# Patient Record
Sex: MALE | Race: WHITE | NOT HISPANIC OR LATINO | Employment: OTHER | URBAN - METROPOLITAN AREA
[De-identification: names, ages, dates, MRNs, and addresses within clinical notes are randomized per-mention and may not be internally consistent; named-entity substitution may affect disease eponyms.]

---

## 2017-01-01 ENCOUNTER — HOSPITAL ENCOUNTER (EMERGENCY)
Facility: HOSPITAL | Age: 77
Discharge: HOME | End: 2017-01-01
Payer: MEDICARE

## 2017-01-01 ENCOUNTER — NURSE TRIAGE (OUTPATIENT)
Dept: ADMINISTRATIVE | Facility: CLINIC | Age: 77
End: 2017-01-01

## 2017-01-01 VITALS
WEIGHT: 180 LBS | OXYGEN SATURATION: 95 % | TEMPERATURE: 99 F | BODY MASS INDEX: 29.99 KG/M2 | HEIGHT: 65 IN | RESPIRATION RATE: 17 BRPM | DIASTOLIC BLOOD PRESSURE: 57 MMHG | SYSTOLIC BLOOD PRESSURE: 124 MMHG | HEART RATE: 78 BPM

## 2017-01-01 DIAGNOSIS — J18.9 PNEUMONIA OF LEFT LOWER LOBE DUE TO INFECTIOUS ORGANISM: Primary | ICD-10-CM

## 2017-01-01 DIAGNOSIS — R05.9 COUGH: ICD-10-CM

## 2017-01-01 DIAGNOSIS — R51.9 HEADACHE: ICD-10-CM

## 2017-01-01 LAB
ALBUMIN SERPL BCP-MCNC: 3.5 G/DL
ALLENS TEST: ABNORMAL
ALP SERPL-CCNC: 78 U/L
ALT SERPL W/O P-5'-P-CCNC: 28 U/L
ANION GAP SERPL CALC-SCNC: 12 MMOL/L
AST SERPL-CCNC: 43 U/L
BASOPHILS # BLD AUTO: 0.03 K/UL
BASOPHILS NFR BLD: 0.2 %
BILIRUB SERPL-MCNC: 1.2 MG/DL
BILIRUB UR QL STRIP: NEGATIVE
BNP SERPL-MCNC: 91 PG/ML
BUN SERPL-MCNC: 11 MG/DL
CALCIUM SERPL-MCNC: 8.9 MG/DL
CHLORIDE SERPL-SCNC: 103 MMOL/L
CLARITY UR: CLEAR
CO2 SERPL-SCNC: 19 MMOL/L
COLOR UR: YELLOW
CREAT SERPL-MCNC: 1.1 MG/DL
DELSYS: ABNORMAL
DIFFERENTIAL METHOD: ABNORMAL
EOSINOPHIL # BLD AUTO: 0.1 K/UL
EOSINOPHIL NFR BLD: 0.4 %
ERYTHROCYTE [DISTWIDTH] IN BLOOD BY AUTOMATED COUNT: 14 %
EST. GFR  (AFRICAN AMERICAN): >60 ML/MIN/1.73 M^2
EST. GFR  (NON AFRICAN AMERICAN): >60 ML/MIN/1.73 M^2
FLOW: 2
FLUAV AG SPEC QL IA: NEGATIVE
FLUBV AG SPEC QL IA: NEGATIVE
GLUCOSE SERPL-MCNC: 110 MG/DL
GLUCOSE UR QL STRIP: NEGATIVE
HCO3 UR-SCNC: 22.8 MMOL/L (ref 24–28)
HCT VFR BLD AUTO: 43.3 %
HGB BLD-MCNC: 14.9 G/DL
HGB UR QL STRIP: ABNORMAL
INR PPP: 1.1
KETONES UR QL STRIP: NEGATIVE
LACTATE SERPL-SCNC: 1.6 MMOL/L
LEUKOCYTE ESTERASE UR QL STRIP: NEGATIVE
LYMPHOCYTES # BLD AUTO: 0.6 K/UL
LYMPHOCYTES NFR BLD: 4.6 %
MCH RBC QN AUTO: 32.3 PG
MCHC RBC AUTO-ENTMCNC: 34.4 %
MCV RBC AUTO: 94 FL
MICROSCOPIC COMMENT: NORMAL
MODE: ABNORMAL
MONOCYTES # BLD AUTO: 1.1 K/UL
MONOCYTES NFR BLD: 8.5 %
NEUTROPHILS # BLD AUTO: 11.2 K/UL
NEUTROPHILS NFR BLD: 86.3 %
NITRITE UR QL STRIP: NEGATIVE
PCO2 BLDA: 35.8 MMHG (ref 35–45)
PH SMN: 7.41 [PH] (ref 7.35–7.45)
PH UR STRIP: 6 [PH] (ref 5–8)
PLATELET # BLD AUTO: 191 K/UL
PMV BLD AUTO: 9.2 FL
PO2 BLDA: 63 MMHG (ref 80–100)
POC BE: -2 MMOL/L
POC SATURATED O2: 92 % (ref 95–100)
POTASSIUM SERPL-SCNC: 4.9 MMOL/L
PROT SERPL-MCNC: 7.5 G/DL
PROT UR QL STRIP: NEGATIVE
PROTHROMBIN TIME: 11.1 SEC
RBC # BLD AUTO: 4.62 M/UL
RBC #/AREA URNS HPF: 1 /HPF (ref 0–4)
SAMPLE: ABNORMAL
SITE: ABNORMAL
SODIUM SERPL-SCNC: 134 MMOL/L
SP GR UR STRIP: <=1.005 (ref 1–1.03)
SPECIMEN SOURCE: NORMAL
SQUAMOUS #/AREA URNS HPF: 1 /HPF
TROPONIN I SERPL DL<=0.01 NG/ML-MCNC: <0.006 NG/ML
URN SPEC COLLECT METH UR: ABNORMAL
UROBILINOGEN UR STRIP-ACNC: NEGATIVE EU/DL
WBC # BLD AUTO: 12.97 K/UL

## 2017-01-01 PROCEDURE — 63600175 PHARM REV CODE 636 W HCPCS: Performed by: PHYSICIAN ASSISTANT

## 2017-01-01 PROCEDURE — 94640 AIRWAY INHALATION TREATMENT: CPT

## 2017-01-01 PROCEDURE — 81000 URINALYSIS NONAUTO W/SCOPE: CPT

## 2017-01-01 PROCEDURE — 83880 ASSAY OF NATRIURETIC PEPTIDE: CPT

## 2017-01-01 PROCEDURE — 87400 INFLUENZA A/B EACH AG IA: CPT

## 2017-01-01 PROCEDURE — 25000003 PHARM REV CODE 250: Performed by: PHYSICIAN ASSISTANT

## 2017-01-01 PROCEDURE — 80053 COMPREHEN METABOLIC PANEL: CPT

## 2017-01-01 PROCEDURE — 85025 COMPLETE CBC W/AUTO DIFF WBC: CPT

## 2017-01-01 PROCEDURE — 99900035 HC TECH TIME PER 15 MIN (STAT)

## 2017-01-01 PROCEDURE — 84484 ASSAY OF TROPONIN QUANT: CPT

## 2017-01-01 PROCEDURE — 87040 BLOOD CULTURE FOR BACTERIA: CPT

## 2017-01-01 PROCEDURE — 96361 HYDRATE IV INFUSION ADD-ON: CPT

## 2017-01-01 PROCEDURE — 93005 ELECTROCARDIOGRAM TRACING: CPT

## 2017-01-01 PROCEDURE — 96365 THER/PROPH/DIAG IV INF INIT: CPT

## 2017-01-01 PROCEDURE — 85610 PROTHROMBIN TIME: CPT

## 2017-01-01 PROCEDURE — 25000242 PHARM REV CODE 250 ALT 637 W/ HCPCS: Performed by: PHYSICIAN ASSISTANT

## 2017-01-01 PROCEDURE — 99284 EMERGENCY DEPT VISIT MOD MDM: CPT | Mod: 25

## 2017-01-01 PROCEDURE — 83605 ASSAY OF LACTIC ACID: CPT

## 2017-01-01 PROCEDURE — 93010 ELECTROCARDIOGRAM REPORT: CPT | Mod: ,,, | Performed by: INTERNAL MEDICINE

## 2017-01-01 RX ORDER — MOXIFLOXACIN HYDROCHLORIDE 400 MG/1
400 TABLET ORAL DAILY
Qty: 10 TABLET | Refills: 0 | Status: SHIPPED | OUTPATIENT
Start: 2017-01-01 | End: 2017-01-03 | Stop reason: ALTCHOICE

## 2017-01-01 RX ORDER — IBUPROFEN 800 MG/1
800 TABLET ORAL EVERY 6 HOURS PRN
Qty: 20 TABLET | Refills: 0 | Status: SHIPPED | OUTPATIENT
Start: 2017-01-01 | End: 2017-02-02

## 2017-01-01 RX ORDER — MOXIFLOXACIN HYDROCHLORIDE 400 MG/250ML
400 INJECTION, SOLUTION INTRAVENOUS
Status: COMPLETED | OUTPATIENT
Start: 2017-01-01 | End: 2017-01-01

## 2017-01-01 RX ORDER — IPRATROPIUM BROMIDE AND ALBUTEROL SULFATE 2.5; .5 MG/3ML; MG/3ML
3 SOLUTION RESPIRATORY (INHALATION)
Status: COMPLETED | OUTPATIENT
Start: 2017-01-01 | End: 2017-01-01

## 2017-01-01 RX ADMIN — SODIUM CHLORIDE 1000 ML: 0.9 INJECTION, SOLUTION INTRAVENOUS at 03:01

## 2017-01-01 RX ADMIN — MOXIFLOXACIN HYDROCHLORIDE 400 MG: 400 INJECTION, SOLUTION INTRAVENOUS at 05:01

## 2017-01-01 RX ADMIN — IPRATROPIUM BROMIDE AND ALBUTEROL SULFATE 3 ML: .5; 3 SOLUTION RESPIRATORY (INHALATION) at 03:01

## 2017-01-01 NOTE — ED NOTES
Bed rails are up and call light is within patient reach. Spouse at bedside, will continue to monitor

## 2017-01-01 NOTE — ED NOTES
Bed rails are up and call light is within patient reach. Spouse at bedside, will continue to monitor and assist.

## 2017-01-01 NOTE — ED AVS SNAPSHOT
[facility] -   [address] [address]               Doron Domínguez   2017  2:32 PM   ED    Description:  Male : 1940   Department:  [facility]           Your Care was Coordinated By:     Provider Role From To    SCARLET Boyer Physician Assistant 17 1980 --      Reason for Visit     Headache           Diagnoses this Visit        Comments    Pneumonia of left lower lobe due to infectious organism    -  Primary     Cough         Headache           ED Disposition     None           To Do List           Follow-up Information     Follow up with Dante Massey MD. Go in 2 days.    Specialty:  Pulmonary Disease    Contact information:    [address]  [facility] [address] [phone]        [facility]     [facility] -  Assistance  Registered nurses in the [facility] provide clinical advisement, health education, appointment booking, and other advisory services.  Call for this free service at [phone].             Medications           Message regarding Medications     Verify the changes and/or additions to your medication regime listed below are the same as discussed with your clinician today.  If any of these changes or additions are incorrect, please notify your healthcare provider.        These medications were administered today        Dose Freq    sodium chloride 0.9% bolus 1,000 mL 1,000 mL ED 1 Time    Sig: Inject 1,000 mLs into the vein ED 1 Time.    Class: Normal    Route: Intravenous    Cosign for Ordering: Accepted by David Padilla MD on 2017  2:49 PM    albuterol-ipratropium 2.5mg-0.5mg/3mL nebulizer solution 3 mL 3 mL ED 1 Time    Sig: Take 3 mLs by nebulization ED 1 Time.    Class: Normal    Route: Nebulization    Cosign for Ordering: Accepted by David Padilla MD on 2017  2:49 PM    moxifloxacin 400 mg/250 mL IVPB 400 mg 400 mg ED 1 Time  "   Sig: Inject 250 mLs (400 mg total) into the vein ED 1 Time.    Class: Normal    Route: Intravenous    Cosign for Ordering: Accepted by David Padilla MD on 1/1/2017  4:28 PM           Verify that the below list of medications is an accurate representation of the medications you are currently taking.  If none reported, the list may be blank. If incorrect, please contact your healthcare provider. Carry this list with you in case of emergency.           Current Medications     albuterol 90 mcg/actuation inhaler Inhale 2 puffs into the lungs every 4 (four) hours as needed for Wheezing.    albuterol-ipratropium 2.5mg-0.5mg/3mL (DUO-NEB) 0.5 mg-3 mg(2.5 mg base)/3 mL nebulizer solution Take 3 mLs by nebulization every 6 (six) hours.    atorvastatin (LIPITOR) 20 MG tablet Take 20 mg by mouth every evening.     budesonide (PULMICORT) 0.5 mg/2 mL nebulizer solution Take 2 mLs (0.5 mg total) by nebulization 2 (two) times daily. Wash out mouth after using.    glucosamine-chondroitin 500-400 mg tablet Take 1 tablet by mouth 2 (two) times daily.     inhalation device (AEROCHAMBER PLUS FLOW-VU) Use as directed for inhalation.    multivitamin with minerals tablet Take 1 tablet by mouth once daily.    potassium gluconate 595 mg (99 mg) Tab Take by mouth.    tamsulosin (FLOMAX) 0.4 mg Cp24 Take 0.4 mg by mouth once daily.     albuterol-ipratropium 2.5mg-0.5mg/3mL nebulizer solution 3 mL Take 3 mLs by nebulization ED 1 Time.    metoprolol succinate (TOPROL-XL) 25 MG 24 hr tablet Take 25 mg by mouth once daily.     moxifloxacin 400 mg/250 mL IVPB 400 mg Inject 250 mLs (400 mg total) into the vein ED 1 Time.           Clinical Reference Information           Your Vitals Were     BP Pulse Temp Resp Height Weight    122/59 77 98.7 °F (37.1 °C) (Oral) 19 5' 5" (1.651 m) 81.6 kg (180 lb)    SpO2 BMI             94% 29.95 kg/m2         Allergies as of 1/1/2017        Reactions    Bactrim [Sulfamethoxazole-trimethoprim] Other (See " Comments)    Severe leg cramps, dizziness      Immunizations Administered on Date of Encounter - 1/1/2017     None      ED Micro, Lab, POCT     Start Ordered       Status Ordering Provider    01/01/17 1711 01/01/17 1711  ISTAT PROCEDURE  Once      Final result     01/01/17 1628 01/01/17 1627  Blood Culture #1 **CANNOT BE ORDERED STAT**  Once      In process     01/01/17 1628 01/01/17 1627  Blood Culture #2 **CANNOT BE ORDERED STAT**  Once      Acknowledged     01/01/17 1532 01/01/17 1532  Influenza antigen Nasopharyngeal Swab  Once      Final result     01/01/17 1448 01/01/17 1448  Troponin I  STAT      Final result     01/01/17 1448 01/01/17 1448  Lactic acid, plasma  STAT      Final result     01/01/17 1448 01/01/17 1448  CBC auto differential  STAT      Final result     01/01/17 1448 01/01/17 1448  Comprehensive metabolic panel  STAT      Final result     01/01/17 1448 01/01/17 1448  Brain natriuretic peptide  STAT      Final result     01/01/17 1448 01/01/17 1448  Urinalysis  STAT      Final result     01/01/17 1448 01/01/17 1448  Protime-INR  STAT      Final result     01/01/17 1448 01/01/17 1448  Urinalysis Microscopic  Once      Final result       ED Imaging Orders     Start Ordered       Status Ordering Provider    01/01/17 1448 01/01/17 1448  X-Ray Chest PA And Lateral  1 time imaging      Final result     01/01/17 1448 01/01/17 1448    1 time imaging,   Status:  Canceled      Canceled         Discharge Instructions         Pneumonia (Adult)  Pneumonia is an infection deep within the lungs. It is in the small air sacs (alveoli). Pneumonia may be caused by a virus or bacteria. Pneumonia caused by bacteria is usually treated with an antibiotic. Severe cases may need to be treated in the hospital. Milder cases can be treated at home. Symptoms usually start to get better during the first 2 days of treatment.    Home care  Follow these guidelines when caring for yourself at home:  · Rest at home for the first 2  to 3 days, or until you feel stronger. Dont let yourself get overly tired when you go back to your activities.  · Stay away from cigarette smoke - yours or other peoples.  · You may use acetaminophen or ibuprofen to control fever or pain, unless another medicine was prescribed. If you have chronic liver or kidney disease, talk with your health care provider before using these medicines. Also talk with your provider if youve had a stomach ulcer or GI bleeding. Dont give aspirin to anyone younger than 18 years of age who is ill with a fever. It may cause severe liver damage.  · Your appetite may be poor, so a light diet is fine.  · Drink 6 to 8 glasses of fluids every day to make sure you are getting enough fluids. Beverages can include water, sport drinks, sodas without caffeine, juices, tea, or soup. Fluids will help loosen secretions in the lung. This will make it easier for you to cough up the phlegm (sputum). If you also have heart or kidney disease, check with your health care provider before you drink extra fluids.  · Take antibiotic medicine prescribed until it is all gone, even if you are feeling better after a few days.  Follow-up care  Follow up with your health care provider in the next 2 to 3 days, or as advised. This is to be sure the medicine is helping you get better.  If you are 65 or older, you should get a pneumococcal vaccine and a yearly flu (influenza) shot. You should also get these vaccines if you have chronic lung disease like asthma, emphysema, or COPD. Ask your provider about this.  When to seek medical advice  Call your health care provider right away if any of these occur:  · You dont get better within the first 48 hours of treatment  · Shortness of breath gets worse  · Rapid breathing (more than 25 breaths per minute)  · Coughing up blood  · Chest pain gets worse with breathing  · Fever of 102°F (38°C) or higher that doesnt get better with fever medicine  · Weakness, dizziness, or  fainting that gets worse  · Thirst or dry mouth that gets worse  · Sinus pain, headache, or a stiff neck  · Chest pain not caused by coughing  © 5950-9670 [employer] [address] [place] [address], [place] [address]. All rights reserved. This information is not intended as a substitute for professional medical care. Always follow your healthcare professional's instructions.          Your Scheduled Appointments     Jan 03, 2017  8:00 AM CST   Established Patient Visit with MD [facility] - [facility] ([facility])    [address] [place] [id number]   [phone]            Jan 04, 2017  1:00 PM CST   Consult with Negrito Hernandez MD   [facility] - General Surgery ([facility])    [address] [place] [id number]   [phone]            Jan 04, 2017  3:20 PM CST   Established Patient Visit with MD JUSTA [facility] - Hematology Oncology ([facility])    [address] [place] [id number] [phone]            Mar 29, 2017  3:40 PM CDT   Established Patient Visit with Jaren Shelton MD   [facility] Cardiology ([facility])    [address] [place] [id number] [phone]               [facility] complies with applicable Federal civil rights laws and does not discriminate on the basis of race, color, national origin, age, disability, or sex.        Language Assistance Services     ATTENTION: Language assistance services are available, free of charge. Please call [phone].      ATENCIÓN: Si habla español, tiene a moreno disposición servicios gratuitos de asistencia lingüística. Llame al [phone].     CHÚ Ý: N?u b?n nói Ti?ng Vi?t, có các d?ch v? h? tr? ngôn ng? mi?n phí dành cho b?n. G?i s? [phone].

## 2017-01-01 NOTE — DISCHARGE INSTRUCTIONS
Pneumonia (Adult)  Pneumonia is an infection deep within the lungs. It is in the small air sacs (alveoli). Pneumonia may be caused by a virus or bacteria. Pneumonia caused by bacteria is usually treated with an antibiotic. Severe cases may need to be treated in the hospital. Milder cases can be treated at home. Symptoms usually start to get better during the first 2 days of treatment.    Home care  Follow these guidelines when caring for yourself at home:  · Rest at home for the first 2 to 3 days, or until you feel stronger. Dont let yourself get overly tired when you go back to your activities.  · Stay away from cigarette smoke - yours or other peoples.  · You may use acetaminophen or ibuprofen to control fever or pain, unless another medicine was prescribed. If you have chronic liver or kidney disease, talk with your health care provider before using these medicines. Also talk with your provider if youve had a stomach ulcer or GI bleeding. Dont give aspirin to anyone younger than 18 years of age who is ill with a fever. It may cause severe liver damage.  · Your appetite may be poor, so a light diet is fine.  · Drink 6 to 8 glasses of fluids every day to make sure you are getting enough fluids. Beverages can include water, sport drinks, sodas without caffeine, juices, tea, or soup. Fluids will help loosen secretions in the lung. This will make it easier for you to cough up the phlegm (sputum). If you also have heart or kidney disease, check with your health care provider before you drink extra fluids.  · Take antibiotic medicine prescribed until it is all gone, even if you are feeling better after a few days.  Follow-up care  Follow up with your health care provider in the next 2 to 3 days, or as advised. This is to be sure the medicine is helping you get better.  If you are 65 or older, you should get a pneumococcal vaccine and a yearly flu (influenza) shot. You should also get these vaccines if you have  chronic lung disease like asthma, emphysema, or COPD. Ask your provider about this.  When to seek medical advice  Call your health care provider right away if any of these occur:  · You dont get better within the first 48 hours of treatment  · Shortness of breath gets worse  · Rapid breathing (more than 25 breaths per minute)  · Coughing up blood  · Chest pain gets worse with breathing  · Fever of 102°F (38°C) or higher that doesnt get better with fever medicine  · Weakness, dizziness, or fainting that gets worse  · Thirst or dry mouth that gets worse  · Sinus pain, headache, or a stiff neck  · Chest pain not caused by coughing  © 9243-5476 [employer]. [address] [place] [address], [place] [address]. All rights reserved. This information is not intended as a substitute for professional medical care. Always follow your healthcare professional's instructions.

## 2017-01-01 NOTE — ED PROVIDER NOTES
"Encounter Date: 1/1/2017       History     Chief Complaint   Patient presents with    Headache     Pt states, "I have a bad headache and no appetite, had fever at home and took some tylenol". PT was just diagnosed with lung cancer on 12/23.     Review of patient's allergies indicates:   Allergen Reactions    Bactrim [sulfamethoxazole-trimethoprim] Other (See Comments)     Severe leg cramps, dizziness     Patient is a 76 y.o. male presenting with the following complaint: headaches. The history is provided by the patient.   Headache    This is a new problem. The current episode started today. The problem occurs constantly. The problem has been unchanged. The pain is located in the bilateral region. The pain does not radiate. The pain quality is not similar to prior headaches. The quality of the pain is described as aching. The pain is at a severity of 4/10. Pertinent negatives include no nausea, neck pain, phonophobia, photophobia, scalp tenderness, seizures, sinus pressure, sore throat, swollen glands, tingling, tinnitus or visual change. The symptoms are aggravated by coughing. He has tried acetaminophen for the symptoms. The treatment provided mild relief. His past medical history is significant for cancer.     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  Past Surgical History   Procedure Laterality Date    Coronary artery bypass graft  2012     C[facility]G x 3 at [facility]     History reviewed. No pertinent family history.  Social History   Substance Use Topics    Smoking status: Never Smoker    Smokeless tobacco: Never Used    Alcohol use No     Review of Systems   HENT: Negative for sinus pressure, sore throat and tinnitus.    Eyes: Negative for photophobia. " "  Gastrointestinal: Negative for nausea.   Endocrine: Negative for polydipsia and polyphagia.   Musculoskeletal: Negative for neck pain.   Neurological: Positive for headaches. Negative for tingling and seizures.   Psychiatric/Behavioral: The patient is not nervous/anxious.    All other systems reviewed and are negative.      Physical Exam   Initial Vitals   BP Pulse Resp Temp SpO2   01/01/17 1425 01/01/17 1425 01/01/17 1425 01/01/17 1425 01/01/17 1425   128/68 98 20 98.7 °F (37.1 °C) 92 %     Physical Exam    Nursing note and vitals reviewed.  Constitutional: He appears well-developed and well-nourished.   HENT:   Head: Normocephalic and atraumatic.   Right Ear: External ear normal.   Left Ear: External ear normal.   Nose: Nose normal.   Mouth/Throat: Oropharynx is clear and moist.   Eyes: Conjunctivae and EOM are normal. Pupils are equal, round, and reactive to light.   Neck: Normal range of motion. Neck supple.   Cardiovascular: Normal rate, regular rhythm, normal heart sounds and intact distal pulses.   Pulmonary/Chest: Breath sounds normal. No respiratory distress. He has no wheezes. He has no rhonchi. He has no rales.   Abdominal: Soft. Bowel sounds are normal. He exhibits no distension. There is no tenderness. There is no rebound and no guarding.   Musculoskeletal: Normal range of motion.   Neurological: He is alert and oriented to person, place, and time. He has normal strength.   Skin: Skin is warm and dry.   Psychiatric: He has a normal mood and affect. His behavior is normal. Judgment and thought content normal.         ED Course   Procedures  ED Vital Signs:  Vitals:    01/01/17 1425 01/01/17 1502 01/01/17 1512 01/01/17 1516   BP: 128/68  121/69 120/62   Pulse: 98 88 88 88   Resp: 20 20 20 (!) 24   Temp: 98.7 °F (37.1 °C)      TempSrc: Oral      SpO2: (!) 92% (!) 93% (!) 93% (!) 92%   Weight: 81.6 kg (180 lb)      Height: 5' 5" (1.651 m)       01/01/17 1531 01/01/17 1537 01/01/17 1601 01/01/17 1607   BP: " 106/62  (!) 111/54    Pulse: 87 86 79 81   Resp: 19 (!) 21 14 19   Temp:       TempSrc:       SpO2: (!) 94% 96% 95% 97%   Weight:       Height:        01/01/17 1616 01/01/17 1631 01/01/17 1646   BP: 132/60 (!) 119/58 (!) 122/59   Pulse: 80 79 77   Resp: (!) 21 10 19   Temp:      TempSrc:      SpO2: 95% 96% (!) 94%   Weight:      Height:            Abnormal Lab Results:  Labs Reviewed   CBC W/ AUTO DIFFERENTIAL - Abnormal; Notable for the following:        Result Value    WBC 12.97 (*)     MCH 32.3 (*)     Gran # 11.2 (*)     Lymph # 0.6 (*)     Mono # 1.1 (*)     Gran% 86.3 (*)     Lymph% 4.6 (*)     All other components within normal limits   COMPREHENSIVE METABOLIC PANEL - Abnormal; Notable for the following:     Sodium 134 (*)     CO2 19 (*)     Total Bilirubin 1.2 (*)     AST 43 (*)     All other components within normal limits   URINALYSIS - Abnormal; Notable for the following:     Specific Gravity, UA <=1.005 (*)     Occult Blood UA 1+ (*)     All other components within normal limits   ISTAT PROCEDURE - Abnormal; Notable for the following:     POC PO2 63 (*)     POC HCO3 22.8 (*)     POC SATURATED O2 92 (*)     All other components within normal limits   CULTURE, BLOOD   CULTURE, BLOOD   TROPONIN I   LACTIC ACID, PLASMA   B-TYPE NATRIURETIC PEPTIDE   PROTIME-INR   INFLUENZA A AND B ANTIGEN   URINALYSIS MICROSCOPIC          All Lab Results:  Results for orders placed or performed during the hospital encounter of 01/01/17   Troponin I   Result Value Ref Range    Troponin I <0.006 0.000 - 0.026 ng/mL   Lactic acid, plasma   Result Value Ref Range    Lactate (Lactic Acid) 1.6 0.5 - 2.2 mmol/L   CBC auto differential   Result Value Ref Range    WBC 12.97 (H) 3.90 - 12.70 K/uL    RBC 4.62 4.60 - 6.20 M/uL    Hemoglobin 14.9 14.0 - 18.0 g/dL    Hematocrit 43.3 40.0 - 54.0 %    MCV 94 82 - 98 fL    MCH 32.3 (H) 27.0 - 31.0 pg    MCHC 34.4 32.0 - 36.0 %    RDW 14.0 11.5 - 14.5 %    Platelets 191 150 - 350 K/uL    MPV  9.2 9.2 - 12.9 fL    Gran # 11.2 (H) 1.8 - 7.7 K/uL    Lymph # 0.6 (L) 1.0 - 4.8 K/uL    Mono # 1.1 (H) 0.3 - 1.0 K/uL    Eos # 0.1 0.0 - 0.5 K/uL    Baso # 0.03 0.00 - 0.20 K/uL    Gran% 86.3 (H) 38.0 - 73.0 %    Lymph% 4.6 (L) 18.0 - 48.0 %    Mono% 8.5 4.0 - 15.0 %    Eosinophil% 0.4 0.0 - 8.0 %    Basophil% 0.2 0.0 - 1.9 %    Differential Method Automated    Comprehensive metabolic panel   Result Value Ref Range    Sodium 134 (L) 136 - 145 mmol/L    Potassium 4.9 3.5 - 5.1 mmol/L    Chloride 103 95 - 110 mmol/L    CO2 19 (L) 23 - 29 mmol/L    Glucose 110 70 - 110 mg/dL    BUN, Bld 11 8 - 23 mg/dL    Creatinine 1.1 0.5 - 1.4 mg/dL    Calcium 8.9 8.7 - 10.5 mg/dL    Total Protein 7.5 6.0 - 8.4 g/dL    Albumin 3.5 3.5 - 5.2 g/dL    Total Bilirubin 1.2 (H) 0.1 - 1.0 mg/dL    Alkaline Phosphatase 78 55 - 135 U/L    AST 43 (H) 10 - 40 U/L    ALT 28 10 - 44 U/L    Anion Gap 12 8 - 16 mmol/L    eGFR if African American >60 >60 mL/min/1.73 m^2    eGFR if non African American >60 >60 mL/min/1.73 m^2   Brain natriuretic peptide   Result Value Ref Range    BNP 91 0 - 99 pg/mL   Urinalysis   Result Value Ref Range    Specimen UA Urine, Clean Catch     Color, UA Yellow Yellow, Straw, Lucita    Appearance, UA Clear Clear    pH, UA 6.0 5.0 - 8.0    Specific Gravity, UA <=1.005 (A) 1.005 - 1.030    Protein, UA Negative Negative    Glucose, UA Negative Negative    Ketones, UA Negative Negative    Bilirubin (UA) Negative Negative    Occult Blood UA 1+ (A) Negative    Nitrite, UA Negative Negative    Urobilinogen, UA Negative <2.0 EU/dL    Leukocytes, UA Negative Negative   Protime-INR   Result Value Ref Range    Prothrombin Time 11.1 9.0 - 12.5 sec    INR 1.1 0.8 - 1.2   Influenza antigen Nasopharyngeal Swab   Result Value Ref Range    Influenza A Ag, EIA Negative Negative    Influenza B Ag, EIA Negative Negative    Flu A & B Source Nasopharyngeal Swab    Urinalysis Microscopic   Result Value Ref Range    RBC, UA 1 0 - 4 /hpf     Squam Epithel, UA 1 /hpf    Microscopic Comment SEE COMMENT    ISTAT PROCEDURE   Result Value Ref Range    POC PH 7.412 7.35 - 7.45    POC PCO2 35.8 35 - 45 mmHg    POC PO2 63 (L) 80 - 100 mmHg    POC HCO3 22.8 (L) 24 - 28 mmol/L    POC BE -2 -2 to 2 mmol/L    POC SATURATED O2 92 (L) 95 - 100 %    Sample ARTERIAL     Site RR     Allens Test Pass     DelSys Nasal Can     Mode SPONT     Flow 2            Imaging Results:  Imaging Results         X-Ray Chest PA And Lateral (Final result) Result time:  01/01/17 15:59:13    Final result by Shyam Regan MD (01/01/17 15:59:13)    Impression:         Left lower lobe infiltrate.  Findings appear worse since 12/16/2016.      Electronically signed by: SHYAM REGAN MD  Date:     01/01/17  Time:    15:59     Narrative:    Exam: XR CHEST PA AND LATERAL,    Date:  01/01/17 15:29:59    History: Cough.    Comparison:  12/16/2016        Findings:     Mild hazy increased density left lung base compatible with a left lower lobe infiltrate.  Finding is worse since 12/16/2016.  Right lung clear.  Heart mildly enlarged with postoperative changes.                 The Emergency Provider reviewed the vital signs and test results, which are outlined above.    ED Discussions:    4:25 PM: Pt is refusing head CT at this time. Pt states that he is concerned about the amount of radiation exposure and notes that he recently had PET scan.     5:06 PM: SCARLET Hogan discussed the pt's case with Sultana Guy NP ([facility]) and requested admission for pt. Malena reviewed pt's records, hx, and x-ray at length. Malena discussed pt's case with the Hospital Medicine physician. Malena states that hospital medicine does not feel the need to admit pt and states that pt can be discharged home.                         Clinical Impression:       ICD-10-CM ICD-9-CM   1. Pneumonia of left lower lobe due to infectious organism J18.9 483.8   2. Cough R05 786.2   3. Headache R51 784.0          Disposition:   Disposition: Discharged  Condition: Stable       SCARLET Boyer  01/01/17 1037

## 2017-01-02 ENCOUNTER — TELEPHONE (OUTPATIENT)
Dept: EMERGENCY MEDICINE | Facility: HOSPITAL | Age: 77
End: 2017-01-02

## 2017-01-03 ENCOUNTER — OFFICE VISIT (OUTPATIENT)
Dept: PULMONOLOGY | Facility: CLINIC | Age: 77
End: 2017-01-03
Payer: MEDICARE

## 2017-01-03 VITALS
WEIGHT: 178.38 LBS | OXYGEN SATURATION: 92 % | HEIGHT: 65 IN | BODY MASS INDEX: 29.72 KG/M2 | SYSTOLIC BLOOD PRESSURE: 118 MMHG | RESPIRATION RATE: 18 BRPM | HEART RATE: 88 BPM | DIASTOLIC BLOOD PRESSURE: 70 MMHG

## 2017-01-03 DIAGNOSIS — R05.9 COUGH: ICD-10-CM

## 2017-01-03 DIAGNOSIS — C34.32 MALIGNANT NEOPLASM OF LOWER LOBE OF LEFT LUNG: ICD-10-CM

## 2017-01-03 DIAGNOSIS — J20.8 ACUTE BRONCHITIS DUE TO OTHER SPECIFIED ORGANISMS: Primary | ICD-10-CM

## 2017-01-03 PROCEDURE — 99214 OFFICE O/P EST MOD 30 MIN: CPT | Mod: S$PBB,,, | Performed by: INTERNAL MEDICINE

## 2017-01-03 PROCEDURE — 99213 OFFICE O/P EST LOW 20 MIN: CPT | Mod: PBBFAC | Performed by: INTERNAL MEDICINE

## 2017-01-03 PROCEDURE — 99999 PR PBB SHADOW E&M-EST. PATIENT-LVL III: CPT | Mod: PBBFAC,,, | Performed by: INTERNAL MEDICINE

## 2017-01-03 RX ORDER — LEVOFLOXACIN 500 MG/1
500 TABLET, FILM COATED ORAL DAILY
Qty: 10 TABLET | Refills: 1 | Status: ON HOLD | OUTPATIENT
Start: 2017-01-03 | End: 2017-01-19 | Stop reason: HOSPADM

## 2017-01-03 RX ORDER — PROMETHAZINE HYDROCHLORIDE AND CODEINE PHOSPHATE 6.25; 1 MG/5ML; MG/5ML
5 SOLUTION ORAL EVERY 6 HOURS PRN
Qty: 473 ML | Refills: 1 | Status: ON HOLD | OUTPATIENT
Start: 2017-01-03 | End: 2017-01-19 | Stop reason: HOSPADM

## 2017-01-03 NOTE — PROGRESS NOTES
Subjective:       Patient ID: Doron Domínguez is a 76 y.o. male.    Chief Complaint:   He   presents for evaluation and treatment of lung cancer after being discharged from the hospital  3  days ago. Since discharge symptoms have gradually improving course since that time. Patient denies fever or chills. Symptoms are aggravated by activity. Symptoms improve with rest.  Respiratory: positive for cough and dyspnea on exertion; Cardiovascular: no chest pain or palpitations.  Patient currently is not on home oxygen therapy..    Malignant Neoplasm Of The lower left lobe    HPI  Past Medical History   Diagnosis Date    Aortic stenosis 12/29/2016    BPH (benign prostatic hyperplasia)     CAD (coronary artery disease)     CAD, multiple vessel 12/29/2016    Cancer     High cholesterol     Hx of CABG 12/29/2016    Hypertension      Past Surgical History   Procedure Laterality Date    Coronary artery bypass graft  2012     [facility] at [facility]     Social History     Social History    Marital status:      Spouse name: N/A    Number of children: N/A    Years of education: N/A     Occupational History    Not on file.     Social History Main Topics    Smoking status: Never Smoker    Smokeless tobacco: Never Used    Alcohol use No    Drug use: Not on file    Sexual activity: Not on file     Other Topics Concern    Not on file     Social History Narrative     Review of Systems   Constitutional: Positive for fatigue. Negative for fever.   HENT: Negative for postnasal drip, rhinorrhea and congestion.    Respiratory: Positive for cough, sputum production, shortness of breath, dyspnea on extertion and Paroxysmal Nocturnal Dyspnea. Negative for use of rescue inhaler.    Cardiovascular: Negative for chest pain, palpitations and leg swelling.   Skin: Negative for rash.   Gastrointestinal: Negative for nausea and abdominal pain.   Neurological: Negative for dizziness, syncope, weakness and light-headedness.    Hematological: Negative for adenopathy. Does not bruise/bleed easily.   Psychiatric/Behavioral: Negative for sleep disturbance. The patient is not nervous/anxious.        Objective:      Physical Exam   Constitutional: He is oriented to person, place, and time. He appears well-developed and well-nourished.   HENT:   Head: Normocephalic and atraumatic.   Mouth/Throat: No oropharyngeal exudate.   Eyes: Conjunctivae are normal. Pupils are equal, round, and reactive to light.   Neck: Neck supple. No JVD present. No tracheal deviation present. No thyromegaly present.   Cardiovascular: Normal rate, regular rhythm and normal heart sounds.    No murmur heard.  Pulmonary/Chest: Effort normal. He has decreased breath sounds in the left lower field. He has no wheezes. He has no rhonchi. He has rales in the left lower field.   Abdominal: Soft. Bowel sounds are normal.   Musculoskeletal: Normal range of motion. He exhibits no edema or tenderness.   Lymphadenopathy:     He has no cervical adenopathy.   Neurological: He is alert and oriented to person, place, and time.   Skin: Skin is warm and dry.   Nursing note and vitals reviewed.    Personal Diagnostic Review  Chest x-ray: left lower lobe  PET: left lower lobe uptake    .GMGPFTNEW  No flowsheet data found.      Assessment:       1. Acute bronchitis due to other specified organisms    2. Cough    3. Malignant neoplasm of lower lobe of left lung        Outpatient Encounter Prescriptions as of 1/3/2017   Medication Sig Dispense Refill    albuterol 90 mcg/actuation inhaler Inhale 2 puffs into the lungs every 4 (four) hours as needed for Wheezing. 8.5 g 1    albuterol-ipratropium 2.5mg-0.5mg/3mL (DUO-NEB) 0.5 mg-3 mg(2.5 mg base)/3 mL nebulizer solution Take 3 mLs by nebulization every 6 (six) hours. 120 vial 12    atorvastatin (LIPITOR) 20 MG tablet Take 20 mg by mouth every evening.       budesonide (PULMICORT) 0.5 mg/2 mL nebulizer solution Take 2 mLs (0.5 mg total) by  nebulization 2 (two) times daily. Wash out mouth after using. 120 mL 12    glucosamine-chondroitin 500-400 mg tablet Take 1 tablet by mouth 2 (two) times daily.       ibuprofen (ADVIL,MOTRIN) 800 MG tablet Take 1 tablet (800 mg total) by mouth every 6 (six) hours as needed for Pain. 20 tablet 0    inhalation device (AEROCHAMBER PLUS FLOW-VU) Use as directed for inhalation. 1 Device 0    metoprolol succinate (TOPROL-XL) 25 MG 24 hr tablet Take 25 mg by mouth once daily.       multivitamin with minerals tablet Take 1 tablet by mouth once daily.      potassium gluconate 595 mg (99 mg) Tab Take by mouth.      tamsulosin (FLOMAX) 0.4 mg Cp24 Take 0.4 mg by mouth once daily.       [DISCONTINUED] moxifloxacin (AVELOX) 400 mg tablet Take 1 tablet (400 mg total) by mouth once daily. 10 tablet 0    levoFLOXacin (LEVAQUIN) 500 MG tablet Take 1 tablet (500 mg total) by mouth once daily. 10 tablet 1    promethazine-codeine 6.25-10 mg/5 ml (PHENERGAN WITH CODEINE) 6.25-10 mg/5 mL syrup Take 5 mLs by mouth every 6 (six) hours as needed for Cough. 473 mL 1     No facility-administered encounter medications on file as of 1/3/2017.      No orders of the defined types were placed in this encounter.    Plan:       Requested Prescriptions     Signed Prescriptions Disp Refills    levoFLOXacin (LEVAQUIN) 500 MG tablet 10 tablet 1     Sig: Take 1 tablet (500 mg total) by mouth once daily.    promethazine-codeine 6.25-10 mg/5 ml (PHENERGAN WITH CODEINE) 6.25-10 mg/5 mL syrup 473 mL 1     Sig: Take 5 mLs by mouth every 6 (six) hours as needed for Cough.     Acute bronchitis due to other specified organisms  -     levoFLOXacin (LEVAQUIN) 500 MG tablet; Take 1 tablet (500 mg total) by mouth once daily.  Dispense: 10 tablet; Refill: 1    Cough  -     promethazine-codeine 6.25-10 mg/5 ml (PHENERGAN WITH CODEINE) 6.25-10 mg/5 mL syrup; Take 5 mLs by mouth every 6 (six) hours as needed for Cough.  Dispense: 473 mL; Refill:  1    Malignant neoplasm of lower lobe of left lung           Return if symptoms worsen or fail to improve.

## 2017-01-03 NOTE — MR AVS SNAPSHOT
[facility]  [address]  [place] [address]  Phone: [phone]  Fax: [phone]                  Doron Domínguez   1/3/2017 8:00 AM   Office Visit    Description:  Male : 1940   Provider:  Dante Massey MD   [facility]  [facility]           Reason for Visit     Malignant Neoplasm Of The lower left lobe           Diagnoses this Visit        Comments    Acute bronchitis due to other specified organisms    -  Primary     Cough         Malignant neoplasm of lower lobe of left lung                To Do List           Future Appointments        Provider Department Dept Phone    2017 1:00 PM Negrito Hernandez MD [facility] General Surgery [phone]    2017 3:20 PM Shant Leon MD [facility] - Hematology Oncology [phone]    3/29/2017 3:40 PM Jaren Shelton MD [facility] - Cardiology [phone]      Goals (5 Years of Data)     None      Follow-Up and Disposition     Return if symptoms worsen or fail to improve.    Follow-up and Disposition History       These Medications        Disp Refills Start End    levoFLOXacin (LEVAQUIN) 500 MG tablet 10 tablet 1 1/3/2017 2017    Take 1 tablet (500 mg total) by mouth once daily. - Oral    Pharmacy: [address] [address] [facility] Ph #: [phone]       promethazine-codeine 6.25-10 mg/5 ml (PHENERGAN WITH CODEINE) 6.25-10 mg/5 mL syrup 473 mL 1 1/3/2017 2017    Take 5 mLs by mouth every 6 (six) hours as needed for Cough. - Oral    Pharmacy: [address] - [address] [facility] Ph #: [phone]         [facility]     [facility] On [facility] Nurse Care Line -  Assistance  Registered nurses in the [facility] On Call Center provide clinical advisement, health education, appointment booking, and other advisory services.  Call for this free service at [phone].             Medications           Message  regarding Medications     Verify the changes and/or additions to your medication regime listed below are the same as discussed with your clinician today.  If any of these changes or additions are incorrect, please notify your healthcare provider.        START taking these NEW medications        Refills    levoFLOXacin (LEVAQUIN) 500 MG tablet 1    Sig: Take 1 tablet (500 mg total) by mouth once daily.    Class: Normal    Route: Oral    promethazine-codeine 6.25-10 mg/5 ml (PHENERGAN WITH CODEINE) 6.25-10 mg/5 mL syrup 1    Sig: Take 5 mLs by mouth every 6 (six) hours as needed for Cough.    Class: Normal    Route: Oral      STOP taking these medications     moxifloxacin (AVELOX) 400 mg tablet Take 1 tablet (400 mg total) by mouth once daily.           Verify that the below list of medications is an accurate representation of the medications you are currently taking.  If none reported, the list may be blank. If incorrect, please contact your healthcare provider. Carry this list with you in case of emergency.           Current Medications     albuterol 90 mcg/actuation inhaler Inhale 2 puffs into the lungs every 4 (four) hours as needed for Wheezing.    albuterol-ipratropium 2.5mg-0.5mg/3mL (DUO-NEB) 0.5 mg-3 mg(2.5 mg base)/3 mL nebulizer solution Take 3 mLs by nebulization every 6 (six) hours.    atorvastatin (LIPITOR) 20 MG tablet Take 20 mg by mouth every evening.     budesonide (PULMICORT) 0.5 mg/2 mL nebulizer solution Take 2 mLs (0.5 mg total) by nebulization 2 (two) times daily. Wash out mouth after using.    glucosamine-chondroitin 500-400 mg tablet Take 1 tablet by mouth 2 (two) times daily.     ibuprofen (ADVIL,MOTRIN) 800 MG tablet Take 1 tablet (800 mg total) by mouth every 6 (six) hours as needed for Pain.    inhalation device (AEROCHAMBER PLUS FLOW-VU) Use as directed for inhalation.    metoprolol succinate (TOPROL-XL) 25 MG 24 hr tablet Take 25 mg by mouth once daily.     multivitamin with minerals  "tablet Take 1 tablet by mouth once daily.    potassium gluconate 595 mg (99 mg) Tab Take by mouth.    tamsulosin (FLOMAX) 0.4 mg Cp24 Take 0.4 mg by mouth once daily.     levoFLOXacin (LEVAQUIN) 500 MG tablet Take 1 tablet (500 mg total) by mouth once daily.    promethazine-codeine 6.25-10 mg/5 ml (PHENERGAN WITH CODEINE) 6.25-10 mg/5 mL syrup Take 5 mLs by mouth every 6 (six) hours as needed for Cough.           Clinical Reference Information           Vital Signs - Last Recorded  Most recent update: 1/3/2017  8:16 AM by Gabriela Bang MA    BP Pulse Resp Ht Wt SpO2    118/70 88 18 5' 5" (1.651 m) 80.9 kg (178 lb 5.6 oz) (!) 92%    BMI                29.68 kg/m2          Blood Pressure          Most Recent Value    BP  118/70      Allergies as of 1/3/2017     Bactrim [Sulfamethoxazole-trimethoprim]      Immunizations Administered on Date of Encounter - 1/3/2017     None      "

## 2017-01-04 ENCOUNTER — OFFICE VISIT (OUTPATIENT)
Dept: HEMATOLOGY/ONCOLOGY | Facility: CLINIC | Age: 77
End: 2017-01-04
Payer: MEDICARE

## 2017-01-04 ENCOUNTER — OFFICE VISIT (OUTPATIENT)
Dept: SURGERY | Facility: CLINIC | Age: 77
End: 2017-01-04
Payer: MEDICARE

## 2017-01-04 VITALS
HEART RATE: 92 BPM | WEIGHT: 177 LBS | BODY MASS INDEX: 29.46 KG/M2 | SYSTOLIC BLOOD PRESSURE: 136 MMHG | TEMPERATURE: 97 F | DIASTOLIC BLOOD PRESSURE: 68 MMHG

## 2017-01-04 VITALS
TEMPERATURE: 99 F | OXYGEN SATURATION: 94 % | DIASTOLIC BLOOD PRESSURE: 68 MMHG | HEART RATE: 89 BPM | BODY MASS INDEX: 29.38 KG/M2 | RESPIRATION RATE: 20 BRPM | HEIGHT: 65 IN | WEIGHT: 176.38 LBS | SYSTOLIC BLOOD PRESSURE: 122 MMHG

## 2017-01-04 DIAGNOSIS — C34.32 MALIGNANT NEOPLASM OF LOWER LOBE OF LEFT LUNG: Primary | ICD-10-CM

## 2017-01-04 DIAGNOSIS — C34.92 PRIMARY LUNG CANCER, LEFT: ICD-10-CM

## 2017-01-04 PROCEDURE — 99214 OFFICE O/P EST MOD 30 MIN: CPT | Mod: S$PBB,,, | Performed by: INTERNAL MEDICINE

## 2017-01-04 PROCEDURE — 99999 PR PBB SHADOW E&M-EST. PATIENT-LVL III: CPT | Mod: PBBFAC,,, | Performed by: INTERNAL MEDICINE

## 2017-01-04 PROCEDURE — 99204 OFFICE O/P NEW MOD 45 MIN: CPT | Mod: S$PBB,,, | Performed by: SURGERY

## 2017-01-04 PROCEDURE — 99999 PR PBB SHADOW E&M-EST. PATIENT-LVL III: CPT | Mod: PBBFAC,,, | Performed by: SURGERY

## 2017-01-04 PROCEDURE — 99213 OFFICE O/P EST LOW 20 MIN: CPT | Mod: PBBFAC | Performed by: INTERNAL MEDICINE

## 2017-01-04 RX ORDER — SODIUM CHLORIDE 9 MG/ML
INJECTION, SOLUTION INTRAVENOUS CONTINUOUS
Status: CANCELLED | OUTPATIENT
Start: 2017-01-04

## 2017-01-04 NOTE — PROGRESS NOTES
Distress Screening Results: Psychosocial Distress screening score of Distress Score: 5 {AMB ONC DISTRESS SCORE:41187}

## 2017-01-04 NOTE — LETTER
January 6, 2017      Shant Leon MD  [address] [place] [address]           [facility] General Surgery  [address]  [place] [address]  Phone: [phone]  Fax: [phone]          Patient: Doron Domínguez   MR Number: [id number]   YOB: 1940   Date of Visit: 1/4/2017       Dear Dr. Shant Leon:    Thank you for referring Doron Domínguez to me for evaluation. Attached you will find relevant portions of my assessment and plan of care.    If you have questions, please do not hesitate to call me. I look forward to following Doron Domínguez along with you.    Sincerely,    Negrito Hernandez MD    Enclosure  CC:  No Recipients    If you would like to receive this communication electronically, please contact [email or web address] or [phone] to request more information on [employer] Link access.    For providers and/or their staff who would like to refer a patient to [facility], please contact us through our one-stop-shop provider referral line, [facility], at [phone].    If you feel you have received this communication in error or would no longer like to receive these types of communications, please e-mail [email or web address]

## 2017-01-04 NOTE — PROGRESS NOTES
Subjective:       Patient ID: Doron Domínguez is a 76 y.o. male.    Chief Complaint: Results and Lung Cancer    HPI 76-year-old male returns after being seen by thoracic surgery for consideration of potential surgical resection for left lower lobe non-small cell lung carcinoma ECOG status 1    Past Medical History   Diagnosis Date    Aortic stenosis 12/29/2016    BPH (benign prostatic hyperplasia)     CAD (coronary artery disease)     CAD, multiple vessel 12/29/2016    Cancer     High cholesterol     Hx of CABG 12/29/2016    Hypertension      History reviewed. No pertinent family history.  Social History     Social History    Marital status:      Spouse name: N/A    Number of children: N/A    Years of education: N/A     Occupational History    Not on file.     Social History Main Topics    Smoking status: Never Smoker    Smokeless tobacco: Never Used    Alcohol use No    Drug use: Not on file    Sexual activity: Not on file     Other Topics Concern    Not on file     Social History Narrative     Past Surgical History   Procedure Laterality Date    Coronary artery bypass graft  2012     CABG x 3 at [facility]       Labs:  Lab Results   Component Value Date    WBC 12.97 (H) 01/01/2017    HGB 14.9 01/01/2017    HCT 43.3 01/01/2017    MCV 94 01/01/2017     01/01/2017     BMP  Lab Results   Component Value Date     (L) 01/01/2017    K 4.9 01/01/2017     01/01/2017    CO2 19 (L) 01/01/2017    BUN 11 01/01/2017    CREATININE 1.1 01/01/2017    CALCIUM 8.9 01/01/2017    ANIONGAP 12 01/01/2017    ESTGFRAFRICA >60 01/01/2017    EGFRNONAA >60 01/01/2017     Lab Results   Component Value Date    ALT 28 01/01/2017    AST 43 (H) 01/01/2017    ALKPHOS 78 01/01/2017    BILITOT 1.2 (H) 01/01/2017       No results found for: IRON, TIBC, FERRITIN, SATURATEDIRO  No results found for: [id number]  No results found for: FOLATE  No results found for: TSH      Review of Systems   Constitutional: Negative  for activity change, appetite change, chills, diaphoresis, fatigue, fever and unexpected weight change.   HENT: Negative for congestion, dental problem, drooling, ear discharge, ear pain, facial swelling, hearing loss, mouth sores, nosebleeds, postnasal drip, rhinorrhea, sinus pressure, sneezing, sore throat, tinnitus, trouble swallowing and voice change.    Eyes: Negative for photophobia, pain, discharge, redness, itching and visual disturbance.   Respiratory: Negative for apnea, cough, choking, chest tightness, shortness of breath, wheezing and stridor.    Cardiovascular: Negative for chest pain, palpitations and leg swelling.   Gastrointestinal: Negative for abdominal distention, abdominal pain, anal bleeding, blood in stool, constipation, diarrhea, nausea, rectal pain and vomiting.   Endocrine: Negative for cold intolerance, heat intolerance, polydipsia, polyphagia and polyuria.   Genitourinary: Negative for decreased urine volume, difficulty urinating, discharge, dysuria, enuresis, flank pain, frequency, genital sores, hematuria, penile pain, penile swelling, scrotal swelling, testicular pain and urgency.   Musculoskeletal: Negative for arthralgias, back pain, gait problem, joint swelling, myalgias, neck pain and neck stiffness.   Skin: Negative for color change, pallor, rash and wound.   Allergic/Immunologic: Negative for environmental allergies, food allergies and immunocompromised state.   Neurological: Negative for dizziness, tremors, seizures, syncope, facial asymmetry, speech difficulty, weakness, light-headedness, numbness and headaches.   Hematological: Negative for adenopathy. Does not bruise/bleed easily.   Psychiatric/Behavioral: Positive for dysphoric mood. Negative for agitation, behavioral problems, confusion, decreased concentration, hallucinations, self-injury, sleep disturbance and suicidal ideas. The patient is nervous/anxious. The patient is not hyperactive.        Objective:      Physical  Exam   Constitutional: He is oriented to person, place, and time. He appears well-developed and well-nourished. No distress.   HENT:   Head: Normocephalic.   Right Ear: External ear normal.   Left Ear: External ear normal.   Nose: Nose normal. Right sinus exhibits no maxillary sinus tenderness and no frontal sinus tenderness. Left sinus exhibits no maxillary sinus tenderness and no frontal sinus tenderness.   Mouth/Throat: Oropharynx is clear and moist. No oropharyngeal exudate.   Eyes: EOM and lids are normal. Pupils are equal, round, and reactive to light. Right eye exhibits no discharge. Left eye exhibits no discharge. Right conjunctiva is not injected. Right conjunctiva has no hemorrhage. Left conjunctiva is not injected. Left conjunctiva has no hemorrhage. No scleral icterus. Right eye exhibits normal extraocular motion. Left eye exhibits normal extraocular motion.   Neck: Normal range of motion. Neck supple. No JVD present. No tracheal deviation present. No thyromegaly present.   Cardiovascular: Normal rate and regular rhythm.    Pulmonary/Chest: Effort normal. No stridor. No respiratory distress.   Abdominal: Soft. He exhibits no mass. There is no hepatosplenomegaly, splenomegaly or hepatomegaly. There is no tenderness.   Musculoskeletal: Normal range of motion. He exhibits no edema or tenderness.   Lymphadenopathy:        Head (right side): No posterior auricular and no occipital adenopathy present.        Head (left side): No posterior auricular and no occipital adenopathy present.     He has no cervical adenopathy.        Right cervical: No superficial cervical, no deep cervical and no posterior cervical adenopathy present.       Left cervical: No superficial cervical, no deep cervical and no posterior cervical adenopathy present.     He has no axillary adenopathy.        Right: No supraclavicular adenopathy present.        Left: No supraclavicular adenopathy present.   Neurological: He is alert and  oriented to person, place, and time. He has normal strength. No cranial nerve deficit. Coordination normal.   Skin: Skin is dry. No rash noted. He is not diaphoretic. No cyanosis or erythema. Nails show no clubbing.   Psychiatric: He has a normal mood and affect. His behavior is normal. Judgment and thought content normal. Cognition and memory are normal.   Vitals reviewed.          Assessment:       1. Malignant neoplasm of lower lobe of left lung            Plan:     patient has been seen and evaluated by thoracic surgery a concur that sensation is potential surgical candidate the abnormal findings in the pleura may be reactive in nature.  I've discussed with him his wife daughter as well as granddaughter who is a fourth year GYN resident at [employer] medical school in [place].  I have forwarded images from the most recent PET scan and CAT scan to her I concur that thorascopic examination prior to formal thoracotomy to determine whether or not pleural involvement with tumor is the best course of action.  I agree entirely that surgical resection is the best course of action in terms of potential cure.  We discussed potential options in the postoperative setting of adjuvant chemotherapy or adjuvant immunotherapy in research trials based off of molecular markers of tumor.  Answered questions 25 minutes face-to-face time coordination of care [id number]. Psychosocial Distress screening score of Distress Score: 5 noted and reviewed.  Ambulatory referral made to

## 2017-01-04 NOTE — LETTER
January 4, 2017      Dante Massey MD  [address] [address]           [facility]  [address] [place] [address]  Phone: [phone]  Fax: [phone]          Patient: Doron Domínguez   MR Number: [id number]   YOB: 1940   Date of Visit: 1/4/2017       Dear Dr. Dante Massey:    Thank you for referring Doron Domínguez to me for evaluation. Attached you will find relevant portions of my assessment and plan of care.    If you have questions, please do not hesitate to call me. I look forward to following Doron Domínguez along with you.    Sincerely,    Shant Leon MD    Enclosure  CC:  No Recipients    If you would like to receive this communication electronically, please contact [email or web address] or [phone] to request more information on [employer] Link access.    For providers and/or their staff who would like to refer a patient to [facility], please contact us through our one-stop-shop provider referral line, [facility], at [phone].    If you feel you have received this communication in error or would no longer like to receive these types of communications, please e-mail [email or web address]

## 2017-01-05 NOTE — PROGRESS NOTES
CONSULTATION    Consultation seen for Dr. Leon.     REASON FOR CONSULTATION:  Left lower lobe lung cancer.    HISTORY OF PRESENT ILLNESS:  The patient is a 76-year-old white male who noticed   around August, he developed a dry type of cough.  He was seen by his primary   care doctor and he apparently was placed on a course of antibiotics and then   required a second course with Levaquin.  This seemed to get a little better, but   then he still felt congestion in his chest when he lies down.  He had a   worsening of the chest congestion with some wheezing.  The sputum itself is   clear.  He has had a persistent left lower lobe infiltrate.  The patient   underwent a CT scan of the chest, which showed this lower lobe infiltrate and   thought there was possibly solid consolidated area within that infiltrate.  He   underwent a PET scan, which showed the area also with infiltrate, but some area   of consolidation, which seemed to be a little bit shifted from the CT scan.  It   had SUV of 11.  There was a question of a very small amount of fluid in this   area of the chest, which had a SUV around 4.  There were no obvious pleural   nodules, no enlarged lymph nodes.  There was area of consolidation or mass   effect in the lower lobe adjacent to the pleura posteriorly and inferiorly.  The   patient underwent a bronchoscopy.  Bronchoscopy washings showed malignant   cells, nonsmall cell, difficult to tell whether it is adenoma or squamous.  A   bronchoscopic lung biopsy showed malignancy and probable adenocarcinoma.  The   patient was seen in the Emergency Room again last week, again with a cough,   which makes it hard for him to sleep at night and a mild headache, but that has   resolved at this point.  He was placed on Levaquin again.  It has seemed to   decrease his cough some along with a cough syrup.  He is able to sleep a little   better and again he is also having fever somewhere, the highest one was 102 and   has  been up to 99 or 100.  He has taken Tylenol and Advil for that.  The patient   denies any chest pain.  He has lost about 10 pounds, but attributes that to his   decreased eating because of the cough.  He has never smoked cigarettes nor did   cigars.  Denies any asbestos exposure.  He has had no prior pneumonias.  He had   pulmonary functions performed, which revealed FEV1 of 2.34, which is 95% of   predicted.  His diffusion capacity is 90% of predicted.  His total lung capacity   is normal.  He was seen by Cardiology because of a prior history of coronary   artery bypass.  He has never had an MI.  His echocardiogram showed good LV   function, mild AS, and it was felt by Cardiology to be good to proceed with   surgery.  In addition to Levaquin that he is taking, he is also using Pulmicort   nebulizers and DuoNebs.    PAST MEDICAL HISTORY:  Again, he has the left lobe lung cancer, mild aortic   stenosis.  He has had a history of coronary artery bypass graft.  Lumbar disk   disease with some chronic mild back pain.  Hyperlipidemia.      SOCIAL HISTORY:  He has never smoked.  Does not drink alcohol.    FAMILY HISTORY:  Noncontributory.    MEDICATIONS:  Include DuoNeb, Pulmicort inhaler, Lipitor.  The other medicines   are Advil, Tylenol, Levaquin, metoprolol XL 25 mg every day in the a.m.,   potassium intermittently for leg cramps, Flomax.    ALLERGIES:  HE IS ALLERGIC TO BACTRIM.    PAST SURGICAL HISTORY:  Includes coronary artery bypass graft.    REVIEW OF SYSTEMS:  CARDIAC:  At this point, cardiac wise, again he just recently had the echo   showing some mild AS and normal ejection fraction.  Denies any anginal-type   symptoms or any palpitations.  RESPIRATORY:  Again, see present illness.  Occasional shortness of breath with   clear sputum.  CONSTITUTIONAL:  He had the fever recently, which seems to be coming down with   the antibiotics.  He has had about a 10-pound weight loss.  He has forced   himself to eat  well.  GASTROINTESTINAL:  Occasional heartburn.  No nausea, vomiting or diarrhea.  ENDOCRINE:  He has some hyperlipidemia, but no other endocrine complaints.  GENITOURINARY:  He does have some BPH, slow urine stream, for which he takes   Flomax.  MUSCULOSKELETAL:  Again, some arthralgias in his legs, but chronic lower back   pain he says from disk disease.  HEMATOLOGIC:  Denies any history of blood clots or excess bleeding or anemia.  NEUROLOGIC:  The patient denies any neurologic problems or anxiety.  Denies any   history of seizure or any strokes.  Balance of review of systems is negative.    PHYSICAL EXAMINATION:  VITAL SIGNS:  Blood pressure 136/68, pulse 90, temperature 97.4.  He is 80 kg.    He is alert and oriented x3.  HEENT:  Extraocular movements were intact.  No gross visual or auditory defects.  NECK:  Soft without any adenopathy or any bruits.  There is no axillary   adenopathy.  CHEST:  Clear on the right side with some mildly diminished breath sounds in the   right base.  Examination of the left side is clear except in posteriorly in the   lower one-third one-fourth of the chest are some rales.  I do not hear any   wheezes in the lung fields.  He has good respiratory excursion.  There are no   chest wall deformities.  HEART:  Reveals normal rate and rhythm.  I do not hear any bruits.  There is no   murmur.  ABDOMEN:  Reveals good bowel sounds.  Abdomen is soft and nontender throughout.    He has good pulses in his groins.  EXTREMITIES:  There are no gross deformities on the back.  He has adequate   strength and range of motion in his extremities.  NEUROLOGIC:  Gross neuro is intact.    LABORATORY DATA:  Reviewed his CT scan done, his PET scan, his pulmonary   functions, and his blood work.    IMPRESSION:  1.  Left lower lobe lung cancer.  It is difficult to really delineate the tumor.    Most of the CT and PET scan show a left lower lobe with a lot of inflammation   or infiltrate in the left lower  lobe.  The rest of the lungs looks normal.    There is a hint of a mass consolidation posteriorly inferiorly in the left lower   lobe.  The consolidation seems to be a little different and on followup CT   scan from the PET, which seems to be a little smaller little more medial.  There   is no obvious gross pleural effusion.bronchoalveolar.He may have bronchoalveolar cancer in left lower lobe  2.  Symptoms are consistent with pneumonia, probably related to the carcinoma of   left lower lobe causing the postobstructive pneumonia.  3.  Atherosclerotic heart disease, status post coronary artery bypass,   asymptomatic at this point.  4.  Mild aortic stenosis.  5.  Some mild arthritis the lower back.    RECOMMENDATIONS:  I told the patient as far as I can tell he is a good candidate   for surgery for a possible surgical cure.  This will entail a left lower   lobectomy.  The patient has adequate pulmonary function tests.  The PET scan did   not show any definite metastatic spread of disease.  There is question of some   effusion there, which is probably reactive.  At this point, I talked to the   family.  I recommend that we start with a left thoracoscopy to look at the left   lower lobe and look for any obvious pleural metastases.  If there are no obvious   pleural metastases, I will proceed with thoracotomy and left lower lobectomy.    If the visceral pleura is adherent to the parietal pleura, then that could be   removed with the left lower lobe if it appears to be resectable.  I told them   with a left thoracotomy, I would check the lymph nodes.  If they are positive   outside the left lower lobe, then I would stop.  Otherwise, I will go ahead and   proceed with the left lower lobectomy of the lung.  I counseled them as to the   procedure, the pre and postoperative course, risks including bleeding and   infection, postoperative cardiac event, postoperative SVT, postoperative   pneumonia or respiratory failure.  I  counseled him on the postoperative pain,   prolonged chest tube drainage, possible need to go home with the chest tube.  I   also counseled him to the fact the placement of an epidural catheter and the   fact he will need a Almaraz catheter for a while.  I told him he will probably be   in the hospital approximately one week.  The patient has surgery scheduled for   next week.  So I told him to stop the Advil, hold the aspirin.  He is to finish   taking his antibiotics and make sure he takes his metoprolol on the morning of   surgery.        JACOB/MOHAN  dd: 01/04/2017 17:46:47 (CST)  td: 01/05/2017 03:39:40 (CST)  Doc ID   #[id number]  Job ID #[id number]    CC: Dante Massey M.D.  [employer] [facility]

## 2017-01-06 NOTE — PRE ADMISSION SCREENING
Pre op instructions reviewed with patient per phone:    To confirm, Your surgeon has instructed you:  Surgery is scheduled 01/10/17 at 0923.      Please report to [facility] 1st floor main lobby by 0745  Pre admit office to call afternoon prior to arrival time.      INSTRUCTIONS IMPORTANT!!!  ¨ Do not eat, drink, or smoke after 12 midnight-including water. OK to brush teeth, no gum, candy or mints!    ¨ Take only these medicines with a small swallow of water-morning of surgery.  Duo-Neb, Metoprolol        ____  Do not wear makeup, including mascara.  ____  No powder, lotions or creams to surgical area.  ____  Please remove all jewelry, including piercings and leave at home.  ____  No money or valuables needed. Please leave at home.  ____  Please bring identification and insurance information to hospital.  ____  If going home the same day, arrange for a ride home. You will not be able to   drive if Anesthesia was used.  ____  Children, under 12 years old, must remain in the waiting room with an adult.  They are not allowed in patient areas.  ____  Wear loose fitting clothing. Allow for dressings, bandages.  ____  Stop Aspirin, Ibuprofen, Motrin and Aleve at least 5-7 days before surgery, unless otherwise instructed by your doctor, or the nurse.   You MAY use Tylenol/acetaminophen until day of surgery.  ____  If you take diabetic medication, do not take am of surgery unless instructed by   Doctor.  ____ Stop taking any Fish Oil supplement or any Vitamins that contain Vitamin E at least 5 days prior to surgery.          Bathing Instructions-- The night before surgery and the morning prior to coming to the hospital:   -Do not shave the surgical area.   -Shower and wash your hair and body as usual with your regular soap and shampoo.   -Rinse your hair and body completely.   -Use one packet of hibiclens to wash the surgical site (using your hand) gently for 5 minutes.  Do not scrub you skin too  hard.   -Do not use hibiclens on your head, face, or genitals.   -Do not wash with regular soap after you use the hibiclens.   -Rinse your body thoroughly.   -Dry with clean, soft towel.  Do not use lotion, cream, deodorant, or powders on   the surgical site.    Use antibacterial soap in place of hibiclens if your surgery is on the head, face or genitals.         Surgical Site Infection    Prevention of surgical site infections:     -Keep incisions clean and dry.   -Do not soak/submerge incisions in water until completely healed.   -Do not apply lotions, powders, creams, or deodorants to site.   -Always make sure hands are cleaned with antibacterial soap/ alcohol-based   prior to touching the surgical site.  (This includes doctors, nurses, staff, and yourself.)    Signs and symptoms:   -Redness and pain around the area where you had surgery   -Drainage of cloudy fluid from your surgical wound   -Fever over 100.4  I have read or had read and explained to me, and understand the above information.

## 2017-01-07 LAB
BACTERIA BLD CULT: NORMAL
BACTERIA BLD CULT: NORMAL

## 2017-01-09 ENCOUNTER — ANESTHESIA EVENT (OUTPATIENT)
Dept: SURGERY | Facility: HOSPITAL | Age: 77
DRG: 163 | End: 2017-01-09
Payer: MEDICARE

## 2017-01-09 NOTE — H&P (VIEW-ONLY)
CONSULTATION    Consultation seen for Dr. Leon.     REASON FOR CONSULTATION:  Left lower lobe lung cancer.    HISTORY OF PRESENT ILLNESS:  The patient is a 76-year-old white male who noticed   around August, he developed a dry type of cough.  He was seen by his primary   care doctor and he apparently was placed on a course of antibiotics and then   required a second course with Levaquin.  This seemed to get a little better, but   then he still felt congestion in his chest when he lies down.  He had a   worsening of the chest congestion with some wheezing.  The sputum itself is   clear.  He has had a persistent left lower lobe infiltrate.  The patient   underwent a CT scan of the chest, which showed this lower lobe infiltrate and   thought there was possibly solid consolidated area within that infiltrate.  He   underwent a PET scan, which showed the area also with infiltrate, but some area   of consolidation, which seemed to be a little bit shifted from the CT scan.  It   had SUV of 11.  There was a question of a very small amount of fluid in this   area of the chest, which had a SUV around 4.  There were no obvious pleural   nodules, no enlarged lymph nodes.  There was area of consolidation or mass   effect in the lower lobe adjacent to the pleura posteriorly and inferiorly.  The   patient underwent a bronchoscopy.  Bronchoscopy washings showed malignant   cells, nonsmall cell, difficult to tell whether it is adenoma or squamous.  A   bronchoscopic lung biopsy showed malignancy and probable adenocarcinoma.  The   patient was seen in the Emergency Room again last week, again with a cough,   which makes it hard for him to sleep at night and a mild headache, but that has   resolved at this point.  He was placed on Levaquin again.  It has seemed to   decrease his cough some along with a cough syrup.  He is able to sleep a little   better and again he is also having fever somewhere, the highest one was 102 and   has  been up to 99 or 100.  He has taken Tylenol and Advil for that.  The patient   denies any chest pain.  He has lost about 10 pounds, but attributes that to his   decreased eating because of the cough.  He has never smoked cigarettes nor did   cigars.  Denies any asbestos exposure.  He has had no prior pneumonias.  He had   pulmonary functions performed, which revealed FEV1 of 2.34, which is 95% of   predicted.  His diffusion capacity is 90% of predicted.  His total lung capacity   is normal.  He was seen by Cardiology because of a prior history of coronary   artery bypass.  He has never had an MI.  His echocardiogram showed good LV   function, mild AS, and it was felt by Cardiology to be good to proceed with   surgery.  In addition to Levaquin that he is taking, he is also using Pulmicort   nebulizers and DuoNebs.    PAST MEDICAL HISTORY:  Again, he has the left lobe lung cancer, mild aortic   stenosis.  He has had a history of coronary artery bypass graft.  Lumbar disk   disease with some chronic mild back pain.  Hyperlipidemia.      SOCIAL HISTORY:  He has never smoked.  Does not drink alcohol.    FAMILY HISTORY:  Noncontributory.    MEDICATIONS:  Include DuoNeb, Pulmicort inhaler, Lipitor.  The other medicines   are Advil, Tylenol, Levaquin, metoprolol XL 25 mg every day in the a.m.,   potassium intermittently for leg cramps, Flomax.    ALLERGIES:  HE IS ALLERGIC TO BACTRIM.    PAST SURGICAL HISTORY:  Includes coronary artery bypass graft.    REVIEW OF SYSTEMS:  CARDIAC:  At this point, cardiac wise, again he just recently had the echo   showing some mild AS and normal ejection fraction.  Denies any anginal-type   symptoms or any palpitations.  RESPIRATORY:  Again, see present illness.  Occasional shortness of breath with   clear sputum.  CONSTITUTIONAL:  He had the fever recently, which seems to be coming down with   the antibiotics.  He has had about a 10-pound weight loss.  He has forced   himself to eat  well.  GASTROINTESTINAL:  Occasional heartburn.  No nausea, vomiting or diarrhea.  ENDOCRINE:  He has some hyperlipidemia, but no other endocrine complaints.  GENITOURINARY:  He does have some BPH, slow urine stream, for which he takes   Flomax.  MUSCULOSKELETAL:  Again, some arthralgias in his legs, but chronic lower back   pain he says from disk disease.  HEMATOLOGIC:  Denies any history of blood clots or excess bleeding or anemia.  NEUROLOGIC:  The patient denies any neurologic problems or anxiety.  Denies any   history of seizure or any strokes.  Balance of review of systems is negative.    PHYSICAL EXAMINATION:  VITAL SIGNS:  Blood pressure 136/68, pulse 90, temperature 97.4.  He is 80 kg.    He is alert and oriented x3.  HEENT:  Extraocular movements were intact.  No gross visual or auditory defects.  NECK:  Soft without any adenopathy or any bruits.  There is no axillary   adenopathy.  CHEST:  Clear on the right side with some mildly diminished breath sounds in the   right base.  Examination of the left side is clear except in posteriorly in the   lower one-third one-fourth of the chest are some rales.  I do not hear any   wheezes in the lung fields.  He has good respiratory excursion.  There are no   chest wall deformities.  HEART:  Reveals normal rate and rhythm.  I do not hear any bruits.  There is no   murmur.  ABDOMEN:  Reveals good bowel sounds.  Abdomen is soft and nontender throughout.    He has good pulses in his groins.  EXTREMITIES:  There are no gross deformities on the back.  He has adequate   strength and range of motion in his extremities.  NEUROLOGIC:  Gross neuro is intact.    LABORATORY DATA:  Reviewed his CT scan done, his PET scan, his pulmonary   functions, and his blood work.    IMPRESSION:  1.  Left lower lobe lung cancer.  It is difficult to really delineate the tumor.    Most of the CT and PET scan show a left lower lobe with a lot of inflammation   or infiltrate in the left lower  lobe.  The rest of the lungs looks normal.    There is a hint of a mass consolidation posteriorly inferiorly in the left lower   lobe.  The consolidation seems to be a little different and on followup CT   scan from the PET, which seems to be a little smaller little more medial.  There   is no obvious gross pleural effusion.bronchoalveolar.He may have bronchoalveolar cancer in left lower lobe  2.  Symptoms are consistent with pneumonia, probably related to the carcinoma of   left lower lobe causing the postobstructive pneumonia.  3.  Atherosclerotic heart disease, status post coronary artery bypass,   asymptomatic at this point.  4.  Mild aortic stenosis.  5.  Some mild arthritis the lower back.    RECOMMENDATIONS:  I told the patient as far as I can tell he is a good candidate   for surgery for a possible surgical cure.  This will entail a left lower   lobectomy.  The patient has adequate pulmonary function tests.  The PET scan did   not show any definite metastatic spread of disease.  There is question of some   effusion there, which is probably reactive.  At this point, I talked to the   family.  I recommend that we start with a left thoracoscopy to look at the left   lower lobe and look for any obvious pleural metastases.  If there are no obvious   pleural metastases, I will proceed with thoracotomy and left lower lobectomy.    If the visceral pleura is adherent to the parietal pleura, then that could be   removed with the left lower lobe if it appears to be resectable.  I told them   with a left thoracotomy, I would check the lymph nodes.  If they are positive   outside the left lower lobe, then I would stop.  Otherwise, I will go ahead and   proceed with the left lower lobectomy of the lung.  I counseled them as to the   procedure, the pre and postoperative course, risks including bleeding and   infection, postoperative cardiac event, postoperative SVT, postoperative   pneumonia or respiratory failure.  I  counseled him on the postoperative pain,   prolonged chest tube drainage, possible need to go home with the chest tube.  I   also counseled him to the fact the placement of an epidural catheter and the   fact he will need a Almaraz catheter for a while.  I told him he will probably be   in the hospital approximately one week.  The patient has surgery scheduled for   next week.  So I told him to stop the Advil, hold the aspirin.  He is to finish   taking his antibiotics and make sure he takes his metoprolol on the morning of   surgery.        JACOB/MOHAN  dd: 01/04/2017 17:46:47 (CST)  td: 01/05/2017 03:39:40 (CST)  Doc ID   #[id number]  Job ID #[id number]    CC: Dante Massey M.D.  [employer] [facility]

## 2017-01-10 ENCOUNTER — ANESTHESIA (OUTPATIENT)
Dept: SURGERY | Facility: HOSPITAL | Age: 77
DRG: 163 | End: 2017-01-10
Payer: MEDICARE

## 2017-01-10 ENCOUNTER — HOSPITAL ENCOUNTER (INPATIENT)
Facility: HOSPITAL | Age: 77
LOS: 9 days | Discharge: HOME HEALTH | DRG: 163 | End: 2017-01-19
Attending: SURGERY | Admitting: SURGERY
Payer: MEDICARE

## 2017-01-10 VITALS — RESPIRATION RATE: 17 BRPM

## 2017-01-10 DIAGNOSIS — C34.92 BRONCHIOLO-ALVEOLAR ADENOCARCINOMA OF LEFT LUNG: Primary | ICD-10-CM

## 2017-01-10 DIAGNOSIS — C34.92 PRIMARY LUNG CANCER, LEFT: ICD-10-CM

## 2017-01-10 DIAGNOSIS — C34.32 MALIGNANT NEOPLASM OF LOWER LOBE OF LEFT LUNG: ICD-10-CM

## 2017-01-10 PROBLEM — C34.90 PRIMARY LUNG CANCER: Status: ACTIVE | Noted: 2017-01-10

## 2017-01-10 LAB
ABO + RH BLD: NORMAL
ALLENS TEST: ABNORMAL
ANION GAP SERPL CALC-SCNC: 8 MMOL/L
BASOPHILS # BLD AUTO: 0.02 K/UL
BASOPHILS NFR BLD: 0.1 %
BLD GP AB SCN CELLS X3 SERPL QL: NORMAL
BUN SERPL-MCNC: 14 MG/DL
CALCIUM SERPL-MCNC: 8.6 MG/DL
CHLORIDE SERPL-SCNC: 107 MMOL/L
CO2 SERPL-SCNC: 23 MMOL/L
CREAT SERPL-MCNC: 0.9 MG/DL
DELSYS: ABNORMAL
DIFFERENTIAL METHOD: ABNORMAL
EOSINOPHIL # BLD AUTO: 0.1 K/UL
EOSINOPHIL NFR BLD: 0.4 %
ERYTHROCYTE [DISTWIDTH] IN BLOOD BY AUTOMATED COUNT: 13.9 %
EST. GFR  (AFRICAN AMERICAN): >60 ML/MIN/1.73 M^2
EST. GFR  (NON AFRICAN AMERICAN): >60 ML/MIN/1.73 M^2
FIO2: 32
FLOW: 3
GLUCOSE SERPL-MCNC: 122 MG/DL
GLUCOSE SERPL-MCNC: 168 MG/DL (ref 70–110)
HCO3 UR-SCNC: 24.4 MMOL/L (ref 24–28)
HCT VFR BLD AUTO: 39.5 %
HCT VFR BLD CALC: 37 %PCV (ref 36–54)
HGB BLD-MCNC: 13.3 G/DL
LYMPHOCYTES # BLD AUTO: 0.7 K/UL
LYMPHOCYTES NFR BLD: 5.1 %
MCH RBC QN AUTO: 31.3 PG
MCHC RBC AUTO-ENTMCNC: 33.7 %
MCV RBC AUTO: 93 FL
MODE: ABNORMAL
MONOCYTES # BLD AUTO: 1.2 K/UL
MONOCYTES NFR BLD: 8.2 %
NEUTROPHILS # BLD AUTO: 12.4 K/UL
NEUTROPHILS NFR BLD: 86.2 %
PCO2 BLDA: 38 MMHG (ref 35–45)
PH SMN: 7.42 [PH] (ref 7.35–7.45)
PLATELET # BLD AUTO: 210 K/UL
PMV BLD AUTO: 8.6 FL
PO2 BLDA: 115 MMHG (ref 80–100)
POC BE: 0 MMOL/L
POC IONIZED CALCIUM: 1.18 MMOL/L (ref 1.06–1.42)
POC SATURATED O2: 99 % (ref 95–100)
POTASSIUM BLD-SCNC: 4.1 MMOL/L (ref 3.5–5.1)
POTASSIUM SERPL-SCNC: 4.1 MMOL/L
RBC # BLD AUTO: 4.25 M/UL
SAMPLE: ABNORMAL
SITE: ABNORMAL
SODIUM BLD-SCNC: 138 MMOL/L (ref 136–145)
SODIUM SERPL-SCNC: 138 MMOL/L
SP02: 98
WBC # BLD AUTO: 14.34 K/UL

## 2017-01-10 PROCEDURE — 63600175 PHARM REV CODE 636 W HCPCS: Performed by: NURSE ANESTHETIST, CERTIFIED REGISTERED

## 2017-01-10 PROCEDURE — 25000003 PHARM REV CODE 250: Performed by: SURGERY

## 2017-01-10 PROCEDURE — 25000003 PHARM REV CODE 250: Performed by: NURSE ANESTHETIST, CERTIFIED REGISTERED

## 2017-01-10 PROCEDURE — 37799 UNLISTED PX VASCULAR SURGERY: CPT

## 2017-01-10 PROCEDURE — 94770 HC EXHALED C02 TEST: CPT

## 2017-01-10 PROCEDURE — 88331 PATH CONSLTJ SURG 1 BLK 1SPC: CPT | Mod: 26,,, | Performed by: PATHOLOGY

## 2017-01-10 PROCEDURE — 38746 REMOVE THORACIC LYMPH NODES: CPT | Mod: ,,, | Performed by: SURGERY

## 2017-01-10 PROCEDURE — 88309 TISSUE EXAM BY PATHOLOGIST: CPT | Mod: 26,,, | Performed by: PATHOLOGY

## 2017-01-10 PROCEDURE — 99291 CRITICAL CARE FIRST HOUR: CPT | Mod: ,,, | Performed by: INTERNAL MEDICINE

## 2017-01-10 PROCEDURE — 85025 COMPLETE CBC W/AUTO DIFF WBC: CPT

## 2017-01-10 PROCEDURE — 25000003 PHARM REV CODE 250: Performed by: INTERNAL MEDICINE

## 2017-01-10 PROCEDURE — 82800 BLOOD PH: CPT

## 2017-01-10 PROCEDURE — 82803 BLOOD GASES ANY COMBINATION: CPT

## 2017-01-10 PROCEDURE — 84132 ASSAY OF SERUM POTASSIUM: CPT

## 2017-01-10 PROCEDURE — 37000009 HC ANESTHESIA EA ADD 15 MINS: Performed by: SURGERY

## 2017-01-10 PROCEDURE — 71000033 HC RECOVERY, INTIAL HOUR: Performed by: SURGERY

## 2017-01-10 PROCEDURE — 82330 ASSAY OF CALCIUM: CPT

## 2017-01-10 PROCEDURE — 27000221 HC OXYGEN, UP TO 24 HOURS

## 2017-01-10 PROCEDURE — 63600175 PHARM REV CODE 636 W HCPCS: Performed by: ANESTHESIOLOGY

## 2017-01-10 PROCEDURE — 32480 PARTIAL REMOVAL OF LUNG: CPT | Mod: LT,,, | Performed by: SURGERY

## 2017-01-10 PROCEDURE — 80048 BASIC METABOLIC PNL TOTAL CA: CPT

## 2017-01-10 PROCEDURE — 0BTJ0ZZ RESECTION OF LEFT LOWER LUNG LOBE, OPEN APPROACH: ICD-10-PCS | Performed by: SURGERY

## 2017-01-10 PROCEDURE — 20000000 HC ICU ROOM

## 2017-01-10 PROCEDURE — 86900 BLOOD TYPING SEROLOGIC ABO: CPT

## 2017-01-10 PROCEDURE — 36000710: Performed by: SURGERY

## 2017-01-10 PROCEDURE — 3E0234Z INTRODUCTION OF SERUM, TOXOID AND VACCINE INTO MUSCLE, PERCUTANEOUS APPROACH: ICD-10-PCS | Performed by: SURGERY

## 2017-01-10 PROCEDURE — 38746 REMOVE THORACIC LYMPH NODES: CPT | Mod: 80,,, | Performed by: SURGERY

## 2017-01-10 PROCEDURE — 36000711: Performed by: SURGERY

## 2017-01-10 PROCEDURE — 88307 TISSUE EXAM BY PATHOLOGIST: CPT | Mod: 26,,, | Performed by: PATHOLOGY

## 2017-01-10 PROCEDURE — 27201423 OPTIME MED/SURG SUP & DEVICES STERILE SUPPLY: Performed by: SURGERY

## 2017-01-10 PROCEDURE — 71000039 HC RECOVERY, EACH ADD'L HOUR: Performed by: SURGERY

## 2017-01-10 PROCEDURE — 85014 HEMATOCRIT: CPT

## 2017-01-10 PROCEDURE — 37000008 HC ANESTHESIA 1ST 15 MINUTES: Performed by: SURGERY

## 2017-01-10 PROCEDURE — 32480 PARTIAL REMOVAL OF LUNG: CPT | Mod: 80,LT,, | Performed by: SURGERY

## 2017-01-10 PROCEDURE — C1729 CATH, DRAINAGE: HCPCS | Performed by: SURGERY

## 2017-01-10 PROCEDURE — 25000003 PHARM REV CODE 250: Performed by: ANESTHESIOLOGY

## 2017-01-10 PROCEDURE — 86850 RBC ANTIBODY SCREEN: CPT

## 2017-01-10 PROCEDURE — 0BJL4ZZ INSPECTION OF LEFT LUNG, PERCUTANEOUS ENDOSCOPIC APPROACH: ICD-10-PCS | Performed by: SURGERY

## 2017-01-10 PROCEDURE — 84295 ASSAY OF SERUM SODIUM: CPT

## 2017-01-10 PROCEDURE — 88331 PATH CONSLTJ SURG 1 BLK 1SPC: CPT | Performed by: PATHOLOGY

## 2017-01-10 PROCEDURE — 99900035 HC TECH TIME PER 15 MIN (STAT)

## 2017-01-10 RX ORDER — BUPIVACAINE HYDROCHLORIDE 2.5 MG/ML
INJECTION, SOLUTION EPIDURAL; INFILTRATION; INTRACAUDAL
Status: DISCONTINUED | OUTPATIENT
Start: 2017-01-10 | End: 2017-01-10 | Stop reason: HOSPADM

## 2017-01-10 RX ORDER — SODIUM CHLORIDE 0.9 % (FLUSH) 0.9 %
3 SYRINGE (ML) INJECTION
Status: DISCONTINUED | OUTPATIENT
Start: 2017-01-10 | End: 2017-01-19 | Stop reason: HOSPADM

## 2017-01-10 RX ORDER — SODIUM CHLORIDE, SODIUM LACTATE, POTASSIUM CHLORIDE, CALCIUM CHLORIDE 600; 310; 30; 20 MG/100ML; MG/100ML; MG/100ML; MG/100ML
INJECTION, SOLUTION INTRAVENOUS CONTINUOUS PRN
Status: DISCONTINUED | OUTPATIENT
Start: 2017-01-10 | End: 2017-01-10

## 2017-01-10 RX ORDER — SODIUM CHLORIDE, SODIUM LACTATE, POTASSIUM CHLORIDE, CALCIUM CHLORIDE 600; 310; 30; 20 MG/100ML; MG/100ML; MG/100ML; MG/100ML
INJECTION, SOLUTION INTRAVENOUS CONTINUOUS
Status: DISCONTINUED | OUTPATIENT
Start: 2017-01-10 | End: 2017-01-10

## 2017-01-10 RX ORDER — NALOXONE HCL 0.4 MG/ML
0.02 VIAL (ML) INJECTION
Status: DISCONTINUED | OUTPATIENT
Start: 2017-01-10 | End: 2017-01-10 | Stop reason: SDUPTHER

## 2017-01-10 RX ORDER — MEPERIDINE HYDROCHLORIDE 50 MG/ML
12.5 INJECTION INTRAMUSCULAR; INTRAVENOUS; SUBCUTANEOUS ONCE AS NEEDED
Status: DISCONTINUED | OUTPATIENT
Start: 2017-01-10 | End: 2017-01-10 | Stop reason: HOSPADM

## 2017-01-10 RX ORDER — HYDROMORPHONE HYDROCHLORIDE 1 MG/ML
1 INJECTION, SOLUTION INTRAMUSCULAR; INTRAVENOUS; SUBCUTANEOUS
Status: DISCONTINUED | OUTPATIENT
Start: 2017-01-10 | End: 2017-01-10 | Stop reason: DRUGHIGH

## 2017-01-10 RX ORDER — GLYCOPYRROLATE 0.2 MG/ML
INJECTION INTRAMUSCULAR; INTRAVENOUS
Status: DISCONTINUED | OUTPATIENT
Start: 2017-01-10 | End: 2017-01-10

## 2017-01-10 RX ORDER — LIDOCAINE HCL/PF 100 MG/5ML
SYRINGE (ML) INTRAVENOUS
Status: DISCONTINUED | OUTPATIENT
Start: 2017-01-10 | End: 2017-01-10

## 2017-01-10 RX ORDER — ROPIVACAINE HYDROCHLORIDE 5 MG/ML
8 INJECTION, SOLUTION EPIDURAL; INFILTRATION; PERINEURAL CONTINUOUS
Status: DISCONTINUED | OUTPATIENT
Start: 2017-01-10 | End: 2017-01-10 | Stop reason: DRUGHIGH

## 2017-01-10 RX ORDER — SODIUM CHLORIDE 9 MG/ML
INJECTION, SOLUTION INTRAVENOUS CONTINUOUS
Status: DISCONTINUED | OUTPATIENT
Start: 2017-01-10 | End: 2017-01-10

## 2017-01-10 RX ORDER — ATORVASTATIN CALCIUM 10 MG/1
20 TABLET, FILM COATED ORAL NIGHTLY
Status: DISCONTINUED | OUTPATIENT
Start: 2017-01-10 | End: 2017-01-19 | Stop reason: HOSPADM

## 2017-01-10 RX ORDER — ENOXAPARIN SODIUM 100 MG/ML
40 INJECTION SUBCUTANEOUS
Status: DISPENSED | OUTPATIENT
Start: 2017-01-11 | End: 2017-01-15

## 2017-01-10 RX ORDER — NALOXONE HCL 0.4 MG/ML
0.02 VIAL (ML) INJECTION
Status: DISCONTINUED | OUTPATIENT
Start: 2017-01-10 | End: 2017-01-12

## 2017-01-10 RX ORDER — ROCURONIUM BROMIDE 10 MG/ML
INJECTION, SOLUTION INTRAVENOUS
Status: DISCONTINUED | OUTPATIENT
Start: 2017-01-10 | End: 2017-01-10

## 2017-01-10 RX ORDER — PHENYLEPHRINE HYDROCHLORIDE 10 MG/ML
INJECTION INTRAVENOUS
Status: DISCONTINUED | OUTPATIENT
Start: 2017-01-10 | End: 2017-01-10

## 2017-01-10 RX ORDER — FAMOTIDINE 10 MG/ML
20 INJECTION INTRAVENOUS EVERY 12 HOURS
Status: DISCONTINUED | OUTPATIENT
Start: 2017-01-10 | End: 2017-01-12

## 2017-01-10 RX ORDER — OXYCODONE HYDROCHLORIDE 5 MG/1
5 TABLET ORAL
Status: DISCONTINUED | OUTPATIENT
Start: 2017-01-10 | End: 2017-01-10 | Stop reason: HOSPADM

## 2017-01-10 RX ORDER — DEXTROSE, SODIUM CHLORIDE, SODIUM LACTATE, POTASSIUM CHLORIDE, AND CALCIUM CHLORIDE 5; .6; .31; .03; .02 G/100ML; G/100ML; G/100ML; G/100ML; G/100ML
INJECTION, SOLUTION INTRAVENOUS CONTINUOUS
Status: DISCONTINUED | OUTPATIENT
Start: 2017-01-10 | End: 2017-01-12

## 2017-01-10 RX ORDER — ACETAMINOPHEN 10 MG/ML
INJECTION, SOLUTION INTRAVENOUS
Status: DISCONTINUED | OUTPATIENT
Start: 2017-01-10 | End: 2017-01-10

## 2017-01-10 RX ORDER — MIDAZOLAM HYDROCHLORIDE 1 MG/ML
INJECTION INTRAMUSCULAR; INTRAVENOUS
Status: DISCONTINUED | OUTPATIENT
Start: 2017-01-10 | End: 2017-01-10

## 2017-01-10 RX ORDER — CIPROFLOXACIN 500 MG/1
500 TABLET ORAL EVERY 12 HOURS
Status: DISCONTINUED | OUTPATIENT
Start: 2017-01-10 | End: 2017-01-16

## 2017-01-10 RX ORDER — TAMSULOSIN HYDROCHLORIDE 0.4 MG/1
0.4 CAPSULE ORAL NIGHTLY
Status: DISCONTINUED | OUTPATIENT
Start: 2017-01-10 | End: 2017-01-12

## 2017-01-10 RX ORDER — ONDANSETRON 2 MG/ML
4 INJECTION INTRAMUSCULAR; INTRAVENOUS EVERY 6 HOURS PRN
Status: DISCONTINUED | OUTPATIENT
Start: 2017-01-10 | End: 2017-01-19 | Stop reason: HOSPADM

## 2017-01-10 RX ORDER — MORPHINE SULFATE 10 MG/ML
2 INJECTION INTRAMUSCULAR; INTRAVENOUS; SUBCUTANEOUS EVERY 5 MIN PRN
Status: DISCONTINUED | OUTPATIENT
Start: 2017-01-10 | End: 2017-01-10 | Stop reason: HOSPADM

## 2017-01-10 RX ORDER — METOPROLOL SUCCINATE 25 MG/1
25 TABLET, EXTENDED RELEASE ORAL DAILY
Status: DISCONTINUED | OUTPATIENT
Start: 2017-01-11 | End: 2017-01-15

## 2017-01-10 RX ORDER — IPRATROPIUM BROMIDE AND ALBUTEROL SULFATE 2.5; .5 MG/3ML; MG/3ML
3 SOLUTION RESPIRATORY (INHALATION) EVERY 6 HOURS
Status: DISCONTINUED | OUTPATIENT
Start: 2017-01-10 | End: 2017-01-12

## 2017-01-10 RX ORDER — FENTANYL CITRATE 50 UG/ML
INJECTION, SOLUTION INTRAMUSCULAR; INTRAVENOUS
Status: DISCONTINUED | OUTPATIENT
Start: 2017-01-10 | End: 2017-01-10

## 2017-01-10 RX ORDER — PROPOFOL 10 MG/ML
VIAL (ML) INTRAVENOUS
Status: DISCONTINUED | OUTPATIENT
Start: 2017-01-10 | End: 2017-01-10

## 2017-01-10 RX ORDER — HYDROMORPHONE HCL IN 0.9% NACL 6 MG/30 ML
PATIENT CONTROLLED ANALGESIA SYRINGE INTRAVENOUS CONTINUOUS
Status: DISCONTINUED | OUTPATIENT
Start: 2017-01-10 | End: 2017-01-12

## 2017-01-10 RX ORDER — BUPIVACAINE HYDROCHLORIDE 5 MG/ML
INJECTION, SOLUTION EPIDURAL; INTRACAUDAL
Status: DISCONTINUED | OUTPATIENT
Start: 2017-01-10 | End: 2017-01-10

## 2017-01-10 RX ORDER — SUCCINYLCHOLINE CHLORIDE 20 MG/ML
INJECTION INTRAMUSCULAR; INTRAVENOUS
Status: DISCONTINUED | OUTPATIENT
Start: 2017-01-10 | End: 2017-01-10

## 2017-01-10 RX ADMIN — ATORVASTATIN CALCIUM 20 MG: 10 TABLET, FILM COATED ORAL at 08:01

## 2017-01-10 RX ADMIN — FENTANYL CITRATE 50 MCG: 50 INJECTION, SOLUTION INTRAMUSCULAR; INTRAVENOUS at 01:01

## 2017-01-10 RX ADMIN — BUPIVACAINE 266 MG: 13.3 INJECTION, SUSPENSION, LIPOSOMAL INFILTRATION at 01:01

## 2017-01-10 RX ADMIN — SODIUM CHLORIDE, SODIUM LACTATE, POTASSIUM CHLORIDE, AND CALCIUM CHLORIDE 500 ML: .6; .31; .03; .02 INJECTION, SOLUTION INTRAVENOUS at 10:01

## 2017-01-10 RX ADMIN — FENTANYL CITRATE 100 MCG: 50 INJECTION, SOLUTION INTRAMUSCULAR; INTRAVENOUS at 02:01

## 2017-01-10 RX ADMIN — GLYCOPYRROLATE 0.2 MG: 0.2 INJECTION, SOLUTION INTRAMUSCULAR; INTRAVENOUS at 10:01

## 2017-01-10 RX ADMIN — PHENYLEPHRINE HYDROCHLORIDE 200 MCG: 10 INJECTION INTRAVENOUS at 12:01

## 2017-01-10 RX ADMIN — EPHEDRINE SULFATE 10 MG: 50 INJECTION, SOLUTION INTRAMUSCULAR; INTRAVENOUS; SUBCUTANEOUS at 12:01

## 2017-01-10 RX ADMIN — SODIUM CHLORIDE, SODIUM LACTATE, POTASSIUM CHLORIDE, AND CALCIUM CHLORIDE: 600; 310; 30; 20 INJECTION, SOLUTION INTRAVENOUS at 10:01

## 2017-01-10 RX ADMIN — ACETAMINOPHEN 1000 MG: 10 INJECTION, SOLUTION INTRAVENOUS at 01:01

## 2017-01-10 RX ADMIN — MIDAZOLAM HYDROCHLORIDE 2 MG: 1 INJECTION, SOLUTION INTRAMUSCULAR; INTRAVENOUS at 10:01

## 2017-01-10 RX ADMIN — FAMOTIDINE 20 MG: 10 INJECTION, SOLUTION INTRAVENOUS at 08:01

## 2017-01-10 RX ADMIN — TAMSULOSIN HYDROCHLORIDE 0.4 MG: 0.4 CAPSULE ORAL at 08:01

## 2017-01-10 RX ADMIN — ROPIVACAINE HYDROCHLORIDE 16 MCG/HR: 5 INJECTION, SOLUTION EPIDURAL; INFILTRATION; PERINEURAL at 03:01

## 2017-01-10 RX ADMIN — BUPIVACAINE HYDROCHLORIDE 2.5 ML: 5 INJECTION, SOLUTION EPIDURAL; INTRACAUDAL; PERINEURAL at 01:01

## 2017-01-10 RX ADMIN — Medication: at 08:01

## 2017-01-10 RX ADMIN — DEXTROSE, SODIUM CHLORIDE, SODIUM LACTATE, POTASSIUM CHLORIDE, AND CALCIUM CHLORIDE: 5; .6; .31; .03; .02 INJECTION, SOLUTION INTRAVENOUS at 05:01

## 2017-01-10 RX ADMIN — ROCURONIUM BROMIDE 45 MG: 10 INJECTION, SOLUTION INTRAVENOUS at 11:01

## 2017-01-10 RX ADMIN — FENTANYL CITRATE 50 MCG: 50 INJECTION, SOLUTION INTRAMUSCULAR; INTRAVENOUS at 10:01

## 2017-01-10 RX ADMIN — CIPROFLOXACIN HYDROCHLORIDE 500 MG: 500 TABLET, FILM COATED ORAL at 08:01

## 2017-01-10 RX ADMIN — SUCCINYLCHOLINE CHLORIDE 120 MG: 20 INJECTION, SOLUTION INTRAMUSCULAR; INTRAVENOUS at 11:01

## 2017-01-10 RX ADMIN — LIDOCAINE HYDROCHLORIDE 100 MG: 20 INJECTION, SOLUTION INTRAVENOUS at 11:01

## 2017-01-10 RX ADMIN — ROCURONIUM BROMIDE 5 MG: 10 INJECTION, SOLUTION INTRAVENOUS at 11:01

## 2017-01-10 RX ADMIN — PROPOFOL 100 MG: 10 INJECTION, EMULSION INTRAVENOUS at 11:01

## 2017-01-10 RX ADMIN — ROCURONIUM BROMIDE 20 MG: 10 INJECTION, SOLUTION INTRAVENOUS at 12:01

## 2017-01-10 RX ADMIN — GLYCOPYRROLATE 0.8 MG: 0.2 INJECTION, SOLUTION INTRAMUSCULAR; INTRAVENOUS at 02:01

## 2017-01-10 RX ADMIN — ROPIVACAINE HYDROCHLORIDE 8 ML/HR: 5 INJECTION, SOLUTION EPIDURAL; INFILTRATION; PERINEURAL at 08:01

## 2017-01-10 RX ADMIN — EPHEDRINE SULFATE 10 MG: 50 INJECTION, SOLUTION INTRAMUSCULAR; INTRAVENOUS; SUBCUTANEOUS at 01:01

## 2017-01-10 RX ADMIN — PHENYLEPHRINE HYDROCHLORIDE 100 MCG: 10 INJECTION INTRAVENOUS at 12:01

## 2017-01-10 RX ADMIN — FENTANYL CITRATE 50 MCG: 50 INJECTION, SOLUTION INTRAMUSCULAR; INTRAVENOUS at 11:01

## 2017-01-10 NOTE — TRANSFER OF CARE
"Anesthesia Transfer of Care Note    Patient: Doron Domínguez    Procedure(s) Performed: Procedure(s) (LRB):  LOBECTOMY-LUNG left lower lobe, need to do thoracoscopy first (Left)  THORACOSCOPY (Left)    Patient location: PACU    Anesthesia Type: general    Transport from OR: Transported from OR on room air with adequate spontaneous ventilation    Post pain: adequate analgesia    Post assessment: no apparent anesthetic complications and tolerated procedure well    Post vital signs: stable    Level of consciousness: awake and responds to stimulation    Nausea/Vomiting: no nausea/vomiting    Complications: none          Last vitals:   Visit Vitals    /71 (BP Location: Left arm, Patient Position: Lying, BP Method: Automatic)    Pulse 70    Temp 36.5 °C (97.7 °F) (Oral)    Resp (!) 21    Ht 5' 5.5" (1.664 m)    Wt 78 kg (171 lb 15.3 oz)    SpO2 (!) 93%    BMI 28.18 kg/m2     "

## 2017-01-10 NOTE — ANESTHESIA POSTPROCEDURE EVALUATION
"Anesthesia Post Evaluation    Patient: Doron Domínguez    Procedure(s) Performed: Procedure(s) (LRB):  LOBECTOMY-LUNG left lower lobe, need to do thoracoscopy first (Left)  THORACOSCOPY (Left)    Final Anesthesia Type: general  Patient location during evaluation: PACU  Patient participation: Yes- Able to Participate  Level of consciousness: awake  Post-procedure vital signs: reviewed and stable  Pain management: adequate  Airway patency: patent  PONV status at discharge: No PONV  Anesthetic complications: no      Cardiovascular status: blood pressure returned to baseline  Respiratory status: unassisted, spontaneous ventilation and room air  Hydration status: euvolemic  Follow-up needed (thoracic epidural in place)         Visit Vitals    /62 (BP Location: Left arm, Patient Position: Lying, BP Method: Automatic)    Pulse 83    Temp 36.4 °C (97.5 °F) (Temporal)    Resp 19    Ht 5' 5.5" (1.664 m)    Wt 78 kg (171 lb 15.3 oz)    SpO2 97%    BMI 28.18 kg/m2       Pain/Feliz Score: Pain Assessment Performed: Yes (1/10/2017  3:30 PM)  Presence of Pain: non-verbal indicators absent (1/10/2017  3:30 PM)  Pain Rating Prior to Med Admin: 4 (1/10/2017  3:15 PM)  Feliz Score: 8 (1/10/2017  3:30 PM)      "

## 2017-01-10 NOTE — ANESTHESIA PREPROCEDURE EVALUATION
01/10/2017  Doron Domínguez is a 76 y.o., male.    [facility] Anesthesia Evaluation    I have reviewed the Patient Summary Reports.    I have reviewed the Nursing Notes.   I have reviewed the Medications.     Review of Systems  Anesthesia Hx:  History of prior surgery of interest to airway management or planning: Denies Family Hx of Anesthesia complications.   Denies Personal Hx of Anesthesia complications.   Cardiovascular:   Hypertension CAD   CONCLUSIONS     1 - Mild left atrial enlargement.     2 - Concentric remodeling.     3 - Normal left ventricular systolic function (EF 60-65%).     4 - Normal left ventricular diastolic function.     5 - Right ventricular enlargement with mildly depressed systolic function.     6 - The estimated PA systolic pressure is 20 mmHg.     7 - Mild aortic stenosis, LIZZETTE = 1.55 cm2, peak velocity = 2.0 m/s, mean gradient = 9.0 mmHg.      Pulmonary:   Asthma Shortness of breath Lung cancer       Physical Exam  General:  Well nourished    Airway/Jaw/Neck:  Airway Findings: Mallampati: I     Dental:  Dental Findings: Edentulous   Chest/Lungs:  Chest/Lungs Findings: Normal Respiratory Rate     Heart/Vascular:  Heart Findings: Normal            Anesthesia Plan  Type of Anesthesia, risks & benefits discussed:  Anesthesia Type:  general  Patient's Preference:   Intra-op Monitoring Plan: arterial line  Intra-op Monitoring Plan Comments:   Post Op Pain Control Plan: epidural  Post Op Pain Control Plan Comments:   Induction:   IV  Beta Blocker:  Patient is on a Beta-Blocker and has received one dose within the past 24 hours (No further documentation required).       Informed Consent: Patient understands risks and agrees with Anesthesia plan.  Questions answered. Anesthesia consent signed with patient.  ASA Score: 3     Day of Surgery Review of History & Physical: I have interviewed and examined  the patient. I have reviewed the patient's H&P dated:  There are no significant changes.          Ready For Surgery From Anesthesia Perspective.

## 2017-01-10 NOTE — IP AVS SNAPSHOT
[facility]  [id number] [place] [address] [address] [place] [address]           Patient Discharge Instructions     Our goal is to set you up for success. This packet includes information on your condition, medications, and your home care. It will help you to care for yourself so you don't get sicker and need to go back to the hospital.     Please ask your nurse if you have any questions.        There are many details to remember when preparing to leave the hospital. Here is what you will need to do:    1. Take your medicine. If you are prescribed medications, review your Medication List in the following pages. You may have new medications to  at the pharmacy and others that you'll need to stop taking. Review the instructions for how and when to take your medications. Talk with your doctor or nurses if you are unsure of what to do.     2. Go to your follow-up appointments. Specific follow-up information is listed in the following pages. Your may be contacted by a transition nurse or clinical provider about future appointments. Be sure we have all of the phone numbers to reach you, if needed. Please contact your provider's office if you are unable to make an appointment.     3. Watch for warning signs. Your doctor or nurse will give you detailed warning signs to watch for and when to call for assistance. These instructions may also include educational information about your condition. If you experience any of warning signs to your health, call your doctor.               ** Verify the list of medication(s) below is accurate and up to date. Carry this with you in case of emergency. If your medications have changed, please notify your healthcare provider.             Medication List      START taking these medications        Additional Info                      hydrocodone-acetaminophen 7.5-325mg 7.5-325 mg per tablet   Commonly known as:  NORCO   Quantity:  20 tablet   Refills:  0   Dose:  1 tablet     Last time this was given:  1 tablet on 1/19/2017  1:22 PM   Instructions:  Take 1 tablet by mouth every 4 (four) hours as needed.     Begin Date    AM    Noon    PM    Bedtime         CONTINUE taking these medications        Additional Info                      albuterol 90 mcg/actuation inhaler   Quantity:  8.5 g   Refills:  1   Dose:  2 puff    Instructions:  Inhale 2 puffs into the lungs every 4 (four) hours as needed for Wheezing.     Begin Date    AM    Noon    PM    Bedtime       albuterol-ipratropium 2.5mg-0.5mg/3mL 0.5 mg-3 mg(2.5 mg base)/3 mL nebulizer solution   Commonly known as:  DUO-NEB   Quantity:  120 vial   Refills:  12   Dose:  3 mL    Last time this was given:  3 mLs on 1/19/2017  9:20 AM   Instructions:  Take 3 mLs by nebulization every 6 (six) hours.     Begin Date    AM    Noon    PM    Bedtime       atorvastatin 20 MG tablet   Commonly known as:  LIPITOR   Refills:  0   Dose:  20 mg    Last time this was given:  20 mg on 1/18/2017  8:54 PM   Instructions:  Take 20 mg by mouth every evening.     Begin Date    AM    Noon    PM    Bedtime       budesonide 0.5 mg/2 mL nebulizer solution   Commonly known as:  PULMICORT   Quantity:  120 mL   Refills:  12   Dose:  0.5 mg   Comments:  Dispense # 60 vials of 0.5mg/2ml    Instructions:  Take 2 mLs (0.5 mg total) by nebulization 2 (two) times daily. Wash out mouth after using.     Begin Date    AM    Noon    PM    Bedtime       glucosamine-chondroitin 500-400 mg tablet   Refills:  0   Dose:  1 tablet    Instructions:  Take 1 tablet by mouth 2 (two) times daily.     Begin Date    AM    Noon    PM    Bedtime       ibuprofen 800 MG tablet   Commonly known as:  ADVIL,MOTRIN   Quantity:  20 tablet   Refills:  0   Dose:  800 mg    Last time this was given:  400 mg on 1/12/2017 12:25 PM   Instructions:  Take 1 tablet (800 mg total) by mouth every 6 (six) hours as needed for Pain.     Begin Date    AM    Noon    PM    Bedtime       inhalation device   Commonly  known as:  AEROCHAMBER PLUS FLOW-VU   Quantity:  1 Device   Refills:  0   Comments:  Gave in clinic with instruction for use with albuterol inhaler.    Instructions:  Use as directed for inhalation.     Begin Date    AM    Noon    PM    Bedtime       metoprolol succinate 25 MG 24 hr tablet   Commonly known as:  TOPROL-XL   Refills:  0   Dose:  25 mg    Last time this was given:  50 mg on 1/19/2017  9:17 AM   Instructions:  Take 25 mg by mouth once daily.     Begin Date    AM    Noon    PM    Bedtime       multivitamin with minerals tablet   Refills:  0   Dose:  1 tablet    Instructions:  Take 1 tablet by mouth once daily.     Begin Date    AM    Noon    PM    Bedtime       potassium gluconate 595 mg (99 mg) Tab   Refills:  0    Instructions:  Take by mouth.     Begin Date    AM    Noon    PM    Bedtime       tamsulosin 0.4 mg Cp24   Commonly known as:  FLOMAX   Refills:  0   Dose:  0.4 mg    Last time this was given:  0.4 mg on 1/18/2017  8:54 PM   Instructions:  Take 0.4 mg by mouth every evening.     Begin Date    AM    Noon    PM    Bedtime         STOP taking these medications     levoFLOXacin 500 MG tablet   Commonly known as:  LEVAQUIN       promethazine-codeine 6.25-10 mg/5 ml 6.25-10 mg/5 mL syrup   Commonly known as:  PHENERGAN with CODEINE            Where to Get Your Medications      These medications were sent to [place] [address]  [place], [place] - [address] [facility]  [address] [facility] [address]     Phone:  [phone]     hydrocodone-acetaminophen 7.5-325mg 7.5-325 mg per tablet                  Please bring to all follow up appointments:    1. A copy of your discharge instructions.  2. All medicines you are currently taking in their original bottles.  3. Identification and insurance card.    Please arrive 15 minutes ahead of scheduled appointment time.    Please call 24 hours in advance if you must reschedule your appointment and/or time.        Your Scheduled  "Appointments     Jan 24, 2017  1:40 PM CST   New Patient with Altagracia Blackwood DO   [facility]    [address] [place] [address] [phone]            Jan 26, 2017  2:00 PM CST   Established Patient Visit with Shant Leon MD   [facility] ([facility])    [address] [facility] [address] [phone]            Jan 27, 2017  8:00 AM CST   Established Patient Visit with Negrito Hernandez MD   [facility]    [address] [place] [address] [phone]            Jan 31, 2017  3:20 PM CST   Established Patient Visit with Dante Massey MD   [facility]    [address] [place] [address] [phone]            Mar 29, 2017  3:40 PM CDT   Established Patient Visit with Jaren Shelton MD   [facility]    [address] [place] [address] [phone]              Follow-up Information     Follow up with Altagracia Blackwood DO In 2 weeks.    Specialty:  Internal Medicine    Contact information:    [address] [place] [address] [address] [place] [address]  [phone]          Follow up with Shant Leon MD In 2 weeks.    Specialty:  Hematology and Oncology    Contact information:    [address] [facility] [id number] [phone]          Follow up with [facility] [place].    Specialties:  Wound Care, Home Health Services    Why:  Home Health, SN and PT        Follow up with Negrito Hernandez MD On 1/27/2017.    Specialty:  General Surgery    Contact information:    [address]  [place] [id number] [phone]          Discharge Instructions     Future Orders    BATH/SHOWER CHAIR FOR HOME USE     Questions:    Height:  5' 5.5" (1.664 m)    Weight:  78 kg (172 lb)    Does patient have medical equipment at home?:  none    Length of need (1-99 months):  99    Type:  With back    Call MD for:  " "difficulty breathing or increased cough     Call MD for:  increased confusion or weakness     Call MD for:  persistent nausea and vomiting or diarrhea     Call MD for:  redness, tenderness, or signs of infection (pain, swelling, redness, odor or green/yellow discharge around incision site)     Call MD for:  severe uncontrolled pain     Call MD for:  temperature >100.4     Diet general     Questions:    Total calories:      Fat restriction, if any:      Protein restriction, if any:      Na restriction, if any:      Fluid restriction:      Additional restrictions:      Lifting restrictions     Comments:    Avoid lifting over 20lbs        Primary Diagnosis     Your primary diagnosis was:  Malignant Neoplasm Of Lower Lobe Of Left Lung      Admission Information     Date & Time Department CSN    1/10/2017  7:33 AM [facility] -  [id number]      Care Providers     Provider Role Specialty Primary office phone    Negrito Hernandez MD Surgeon  General Surgery [phone]      Important Medicare Message          Most Recent Value    Important Message from Medicare Regarding Discharge Appeal Rights  Given to patient/caregiver, Explained to patient/caregiver, Signed/date by patient/caregiver yes 01/18/2017 1439      Your Vitals Were     BP Pulse Temp Resp Height Weight    106/59 (BP Location: Left arm, Patient Position: Sitting, BP Method: Automatic) 87 98.4 °F (36.9 °C) (Oral) 18 5' 5.5" (1.664 m) 78 kg (172 lb)    SpO2 BMI             95% 28.19 kg/m2         Recent Lab Values     No lab values to display.      Allergies as of 1/19/2017        Reactions    Bactrim [Sulfamethoxazole-trimethoprim] Other (See Comments)    Severe leg cramps, dizziness    Dilaudid [Hydromorphone] Other (See Comments)    Confusion, paranoia      Advance Directives     An advance directive is a document which, in the event you are no longer able to make decisions for yourself, tells your healthcare team what kind of treatment you do or do " not want to receive, or who you would like to make those decisions for you.  If you do not currently have an advance directive, [facility] encourages you to create one.  For more information call:  [phone] ([phone]), [phone] ([phone]),  or log on to [email or web address].        Language Assistance Services     ATTENTION: Language assistance services are available, free of charge. Please call [phone].      ATENCIÓN: Si habla español, tiene a moreno disposición servicios gratuitos de asistencia lingüística. Llame al [phone].     CHÚ Ý: N?u b?n nói Ti?ng Vi?t, có các d?ch v? h? tr? ngôn ng? mi?n phí dành cho b?n. G?i s? [phone].        Pneumonmia Discharge Instructions                 [facility] complies with applicable Federal civil rights laws and does not discriminate on the basis of race, color, national origin, age, disability, or sex.

## 2017-01-10 NOTE — CONSULTS
[facility] -   Critical Care Medicine  Consult Note    Patient Name: Doron Domínguez  MRN: [id number]  Admission Date: 1/10/2017  Hospital Length of Stay: 0 days  Code Status: Full Code  Attending Physician: Negrito Hernandez MD   Primary Care Provider: Omar Ramirez MD   Principal Problem: Malignant neoplasm of lower lobe of left lung    Consults  Subjective: left sided shoulder pain     HPI: s/p op left lower lobe lobectomy for lung cancer, alert, orientated     Hospital/ICU Course: Post op in ICU - c/o left shoulder pain, no SOB    Past Medical History   Diagnosis Date    Aortic stenosis 12/29/2016    Arthritis     Asthma     BPH (benign prostatic hyperplasia)     CAD (coronary artery disease)     CAD, multiple vessel 12/29/2016    Cancer      lung    High cholesterol     Hx of CABG 12/29/2016    Hypertension        Past Surgical History   Procedure Laterality Date    Coronary artery bypass graft  2012     [facility]    Cardiac surgery      Appendectomy         Review of patient's allergies indicates:   Allergen Reactions    Bactrim [sulfamethoxazole-trimethoprim] Other (See Comments)     Severe leg cramps, dizziness       Family History     None          Social History Main Topics    Smoking status: Never Smoker    Smokeless tobacco: Never Used    Alcohol use No    Drug use: No    Sexual activity: Not on file        Review of Systems   Constitutional: Negative.  Negative for fatigue, fever and unexpected weight change.   HENT: Negative for congestion, postnasal drip, rhinorrhea, sinus pressure and sneezing.    Respiratory: Positive for chest tightness.    Cardiovascular: Positive for chest pain. Negative for palpitations and leg swelling.   Gastrointestinal: Negative for abdominal pain and nausea.   Musculoskeletal: Negative for arthralgias and back pain.   Skin: Negative for rash.   Neurological: Negative for dizziness, syncope, weakness and light-headedness.   Hematological:  Negative for adenopathy. Does not bruise/bleed easily.   Psychiatric/Behavioral: Negative for dysphoric mood and sleep disturbance. The patient is not nervous/anxious.      Objective:     Vital Signs (Most Recent):  Temp: 97.5 °F (36.4 °C) (01/10/17 1630)  Pulse: 83 (01/10/17 1630)  Resp: 19 (01/10/17 1630)  BP: 117/62 (01/10/17 1630)  SpO2: 97 % (01/10/17 1630) Vital Signs (24h Range):  Temp:  [97.5 °F (36.4 °C)-98.2 °F (36.8 °C)] 97.5 °F (36.4 °C)  Pulse:  [70-83] 83  Resp:  [11-24] 19  BP: (112-158)/(62-83) 117/62  Arterial Line BP: (118-145)/(50-61) 119/51     Weight: 78 kg (171 lb 15.3 oz)  Body mass index is 28.18 kg/(m^2).      Intake/Output Summary (Last 24 hours) at 01/10/17 1713  Last data filed at 01/10/17 1436   Gross per 24 hour   Intake              800 ml   Output              600 ml   Net              200 ml       Physical Exam   Constitutional: He is oriented to person, place, and time. He appears well-developed and well-nourished.   HENT:   Head: Normocephalic and atraumatic.   Eyes: Conjunctivae are normal. Pupils are equal, round, and reactive to light.   Neck: Neck supple. No JVD present. No tracheal deviation present. No thyromegaly present.   Cardiovascular: Normal rate, regular rhythm and normal heart sounds.    Pulmonary/Chest: Effort normal. He has decreased breath sounds in the left lower field. He has rales in the left lower field.       Abdominal: Soft.   Musculoskeletal: Normal range of motion.   Lymphadenopathy:     He has no cervical adenopathy.   Neurological: He is alert and oriented to person, place, and time.   Skin: Skin is warm and dry.   Nursing note and vitals reviewed.      Vents:  Oxygen Concentration (%): 98 (01/10/17 1500)    Lines/Drains/Airways     Drain                 Chest Tube 01/10/17 1320 1 Left Pleural 36 Fr. less than 1 day         Chest Tube 01/10/17 1321 2 Left Pleural 32 Fr. less than 1 day         Urethral Catheter 01/10/17 1115 Latex;Straight-tip 16 Fr. less  than 1 day          Epidural Line                 Neuraxial Analgesia/Anesthesia Assessment (using dermatomes) Epidural 01/10/17 0850 less than 1 day          Arterial Line                 Arterial Line 01/10/17 1436 Left Radial less than 1 day          Peripheral Intravenous Line                 Peripheral IV - Single Lumen 01/10/17 0755 Right Forearm less than 1 day         Peripheral IV - Single Lumen 01/10/17 1435 Left Hand less than 1 day                Significant Labs:    CBC/Anemia Profile:    Recent Labs  Lab 01/10/17  1435   WBC 14.34*   HGB 13.3*   HCT 39.5*      MCV 93   RDW 13.9        Chemistries:    Recent Labs  Lab 01/10/17  1435      K 4.1      CO2 23   BUN 14   CREATININE 0.9   CALCIUM 8.6*       BMP:     Recent Labs  Lab 01/10/17  1435   *      K 4.1      CO2 23   BUN 14   CREATININE 0.9   CALCIUM 8.6*     CMP:     Recent Labs  Lab 01/10/17  1435      K 4.1      CO2 23   *   BUN 14   CREATININE 0.9   CALCIUM 8.6*   ANIONGAP 8   EGFRNONAA >60     Coagulation: No results for input(s): INR, APTT in the last 48 hours.    Invalid input(s): PT    Significant Imaging: CXR: I have reviewed all pertinent results/findings within the past 24 hours and my personal findings are:  post op changes , chest tube    Assessment/Plan:     Active Diagnoses:    Diagnosis Date Noted POA    PRINCIPAL PROBLEM:  Malignant neoplasm of lower lobe of left lung [C34.32] 12/23/2016 Yes    Primary lung cancer [C34.90] 01/10/2017 Yes      Problems Resolved During this Admission:    Diagnosis Date Noted Date Resolved POA          Critical Care Medicine Daily Checklist:    A: Awake: RASS Goal/Actual Goal:    Actual:     B: Spontaneous Breathing Trial Performed?     C: SAT & SBT Coordinated?  Y                      D: Delirium: CAM-ICU     E: Early Mobility Performed? No   F: Feeding Goal:    Status:     Current Diet Order   Procedures    Diet NPO      AS:  Analgesia/Sedation Adequate  Discuss left shoulder pain with anesthesia   T: Thromboembolic Prophylaxis y   H: HOB > 300 Yes   U: Stress Ulcer Prophylaxis (if needed) Y   G: Glucose Control good   B: Bowel Function     I: Indwelling Catheter (Lines & Almaraz) Necessity needed   D: De-escalation of Antimicrobials/Pharmacotherapies na    Plan for the day/ETD Pain control    Code Status:  Family/Goals of Care: Full Code  discussed     Critical Care Time: 35 min  Critical secondary to Patient has a condition that poses threat to life and bodily function: post op thoracotomy  Patient is currently on drug therapy requiring intensive monitoring for toxicity: epidural      Critical care was time spent personally by me on the following activities: development of treatment plan with patient or surrogate and bedside caregivers, discussions with consultants, evaluation of patient's response to treatment, examination of patient, ordering and performing treatments and interventions, ordering and review of laboratory studies, ordering and review of radiographic studies, pulse oximetry, re-evaluation of patient's condition.  This critical care time did not overlap with that of any other provider or involve time for any procedures.    Thank you for your consult. I will follow-up with patient. Please contact us if you have any additional questions.     Dante Massey MD  Critical Care Medicine  [facility]

## 2017-01-10 NOTE — INTERVAL H&P NOTE
The patient has been examined and the H&P has been reviewed:    I concur with the findings and no changes have occurred since H&P was written.    Anesthesia/Surgery risks, benefits and alternative options discussed and understood by patient/family.          Active Hospital Problems    Diagnosis  POA    Primary lung cancer [C34.90]  Yes      Resolved Hospital Problems    Diagnosis Date Resolved POA   No resolved problems to display.

## 2017-01-10 NOTE — ANESTHESIA PROCEDURE NOTES
Epidural    Patient location during procedure: pre-op  Block not for primary anesthetic.  End time: 1/10/2017 9:10 AM  Staffing  Anesthesiologist: MADDIE SUN  Performed by: anesthesiologist   Preanesthetic Checklist  Completed: patient identified, surgical consent, pre-op evaluation, timeout performed, IV checked, risks and benefits discussed, monitors and equipment checked, anesthesia consent given, hand hygiene performed and patient being monitored  Preparation  Patient position: sitting  Prep: Betadine  Patient monitoring: ECG, Pulse Ox and Blood Pressure  Epidural  Skin Anesthetic: lidocaine 1%  Approach: midline  Interspace: T4-5  Injection technique: JD air  Needle and Epidural Catheter  Needle type: Tuohy   Needle gauge: 18  Needle length: 3.5 inches  Catheter type: multi-orifice  Test dose: 2 mL of lidocaine 1.5% with Epi 1-to-200,000  Additional Documentation: negative aspiration for heme and CSF, no paresthesia on injection, no signs/symptoms of IV or SA injection and no significant pain on injection  Needle localization: anatomical landmarks  Assessment  Ease of block: easy  Patient's tolerance of the procedure: comfortable throughout block and no complaints  Additional Notes  Attempted one space lower without success.  Catheter will be bolused at end of procedure.

## 2017-01-10 NOTE — PROGRESS NOTES
Epidural inserted by dr parnell. Pt assisted in sitting position with head down to chest and shoulders rounded.Sterile technique maintained throughout procedure. Time out done 0848. Procedure started at 0850 and ended at 0910am. Benzoin spray followed by tegaderm and foam tape applied by dr parnell and pt assisted back to supine position. jose procedure well. No loss of function or numbness noted. Will continue to monitor.

## 2017-01-10 NOTE — BRIEF OP NOTE
[facility] -   Brief Operative Note    SUMMARY     Surgery Date: 1/10/2017     Surgeon(s) and Role:     * Negrito Hernandez MD - Primary     * Nick Stinson MD - Assisting        Pre-op Diagnosis:  Malignant neoplasm of lower lobe of left lung [C34.32]    Post-op Diagnosis:  Post-Op Diagnosis Codes:     * Malignant neoplasm of lower lobe of left lung [C34.32]    Procedure(s) (LRB):  LOBECTOMY-LUNG left lower lobe, need to do thoracoscopy first (Left)  THORACOSCOPY (Left)    Anesthesia: General and 20ml of exparel with 30ml of .25% marcaine with epi via intercostal block and locally    Description of Procedure:      Description of the findings of the procedure: no pleural lesions, all lymph nodes were benign, infiltrative process of lower lobe    Estimated Blood Loss: 100 mL         Specimens:   Specimen (12h ago through future)    Start     Ordered    01/10/17 1338  Specimen to Pathology - Surgery  Once     Comments:  1 - left inferior pulmonary ligament nodes (frozen)  2 - left inferior pulmonary vein node (frozen)  3 - left AP window node (frozen)  4 - Left lobar node (permanent)  5 - Left lower lobe (fresh)    DX - lung cancer, suspicious lung mass    01/10/17 1340

## 2017-01-10 NOTE — NURSING
Pt c/o pain to left shoulder- 5 on PRS- spoke to Dr Ponce with anesthesia- new orders received.  He is coming to see pt shortly.

## 2017-01-11 LAB
ANION GAP SERPL CALC-SCNC: 7 MMOL/L
BASOPHILS # BLD AUTO: 0.01 K/UL
BASOPHILS NFR BLD: 0.1 %
BUN SERPL-MCNC: 14 MG/DL
CALCIUM SERPL-MCNC: 8.5 MG/DL
CHLORIDE SERPL-SCNC: 105 MMOL/L
CO2 SERPL-SCNC: 24 MMOL/L
CREAT SERPL-MCNC: 1 MG/DL
DIFFERENTIAL METHOD: ABNORMAL
EOSINOPHIL # BLD AUTO: 0 K/UL
EOSINOPHIL NFR BLD: 0.1 %
ERYTHROCYTE [DISTWIDTH] IN BLOOD BY AUTOMATED COUNT: 14 %
EST. GFR  (AFRICAN AMERICAN): >60 ML/MIN/1.73 M^2
EST. GFR  (NON AFRICAN AMERICAN): >60 ML/MIN/1.73 M^2
GLUCOSE SERPL-MCNC: 155 MG/DL
HCT VFR BLD AUTO: 35.9 %
HGB BLD-MCNC: 11.8 G/DL
LYMPHOCYTES # BLD AUTO: 0.8 K/UL
LYMPHOCYTES NFR BLD: 6.9 %
MCH RBC QN AUTO: 31 PG
MCHC RBC AUTO-ENTMCNC: 32.9 %
MCV RBC AUTO: 94 FL
MONOCYTES # BLD AUTO: 1.3 K/UL
MONOCYTES NFR BLD: 11.1 %
NEUTROPHILS # BLD AUTO: 9.5 K/UL
NEUTROPHILS NFR BLD: 81.8 %
PLATELET # BLD AUTO: 202 K/UL
PMV BLD AUTO: 9 FL
POTASSIUM SERPL-SCNC: 4.1 MMOL/L
RBC # BLD AUTO: 3.81 M/UL
SODIUM SERPL-SCNC: 136 MMOL/L
WBC # BLD AUTO: 11.6 K/UL

## 2017-01-11 PROCEDURE — 25000003 PHARM REV CODE 250: Performed by: INTERNAL MEDICINE

## 2017-01-11 PROCEDURE — P9045 ALBUMIN (HUMAN), 5%, 250 ML: HCPCS

## 2017-01-11 PROCEDURE — 25000242 PHARM REV CODE 250 ALT 637 W/ HCPCS: Performed by: SURGERY

## 2017-01-11 PROCEDURE — 94799 UNLISTED PULMONARY SVC/PX: CPT

## 2017-01-11 PROCEDURE — 63600175 PHARM REV CODE 636 W HCPCS

## 2017-01-11 PROCEDURE — 85025 COMPLETE CBC W/AUTO DIFF WBC: CPT

## 2017-01-11 PROCEDURE — 80048 BASIC METABOLIC PNL TOTAL CA: CPT

## 2017-01-11 PROCEDURE — 25000003 PHARM REV CODE 250: Performed by: SURGERY

## 2017-01-11 PROCEDURE — 94640 AIRWAY INHALATION TREATMENT: CPT

## 2017-01-11 PROCEDURE — 63600175 PHARM REV CODE 636 W HCPCS: Performed by: SURGERY

## 2017-01-11 PROCEDURE — 99233 SBSQ HOSP IP/OBS HIGH 50: CPT | Mod: ,,, | Performed by: INTERNAL MEDICINE

## 2017-01-11 PROCEDURE — 20000000 HC ICU ROOM

## 2017-01-11 PROCEDURE — 99900035 HC TECH TIME PER 15 MIN (STAT)

## 2017-01-11 PROCEDURE — 94770 HC EXHALED C02 TEST: CPT

## 2017-01-11 PROCEDURE — 63600175 PHARM REV CODE 636 W HCPCS: Performed by: ANESTHESIOLOGY

## 2017-01-11 RX ORDER — IBUPROFEN 400 MG/1
400 TABLET ORAL EVERY 6 HOURS PRN
Status: DISCONTINUED | OUTPATIENT
Start: 2017-01-11 | End: 2017-01-19 | Stop reason: HOSPADM

## 2017-01-11 RX ORDER — ALBUMIN HUMAN 50 G/1000ML
SOLUTION INTRAVENOUS
Status: COMPLETED
Start: 2017-01-11 | End: 2017-01-11

## 2017-01-11 RX ORDER — ALBUMIN HUMAN 250 G/1000ML
12.5 SOLUTION INTRAVENOUS ONCE
Status: DISCONTINUED | OUTPATIENT
Start: 2017-01-11 | End: 2017-01-19 | Stop reason: HOSPADM

## 2017-01-11 RX ADMIN — SODIUM CHLORIDE, SODIUM LACTATE, POTASSIUM CHLORIDE, AND CALCIUM CHLORIDE 500 ML: .6; .31; .03; .02 INJECTION, SOLUTION INTRAVENOUS at 04:01

## 2017-01-11 RX ADMIN — IPRATROPIUM BROMIDE AND ALBUTEROL SULFATE 3 ML: .5; 3 SOLUTION RESPIRATORY (INHALATION) at 07:01

## 2017-01-11 RX ADMIN — CIPROFLOXACIN HYDROCHLORIDE 500 MG: 500 TABLET, FILM COATED ORAL at 08:01

## 2017-01-11 RX ADMIN — IPRATROPIUM BROMIDE AND ALBUTEROL SULFATE 3 ML: .5; 3 SOLUTION RESPIRATORY (INHALATION) at 01:01

## 2017-01-11 RX ADMIN — ONDANSETRON 4 MG: 2 INJECTION INTRAMUSCULAR; INTRAVENOUS at 12:01

## 2017-01-11 RX ADMIN — CIPROFLOXACIN HYDROCHLORIDE 500 MG: 500 TABLET, FILM COATED ORAL at 09:01

## 2017-01-11 RX ADMIN — ALBUMIN (HUMAN) 12.5 G: 12.5 SOLUTION INTRAVENOUS at 03:01

## 2017-01-11 RX ADMIN — IPRATROPIUM BROMIDE AND ALBUTEROL SULFATE 3 ML: .5; 3 SOLUTION RESPIRATORY (INHALATION) at 12:01

## 2017-01-11 RX ADMIN — TAMSULOSIN HYDROCHLORIDE 0.4 MG: 0.4 CAPSULE ORAL at 09:01

## 2017-01-11 RX ADMIN — FAMOTIDINE 20 MG: 10 INJECTION, SOLUTION INTRAVENOUS at 09:01

## 2017-01-11 RX ADMIN — FAMOTIDINE 20 MG: 10 INJECTION, SOLUTION INTRAVENOUS at 08:01

## 2017-01-11 RX ADMIN — METOPROLOL SUCCINATE 25 MG: 25 TABLET, EXTENDED RELEASE ORAL at 08:01

## 2017-01-11 RX ADMIN — ENOXAPARIN SODIUM 40 MG: 100 INJECTION SUBCUTANEOUS at 12:01

## 2017-01-11 RX ADMIN — ATORVASTATIN CALCIUM 20 MG: 10 TABLET, FILM COATED ORAL at 09:01

## 2017-01-11 NOTE — PLAN OF CARE
Discharge plan home with family and possible Home Health if medical status warrants discussed with patinet, spouse and daughter at bedside. Senior Resource book given to spouse for HH options. CM awaiting choice. Spouse states independent in all ADL's prior to this hospital stay. Denies post hospital service needs at this time. CM anticipates possible HH, SN, PT,OT.       01/11/17 6473   Discharge Assessment   Assessment Type Discharge Planning Assessment   Confirmed/corrected address and phone number on facesheet? Yes   Assessment information obtained from? Patient;Caregiver  (patient's spouse and daughter)   Expected Length of Stay (days) 7   Communicated expected length of stay with patient/caregiver yes   Type of Healthcare Directive Received Advance Directive   If Healthcare Directive is received, is it scanned into Epic? yes   Prior to hospitilization cognitive status: Alert/Oriented   Prior to hospitalization functional status: Independent   Current cognitive status: Alert/Oriented   Current Functional Status: Needs Assistance   Arrived From admitted as an inpatient;home or self-care   Lives With spouse   Able to Return to Prior Arrangements yes   Is patient able to care for self after discharge? Unable to determine at this time (comments)   How many people do you have in your home that can help with your care after discharge? 1   Who are your caregiver(s) and their phone number(s)? (Anna Domínguez(spouse)-[phone])   Patient's perception of discharge disposition admitted as an inpatient;home or selfcare   Readmission Within The Last 30 Days no previous admission in last 30 days   Patient currently being followed by outpatient case management? No   Patient currently receives home health services? No   Does the patient currently use HME? No   Patient currently receives private duty nursing? N/A   Patient currently receives any other outside agency services? No   Equipment Currently Used at Home none   Do  you have any problems affording any of your prescribed medications? No   Is the patient taking medications as prescribed? yes   Do you have any financial concerns preventing you from receiving the healthcare you need? No   Does the patient have transportation to healthcare appointments? Yes   Transportation Available car;family or friend will provide   On Dialysis? No   Does the patient receive services at the [facility]mad[facility] Clinic? No   Are there any open cases? No   Discharge Plan A Home Health;Home with family   Discharge Plan B Home with family   Patient/Family In Agreement With Plan yes

## 2017-01-11 NOTE — PROGRESS NOTES
Pt seen this morning.  VSS and normal motor strength of all extremities.  Epidural infusuion turned off at midnight, and pt was hurting at the surgical site.  Pt was unable to move legs, so epidural infusion was turned off at midnight.  Restarted infusion at a lower rate of 6cc/hr of ropivicaine 2mg/ml.

## 2017-01-11 NOTE — PLAN OF CARE
Problem: Patient Care Overview  Goal: Plan of Care Review  Outcome: Ongoing (interventions implemented as appropriate)  Pt rec'd 1 liter LR bolus and albumin this shift for low b/p.  Epidural site intact/dry/in place, pump stopped as per anesthesia.  Pt c/o not able to move bi lat lower extremites which resolved within 2-3 hours of pump being off.  Pt has full ROM to bi lat LE at this time.  PCA pump in progress.  C/o shoulder pain which does not seem to be related to the incision site.  Positioned pt with wedge on right side for comfort.  Chest Tube site dry with scant amount of old drainage noted to the dressing, draining 180cc bloody drainage this shift.

## 2017-01-11 NOTE — PROGRESS NOTES
[facility] -   Critical Care Medicine  Progress Note    Patient Name: Doron Domínguez  MRN: [id number]  Admission Date: 1/10/2017  Hospital Length of Stay: 1 days  Code Status: Full Code  Attending Provider: Negrito Hernandez MD  Primary Care Provider: Omar Ramirez MD   Principal Problem: Malignant neoplasm of lower lobe of left lung    Subjective:     HPI: Post op left lower lobe lobectomy    Hospital/ICU Course: stable overnight    Interval History/Significant Events: pain control improved. Numbness in legs resolved with changing dose of epidural    Review of Systems  Objective:     Vital Signs (Most Recent):  Temp: 98.2 °F (36.8 °C) (01/11/17 1105)  Pulse: 80 (01/11/17 1105)  Resp: 14 (01/11/17 1105)  BP: (!) 92/43 (01/11/17 1105)  SpO2: (!) 94 % (01/11/17 1105) Vital Signs (24h Range):  Temp:  [97.5 °F (36.4 °C)-99 °F (37.2 °C)] 98.2 °F (36.8 °C)  Pulse:  [] 80  Resp:  [0-24] 14  BP: ()/(30-81) 92/43  Arterial Line BP: (118-145)/(50-61) 119/51     Weight: 78 kg (171 lb 15.3 oz)  Body mass index is 28.18 kg/(m^2).      Intake/Output Summary (Last 24 hours) at 01/11/17 1143  Last data filed at 01/11/17 1105   Gross per 24 hour   Intake             3013 ml   Output             2490 ml   Net              523 ml       Physical Exam    Vents:  Oxygen Concentration (%): 28 (01/11/17 0121)    Lines/Drains/Airways     Drain                 Urethral Catheter 01/10/17 1115 Latex;Straight-tip 16 Fr. 1 day         Chest Tube 01/10/17 1320 1 Left Pleural 36 Fr. less than 1 day         Chest Tube 01/10/17 1321 2 Left Pleural 32 Fr. less than 1 day          Epidural Line                 Neuraxial Analgesia/Anesthesia Assessment (using dermatomes) Epidural 01/10/17 0850 1 day          Arterial Line                 Arterial Line 01/10/17 1436 Left Radial less than 1 day          Peripheral Intravenous Line                 Peripheral IV - Single Lumen 01/10/17 0755 Right Forearm 1 day         Peripheral IV  - Single Lumen 01/10/17 1435 Left Hand less than 1 day                Significant Labs:    CBC/Anemia Profile:    Recent Labs  Lab 01/10/17  1435 01/10/17  2104 01/11/17  0326   WBC 14.34*  --  11.60   HGB 13.3*  --  11.8*   HCT 39.5* 37 35.9*     --  202   MCV 93  --  94   RDW 13.9  --  14.0        Chemistries:    Recent Labs  Lab 01/10/17  1435 01/11/17  0326    136   K 4.1 4.1    105   CO2 23 24   BUN 14 14   CREATININE 0.9 1.0   CALCIUM 8.6* 8.5*       BMP:   Recent Labs  Lab 01/11/17  0326   *      K 4.1      CO2 24   BUN 14   CREATININE 1.0   CALCIUM 8.5*     CMP:   Recent Labs  Lab 01/10/17  1435 01/11/17  0326    136   K 4.1 4.1    105   CO2 23 24   * 155*   BUN 14 14   CREATININE 0.9 1.0   CALCIUM 8.6* 8.5*   ANIONGAP 8 7*   EGFRNONAA >60 >60       Significant Imaging:  CXR: I have reviewed all pertinent results/findings within the past 24 hours and my personal findings are:  stable    Assessment/Plan:     Active Diagnoses:    Diagnosis Date Noted POA    PRINCIPAL PROBLEM:  Malignant neoplasm of lower lobe of left lung [C34.32] 12/23/2016 Yes    Primary lung cancer [C34.90] 01/10/2017 Yes      Problems Resolved During this Admission:    Diagnosis Date Noted Date Resolved POA        Critical Care Medicine Daily Checklist:    A: Awake: RASS Goal/Actual Goal:    Actual:     B: Spontaneous Breathing Trial Performed?     C: SAT & SBT Coordinated?  na                      D: Delirium: CAM-ICU     E: Early Mobility Performed? Yes   F: Feeding Goal:    Status:     Current Diet Order   Procedures    Diet NPO      AS: Analgesia/Sedation adequate   T: Thromboembolic Prophylaxis Y   H: HOB > 300 Yes   U: Stress Ulcer Prophylaxis (if needed) Y   G: Glucose Control Y   B: Bowel Function     I: Indwelling Catheter (Lines & Almaraz) Necessity needed   D: De-escalation of Antimicrobials/Pharmacotherapies na    Plan for the day/ETD Out of bed - if ok then transfer to  floor - TELE    Code Status:  Family/Goals of Care: Full Code  discusssed        Time: 40 min  Critical secondary to Patient is currently on drug therapy requiring intensive monitoring for toxicity: epidural , pain control     Critical care was time spent personally by me on the following activities: development of treatment plan with patient or surrogate and bedside caregivers, discussions with consultants, evaluation of patient's response to treatment, examination of patient, ordering and performing treatments and interventions, ordering and review of laboratory studies, ordering and review of radiographic studies, pulse oximetry, re-evaluation of patient's condition.  This critical care time did not overlap with that of any other provider or involve time for any procedures.     Dante Massey MD  Critical Care Medicine  [facility] -

## 2017-01-11 NOTE — PROGRESS NOTES
Patient now has 5/5 motor strength in b/l lower extremities; vital signs otherwise stable; will continue to hold epidural for now as pain adequately controlled on pca.  Nurses instructed to remove wedge.  Will continue to follow

## 2017-01-11 NOTE — PLAN OF CARE
Problem: Patient Care Overview  Goal: Plan of Care Review  Outcome: Ongoing (interventions implemented as appropriate)  Patient IS at bedside, experienced nausea during the night. Tolerated neb tx well, will continue to monitor.

## 2017-01-11 NOTE — PROGRESS NOTES
Patient unable to use IS at this time, will follow in the morning. Patient experienced nausea at 2340, but okay at this time to give neb tx. Will continue to monitor.

## 2017-01-11 NOTE — PROGRESS NOTES
At patient bedside after being notified of lower extremity motor weakness.  Epidural discontinued per my verbal orders after patient had 0/5 movement in bilateral lower extremities roughly 2 hours ago.  Current evaluation shows 2/5 motor strength dorsal, plantar, and knee flexor on the right, 1/5 on the left.  Sensory level intact in sacral dermatomes on right but not left.  Thus decision made to pull catheter back a few centimeters.  Upon inspection, it appears as though catheter was secured at 14cm, pulled back to 12cm; catheter site appeared c/d/i other than trace amount of shadowing at insertion site. dressing reapplied and patient placed in right abdominal displacement position.  Will continue qhour neurochecks, and if patient doesn't continue to improve within next hour, he will get stat CT spine to r/o epidural hematoma

## 2017-01-11 NOTE — OP NOTE
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  Left thoracoscopy.  2.  Left thoracotomy, left lower lobectomy of lung.    SURGEON:  Negrito Hernandez M.D.    FIRST ASSISTANT:  Nick Stinson M.D.    ESTIMATED BLOOD LOSS:  100 mL.    FLUIDS RECEIVED:  Approximately 800 mL of crystalloid.    ANESTHESIA:  General endobronchial also solution containing 20 mL of Exparel and   30 mL of 0.25% Marcaine was injected.  A 20 mL of this were used to intercostal   block and injecting the area of the intercostal nerve, 2 ribs above and 2 ribs   below the thoracotomy site.  In addition, 30 mL was used to inject the muscle   fascia and the thoracotomy incision.    OPERATIVE FINDINGS:  Thoracoscopy was performed first and there was no evidence   of any pleural metastasis either from the lung or the parietal pleura.  The lung   itself appeared to be congested, but no pleural lesions or gross invasion   through the visceral pleura could be seen.  There was about 10 mL of clear fluid   in and around the area of the left lower lobe was felt to be due to more   inflammatory  Volume not adequate, easily removed.  The   patient had lymph nodes, lymph nodes were sampled including inferior pulmonary   vein nodes, AP window nodes, inferior pulmonary ligament nodes and these were   sent for frozen and they are all benign.  There were no nodes seen in the area   of the hilum.  Also, some lobar nodes were noted, but they were in the field of   resection, so they were not sent for frozen section.  The patient had again had edema and swelling    left lower lobe, except for the superior segment was thickened.  The bronchial   margins were clear.    DESCRIPTION OF PROCEDURE:  After adequate general endobronchial anesthesia   obtained in supine position, the patient had a Almaraz  catheter placed and   arterial line placed.  The tube was checked and appeared to be in good position   in the left mainstem.  This is a double lumen tube.  The patient was then placed   in lateral decubitus position, left side up.  His left chest was prepped and   draped in the usual sterile fashion.  Preoperatively, he had SCD hose and IV   antibiotics.  Incision was then made approximately seventh intercostal space mid   axillary line.  It was carried through the skin using knife, through   subcutaneous tissue using cautery.  Dissection carried over the eighth rib and   the chest cavity was entered after deflating the left lung.  Thoracoscope was   placed.  The findings were stated as above.  The visceral and parietal pleura   appeared to be normal.  The lower portion of the left lower lobe was thickened.    There was no obvious visceral penetration.  There was minimal amount of pleural   fluid at the base, 10 mL.  It was felt that there was no obvious evidence of   metastatic disease, so thoracotomy was then performed.  This left posterolateral   thoracotomy was performed through a transverse incision was carried through the   skin using knife, through subcutaneous tissue using cautery.  The latissimus   dorsi muscle was then transected using the cautery.  The posterior 1/2 to 2/3rds   of the serratus anterior muscle was transected also using the cautery.  The   rests of the serratus anterior muscles were able to be retracted anteriorly.    The fifth intercostal space was then entered and intercostal muscles were   incised and a rib retractor was placed.  The findings were stated as above.    Next, the inferior pulmonary ligament was taken down, incising the pleura using   the cautery.  Dissection was carried superiorly on the inferior pulmonary   ligament.  There were some inferior pulmonary ligament nodes, which were hard   and felt like they were more granulomatous or anthracotic.  These nodes were     from the pleura and inferior pulmonary ligament and from the inferior   pulmonary vein and sent for frozen, which returned benign.  Inferior pulmonary   vein was then dissected out, actually had 2 branches of the vein.  Dissection   carried around these and noted on the vein was also dissected away and sent.    After this was done, incision was then made posteriorly on the left lower lobe   pleura.  AP window node was identified and was sent.  Pleura were then incised   anteriorly up to the left upper lobe.  The area of the hilum was examined and no   distinct nodes could be identified.  After this was done, incision was then   made on the pleura in the major fissure.  The pleura were incised using cautery.    The main pulmonary artery could be seen at the AP window area and the pleura   anterior to this was transected using cautery and the lingular vessels could be   identified going to the upper lobe.  The lower lobe is at the terminal end of   this artery.  The basilar branches and superior segmental branch could be   identified.  These branches were dissected out and vessel loops placed.  The   fissure anterior to the basilar branches of pulmonary artery was identified.    Using blunt dissection, dissection was carried between the lower lobe bronchus   and the basilar branch of the pulmonary artery and an opening was created there.    Then, the fissure anterior to this pulmonary artery was then transected using   ROBBIE-75 stapler.  After this was done, the basilar branch of the pulmonary   artery, superior segmental branch of the pulmonary artery and the 2 branches of   the inferior pulmonary vein were all stapled and transected using the vascular   Endostapler, power stapler.  After this was done, the only attachment of the   lower lobe was the bronchus.  There were couple of anthracotic nodes on the   bronchus inferior to the upper lobe bronchi.  These were taken out without   devascularize in the  lower lobe bronchus.  After this was done, the lower lobe   bronchus was clamped using the TA 60 stapler with green staples.  The left upper   lobe was then inflated and all segments on the left upper lobe easily inflated   and deflated.  Stapler was then fired and the left lower lobe bronchus was   amputated distal to the staple line.  The lung was removed and sent for margins.    Margins appeared to be clear.  Pleura were examined carefully and no pleural   invasion was noted.  After this was done, the chest cavity was copiously   irrigated.  The lung was cleared in the left mainstem bronchus and the inferior   aspect of the left lower lobe was immersed under saline and the lung was   inflated up to a pressure of 30 cm.  There was some minimal air leak at the lung   ROBBIE staple line.  The bronchus appeared to be good and intact.  The lung was   then deflated.  There was small area of air leak, which was a pleural tear was   closed using a 3-0 Vicryl suture.  After this was done, the intercostal block   was then done with Exparel Marcaine solution.  The Evicel fibrin glue was then   applied over the bronchus stump and the pulmonary artery and pulmonary vein   stumps and along the stapled fissure.  It was also applied at the most posterior   aspect of the thoracotomy.  Surgicel was also applied to those areas.  After   this was done, a 32-Thai chest tube was placed in the original trocar site and   brought up to the apex of the lung and secured to the skin with 0 silk after   closing the snug and the fascia around the chest tube with 0 Vicryl.  Just   posterior to this, another chest tube site was created by making a small   incision.  A 36 angled chest tube was placed along the diaphragm and brought out   through this incision and secured to skin with 0 silk suture.  The ribs were   then reapproximated using interrupted figure-of-eight #2 Vicryl suture.  Before   tying the ribs down, the left lung was then  inflated and the left upper lobe   inflated well and the chest tubes appeared to be in good position.  Ribs were   then sutures around and ribs were then tied down snugly.  The wound was then   irrigated and then injected by injecting the muscle fascia with local   anesthetic.  The latissimus dorsi muscle and serratus anterior muscles were   closed in several layers using running 0 Vicryl sutures.  Subcutaneous tissue   closed using running 3-0 Vicryl suture.  Skin was closed using running 4-0   Vicryl subcuticular.  Steri-Strips were applied, Vaseline gauze around the chest   tubes, 4 x 4s and tape.  The patient was awakened in the Operating Room and   taken to Recovery Room in stable condition.  It should be noted that   preoperatively the patient had an epidural catheter placed and that was dosed at   the time he was awakened up.      JACOB/  dd: 01/10/2017 16:49:00 (CST)  td: 01/10/2017 22:05:14 (CST)  Doc ID   #[id number]  Job ID #[id number]    CC: [employer] [facility]

## 2017-01-11 NOTE — PROGRESS NOTES
Doron Domínguez is a 76 y.o. male patient.   1. Malignant neoplasm of lower lobe of left lung    2. Primary lung cancer, left      Past Medical History   Diagnosis Date    Aortic stenosis 12/29/2016    Arthritis     Asthma     BPH (benign prostatic hyperplasia)     CAD (coronary artery disease)     CAD, multiple vessel 12/29/2016    Cancer      lung    High cholesterol     Hx of CABG 12/29/2016    Hypertension      No past surgical history pertinent negatives on file.  Scheduled Meds:   albumin human 25%  12.5 g Intravenous Once    albuterol-ipratropium 2.5mg-0.5mg/3mL  3 mL Nebulization Q6H    atorvastatin  20 mg Oral QHS    ciprofloxacin HCl  500 mg Oral Q12H    enoxparin  40 mg Subcutaneous Q24H    famotidine (PF)  20 mg Intravenous Q12H    metoprolol succinate  25 mg Oral Daily    tamsulosin  0.4 mg Oral QHS     Continuous Infusions:   dextrose 5% lactated ringers 90 mL/hr at 01/11/17 0600    hydromorphone in 0.9 % NaCl 6 mg/30 ml      ropivacaine (PF)       PRN Meds:naloxone, ondansetron, sodium chloride 0.9%    Review of patient's allergies indicates:   Allergen Reactions    Bactrim [sulfamethoxazole-trimethoprim] Other (See Comments)     Severe leg cramps, dizziness     Active Hospital Problems    Diagnosis  POA    *Malignant neoplasm of lower lobe of left lung [C34.32]  Yes     FINAL PATHOLOGIC DIAGNOSIS  Lung, left lower lobe, biopsy:  Positive for at least adenocarcinoma in situ, see note.  Note: The specimen consists of atypical cells in a lepidic growth pattern. An area of definitive invasion is not  identified, however, a more severe lesion may be present in an unsampled area. Follow up recommended as  clinically indicated. Mutation analyses for EGFR, ALK, ROS-1 and PD-L1 will be sent and the results will be issued  in a supplemental report. Reviewed by Dr. Soraya Garnica, Dr. Mellisa Prasad and Dr. Savannah Campos who concur.  Case discussed with Dr. Dante Massey on 12/21/2016 at 9:00  "a      Primary lung cancer [C34.90]  Yes      Resolved Hospital Problems    Diagnosis Date Resolved POA   No resolved problems to display.     Blood pressure (!) 108/50, pulse 102, temperature 98.8 °F (37.1 °C), temperature source Oral, resp. rate 11, height 5' 5.5" (1.664 m), weight 78 kg (171 lb 15.3 oz), SpO2 97 %.    Subjective:   Diet: NPO (no nausea, ).    Activity level: Activity impairment: to get oob later.    Pain control: Subjective patient pain control: well controled with epidural but could not move legs. moving legs now ok with it off but more pain.       Objective:  Vital signs (most recent): Blood pressure (!) 108/50, pulse 102, temperature 98.8 °F (37.1 °C), temperature source Oral, resp. rate 11, height 5' 5.5" (1.664 m), weight 78 kg (171 lb 15.3 oz), SpO2 97 %.  General appearance: Comfortable and in no acute distress.    Lungs:  Normal respiratory rate and normal effort.  (Mild decrease in bases, chest tube shows no air leak, 180ml of serosanguinous drainage. cxr looks stable except now large gastric bubble. ).    Heart: (Rate 90 to 100 regular. ).    Abdomen: Abdomen is soft.  (There is some gastric distention, there are some bs. ).    Wound:  Clean.  There is no drainage.    Extremities: There is normal range of motion.    Neurological: The patient is alert and oriented to person, place and time.        Recent Labs  Lab 01/10/17  1435 01/10/17  2104 01/11/17  0326   WBC 14.34*  --  11.60   HGB 13.3*  --  11.8*   HCT 39.5* 37 35.9*     --  202   MONO 8.2  1.2*  --  11.1  1.3*       Recent Labs  Lab 01/10/17  1435 01/11/17  0326    136   K 4.1 4.1    105   CO2 23 24   BUN 14 14   CREATININE 0.9 1.0   CALCIUM 8.6* 8.5*      Assessment:   Post-op: 1 day.    Condition: In stable condition (bp running a little low at times but good urine with one liter out over last 12hrs. ).     Plan:  Out of bed and up to chair.  Continue wound care as written.  Start/continue incentive " spirometry.  Chest tube to suction.  Sips/ice chips (hold on diet until gastric air decreases).  General Orders/Medications Plan: will talk to anesthesia about restarting epidural at possible slower rate as it was workin well.            Negrito Hernandez MD  1/11/2017

## 2017-01-11 NOTE — PROGRESS NOTES
EICU aware of low pressures. 500 cc bolus given with little improvement. Will continue to monitor closely.

## 2017-01-11 NOTE — PROGRESS NOTES
Pt able to raise both legs off bed. Neuro checks to lower extremities improving.  Will call anesthesia & inform.

## 2017-01-11 NOTE — PROGRESS NOTES
Called to patient bedside to evaluate for postoperative pain.  He is POD#0 s/p left thoracotomy/lobectomy.  He received thoracic epidural at t4-5 for procedure.  Sensory exam shows coverage to t6.  He denies adequate sensory level at t4 and and also c/o ipsilateral shoulder pain.  Describes pain in shoulder as 6/10 in severity, does not appear to be pleuritic in nature.   Shoulder pain worsens with abduction.  4/5 motor strength in left upper extremity. Due to location of incision, will give 2cc bolus of 1% lidocaine, continue infusion at 8ml/hr of 0.5% ropivicaine (remove fentanyl), and use dilaudid pca for multimodal approach.  Of note, vital signs otherwise stable, and no signs of ST segment changes on ekg. Will continue to follow

## 2017-01-11 NOTE — PROGRESS NOTES
Pt states he cannot move bi lat extremities at all. EICU & anesthesia (Dr. Ponce) consulted.  Dr. Ponce instructed to turn off eipdural pump & watch for 2 hours.  If no improvement, call anesthesia back.

## 2017-01-11 NOTE — PROGRESS NOTES
Anesthesia at bedside.  Pt improving with some movement to bi lat LE.  Reevaluate in 1 hour.  If same or worse, get stat CT of spine.  If improving, continue to watch closely.

## 2017-01-12 LAB
ALBUMIN SERPL BCP-MCNC: 2.7 G/DL
ALP SERPL-CCNC: 64 U/L
ALT SERPL W/O P-5'-P-CCNC: 27 U/L
ANION GAP SERPL CALC-SCNC: 6 MMOL/L
AST SERPL-CCNC: 48 U/L
BASOPHILS # BLD AUTO: 0.02 K/UL
BASOPHILS NFR BLD: 0.1 %
BILIRUB SERPL-MCNC: 1 MG/DL
BUN SERPL-MCNC: 9 MG/DL
CALCIUM SERPL-MCNC: 8.8 MG/DL
CHLORIDE SERPL-SCNC: 101 MMOL/L
CO2 SERPL-SCNC: 27 MMOL/L
CREAT SERPL-MCNC: 0.9 MG/DL
DIFFERENTIAL METHOD: ABNORMAL
EOSINOPHIL # BLD AUTO: 0.1 K/UL
EOSINOPHIL NFR BLD: 0.5 %
ERYTHROCYTE [DISTWIDTH] IN BLOOD BY AUTOMATED COUNT: 13.9 %
EST. GFR  (AFRICAN AMERICAN): >60 ML/MIN/1.73 M^2
EST. GFR  (NON AFRICAN AMERICAN): >60 ML/MIN/1.73 M^2
GLUCOSE SERPL-MCNC: 138 MG/DL
HCT VFR BLD AUTO: 39.3 %
HGB BLD-MCNC: 13 G/DL
LYMPHOCYTES # BLD AUTO: 1.2 K/UL
LYMPHOCYTES NFR BLD: 7.9 %
MCH RBC QN AUTO: 31.3 PG
MCHC RBC AUTO-ENTMCNC: 33.1 %
MCV RBC AUTO: 95 FL
MONOCYTES # BLD AUTO: 1.8 K/UL
MONOCYTES NFR BLD: 11.7 %
NEUTROPHILS # BLD AUTO: 12.4 K/UL
NEUTROPHILS NFR BLD: 79.8 %
PLATELET # BLD AUTO: 197 K/UL
PMV BLD AUTO: 8.7 FL
POTASSIUM SERPL-SCNC: 4 MMOL/L
PROT SERPL-MCNC: 6 G/DL
RBC # BLD AUTO: 4.16 M/UL
SODIUM SERPL-SCNC: 134 MMOL/L
WBC # BLD AUTO: 15.49 K/UL

## 2017-01-12 PROCEDURE — 94799 UNLISTED PULMONARY SVC/PX: CPT

## 2017-01-12 PROCEDURE — 80053 COMPREHEN METABOLIC PANEL: CPT

## 2017-01-12 PROCEDURE — 25000003 PHARM REV CODE 250: Performed by: INTERNAL MEDICINE

## 2017-01-12 PROCEDURE — 36415 COLL VENOUS BLD VENIPUNCTURE: CPT

## 2017-01-12 PROCEDURE — 27100171 HC OXYGEN HIGH FLOW UP TO 24 HOURS

## 2017-01-12 PROCEDURE — 25000003 PHARM REV CODE 250: Performed by: NURSE PRACTITIONER

## 2017-01-12 PROCEDURE — 99024 POSTOP FOLLOW-UP VISIT: CPT | Mod: ,,, | Performed by: PHYSICIAN ASSISTANT

## 2017-01-12 PROCEDURE — 25000003 PHARM REV CODE 250: Performed by: SURGERY

## 2017-01-12 PROCEDURE — 25000242 PHARM REV CODE 250 ALT 637 W/ HCPCS: Performed by: SURGERY

## 2017-01-12 PROCEDURE — 94640 AIRWAY INHALATION TREATMENT: CPT

## 2017-01-12 PROCEDURE — 63600175 PHARM REV CODE 636 W HCPCS: Performed by: SURGERY

## 2017-01-12 PROCEDURE — 63600175 PHARM REV CODE 636 W HCPCS: Performed by: ANESTHESIOLOGY

## 2017-01-12 PROCEDURE — 25000003 PHARM REV CODE 250: Performed by: ANESTHESIOLOGY

## 2017-01-12 PROCEDURE — 21400001 HC TELEMETRY ROOM

## 2017-01-12 PROCEDURE — 99291 CRITICAL CARE FIRST HOUR: CPT | Mod: ,,, | Performed by: INTERNAL MEDICINE

## 2017-01-12 PROCEDURE — 99900035 HC TECH TIME PER 15 MIN (STAT)

## 2017-01-12 PROCEDURE — 85025 COMPLETE CBC W/AUTO DIFF WBC: CPT

## 2017-01-12 PROCEDURE — 25000242 PHARM REV CODE 250 ALT 637 W/ HCPCS: Performed by: NURSE PRACTITIONER

## 2017-01-12 RX ORDER — IPRATROPIUM BROMIDE AND ALBUTEROL SULFATE 2.5; .5 MG/3ML; MG/3ML
3 SOLUTION RESPIRATORY (INHALATION)
Status: DISCONTINUED | OUTPATIENT
Start: 2017-01-12 | End: 2017-01-12

## 2017-01-12 RX ORDER — HYDROCODONE BITARTRATE AND ACETAMINOPHEN 5; 325 MG/1; MG/1
1 TABLET ORAL EVERY 4 HOURS PRN
Status: DISCONTINUED | OUTPATIENT
Start: 2017-01-12 | End: 2017-01-13

## 2017-01-12 RX ORDER — DOCUSATE SODIUM 100 MG/1
100 CAPSULE, LIQUID FILLED ORAL DAILY
Status: DISCONTINUED | OUTPATIENT
Start: 2017-01-12 | End: 2017-01-19 | Stop reason: HOSPADM

## 2017-01-12 RX ORDER — ROPIVACAINE IN 0.9% SOD CHL/PF 0.2 %
PLASTIC BAG, INJECTION (ML) EPIDURAL CONTINUOUS
Status: DISCONTINUED | OUTPATIENT
Start: 2017-01-12 | End: 2017-01-16

## 2017-01-12 RX ORDER — FAMOTIDINE 20 MG/1
20 TABLET, FILM COATED ORAL 2 TIMES DAILY
Status: DISCONTINUED | OUTPATIENT
Start: 2017-01-12 | End: 2017-01-14

## 2017-01-12 RX ORDER — HYDROCODONE BITARTRATE AND ACETAMINOPHEN 10; 325 MG/1; MG/1
1 TABLET ORAL EVERY 4 HOURS PRN
Status: DISCONTINUED | OUTPATIENT
Start: 2017-01-12 | End: 2017-01-16

## 2017-01-12 RX ORDER — BISACODYL 10 MG
10 SUPPOSITORY, RECTAL RECTAL DAILY PRN
Status: DISCONTINUED | OUTPATIENT
Start: 2017-01-12 | End: 2017-01-19 | Stop reason: HOSPADM

## 2017-01-12 RX ORDER — IPRATROPIUM BROMIDE AND ALBUTEROL SULFATE 2.5; .5 MG/3ML; MG/3ML
3 SOLUTION RESPIRATORY (INHALATION) EVERY 8 HOURS
Status: DISCONTINUED | OUTPATIENT
Start: 2017-01-13 | End: 2017-01-19 | Stop reason: HOSPADM

## 2017-01-12 RX ADMIN — ONDANSETRON 4 MG: 2 INJECTION INTRAMUSCULAR; INTRAVENOUS at 06:01

## 2017-01-12 RX ADMIN — IPRATROPIUM BROMIDE AND ALBUTEROL SULFATE 3 ML: .5; 3 SOLUTION RESPIRATORY (INHALATION) at 12:01

## 2017-01-12 RX ADMIN — IPRATROPIUM BROMIDE AND ALBUTEROL SULFATE 3 ML: .5; 3 SOLUTION RESPIRATORY (INHALATION) at 01:01

## 2017-01-12 RX ADMIN — FAMOTIDINE 20 MG: 20 TABLET ORAL at 09:01

## 2017-01-12 RX ADMIN — Medication: at 10:01

## 2017-01-12 RX ADMIN — ENOXAPARIN SODIUM 40 MG: 100 INJECTION SUBCUTANEOUS at 12:01

## 2017-01-12 RX ADMIN — FAMOTIDINE 20 MG: 10 INJECTION, SOLUTION INTRAVENOUS at 09:01

## 2017-01-12 RX ADMIN — CIPROFLOXACIN HYDROCHLORIDE 500 MG: 500 TABLET, FILM COATED ORAL at 09:01

## 2017-01-12 RX ADMIN — ATORVASTATIN CALCIUM 20 MG: 10 TABLET, FILM COATED ORAL at 10:01

## 2017-01-12 RX ADMIN — METOPROLOL SUCCINATE 25 MG: 25 TABLET, EXTENDED RELEASE ORAL at 09:01

## 2017-01-12 RX ADMIN — DEXTROSE, SODIUM CHLORIDE, SODIUM LACTATE, POTASSIUM CHLORIDE, AND CALCIUM CHLORIDE: 5; .6; .31; .03; .02 INJECTION, SOLUTION INTRAVENOUS at 02:01

## 2017-01-12 RX ADMIN — DOCUSATE SODIUM 100 MG: 100 CAPSULE, LIQUID FILLED ORAL at 10:01

## 2017-01-12 RX ADMIN — IPRATROPIUM BROMIDE AND ALBUTEROL SULFATE 3 ML: .5; 3 SOLUTION RESPIRATORY (INHALATION) at 08:01

## 2017-01-12 RX ADMIN — HYDROCODONE BITARTRATE AND ACETAMINOPHEN 1 TABLET: 5; 325 TABLET ORAL at 06:01

## 2017-01-12 RX ADMIN — IBUPROFEN 400 MG: 400 TABLET, FILM COATED ORAL at 12:01

## 2017-01-12 NOTE — PROGRESS NOTES
ETco2 monitor off, patient combative, restraints in place per Rosio Lee rn.  Patient very confused. Will continue to monitor.

## 2017-01-12 NOTE — PLAN OF CARE
Problem: Patient Care Overview  Goal: Plan of Care Review  Outcome: Ongoing (interventions implemented as appropriate)  Patient remains on 2lnc with a spo2 of 96%, no distress noted. Patient seems more calm, alert now. Tolerated neb tx well, unable to use IS at this time. Will continue to monitor.

## 2017-01-12 NOTE — PROGRESS NOTES
"Pt awake & tearful.  States that, in his confused state earlier,  he thought we were trying to "harvest" his organs.  Still a bit confused but seems to be resolving.  Educated pt on coughing & deep breathing & IS.  Pt demonstrates correctly.  Pt is no longer pulling his lines, bi lat soft wrist restraints removed at this time.   "

## 2017-01-12 NOTE — PROGRESS NOTES
Received Pt from ICU s/p Left thoracotomy, LLL lobectomy, Left thoroscopy.  Pt in bed.  CT to 2-0cm suction intact.  Epidural intact and secured to back with tape.  Family at bedside.  POC reviewed with Pt and spouse.  Both verbalized understanding.  Bed low and locked.  Call light within reach.

## 2017-01-12 NOTE — PROGRESS NOTES
Spouse at bedside.  Restraints in place.  Pt much calmer. pca pump stopped at this time.  EICU notified via monitor.

## 2017-01-12 NOTE — CONSULTS
Patient doing well. Resting comfortably. Site clear. Dressing intact. Rate 6cc/hr. Vitals stable. Will continue current rate.

## 2017-01-12 NOTE — PROGRESS NOTES
Pt increasingly confused.  Pulling out IV & pulling at chest tube. Order rec'd for bilat soft wrist restraints. Spouse telephoned at pt request.

## 2017-01-12 NOTE — PROGRESS NOTES
Pt awake, still confused x 3.  Speech is clear & there are no signs of distress noted.  Soft wrist restraints in place.  Pt educated on restraints.  Mood is no longer angry & combative.  Pt exhibits signs of sadness & remorse for acting out at staff earlier.  Encouraged pt to rest & helped reposition with pillows used for support.  Pt states he is not in any pain at this time.

## 2017-01-12 NOTE — PROGRESS NOTES
Doron Domínguez is a 76 y.o. male patient.   1. Malignant neoplasm of lower lobe of left lung    2. Primary lung cancer, left      Past Medical History   Diagnosis Date    Aortic stenosis 12/29/2016    Arthritis     Asthma     BPH (benign prostatic hyperplasia)     CAD (coronary artery disease)     CAD, multiple vessel 12/29/2016    Cancer      lung    High cholesterol     Hx of CABG 12/29/2016    Hypertension      No past surgical history pertinent negatives on file.  Scheduled Meds:   albumin human 25%  12.5 g Intravenous Once    albuterol-ipratropium 2.5mg-0.5mg/3mL  3 mL Nebulization Q6H    atorvastatin  20 mg Oral QHS    ciprofloxacin HCl  500 mg Oral Q12H    enoxparin  40 mg Subcutaneous Q24H    famotidine (PF)  20 mg Intravenous Q12H    metoprolol succinate  25 mg Oral Daily    tamsulosin  0.4 mg Oral QHS     Continuous Infusions:   dextrose 5% lactated ringers 90 mL/hr at 01/12/17 0800    ropivacaine (PF)       PRN Meds:ibuprofen, ondansetron, pneumoc 13-donnie conj-dip cr(PF), sodium chloride 0.9%    Review of patient's allergies indicates:   Allergen Reactions    Bactrim [sulfamethoxazole-trimethoprim] Other (See Comments)     Severe leg cramps, dizziness     Active Hospital Problems    Diagnosis  POA    *Malignant neoplasm of lower lobe of left lung [C34.32]  Yes     FINAL PATHOLOGIC DIAGNOSIS  Lung, left lower lobe, biopsy:  Positive for at least adenocarcinoma in situ, see note.  Note: The specimen consists of atypical cells in a lepidic growth pattern. An area of definitive invasion is not  identified, however, a more severe lesion may be present in an unsampled area. Follow up recommended as  clinically indicated. Mutation analyses for EGFR, ALK, ROS-1 and PD-L1 will be sent and the results will be issued  in a supplemental report. Reviewed by Dr. Soraya Garnica, Dr. Mellisa Prasad and Dr. Savannah Campos who concur.  Case discussed with Dr. Dante Massey on 12/21/2016 at 9:00 a       "Primary lung cancer [C34.90]  Yes      Resolved Hospital Problems    Diagnosis Date Resolved POA   No resolved problems to display.     Blood pressure (!) 119/55, pulse 76, temperature 99.9 °F (37.7 °C), temperature source Oral, resp. rate (!) 23, height 5' 5.5" (1.664 m), weight 78 kg (171 lb 15.3 oz), SpO2 99 %.    Subjective:   Diet: NPO.  Patient reports no nausea or vomiting.    Activity level: Impaired due to weakness (became dizzy yesterday so was not able to get OOB. Will try again today with PT. ).    Pain control: Well controlled.    Wound: Painful.      Objective:  Vital signs (most recent): Blood pressure (!) 119/55, pulse 76, temperature 99.9 °F (37.7 °C), temperature source Oral, resp. rate (!) 23, height 5' 5.5" (1.664 m), weight 78 kg (171 lb 15.3 oz), SpO2 99 %.  General appearance: Comfortable and in no acute distress.    Lungs:  Normal respiratory rate and normal effort.  (Mild decrease in bases, chest tube shows no air leak, serosanguinous drainage. cxr looks stable and gastric bubble appears somewhat smaller.   ).    Heart: (Rate 80's and regular. ).    Abdomen: Abdomen is soft.  (Mild distention, + bowel sounds, no tenderness  ).    Bowel sounds:  Bowel sounds are normal.    Wound:  Clean.    Extremities: There is normal range of motion.    Neurological: The patient is alert and oriented to person, place and time.        Recent Labs  Lab 01/10/17  1435 01/10/17  2104 01/11/17  0326 01/12/17  0430   WBC 14.34*  --  11.60 15.49*   HGB 13.3*  --  11.8* 13.0*   HCT 39.5* 37 35.9* 39.3*     --  202 197   MONO 8.2  1.2*  --  11.1  1.3* 11.7  1.8*       Recent Labs  Lab 01/10/17  1435 01/11/17  0326 01/12/17  0430    136 134*   K 4.1 4.1 4.0    105 101   CO2 23 24 27   BUN 14 14 9   CREATININE 0.9 1.0 0.9   CALCIUM 8.6* 8.5* 8.8   PROT  --   --  6.0   BILITOT  --   --  1.0   ALKPHOS  --   --  64   ALT  --   --  27   AST  --   --  48*      Assessment:   Post-op: 2 days.  "   Condition: In stable condition.     Plan:  Out of bed and up to chair.  Continue wound care as written.  Start/continue incentive spirometry.  Chest tube to suction.  Sips/ice chips (start clears today).    leave mac until OOB more.    PATIENT SEEN AND EXAMINED. HE IS TOLERATING DIET , URINE OUTPUT IS GOOD WITH 2.7 LITERS OUT. SATS OK ON 2LNC. HE HAS BS TODAY. CXR AND PE IS STABLE . LOW GRADE FEVER PROBABLY PULMONARY.      P: ADVANCE DIET, MAC PROBABLY OUT IN AM, PT CONSULT TO HELP AMBULATE. CONTINUE RESP. RX.     Wanda Decker PA-C  1/12/2017

## 2017-01-12 NOTE — PLAN OF CARE
Problem: Patient Care Overview  Goal: Plan of Care Review  Outcome: Ongoing (interventions implemented as appropriate)  Pt confusion slowly subsiding. Pt remains calm. Sleeping in between care. Chest tube in place, drsg dry & intact.  Drainage is serosanguinous to serous in color, amount moderate.  Epidural site intact & infusing at 6ml/hr. Pt turns self with 1 person asst.  Skin is free from breakdown & pt is free from falls/injury.  Oral intake this shift includes H2O & ice.  Pt has no problems swallowing.

## 2017-01-13 PROCEDURE — G8978 MOBILITY CURRENT STATUS: HCPCS | Mod: CM

## 2017-01-13 PROCEDURE — 94761 N-INVAS EAR/PLS OXIMETRY MLT: CPT

## 2017-01-13 PROCEDURE — 21400001 HC TELEMETRY ROOM

## 2017-01-13 PROCEDURE — 25000242 PHARM REV CODE 250 ALT 637 W/ HCPCS: Performed by: SURGERY

## 2017-01-13 PROCEDURE — 99231 SBSQ HOSP IP/OBS SF/LOW 25: CPT | Mod: ,,, | Performed by: INTERNAL MEDICINE

## 2017-01-13 PROCEDURE — G8979 MOBILITY GOAL STATUS: HCPCS | Mod: CK

## 2017-01-13 PROCEDURE — 27000221 HC OXYGEN, UP TO 24 HOURS

## 2017-01-13 PROCEDURE — 97530 THERAPEUTIC ACTIVITIES: CPT

## 2017-01-13 PROCEDURE — 97163 PT EVAL HIGH COMPLEX 45 MIN: CPT

## 2017-01-13 PROCEDURE — 25000003 PHARM REV CODE 250: Performed by: SURGERY

## 2017-01-13 PROCEDURE — 25000003 PHARM REV CODE 250: Performed by: ANESTHESIOLOGY

## 2017-01-13 PROCEDURE — 25000003 PHARM REV CODE 250: Performed by: NURSE PRACTITIONER

## 2017-01-13 PROCEDURE — 94640 AIRWAY INHALATION TREATMENT: CPT

## 2017-01-13 PROCEDURE — 99900035 HC TECH TIME PER 15 MIN (STAT)

## 2017-01-13 RX ORDER — HYDROCODONE BITARTRATE AND ACETAMINOPHEN 5; 325 MG/1; MG/1
1 TABLET ORAL EVERY 4 HOURS PRN
Status: DISCONTINUED | OUTPATIENT
Start: 2017-01-13 | End: 2017-01-16

## 2017-01-13 RX ADMIN — HYDROCODONE BITARTRATE AND ACETAMINOPHEN 1 TABLET: 5; 325 TABLET ORAL at 06:01

## 2017-01-13 RX ADMIN — ATORVASTATIN CALCIUM 20 MG: 10 TABLET, FILM COATED ORAL at 09:01

## 2017-01-13 RX ADMIN — Medication: at 03:01

## 2017-01-13 RX ADMIN — HYDROCODONE BITARTRATE AND ACETAMINOPHEN 1 TABLET: 10; 325 TABLET ORAL at 10:01

## 2017-01-13 RX ADMIN — FAMOTIDINE 20 MG: 20 TABLET ORAL at 09:01

## 2017-01-13 RX ADMIN — IPRATROPIUM BROMIDE AND ALBUTEROL SULFATE 3 ML: .5; 3 SOLUTION RESPIRATORY (INHALATION) at 07:01

## 2017-01-13 RX ADMIN — IPRATROPIUM BROMIDE AND ALBUTEROL SULFATE 3 ML: .5; 3 SOLUTION RESPIRATORY (INHALATION) at 03:01

## 2017-01-13 RX ADMIN — CIPROFLOXACIN HYDROCHLORIDE 500 MG: 500 TABLET, FILM COATED ORAL at 09:01

## 2017-01-13 RX ADMIN — IPRATROPIUM BROMIDE AND ALBUTEROL SULFATE 3 ML: .5; 3 SOLUTION RESPIRATORY (INHALATION) at 12:01

## 2017-01-13 RX ADMIN — HYDROCODONE BITARTRATE AND ACETAMINOPHEN 1 TABLET: 5; 325 TABLET ORAL at 04:01

## 2017-01-13 RX ADMIN — METOPROLOL SUCCINATE 25 MG: 25 TABLET, EXTENDED RELEASE ORAL at 09:01

## 2017-01-13 RX ADMIN — HYDROCODONE BITARTRATE AND ACETAMINOPHEN 1 TABLET: 5; 325 TABLET ORAL at 02:01

## 2017-01-13 RX ADMIN — IPRATROPIUM BROMIDE AND ALBUTEROL SULFATE 3 ML: .5; 3 SOLUTION RESPIRATORY (INHALATION) at 11:01

## 2017-01-13 RX ADMIN — DOCUSATE SODIUM 100 MG: 100 CAPSULE, LIQUID FILLED ORAL at 09:01

## 2017-01-13 RX ADMIN — Medication: at 07:01

## 2017-01-13 RX ADMIN — HYDROCODONE BITARTRATE AND ACETAMINOPHEN 1 TABLET: 5; 325 TABLET ORAL at 09:01

## 2017-01-13 NOTE — PLAN OF CARE
CM met with pt to conduct initial d/c assessment.  Pt wife present during assessment.  Pt used HH in the past, five years ago after he had surgery, but has not used HH since.  Pt is unsure if he will need HH and/or DME after d/c.  Pt wants to meet with his doctor and physical therapist for evaluation, and then reassess his needs.  Pt will be leaving primary care physician on Feb 28 and seeking a new PCP within [facility]       01/13/17 1158   Discharge Assessment   Assessment Type Discharge Planning Assessment   Confirmed/corrected address and phone number on facesheet? Yes   Assessment information obtained from? Patient;Caregiver;Medical Record   Expected Length of Stay (days) (TBD)   Type of Healthcare Directive Received (Anna Domínguez (wife) has POA.  )   Prior to hospitilization cognitive status: Alert/Oriented   Prior to hospitalization functional status: Independent   Current cognitive status: Alert/Oriented   Current Functional Status: Independent   Arrived From home or self-care   Lives With spouse   Able to Return to Prior Arrangements yes   Is patient able to care for self after discharge? Unable to determine at this time (comments)   How many people do you have in your home that can help with your care after discharge? 1   Who are your caregiver(s) and their phone number(s)? Anna Domínguez (wife) - [phone]   Patient's perception of discharge disposition home or selfcare   Readmission Within The Last 30 Days no previous admission in last 30 days   Patient currently being followed by outpatient case management? No   Patient currently receives home health services? No   Does the patient currently use HME? No   Patient currently receives private duty nursing? No   Patient currently receives any other outside agency services? No   Equipment Currently Used at Home none   Do you have any problems affording any of your prescribed medications? No   Do you have any financial concerns preventing you from  receiving the healthcare you need? No   Does the patient have transportation to healthcare appointments? Yes   Transportation Available car;family or friend will provide   On Dialysis? No   Does the patient receive services at the Coumadin Clinic? No   Are there any open cases? No   Discharge Plan A Home   Discharge Plan B Home Health   Patient/Family In Agreement With Plan yes

## 2017-01-13 NOTE — PLAN OF CARE
Problem: Patient Care Overview  Goal: Plan of Care Review  Outcome: Ongoing (interventions implemented as appropriate)  POC reviewed with Pt and spouse.  Both verbalized understanding.  Bed low and locked.  Call light within reach.  CT to -20cm suction intact, no air leaks noted.  Dsg to left chest wall CDI.  Output to CT 80 ml this shift.  Pt complained of pain and was given Ibuprofen and new order given for Norco which was given as ordered.  Zofran IV given for c/o nausea with good results.  Wife at bedside.  Almaraz cdi draining clear yellow urine. Pt encouraged to move and shift weight.

## 2017-01-13 NOTE — PLAN OF CARE
Problem: Patient Care Overview  Goal: Plan of Care Review  PT REQUIRES MAX A FOR BED MOBILITY AND MOD A FOR T/F TO CHAIR.   Outcome: Ongoing (interventions implemented as appropriate)  Pt has been free from falls, injury or trauma this shift.  POC reviewed with Pt and spouse.  Both verbalized understanding.  CT x 2 to Left chest CDI.  No air leaks noted.  Pt has had good pain control with po pain medication.  AVSS.  Bed low and locked while Pt in bed.  Call light within reach.

## 2017-01-13 NOTE — PROGRESS NOTES
Almaraz cath removed without difficulty.  Cath tip intact.  Pt tolerated well.  Chest tube x 2 intact, changed to 2 atriums.  Pt tolerated well.  No air leaks noted.  Will continue to monitor.

## 2017-01-13 NOTE — PT/OT/SLP EVAL
Physical Therapy  Evaluation    Doron Domínguez   MRN: [id number]   Admitting Diagnosis: Malignant neoplasm of lower lobe of left lung    PT Received On: 17  PT Start Time: 1002     PT Stop Time: 1026    PT Total Time (min): 24 min       Billable Minutes:  Evaluation 14 and Therapeutic Activity 10    Diagnosis: Malignant neoplasm of lower lobe of left lung  PT DX: GEN WEAKNESS    Past Medical History   Diagnosis Date    Aortic stenosis 2016    Arthritis     Asthma     BPH (benign prostatic hyperplasia)     CAD (coronary artery disease)     CAD, multiple vessel 2016    Cancer      lung    High cholesterol     Hx of CABG 2016    Hypertension       Past Surgical History   Procedure Laterality Date    Coronary artery bypass graft       [facility]    Cardiac surgery      Appendectomy         Referring physician: DOUGLAS  Date referred to PT: 2017      General Precautions: Standard, fall  Orthopedic Precautions:     Braces:              Patient History:  Lives With: spouse  Living Arrangements: house  Home Layout: Able to live on 1st floor  Stair Railings at Home: none  Transportation Available: family or friend will provide  Living Environment Comment: PT LIVES AT HOME IND AND WAS DRIVING.  Equipment Currently Used at Home: none  DME owned (not currently used): none    Previous Level of Function:  Ambulation Skills: independent  Transfer Skills: independent  ADL Skills: independent  Work/Leisure Activity: independent    Subjective:  Communicated with NURSE STONER AND EPIC CHART REVIEW  prior to session.  PT AGREED TO EVAL AND TX   Chief Complaint: PAIN  Patient goals: WALK    Pain Ratin/10         Location: chest  Pain Addressed: Cessation of Activity  Pain Rating Post-Intervention: 4/10    Objective:   Patient found with: telemetry, oxygen, chest tube, mac catheter (EPIDURAL)     Cognitive Exam:  Oriented to: Person, Place, Time and Situation    Follows  Commands/attention: Follows multistep  commands  Communication: clear/fluent  Safety awareness/insight to disability: intact    Physical Exam:  Postural examination/scapula alignment: Rounded shoulder and Head forward    Sensation:   Intact    Lower Extremity Range of Motion:  Right Lower Extremity: WNL  Left Lower Extremity: WNL    Lower Extremity Strength:  Right Lower Extremity: WFL  Left Lower Extremity: WFL    Functional Mobility:  Bed Mobility:  Rolling/Turning to Left: Maximum assistance  Scooting/Bridging: Moderate Assistance  Supine to Sit: Maximum Assistance    Transfers:  Sit <> Stand Assistance: Moderate Assistance  Bed <> Chair Technique: Stand Pivot  Bed <> Chair Assistance: Moderate Assistance  Bed <> Chair Assistive Device:  (HHA )    Gait:   Gait Distance: PT TOOK 2 STEPS FORWARD WITH MOD A.   Assistance 1: Moderate assistance  Gait Assistive Device: No device    Balance:   Static Sit: GOOD-: Takes MODERATE challenges from all directions but inconsistently  Dynamic Sit: FAIR+: Maintains balance through MINIMAL excursions of active trunk motion  Static Stand: POOR: Needs MODERATE assist to maintain  Dynamic stand: POOR: N/A    Therapeutic Activities and Exercises:  PT STOOD WITH MOD A AND T/F TO CHAIR WITH HHA. PT SEATED AND EDUCATED ON ROLE OF P.T.     AM-PAC 6 CLICK MOBILITY  How much help from another person does this patient currently need?   1 = Unable, Total/Dependent Assistance  2 = A lot, Maximum/Moderate Assistance  3 = A little, Minimum/Contact Guard/Supervision  4 = None, Modified Cochran/Independent          AM-PAC Raw Score CMS G-Code Modifier Level of Impairment Assistance   6 % Total / Unable   7 - 9 CM 80 - 100% Maximal Assist   10 - 14 CL 60 - 80% Moderate Assist   15 - 19 CK 40 - 60% Moderate Assist   20 - 22 CJ 20 - 40% Minimal Assist   23 CI 1-20% SBA / CGA   24 CH 0% Independent/ Mod I     Patient left up in chair with call button in reach and NURSE HERBIE  notified.    Assessment:   Doron Domínguez is a 76 y.o. male with a medical diagnosis of Malignant neoplasm of lower lobe of left lung and presents with CHEST TUBE, EPIDURAL AND LOWERY. PT DEBILITY AND WILL BENFIT FROM P.T. TO ADDRESS GROSS FUNC IMPAIRMENTS.     Rehab identified problem list/impairments: Rehab identified problem list/impairments: weakness, impaired endurance, impaired self care skills, gait instability, decreased lower extremity function, pain, decreased upper extremity function, impaired balance, impaired functional mobilty, decreased ROM    Rehab potential is good.    Activity tolerance: Fair    Discharge recommendations: Discharge Facility/Level Of Care Needs: rehabilitation facility (TBD)     Barriers to discharge:      Equipment recommendations: Equipment Needed After Discharge: walker, rolling     GOALS:   Physical Therapy Goals        Problem: Physical Therapy Goal    Goal Priority Disciplines Outcome Goal Variances Interventions   Physical Therapy Goal     PT/OT, PT      Description:  PT WILL BE SEEN FOR P.T. FOR A MIN OF 5 OUT OF 7 DAYS A WEEK  LT17  1. PT WILL COMPLETE BED MOBILITY WITH MIN A.  2. PT WILL STAND WITH RW AND MIN A WITH RW  3. PT WILL GT TRAINED WITH RW AND MIN A X 150'   4. PT WILL COMPLETE B LE FOR ROM AND STRENGTHENING.               PLAN:    Patient to be seen   to address the above listed problems via gait training, therapeutic activities, therapeutic exercises  Plan of Care expires:    Plan of Care reviewed with: patient, spouse    Functional Assessment Tool Used: BOSTON AM PAC  Score: CM  Functional Limitation: Mobility: Walking and moving around  Mobility: Walking and Moving Around Current Status (): CM  Mobility: Walking and Moving Around Goal Status (): AVA Mercer, PT  2017

## 2017-01-13 NOTE — ANESTHESIA POST-OP PAIN MANAGEMENT
Acute Pain Service Progress Note    Doron Domínguez is a 76 y.o., male, [id number].    Surgery:  Left Thoracotomy/ Lobectomy    Post Op Day #: 3    Catheter type: epidural T4/T5    Infusion type: Ropivacaine 0.2%  6 basal    Problem List:    Active Hospital Problems    Diagnosis  POA    *Malignant neoplasm of lower lobe of left lung [C34.32]  Yes     FINAL PATHOLOGIC DIAGNOSIS  Lung, left lower lobe, biopsy:  Positive for at least adenocarcinoma in situ, see note.  Note: The specimen consists of atypical cells in a lepidic growth pattern. An area of definitive invasion is not  identified, however, a more severe lesion may be present in an unsampled area. Follow up recommended as  clinically indicated. Mutation analyses for EGFR, ALK, ROS-1 and PD-L1 will be sent and the results will be issued  in a supplemental report. Reviewed by Dr. Soraya Garnica, Dr. Mellisa Prasad and Dr. Savannah Campos who concur.  Case discussed with Dr. Dante Massey on 12/21/2016 at 9:00 a      Primary lung cancer [C34.90]  Yes      Resolved Hospital Problems    Diagnosis Date Resolved POA   No resolved problems to display.       Subjective:     General appearance of alert, oriented, no complaints   Pain with rest: 1    Numbers   Pain with movement: 3    Numbers   Side Effects    1. Pruritis No    2. Nausea No    3. Motor Blockade No, 0=Ability to raise lower extremities off bed    4. Sedation No, 1=awake and alert    Objective:     Catheter level T4-5   Catheter site clean, dry, intact, minimal dried blood at insertion site under tegaderm        Vitals   Vitals:    01/13/17 0755   BP:    Pulse: 73   Resp: 20   Temp:         Labs    Admission on 01/10/2017   Component Date Value Ref Range Status    Group & Rh 01/10/2017 A POS   Final    Indirect Shayan 01/10/2017 NEG   Final    Sodium 01/10/2017 138  136 - 145 mmol/L Final    Potassium 01/10/2017 4.1  3.5 - 5.1 mmol/L Final    Chloride 01/10/2017 107  95 - 110 mmol/L Final    CO2 01/10/2017  23  23 - 29 mmol/L Final    Glucose 01/10/2017 122* 70 - 110 mg/dL Final    BUN, Bld 01/10/2017 14  8 - 23 mg/dL Final    Creatinine 01/10/2017 0.9  0.5 - 1.4 mg/dL Final    Calcium 01/10/2017 8.6* 8.7 - 10.5 mg/dL Final    Anion Gap 01/10/2017 8  8 - 16 mmol/L Final    eGFR if African American 01/10/2017 >60  >60 mL/min/1.73 m^2 Final    eGFR if non African American 01/10/2017 >60  >60 mL/min/1.73 m^2 Final    WBC 01/10/2017 14.34* 3.90 - 12.70 K/uL Final    RBC 01/10/2017 4.25* 4.60 - 6.20 M/uL Final    Hemoglobin 01/10/2017 13.3* 14.0 - 18.0 g/dL Final    Hematocrit 01/10/2017 39.5* 40.0 - 54.0 % Final    MCV 01/10/2017 93  82 - 98 fL Final    MCH 01/10/2017 31.3* 27.0 - 31.0 pg Final    MCHC 01/10/2017 33.7  32.0 - 36.0 % Final    RDW 01/10/2017 13.9  11.5 - 14.5 % Final    Platelets 01/10/2017 210  150 - 350 K/uL Final    MPV 01/10/2017 8.6* 9.2 - 12.9 fL Final    Gran # 01/10/2017 12.4* 1.8 - 7.7 K/uL Final    Lymph # 01/10/2017 0.7* 1.0 - 4.8 K/uL Final    Mono # 01/10/2017 1.2* 0.3 - 1.0 K/uL Final    Eos # 01/10/2017 0.1  0.0 - 0.5 K/uL Final    Baso # 01/10/2017 0.02  0.00 - 0.20 K/uL Final    Gran% 01/10/2017 86.2* 38.0 - 73.0 % Final    Lymph% 01/10/2017 5.1* 18.0 - 48.0 % Final    Mono% 01/10/2017 8.2  4.0 - 15.0 % Final    Eosinophil% 01/10/2017 0.4  0.0 - 8.0 % Final    Basophil% 01/10/2017 0.1  0.0 - 1.9 % Final    Differential Method 01/10/2017 Automated   Final    POC PH 01/10/2017 7.416  7.35 - 7.45 Final    POC PCO2 01/10/2017 38.0  35 - 45 mmHg Final    POC PO2 01/10/2017 115* 80 - 100 mmHg Final    POC HCO3 01/10/2017 24.4  24 - 28 mmol/L Final    POC BE 01/10/2017 0  -2 to 2 mmol/L Final    POC SATURATED O2 01/10/2017 99  95 - 100 % Final    POC Glucose 01/10/2017 168* 70 - 110 mg/dL Final    POC Sodium 01/10/2017 138  136 - 145 mmol/L Final    POC Potassium 01/10/2017 4.1  3.5 - 5.1 mmol/L Final    POC Ionized Calcium 01/10/2017 1.18  1.06 - 1.42 mmol/L  Final    POC Hematocrit 01/10/2017 37  36 - 54 %PCV Final    Sample 01/10/2017 ARTERIAL   Final    Site 01/10/2017 Zunilda/UAC   Final    Allens Test 01/10/2017 Pass   Final    DelSys 01/10/2017 Nasal Can   Final    Mode 01/10/2017 SPONT   Final    Flow 01/10/2017 3   Final    FiO2 01/10/2017 32   Final    Sp02 01/10/2017 98   Final    Sodium 01/11/2017 136  136 - 145 mmol/L Final    Potassium 01/11/2017 4.1  3.5 - 5.1 mmol/L Final    Chloride 01/11/2017 105  95 - 110 mmol/L Final    CO2 01/11/2017 24  23 - 29 mmol/L Final    Glucose 01/11/2017 155* 70 - 110 mg/dL Final    BUN, Bld 01/11/2017 14  8 - 23 mg/dL Final    Creatinine 01/11/2017 1.0  0.5 - 1.4 mg/dL Final    Calcium 01/11/2017 8.5* 8.7 - 10.5 mg/dL Final    Anion Gap 01/11/2017 7* 8 - 16 mmol/L Final    eGFR if African American 01/11/2017 >60  >60 mL/min/1.73 m^2 Final    eGFR if non African American 01/11/2017 >60  >60 mL/min/1.73 m^2 Final    WBC 01/11/2017 11.60  3.90 - 12.70 K/uL Final    RBC 01/11/2017 3.81* 4.60 - 6.20 M/uL Final    Hemoglobin 01/11/2017 11.8* 14.0 - 18.0 g/dL Final    Hematocrit 01/11/2017 35.9* 40.0 - 54.0 % Final    MCV 01/11/2017 94  82 - 98 fL Final    MCH 01/11/2017 31.0  27.0 - 31.0 pg Final    MCHC 01/11/2017 32.9  32.0 - 36.0 % Final    RDW 01/11/2017 14.0  11.5 - 14.5 % Final    Platelets 01/11/2017 202  150 - 350 K/uL Final    MPV 01/11/2017 9.0* 9.2 - 12.9 fL Final    Gran # 01/11/2017 9.5* 1.8 - 7.7 K/uL Final    Lymph # 01/11/2017 0.8* 1.0 - 4.8 K/uL Final    Mono # 01/11/2017 1.3* 0.3 - 1.0 K/uL Final    Eos # 01/11/2017 0.0  0.0 - 0.5 K/uL Final    Baso # 01/11/2017 0.01  0.00 - 0.20 K/uL Final    Gran% 01/11/2017 81.8* 38.0 - 73.0 % Final    Lymph% 01/11/2017 6.9* 18.0 - 48.0 % Final    Mono% 01/11/2017 11.1  4.0 - 15.0 % Final    Eosinophil% 01/11/2017 0.1  0.0 - 8.0 % Final    Basophil% 01/11/2017 0.1  0.0 - 1.9 % Final    Differential Method 01/11/2017 Automated   Final     WBC 01/12/2017 15.49* 3.90 - 12.70 K/uL Final    RBC 01/12/2017 4.16* 4.60 - 6.20 M/uL Final    Hemoglobin 01/12/2017 13.0* 14.0 - 18.0 g/dL Final    Hematocrit 01/12/2017 39.3* 40.0 - 54.0 % Final    MCV 01/12/2017 95  82 - 98 fL Final    MCH 01/12/2017 31.3* 27.0 - 31.0 pg Final    MCHC 01/12/2017 33.1  32.0 - 36.0 % Final    RDW 01/12/2017 13.9  11.5 - 14.5 % Final    Platelets 01/12/2017 197  150 - 350 K/uL Final    MPV 01/12/2017 8.7* 9.2 - 12.9 fL Final    Gran # 01/12/2017 12.4* 1.8 - 7.7 K/uL Final    Lymph # 01/12/2017 1.2  1.0 - 4.8 K/uL Final    Mono # 01/12/2017 1.8* 0.3 - 1.0 K/uL Final    Eos # 01/12/2017 0.1  0.0 - 0.5 K/uL Final    Baso # 01/12/2017 0.02  0.00 - 0.20 K/uL Final    Gran% 01/12/2017 79.8* 38.0 - 73.0 % Final    Lymph% 01/12/2017 7.9* 18.0 - 48.0 % Final    Mono% 01/12/2017 11.7  4.0 - 15.0 % Final    Eosinophil% 01/12/2017 0.5  0.0 - 8.0 % Final    Basophil% 01/12/2017 0.1  0.0 - 1.9 % Final    Differential Method 01/12/2017 Automated   Final    Sodium 01/12/2017 134* 136 - 145 mmol/L Final    Potassium 01/12/2017 4.0  3.5 - 5.1 mmol/L Final    Chloride 01/12/2017 101  95 - 110 mmol/L Final    CO2 01/12/2017 27  23 - 29 mmol/L Final    Glucose 01/12/2017 138* 70 - 110 mg/dL Final    BUN, Bld 01/12/2017 9  8 - 23 mg/dL Final    Creatinine 01/12/2017 0.9  0.5 - 1.4 mg/dL Final    Calcium 01/12/2017 8.8  8.7 - 10.5 mg/dL Final    Total Protein 01/12/2017 6.0  6.0 - 8.4 g/dL Final    Albumin 01/12/2017 2.7* 3.5 - 5.2 g/dL Final    Total Bilirubin 01/12/2017 1.0  0.1 - 1.0 mg/dL Final    Alkaline Phosphatase 01/12/2017 64  55 - 135 U/L Final    AST 01/12/2017 48* 10 - 40 U/L Final    ALT 01/12/2017 27  10 - 44 U/L Final    Anion Gap 01/12/2017 6* 8 - 16 mmol/L Final    eGFR if African American 01/12/2017 >60  >60 mL/min/1.73 m^2 Final    eGFR if non African American 01/12/2017 >60  >60 mL/min/1.73 m^2 Final        Meds   Current Facility-Administered  Medications   Medication Dose Route Frequency Provider Last Rate Last Dose    albumin human 25% bottle 12.5 g  12.5 g Intravenous Once Kota Ponce MD   12.5 g at 01/11/17 0430    albuterol-ipratropium 2.5mg-0.5mg/3mL nebulizer solution 3 mL  3 mL Nebulization Q8H Negrito Hernandez MD   3 mL at 01/13/17 0755    atorvastatin tablet 20 mg  20 mg Oral QHS Negrito Hernandez MD   20 mg at 01/12/17 2200    bisacodyl suppository 10 mg  10 mg Rectal Daily PRN Oniel Loera NP        ciprofloxacin HCl tablet 500 mg  500 mg Oral Q12H Negrito Hernandez MD   500 mg at 01/13/17 0911    docusate sodium capsule 100 mg  100 mg Oral Daily Oniel Loera NP   100 mg at 01/13/17 0911    enoxaparin injection 40 mg  40 mg Subcutaneous Q24H Negrito Hernandez MD   40 mg at 01/12/17 1225    famotidine tablet 20 mg  20 mg Oral BID Oniel Loera NP   20 mg at 01/13/17 0911    hydrocodone-acetaminophen 10-325mg per tablet 1 tablet  1 tablet Oral Q4H PRN Negrito Hernandez MD        hydrocodone-acetaminophen 5-325mg per tablet 1 tablet  1 tablet Oral Q4H PRN Negrito Hernandez MD   1 tablet at 01/13/17 1404    ibuprofen tablet 400 mg  400 mg Oral Q6H PRN MARTHA Mitchell MD   400 mg at 01/12/17 1225    metoprolol succinate (TOPROL-XL) 24 hr tablet 25 mg  25 mg Oral Daily Negrito Hernandez MD   25 mg at 01/13/17 0911    ondansetron injection 4 mg  4 mg Intravenous Q6H PRN Fernanda Mata MD   4 mg at 01/12/17 1820    pneumoc 13-donnie conj-dip cr(PF) 0.5 mL 0.5 mL  0.5 mL Intramuscular vaccine x 1 dose Negrito Hernandez MD        ropivacaine (PF) 0.2% in 0.9 % NaCl epidural   Epidural Continuous Fernanda Mata MD 6 mL/hr at 01/13/17 0301      sodium chloride 0.9% flush 3 mL  3 mL Intravenous PRN Fernanda Mata MD            Anticoagulant dose Loven ox at 12:00 (1/12/17); not yet given today.    Assessment:     Pain control adequate    Plan:     Patient doing well, continue present  treatment.    Evaluator Samuel Hope MD

## 2017-01-13 NOTE — PROGRESS NOTES
Progress Note  Pulmonology    Admit Date: 1/10/2017   LOS: 3 days     SUBJECTIVE: improved, no hallucinations overnight     Follow-up For:  Post op care    Continuous Infusions:   ropivacaine (PF) 0.2% in 0.9 % NaCl 6 mL/hr at 01/13/17 0301     Scheduled Meds:   albumin human 25%  12.5 g Intravenous Once    albuterol-ipratropium 2.5mg-0.5mg/3mL  3 mL Nebulization Q8H    atorvastatin  20 mg Oral QHS    ciprofloxacin HCl  500 mg Oral Q12H    docusate sodium  100 mg Oral Daily    enoxparin  40 mg Subcutaneous Q24H    famotidine  20 mg Oral BID    metoprolol succinate  25 mg Oral Daily       Review of Systems:  Constitutional: no fever or chills  Respiratory: positive for cough and dyspnea on exertion  Cardiovascular: no chest pain or palpitations    OBJECTIVE:     Vital Signs Range (Last 24H):  Temp:  [98 °F (36.7 °C)-98.3 °F (36.8 °C)]   Pulse:  [73-83]   Resp:  [18-20]   BP: (136-148)/(73-77)   SpO2:  [97 %-98 %]     I & O (Last 24H):  Intake/Output Summary (Last 24 hours) at 01/13/17 1732  Last data filed at 01/13/17 0600   Gross per 24 hour   Intake               78 ml   Output             1140 ml   Net            -1062 ml     Physical Exam:  General: no distress  Neck: no jugular venous distention  Lungs:  rales LLL  Chest Wall: no tenderness  Heart: regular rate and rhythm and no murmur  Abdomen: soft, non-tender non-distended; bowel sounds normal  Extremities: no cyanosis or edema, or clubbing  Neurologic: alert, oriented, thought content appropriate    Laboratory:  CBC:   Recent Labs  Lab 01/12/17  0430   WBC 15.49*   RBC 4.16*   HGB 13.0*   HCT 39.3*      MCV 95   MCH 31.3*   MCHC 33.1     BMP:   Recent Labs  Lab 01/12/17  0430   *   *   K 4.0      CO2 27   BUN 9   CREATININE 0.9   CALCIUM 8.8     CMP:   Recent Labs  Lab 01/12/17  0430   *   CALCIUM 8.8   ALBUMIN 2.7*   PROT 6.0   *   K 4.0   CO2 27      BUN 9   CREATININE 0.9   ALKPHOS 64   ALT 27   AST  48*   BILITOT 1.0     LFTs:   Recent Labs  Lab 01/12/17  0430   ALT 27   AST 48*   ALKPHOS 64   BILITOT 1.0   PROT 6.0   ALBUMIN 2.7*       Chest X-Ray: na    Diagnostic Results:  na    ASSESSMENT/PLAN:     Patient Active Problem List   Diagnosis    Malignant neoplasm of lower lobe of left lung    Aortic stenosis    CAD, multiple vessel    Hx of CABG    Preop cardiovascular exam    Shortness of breath    Primary lung cancer         Plan: Continue Current, incentive spirometry, jet nebs      Dante Massey MD  Pulmonary / Critical Care Medicine  .

## 2017-01-14 PROCEDURE — 63600175 PHARM REV CODE 636 W HCPCS: Performed by: SURGERY

## 2017-01-14 PROCEDURE — 94761 N-INVAS EAR/PLS OXIMETRY MLT: CPT

## 2017-01-14 PROCEDURE — 97110 THERAPEUTIC EXERCISES: CPT

## 2017-01-14 PROCEDURE — 99231 SBSQ HOSP IP/OBS SF/LOW 25: CPT | Mod: ,,, | Performed by: INTERNAL MEDICINE

## 2017-01-14 PROCEDURE — 27000221 HC OXYGEN, UP TO 24 HOURS

## 2017-01-14 PROCEDURE — 99900035 HC TECH TIME PER 15 MIN (STAT)

## 2017-01-14 PROCEDURE — 25000003 PHARM REV CODE 250: Performed by: SURGERY

## 2017-01-14 PROCEDURE — 25000003 PHARM REV CODE 250: Performed by: ANESTHESIOLOGY

## 2017-01-14 PROCEDURE — 25000003 PHARM REV CODE 250: Performed by: NURSE PRACTITIONER

## 2017-01-14 PROCEDURE — 94799 UNLISTED PULMONARY SVC/PX: CPT

## 2017-01-14 PROCEDURE — 21400001 HC TELEMETRY ROOM

## 2017-01-14 PROCEDURE — 94640 AIRWAY INHALATION TREATMENT: CPT

## 2017-01-14 PROCEDURE — 25000242 PHARM REV CODE 250 ALT 637 W/ HCPCS: Performed by: SURGERY

## 2017-01-14 RX ORDER — TAMSULOSIN HYDROCHLORIDE 0.4 MG/1
0.4 CAPSULE ORAL NIGHTLY
Status: DISCONTINUED | OUTPATIENT
Start: 2017-01-14 | End: 2017-01-19 | Stop reason: HOSPADM

## 2017-01-14 RX ADMIN — ENOXAPARIN SODIUM 40 MG: 100 INJECTION SUBCUTANEOUS at 12:01

## 2017-01-14 RX ADMIN — ATORVASTATIN CALCIUM 20 MG: 10 TABLET, FILM COATED ORAL at 08:01

## 2017-01-14 RX ADMIN — HYDROCODONE BITARTRATE AND ACETAMINOPHEN 1 TABLET: 10; 325 TABLET ORAL at 03:01

## 2017-01-14 RX ADMIN — HYDROCODONE BITARTRATE AND ACETAMINOPHEN 1 TABLET: 10; 325 TABLET ORAL at 07:01

## 2017-01-14 RX ADMIN — IPRATROPIUM BROMIDE AND ALBUTEROL SULFATE 3 ML: .5; 3 SOLUTION RESPIRATORY (INHALATION) at 07:01

## 2017-01-14 RX ADMIN — IPRATROPIUM BROMIDE AND ALBUTEROL SULFATE 3 ML: .5; 3 SOLUTION RESPIRATORY (INHALATION) at 11:01

## 2017-01-14 RX ADMIN — HYDROCODONE BITARTRATE AND ACETAMINOPHEN 1 TABLET: 5; 325 TABLET ORAL at 02:01

## 2017-01-14 RX ADMIN — HYDROCODONE BITARTRATE AND ACETAMINOPHEN 1 TABLET: 10; 325 TABLET ORAL at 11:01

## 2017-01-14 RX ADMIN — METOPROLOL SUCCINATE 25 MG: 25 TABLET, EXTENDED RELEASE ORAL at 09:01

## 2017-01-14 RX ADMIN — Medication: at 02:01

## 2017-01-14 RX ADMIN — HYDROCODONE BITARTRATE AND ACETAMINOPHEN 1 TABLET: 10; 325 TABLET ORAL at 06:01

## 2017-01-14 RX ADMIN — FAMOTIDINE 20 MG: 20 TABLET ORAL at 09:01

## 2017-01-14 RX ADMIN — IPRATROPIUM BROMIDE AND ALBUTEROL SULFATE 3 ML: .5; 3 SOLUTION RESPIRATORY (INHALATION) at 04:01

## 2017-01-14 RX ADMIN — CIPROFLOXACIN HYDROCHLORIDE 500 MG: 500 TABLET, FILM COATED ORAL at 09:01

## 2017-01-14 RX ADMIN — CIPROFLOXACIN HYDROCHLORIDE 500 MG: 500 TABLET, FILM COATED ORAL at 08:01

## 2017-01-14 RX ADMIN — DOCUSATE SODIUM 100 MG: 100 CAPSULE, LIQUID FILLED ORAL at 09:01

## 2017-01-14 RX ADMIN — TAMSULOSIN HYDROCHLORIDE 0.4 MG: 0.4 CAPSULE ORAL at 08:01

## 2017-01-14 NOTE — PROGRESS NOTES
Progress Note  Pulmonology    Admit Date: 1/10/2017   LOS: 4 days     SUBJECTIVE: improved, no hallucinations, c/o frequency of urination     Follow-up For:  Post op respiratory care, occasional cough    Continuous Infusions:   ropivacaine (PF) 0.2% in 0.9 % NaCl 6 mL/hr at 01/13/17 1942     Scheduled Meds:   albumin human 25%  12.5 g Intravenous Once    albuterol-ipratropium 2.5mg-0.5mg/3mL  3 mL Nebulization Q8H    atorvastatin  20 mg Oral QHS    ciprofloxacin HCl  500 mg Oral Q12H    docusate sodium  100 mg Oral Daily    enoxparin  40 mg Subcutaneous Q24H    metoprolol succinate  25 mg Oral Daily    tamsulosin  0.4 mg Oral QHS       Review of Systems:  Constitutional: no fever or chills  Respiratory: positive for cough and dyspnea on exertion  Cardiovascular: no chest pain or palpitations    OBJECTIVE:     Vital Signs Range (Last 24H):  Temp:  [98.1 °F (36.7 °C)-98.6 °F (37 °C)]   Pulse:  [71-82]   Resp:  [17-20]   BP: (114-142)/(59-83)   SpO2:  [96 %-100 %]     I & O (Last 24H):  Intake/Output Summary (Last 24 hours) at 01/14/17 1330  Last data filed at 01/14/17 0658   Gross per 24 hour   Intake              800 ml   Output             1465 ml   Net             -665 ml     Physical Exam:  General: no distress  Neck: no jugular venous distention  Lungs:  diminished breath sounds bibasilar and bilaterally  Chest Wall: no tenderness  Heart: regular rate and rhythm and no murmur  Abdomen: soft, non-tender non-distended; bowel sounds normal  Extremities: no cyanosis or edema, or clubbing  Neurologic: alert, oriented, thought content appropriate    Laboratory:  CBC:   Recent Labs  Lab 01/12/17  0430   WBC 15.49*   RBC 4.16*   HGB 13.0*   HCT 39.3*      MCV 95   MCH 31.3*   MCHC 33.1     BMP:   Recent Labs  Lab 01/12/17  0430   *   *   K 4.0      CO2 27   BUN 9   CREATININE 0.9   CALCIUM 8.8     CMP:   Recent Labs  Lab 01/12/17  0430   *   CALCIUM 8.8   ALBUMIN 2.7*   PROT 6.0    *   K 4.0   CO2 27      BUN 9   CREATININE 0.9   ALKPHOS 64   ALT 27   AST 48*   BILITOT 1.0       Chest X-Ray: I personally reviewed the films and findings are:, tiny ? apical pneumothorax     Diagnostic Results:  na    ASSESSMENT/PLAN:     Patient Active Problem List   Diagnosis    Malignant neoplasm of lower lobe of left lung    Aortic stenosis    CAD, multiple vessel    Hx of CABG    Preop cardiovascular exam    Shortness of breath    Primary lung cancer         Plan: Medications: add tamulosin, monitor blood pressure      Dante Massey MD  Pulmonary / Critical Care Medicine  .

## 2017-01-14 NOTE — PT/OT/SLP PROGRESS
Physical Therapy  Treatment    Doron Domínguez   MRN: [id number]   Admitting Diagnosis: Malignant neoplasm of lower lobe of left lung    PT Received On: 17  PT Start Time: 1120     PT Stop Time: 1140    PT Total Time (min): 20 min       Billable Minutes:  Therapeutic Exercise  18 min    Treatment Type: Treatment  PT/PTA: PTA             General Precautions: Standard, fall  Orthopedic Precautions:     Braces:           Subjective:  Communicated with Apple and Epic prior to session. Pt reports just getting back into bed after sitting up for 2 hours this am.     Pain Ratin/10                   Objective:   Patient found with: peripheral IV, telemetry, chest tube, oxygen    Functional Mobility:  Bed Mobility:   Supine to Sit: Moderate Assistance    Transfers:  Sit <> Stand Assistance: Minimum Assistance  Sit <> Stand Assistive Device: No Assistive Device    Gait:        Stairs:      Balance:   Static Sit: GOOD: Takes MODERATE challenges from all directions  Dynamic Sit: GOOD: Maintains balance through MODERATE excursions of active trunk movement  Static Stand: GOOD: Takes MODERATE challenges from all directions  Dynamic stand: GOOD-: Needs SUPERVISION only during gait and able to self right with moderate      Therapeutic Activities and Exercises:  Standing BLe therex 2 x 20 reps: mini squats, hip abduction and hip flexion.     AM-PAC 6 CLICK MOBILITY  How much help from another person does this patient currently need?   1 = Unable, Total/Dependent Assistance  2 = A lot, Maximum/Moderate Assistance  3 = A little, Minimum/Contact Guard/Supervision  4 = None, Modified Williams/Independent         AM-PAC Raw Score CMS G-Code Modifier Level of Impairment Assistance   6 % Total / Unable   7 - 9 CM 80 - 100% Maximal Assist   10 - 14 CL 60 - 80% Moderate Assist   15 - 19 CK 40 - 60% Moderate Assist   20 - 22 CJ 20 - 40% Minimal Assist   23 CI 1-20% SBA / CGA   24 CH 0% Independent/ Mod I     Patient left  supine with all lines intact, call button in reach and wife present.    Assessment:  Doron Domínguez is a 76 y.o. male with a medical diagnosis of Malignant neoplasm of lower lobe of left lung and presents with weakness and low endurance level. Pt with good tolerance to therex. Limited with gait today due to IV and chest tubes. Pt performed all pain free.     Rehab identified problem list/impairments: Rehab identified problem list/impairments: weakness, impaired endurance, gait instability, pain, edema    Rehab potential is good.    Activity tolerance: Good    Discharge recommendations: Discharge Facility/Level Of Care Needs: home with home health     Barriers to discharge:      Equipment recommendations:       GOALS:   Physical Therapy Goals        Problem: Physical Therapy Goal    Goal Priority Disciplines Outcome Goal Variances Interventions   Physical Therapy Goal     PT/OT, PT      Description:  PT WILL BE SEEN FOR P.T. FOR A MIN OF 5 OUT OF 7 DAYS A WEEK  LT17  1. PT WILL COMPLETE BED MOBILITY WITH MIN A.  2. PT WILL STAND WITH RW AND MIN A WITH RW  3. PT WILL GT TRAINED WITH RW AND MIN A X 150'   4. PT WILL COMPLETE B LE FOR ROM AND STRENGTHENING.               PLAN:    Patient to be seen    to address the above listed problems via gait training, therapeutic activities, therapeutic exercises  Plan of Care expires:    Plan of Care reviewed with: patient, spouse         Ladi James, PTA  2017

## 2017-01-14 NOTE — PLAN OF CARE
Problem: Patient Care Overview  Goal: Plan of Care Review  PT REQUIRES MAX A FOR BED MOBILITY AND MOD A FOR T/F TO CHAIR.   Outcome: Ongoing (interventions implemented as appropriate)  Patient remains free from falls. Fall precautions remain in place. CT x2 to L chest wall to -20cm suction. Pain controlled with epidural pump and PRN oral pain medication. VS are stable. No other c/o at this time. Call bell within reach. Reminded to call for assistance.

## 2017-01-14 NOTE — PLAN OF CARE
Problem: Patient Care Overview  Goal: Plan of Care Review  PT REQUIRES MAX A FOR BED MOBILITY AND MOD A FOR T/F TO CHAIR.   Outcome: Ongoing (interventions implemented as appropriate)  Pt has been free of falls. Pts PIV intact clean dry and intact. Pt has 2 chest tubes to -20 suction. Pt also has a epidural of ropivacaine at 6ml/hr. Pt also c/o of pain and is given PRN meds. Pt is on 2L NC with no c/o of SOB. Pt moves from bed to chair. Urinating a little at a time, flomax started. Pt call light in reach family at bedside and hourly rounding made.

## 2017-01-14 NOTE — ANESTHESIA POST-OP PAIN MANAGEMENT
Acute Pain Service Progress Note    Doron Domíngeuz is a 76 y.o., male, [id number].    Surgery:  Left Thoracotomy    Post Op Day #: 4    Catheter type: epidural T4/T5    Infusion type: Ropivacaine 0.2%  6 basal    Problem List:    Active Hospital Problems    Diagnosis  POA    *Malignant neoplasm of lower lobe of left lung [C34.32]  Yes     FINAL PATHOLOGIC DIAGNOSIS  Lung, left lower lobe, biopsy:  Positive for at least adenocarcinoma in situ, see note.  Note: The specimen consists of atypical cells in a lepidic growth pattern. An area of definitive invasion is not  identified, however, a more severe lesion may be present in an unsampled area. Follow up recommended as  clinically indicated. Mutation analyses for EGFR, ALK, ROS-1 and PD-L1 will be sent and the results will be issued  in a supplemental report. Reviewed by Dr. Soraya Garnica, Dr. Mellisa Prasad and Dr. Savannah Campos who concur.  Case discussed with Dr. Dante Massey on 12/21/2016 at 9:00 a      Primary lung cancer [C34.90]  Yes      Resolved Hospital Problems    Diagnosis Date Resolved POA   No resolved problems to display.       Subjective:     General appearance: sitting in chair, comfortable   Pain with rest: 2    Numbers   Pain with movement: 3    Numbers   Side Effects    1. Pruritis No    2. Nausea No    3. Motor Blockade No, 0=Ability to raise lower extremities off bed    4. Sedation No, 1=awake and alert    Objective:     Catheter level T4-5   Catheter site clean, dry, intact   Catheter placement 10 cm @skin      Vitals   Vitals:    01/14/17 1150   BP: (!) 142/73   Pulse: 71   Resp: 17   Temp: 36.7 °C (98.1 °F)        Labs    Admission on 01/10/2017   Component Date Value Ref Range Status    Group & Rh 01/10/2017 A POS   Final    Indirect Shayan 01/10/2017 NEG   Final    Sodium 01/10/2017 138  136 - 145 mmol/L Final    Potassium 01/10/2017 4.1  3.5 - 5.1 mmol/L Final    Chloride 01/10/2017 107  95 - 110 mmol/L Final    CO2 01/10/2017 23  23 -  29 mmol/L Final    Glucose 01/10/2017 122* 70 - 110 mg/dL Final    BUN, Bld 01/10/2017 14  8 - 23 mg/dL Final    Creatinine 01/10/2017 0.9  0.5 - 1.4 mg/dL Final    Calcium 01/10/2017 8.6* 8.7 - 10.5 mg/dL Final    Anion Gap 01/10/2017 8  8 - 16 mmol/L Final    eGFR if African American 01/10/2017 >60  >60 mL/min/1.73 m^2 Final    eGFR if non African American 01/10/2017 >60  >60 mL/min/1.73 m^2 Final    WBC 01/10/2017 14.34* 3.90 - 12.70 K/uL Final    RBC 01/10/2017 4.25* 4.60 - 6.20 M/uL Final    Hemoglobin 01/10/2017 13.3* 14.0 - 18.0 g/dL Final    Hematocrit 01/10/2017 39.5* 40.0 - 54.0 % Final    MCV 01/10/2017 93  82 - 98 fL Final    MCH 01/10/2017 31.3* 27.0 - 31.0 pg Final    MCHC 01/10/2017 33.7  32.0 - 36.0 % Final    RDW 01/10/2017 13.9  11.5 - 14.5 % Final    Platelets 01/10/2017 210  150 - 350 K/uL Final    MPV 01/10/2017 8.6* 9.2 - 12.9 fL Final    Gran # 01/10/2017 12.4* 1.8 - 7.7 K/uL Final    Lymph # 01/10/2017 0.7* 1.0 - 4.8 K/uL Final    Mono # 01/10/2017 1.2* 0.3 - 1.0 K/uL Final    Eos # 01/10/2017 0.1  0.0 - 0.5 K/uL Final    Baso # 01/10/2017 0.02  0.00 - 0.20 K/uL Final    Gran% 01/10/2017 86.2* 38.0 - 73.0 % Final    Lymph% 01/10/2017 5.1* 18.0 - 48.0 % Final    Mono% 01/10/2017 8.2  4.0 - 15.0 % Final    Eosinophil% 01/10/2017 0.4  0.0 - 8.0 % Final    Basophil% 01/10/2017 0.1  0.0 - 1.9 % Final    Differential Method 01/10/2017 Automated   Final    POC PH 01/10/2017 7.416  7.35 - 7.45 Final    POC PCO2 01/10/2017 38.0  35 - 45 mmHg Final    POC PO2 01/10/2017 115* 80 - 100 mmHg Final    POC HCO3 01/10/2017 24.4  24 - 28 mmol/L Final    POC BE 01/10/2017 0  -2 to 2 mmol/L Final    POC SATURATED O2 01/10/2017 99  95 - 100 % Final    POC Glucose 01/10/2017 168* 70 - 110 mg/dL Final    POC Sodium 01/10/2017 138  136 - 145 mmol/L Final    POC Potassium 01/10/2017 4.1  3.5 - 5.1 mmol/L Final    POC Ionized Calcium 01/10/2017 1.18  1.06 - 1.42 mmol/L Final     POC Hematocrit 01/10/2017 37  36 - 54 %PCV Final    Sample 01/10/2017 ARTERIAL   Final    Site 01/10/2017 Zunilda/UAC   Final    Allens Test 01/10/2017 Pass   Final    DelSys 01/10/2017 Nasal Can   Final    Mode 01/10/2017 SPONT   Final    Flow 01/10/2017 3   Final    FiO2 01/10/2017 32   Final    Sp02 01/10/2017 98   Final    Sodium 01/11/2017 136  136 - 145 mmol/L Final    Potassium 01/11/2017 4.1  3.5 - 5.1 mmol/L Final    Chloride 01/11/2017 105  95 - 110 mmol/L Final    CO2 01/11/2017 24  23 - 29 mmol/L Final    Glucose 01/11/2017 155* 70 - 110 mg/dL Final    BUN, Bld 01/11/2017 14  8 - 23 mg/dL Final    Creatinine 01/11/2017 1.0  0.5 - 1.4 mg/dL Final    Calcium 01/11/2017 8.5* 8.7 - 10.5 mg/dL Final    Anion Gap 01/11/2017 7* 8 - 16 mmol/L Final    eGFR if African American 01/11/2017 >60  >60 mL/min/1.73 m^2 Final    eGFR if non African American 01/11/2017 >60  >60 mL/min/1.73 m^2 Final    WBC 01/11/2017 11.60  3.90 - 12.70 K/uL Final    RBC 01/11/2017 3.81* 4.60 - 6.20 M/uL Final    Hemoglobin 01/11/2017 11.8* 14.0 - 18.0 g/dL Final    Hematocrit 01/11/2017 35.9* 40.0 - 54.0 % Final    MCV 01/11/2017 94  82 - 98 fL Final    MCH 01/11/2017 31.0  27.0 - 31.0 pg Final    MCHC 01/11/2017 32.9  32.0 - 36.0 % Final    RDW 01/11/2017 14.0  11.5 - 14.5 % Final    Platelets 01/11/2017 202  150 - 350 K/uL Final    MPV 01/11/2017 9.0* 9.2 - 12.9 fL Final    Gran # 01/11/2017 9.5* 1.8 - 7.7 K/uL Final    Lymph # 01/11/2017 0.8* 1.0 - 4.8 K/uL Final    Mono # 01/11/2017 1.3* 0.3 - 1.0 K/uL Final    Eos # 01/11/2017 0.0  0.0 - 0.5 K/uL Final    Baso # 01/11/2017 0.01  0.00 - 0.20 K/uL Final    Gran% 01/11/2017 81.8* 38.0 - 73.0 % Final    Lymph% 01/11/2017 6.9* 18.0 - 48.0 % Final    Mono% 01/11/2017 11.1  4.0 - 15.0 % Final    Eosinophil% 01/11/2017 0.1  0.0 - 8.0 % Final    Basophil% 01/11/2017 0.1  0.0 - 1.9 % Final    Differential Method 01/11/2017 Automated   Final    WBC  01/12/2017 15.49* 3.90 - 12.70 K/uL Final    RBC 01/12/2017 4.16* 4.60 - 6.20 M/uL Final    Hemoglobin 01/12/2017 13.0* 14.0 - 18.0 g/dL Final    Hematocrit 01/12/2017 39.3* 40.0 - 54.0 % Final    MCV 01/12/2017 95  82 - 98 fL Final    MCH 01/12/2017 31.3* 27.0 - 31.0 pg Final    MCHC 01/12/2017 33.1  32.0 - 36.0 % Final    RDW 01/12/2017 13.9  11.5 - 14.5 % Final    Platelets 01/12/2017 197  150 - 350 K/uL Final    MPV 01/12/2017 8.7* 9.2 - 12.9 fL Final    Gran # 01/12/2017 12.4* 1.8 - 7.7 K/uL Final    Lymph # 01/12/2017 1.2  1.0 - 4.8 K/uL Final    Mono # 01/12/2017 1.8* 0.3 - 1.0 K/uL Final    Eos # 01/12/2017 0.1  0.0 - 0.5 K/uL Final    Baso # 01/12/2017 0.02  0.00 - 0.20 K/uL Final    Gran% 01/12/2017 79.8* 38.0 - 73.0 % Final    Lymph% 01/12/2017 7.9* 18.0 - 48.0 % Final    Mono% 01/12/2017 11.7  4.0 - 15.0 % Final    Eosinophil% 01/12/2017 0.5  0.0 - 8.0 % Final    Basophil% 01/12/2017 0.1  0.0 - 1.9 % Final    Differential Method 01/12/2017 Automated   Final    Sodium 01/12/2017 134* 136 - 145 mmol/L Final    Potassium 01/12/2017 4.0  3.5 - 5.1 mmol/L Final    Chloride 01/12/2017 101  95 - 110 mmol/L Final    CO2 01/12/2017 27  23 - 29 mmol/L Final    Glucose 01/12/2017 138* 70 - 110 mg/dL Final    BUN, Bld 01/12/2017 9  8 - 23 mg/dL Final    Creatinine 01/12/2017 0.9  0.5 - 1.4 mg/dL Final    Calcium 01/12/2017 8.8  8.7 - 10.5 mg/dL Final    Total Protein 01/12/2017 6.0  6.0 - 8.4 g/dL Final    Albumin 01/12/2017 2.7* 3.5 - 5.2 g/dL Final    Total Bilirubin 01/12/2017 1.0  0.1 - 1.0 mg/dL Final    Alkaline Phosphatase 01/12/2017 64  55 - 135 U/L Final    AST 01/12/2017 48* 10 - 40 U/L Final    ALT 01/12/2017 27  10 - 44 U/L Final    Anion Gap 01/12/2017 6* 8 - 16 mmol/L Final    eGFR if African American 01/12/2017 >60  >60 mL/min/1.73 m^2 Final    eGFR if non African American 01/12/2017 >60  >60 mL/min/1.73 m^2 Final        Meds   Current Facility-Administered  Medications   Medication Dose Route Frequency Provider Last Rate Last Dose    albumin human 25% bottle 12.5 g  12.5 g Intravenous Once Kota Ponce MD   12.5 g at 01/11/17 0430    albuterol-ipratropium 2.5mg-0.5mg/3mL nebulizer solution 3 mL  3 mL Nebulization Q8H Negrito Hernandez MD   3 mL at 01/14/17 0707    atorvastatin tablet 20 mg  20 mg Oral QHS Negrito Hernandez MD   20 mg at 01/13/17 2130    bisacodyl suppository 10 mg  10 mg Rectal Daily PRN Oniel Loera NP        ciprofloxacin HCl tablet 500 mg  500 mg Oral Q12H Negrito Hernandez MD   500 mg at 01/14/17 0923    docusate sodium capsule 100 mg  100 mg Oral Daily Oniel Loera NP   100 mg at 01/14/17 0923    enoxaparin injection 40 mg  40 mg Subcutaneous Q24H Negrito Hernandez MD   40 mg at 01/14/17 1254    hydrocodone-acetaminophen 10-325mg per tablet 1 tablet  1 tablet Oral Q4H PRN Negrito Hernandez MD   1 tablet at 01/14/17 1107    hydrocodone-acetaminophen 5-325mg per tablet 1 tablet  1 tablet Oral Q4H PRN Negrito Hernandez MD   1 tablet at 01/14/17 0256    ibuprofen tablet 400 mg  400 mg Oral Q6H PRN MARTHA Mitchell MD   400 mg at 01/12/17 1225    metoprolol succinate (TOPROL-XL) 24 hr tablet 25 mg  25 mg Oral Daily Negrito Hernandez MD   25 mg at 01/14/17 0923    ondansetron injection 4 mg  4 mg Intravenous Q6H PRN Fernanda Mata MD   4 mg at 01/12/17 1820    pneumoc 13-donnie conj-dip cr(PF) 0.5 mL 0.5 mL  0.5 mL Intramuscular vaccine x 1 dose Negrito Hernandez MD        ropivacaine (PF) 0.2% in 0.9 % NaCl epidural   Epidural Continuous Fernanda Mata MD 6 mL/hr at 01/13/17 1942      sodium chloride 0.9% flush 3 mL  3 mL Intravenous PRN Fernanda Mata MD        tamsulosin 24 hr capsule 0.4 mg  0.4 mg Oral QHS Nick Stinson MD            Anticoagulant dose Lovenox 40mg at 12:54 today 1/14/17.    Assessment:     Pain control adequate    Plan:     Patient doing well, continue present treatment.  Continue ropivacaine 0.2% infusion at 6mL/hr. PO Norco given prn for pain not covered by epidural.     Evaluator Samuel Hope MD

## 2017-01-14 NOTE — PROGRESS NOTES
Doron Domínguez is a 76 y.o. male patient.   1. Malignant neoplasm of lower lobe of left lung    2. Primary lung cancer, left      Past Medical History   Diagnosis Date    Aortic stenosis 12/29/2016    Arthritis     Asthma     BPH (benign prostatic hyperplasia)     CAD (coronary artery disease)     CAD, multiple vessel 12/29/2016    Cancer      lung    High cholesterol     Hx of CABG 12/29/2016    Hypertension      No past surgical history pertinent negatives on file.  Scheduled Meds:   albumin human 25%  12.5 g Intravenous Once    albuterol-ipratropium 2.5mg-0.5mg/3mL  3 mL Nebulization Q8H    atorvastatin  20 mg Oral QHS    ciprofloxacin HCl  500 mg Oral Q12H    docusate sodium  100 mg Oral Daily    enoxparin  40 mg Subcutaneous Q24H    famotidine  20 mg Oral BID    metoprolol succinate  25 mg Oral Daily    tamsulosin  0.4 mg Oral QHS     Continuous Infusions:   ropivacaine (PF) 0.2% in 0.9 % NaCl 6 mL/hr at 01/13/17 1942     PRN Meds:bisacodyl, hydrocodone-acetaminophen 10-325mg, hydrocodone-acetaminophen 5-325mg, ibuprofen, ondansetron, pneumoc 13-donnie conj-dip cr(PF), sodium chloride 0.9%    Review of patient's allergies indicates:   Allergen Reactions    Bactrim [sulfamethoxazole-trimethoprim] Other (See Comments)     Severe leg cramps, dizziness    Dilaudid [hydromorphone] Other (See Comments)     Confusion, paranoia     Active Hospital Problems    Diagnosis  POA    *Malignant neoplasm of lower lobe of left lung [C34.32]  Yes     FINAL PATHOLOGIC DIAGNOSIS  Lung, left lower lobe, biopsy:  Positive for at least adenocarcinoma in situ, see note.  Note: The specimen consists of atypical cells in a lepidic growth pattern. An area of definitive invasion is not  identified, however, a more severe lesion may be present in an unsampled area. Follow up recommended as  clinically indicated. Mutation analyses for EGFR, ALK, ROS-1 and PD-L1 will be sent and the results will be issued  in a  "supplemental report. Reviewed by Dr. Soraya Garnica, Dr. Mellisa Prasad and Dr. Savannah Campos who concur.  Case discussed with Dr. Dante Massey on 12/21/2016 at 9:00 a      Primary lung cancer [C34.90]  Yes      Resolved Hospital Problems    Diagnosis Date Resolved POA   No resolved problems to display.     Blood pressure 114/67, pulse 74, temperature 98.6 °F (37 °C), temperature source Oral, resp. rate 18, height 5' 5.5" (1.664 m), weight 80.6 kg (177 lb 11.2 oz), SpO2 98 %.    Subjective:   Diet: Adequate intake.  Patient reports no nausea or vomiting.    Activity level: Returning to normal.    Pain control: Well controlled.    Wound: Subjective wound complaints: l no pain with epidural.      Objective:  Vital signs (most recent): Blood pressure 114/67, pulse 74, temperature 98.6 °F (37 °C), temperature source Oral, resp. rate 18, height 5' 5.5" (1.664 m), weight 80.6 kg (177 lb 11.2 oz), SpO2 98 %.  General appearance: Comfortable.    Lungs:  Normal respiratory rate.  Breath sounds normal.    Heart: Normal rate.  Regular rhythm.    Chest: Symmetric chest wall expansion.    Abdomen: No distension.    Wound:  Clean (Incision is dry, chest tubes with no air leak).    Neurological: The patient is oriented to person, place and time.      EXAM:   XR CHEST 1 VIEW    CLINICAL HISTORY:  chest tubes evaluation    COMPARISON:  01/13/2017    FINDINGS:   Mostly stable findings.  2 left-sided chest tube remain in place.  Today's images suggest a tiny left apical pneumothorax.  The distance between the pleural reflection line and the left 2nd rib = 0.8 cm. Blunting of the left gastric angle suggesting a small effusion and/or pleural thickening/scarring.    Some linear area of scarring in the right midlung zone.  Heart size appears normal.       Impression            Tiny left apical pneumothorax      Electronically signed by: LOUISE WASHINGTON MD  Date: 01/14/17  Time: 09:23         Assessment:   Post-op time: Left lower lobectomy, " small pneumothorax.      Plan:  Start/continue incentive spirometry.  General Orders/Medications Plan: Flomax, chest tubes to waterseal and check chest x-ray in the morning.  If the pneumothorax is unchanged.  Will remove the inferior chest.           Nick Stinson MD  1/14/2017

## 2017-01-15 PROCEDURE — 93010 ELECTROCARDIOGRAM REPORT: CPT | Mod: ,,, | Performed by: INTERNAL MEDICINE

## 2017-01-15 PROCEDURE — 94799 UNLISTED PULMONARY SVC/PX: CPT

## 2017-01-15 PROCEDURE — 25000003 PHARM REV CODE 250: Performed by: ANESTHESIOLOGY

## 2017-01-15 PROCEDURE — 25000003 PHARM REV CODE 250: Performed by: SURGERY

## 2017-01-15 PROCEDURE — 97110 THERAPEUTIC EXERCISES: CPT

## 2017-01-15 PROCEDURE — 93005 ELECTROCARDIOGRAM TRACING: CPT

## 2017-01-15 PROCEDURE — 25000003 PHARM REV CODE 250: Performed by: NURSE PRACTITIONER

## 2017-01-15 PROCEDURE — 99900035 HC TECH TIME PER 15 MIN (STAT)

## 2017-01-15 PROCEDURE — 27000221 HC OXYGEN, UP TO 24 HOURS

## 2017-01-15 PROCEDURE — 36415 COLL VENOUS BLD VENIPUNCTURE: CPT

## 2017-01-15 PROCEDURE — 80048 BASIC METABOLIC PNL TOTAL CA: CPT

## 2017-01-15 PROCEDURE — 25000242 PHARM REV CODE 250 ALT 637 W/ HCPCS: Performed by: SURGERY

## 2017-01-15 PROCEDURE — 63600175 PHARM REV CODE 636 W HCPCS: Performed by: SURGERY

## 2017-01-15 PROCEDURE — 94640 AIRWAY INHALATION TREATMENT: CPT

## 2017-01-15 PROCEDURE — 85025 COMPLETE CBC W/AUTO DIFF WBC: CPT

## 2017-01-15 PROCEDURE — 83735 ASSAY OF MAGNESIUM: CPT

## 2017-01-15 PROCEDURE — 21400001 HC TELEMETRY ROOM

## 2017-01-15 RX ORDER — METOPROLOL SUCCINATE 50 MG/1
50 TABLET, EXTENDED RELEASE ORAL DAILY
Status: DISCONTINUED | OUTPATIENT
Start: 2017-01-16 | End: 2017-01-19 | Stop reason: HOSPADM

## 2017-01-15 RX ORDER — METOPROLOL TARTRATE 25 MG/1
25 TABLET, FILM COATED ORAL ONCE
Status: DISCONTINUED | OUTPATIENT
Start: 2017-01-15 | End: 2017-01-19 | Stop reason: HOSPADM

## 2017-01-15 RX ADMIN — ATORVASTATIN CALCIUM 20 MG: 10 TABLET, FILM COATED ORAL at 09:01

## 2017-01-15 RX ADMIN — IPRATROPIUM BROMIDE AND ALBUTEROL SULFATE 3 ML: .5; 3 SOLUTION RESPIRATORY (INHALATION) at 07:01

## 2017-01-15 RX ADMIN — HYDROCODONE BITARTRATE AND ACETAMINOPHEN 1 TABLET: 10; 325 TABLET ORAL at 03:01

## 2017-01-15 RX ADMIN — METOPROLOL SUCCINATE 25 MG: 25 TABLET, EXTENDED RELEASE ORAL at 08:01

## 2017-01-15 RX ADMIN — TAMSULOSIN HYDROCHLORIDE 0.4 MG: 0.4 CAPSULE ORAL at 09:01

## 2017-01-15 RX ADMIN — ENOXAPARIN SODIUM 40 MG: 100 INJECTION SUBCUTANEOUS at 11:01

## 2017-01-15 RX ADMIN — CIPROFLOXACIN HYDROCHLORIDE 500 MG: 500 TABLET, FILM COATED ORAL at 08:01

## 2017-01-15 RX ADMIN — DOCUSATE SODIUM 100 MG: 100 CAPSULE, LIQUID FILLED ORAL at 08:01

## 2017-01-15 RX ADMIN — HYDROCODONE BITARTRATE AND ACETAMINOPHEN 1 TABLET: 10; 325 TABLET ORAL at 11:01

## 2017-01-15 RX ADMIN — Medication: at 07:01

## 2017-01-15 RX ADMIN — HYDROCODONE BITARTRATE AND ACETAMINOPHEN 1 TABLET: 10; 325 TABLET ORAL at 07:01

## 2017-01-15 RX ADMIN — Medication: at 10:01

## 2017-01-15 RX ADMIN — IPRATROPIUM BROMIDE AND ALBUTEROL SULFATE 3 ML: .5; 3 SOLUTION RESPIRATORY (INHALATION) at 04:01

## 2017-01-15 RX ADMIN — CIPROFLOXACIN HYDROCHLORIDE 500 MG: 500 TABLET, FILM COATED ORAL at 09:01

## 2017-01-15 NOTE — PLAN OF CARE
Problem: Patient Care Overview  Goal: Plan of Care Review  PT REQUIRES MAX A FOR BED MOBILITY AND MOD A FOR T/F TO CHAIR.   Outcome: Ongoing (interventions implemented as appropriate)  Patient free of falls this shift. Afebrile this shift. Good urine output after flowmax was given. C/O pain throughout the shift and request of PO pain meds when due. Pain was rated from 4-6 throughout, but decrease in pain reported after each med admin. Epidural infusing at 6 ml/hr. Chest tubes to 20 cm with decreased drainage to each. Denies SOB this shift. Afebrile this shift. No new breakdown noted this shift.

## 2017-01-15 NOTE — PT/OT/SLP PROGRESS
Physical Therapy  Treatment    Doron Domínguez   MRN: [id number]   Admitting Diagnosis: Malignant neoplasm of lower lobe of left lung    PT Received On: 01/15/17  PT Start Time: 1005     PT Stop Time: 1016    PT Total Time (min): 11 min       Billable Minutes:  Therapeutic Exercise 11 min    Treatment Type: Treatment  PT/PTA: PTA             General Precautions: Standard, fall  Orthopedic Precautions:     Braces:           Subjective:  Communicated with Apple and Epic prior to session.  Pt is standing in room prior to tx and with some SOB. O2, 2L is donned.     Pain Ratin/10                   Objective:   Patient found with: telemetry, peripheral IV    Functional Mobility:  Bed Mobility:        Transfers:  Sit <> Stand Assistance: Contact Guard Assistance  Sit <> Stand Assistive Device: No Assistive Device    Gait:        Stairs:      Balance:   Static Sit: GOOD+: Takes MAXIMAL challenges from all directions.    Dynamic Sit: GOOD: Maintains balance through MODERATE excursions of active trunk movement  Static Stand: GOOD+: Takes MAXIMAL challenges from all directions  Dynamic stand: GOOD: Needs SUPERVISION only during gait and able to self right with moderate      Therapeutic Activities and Exercises:  Standing Ble therex x 15 reps: mini squats, and hip flexion. Increased SOB, so sat on bed and performed deep breathing with o2 on. Seated BLE therex x 15 reps: LAQ, Hip flexion, hip abduction and AP.      AM-PAC 6 CLICK MOBILITY  How much help from another person does this patient currently need?   1 = Unable, Total/Dependent Assistance  2 = A lot, Maximum/Moderate Assistance  3 = A little, Minimum/Contact Guard/Supervision  4 = None, Modified [place]/Independent         AM-PAC Raw Score CMS G-Code Modifier Level of Impairment Assistance   6 % Total / Unable   7 - 9 CM 80 - 100% Maximal Assist   10 - 14 CL 60 - 80% Moderate Assist   15 - 19 CK 40 - 60% Moderate Assist   20 - 22 CJ 20 - 40% Minimal Assist    23 CI 1-20% SBA / CGA   24 CH 0% Independent/ Mod I     Patient left up in chair with all lines intact, call button in reach, Apple notified and wife present.    Assessment:  Doron Domínguez is a 76 y.o. male with a medical diagnosis of Malignant neoplasm of lower lobe of left lung and presents with weakness and low endurance level. Pt with increased SOB with therex, so performed some in sitting. Pt encouraged proper breathing. Pt HR did decrease after tx.     Rehab identified problem list/impairments: Rehab identified problem list/impairments: impaired endurance, weakness, decreased ROM    Rehab potential is good.    Activity tolerance: Good    Discharge recommendations: Discharge Facility/Level Of Care Needs: home with home health     Barriers to discharge:      Equipment recommendations:       GOALS:   Physical Therapy Goals        Problem: Physical Therapy Goal    Goal Priority Disciplines Outcome Goal Variances Interventions   Physical Therapy Goal     PT/OT, PT      Description:  PT WILL BE SEEN FOR P.T. FOR A MIN OF 5 OUT OF 7 DAYS A WEEK  LT17  1. PT WILL COMPLETE BED MOBILITY WITH MIN A.  2. PT WILL STAND WITH RW AND MIN A WITH RW  3. PT WILL GT TRAINED WITH RW AND MIN A X 150'   4. PT WILL COMPLETE B LE FOR ROM AND STRENGTHENING.               PLAN:    Patient to be seen    to address the above listed problems via gait training, therapeutic activities, therapeutic exercises  Plan of Care expires:    Plan of Care reviewed with: patient, family         Ladi Erika, PTA  01/15/2017

## 2017-01-15 NOTE — PROGRESS NOTES
Doron Domníguez is a 76 y.o. male patient.     Chest tubes are to suction    1. Malignant neoplasm of lower lobe of left lung    2. Primary lung cancer, left      Past Medical History   Diagnosis Date    Aortic stenosis 12/29/2016    Arthritis     Asthma     BPH (benign prostatic hyperplasia)     CAD (coronary artery disease)     CAD, multiple vessel 12/29/2016    Cancer      lung    High cholesterol     Hx of CABG 12/29/2016    Hypertension      No past surgical history pertinent negatives on file.  Scheduled Meds:   albumin human 25%  12.5 g Intravenous Once    albuterol-ipratropium 2.5mg-0.5mg/3mL  3 mL Nebulization Q8H    atorvastatin  20 mg Oral QHS    ciprofloxacin HCl  500 mg Oral Q12H    docusate sodium  100 mg Oral Daily    enoxparin  40 mg Subcutaneous Q24H    metoprolol succinate  25 mg Oral Daily    tamsulosin  0.4 mg Oral QHS     Continuous Infusions:   ropivacaine (PF) 0.2% in 0.9 % NaCl 6 mL/hr at 01/15/17 0719     PRN Meds:bisacodyl, hydrocodone-acetaminophen 10-325mg, hydrocodone-acetaminophen 5-325mg, ibuprofen, ondansetron, pneumoc 13-donnie conj-dip cr(PF), sodium chloride 0.9%    Review of patient's allergies indicates:   Allergen Reactions    Bactrim [sulfamethoxazole-trimethoprim] Other (See Comments)     Severe leg cramps, dizziness    Dilaudid [hydromorphone] Other (See Comments)     Confusion, paranoia     Active Hospital Problems    Diagnosis  POA    *Malignant neoplasm of lower lobe of left lung [C34.32]  Yes     FINAL PATHOLOGIC DIAGNOSIS  Lung, left lower lobe, biopsy:  Positive for at least adenocarcinoma in situ, see note.  Note: The specimen consists of atypical cells in a lepidic growth pattern. An area of definitive invasion is not  identified, however, a more severe lesion may be present in an unsampled area. Follow up recommended as  clinically indicated. Mutation analyses for EGFR, ALK, ROS-1 and PD-L1 will be sent and the results will be issued  in a  "supplemental report. Reviewed by Dr. Soraya Garnica, Dr. Mellisa Prasad and Dr. Savannah Campos who concur.  Case discussed with Dr. Dante Massey on 12/21/2016 at 9:00 a      Primary lung cancer [C34.90]  Yes      Resolved Hospital Problems    Diagnosis Date Resolved POA   No resolved problems to display.     Blood pressure 98/65, pulse 77, temperature 98 °F (36.7 °C), temperature source Oral, resp. rate 18, height 5' 5.5" (1.664 m), weight 80.6 kg (177 lb 11.2 oz), SpO2 98 %.    Subjective:   Diet: Adequate intake.    Activity level: Returning to normal.    Pain control: Well controlled (4 to 6 at times with epidural).    Wound: Painful.      Objective:  Vital signs (most recent): Blood pressure 98/65, pulse 77, temperature 98 °F (36.7 °C), temperature source Oral, resp. rate 18, height 5' 5.5" (1.664 m), weight 80.6 kg (177 lb 11.2 oz), SpO2 98 %.  General appearance: Comfortable.    Lungs:  Normal respiratory rate and normal effort.  Breath sounds normal.    Heart: Normal rate.    Chest: Symmetric chest wall expansion.  (Chest tube to suction, 245 ml out combined, no leak).    Abdomen: Abdomen is soft.  No distension.    Wound:  Objective wound description: dressed and dry.    Neurological: The patient is alert.       Assessment:   Post-op time: left lobectomy.    Condition: In stable condition.     Plan:  X-rays as ordered.  General Orders/Medications Plan: chest tube to water seal, stop lovenox after today dosage as epidural may be removed tomorrow.           Nick Stinson MD  1/15/2017  "

## 2017-01-15 NOTE — PLAN OF CARE
Problem: Patient Care Overview  Goal: Plan of Care Review  PT REQUIRES MAX A FOR BED MOBILITY AND MOD A FOR T/F TO CHAIR.   Outcome: Ongoing (interventions implemented as appropriate)  Pt has been free of falls. Pt has 2 CT the the left lung. Pt is now on water seal. Pt has ropivacaine going at 6 ml/hr. Pt also has oral PRN pain meds which are given every 4 hours. Pt gets up to the chair throughout the day. The plan is to have a chest xray tomorrow to see if one or both chest tubes can come out. Also hold the noon dose lovenox tomorrow. Family is at bedside. PIV intact and NS/ST on the monitor. Call light in reach and hourly rounding made.

## 2017-01-15 NOTE — ANESTHESIA POST-OP PAIN MANAGEMENT
Acute Pain Service Progress Note    Doron Domínguez is a 76 y.o., male, [id number].    Surgery:  Left Thoracotmy/ Lobectomy (1/10/17) by Dr. Hernandez.    Post Op Day #: 5    Catheter type: epidural T4/T5    Infusion type: Ropivacaine 0.2%  6 mL/hr basal    Problem List:    Active Hospital Problems    Diagnosis  POA    *Malignant neoplasm of lower lobe of left lung [C34.32]  Yes     FINAL PATHOLOGIC DIAGNOSIS  Lung, left lower lobe, biopsy:  Positive for at least adenocarcinoma in situ, see note.  Note: The specimen consists of atypical cells in a lepidic growth pattern. An area of definitive invasion is not  identified, however, a more severe lesion may be present in an unsampled area. Follow up recommended as  clinically indicated. Mutation analyses for EGFR, ALK, ROS-1 and PD-L1 will be sent and the results will be issued  in a supplemental report. Reviewed by Dr. Soraya Garnica, Dr. Mellisa Prasad and Dr. Savannah Campos who concur.  Case discussed with Dr. Dante Massey on 12/21/2016 at 9:00 a      Primary lung cancer [C34.90]  Yes      Resolved Hospital Problems    Diagnosis Date Resolved POA   No resolved problems to display.       Subjective:     General appearance of alert, oriented, no complaints   Pain with rest: 2    Numbers   Pain with movement: 4    Numbers   Side Effects    1. Pruritis No    2. Nausea No    3. Motor Blockade No, 0=Ability to raise lower extremities off bed    4. Sedation No, 1=awake and alert    Objective:     Catheter level T4-5   Catheter site clean, dry, intact, minimal dried blood under tegaderm, no induration, no site tenderness   Catheter placement 10 cm @skin      Vitals   Vitals:    01/15/17 0759   BP:    Pulse: 77   Resp: 18   Temp:         Labs    Admission on 01/10/2017   Component Date Value Ref Range Status    Group & Rh 01/10/2017 A POS   Final    Indirect Shayan 01/10/2017 NEG   Final    Sodium 01/10/2017 138  136 - 145 mmol/L Final    Potassium 01/10/2017 4.1  3.5 - 5.1  mmol/L Final    Chloride 01/10/2017 107  95 - 110 mmol/L Final    CO2 01/10/2017 23  23 - 29 mmol/L Final    Glucose 01/10/2017 122* 70 - 110 mg/dL Final    BUN, Bld 01/10/2017 14  8 - 23 mg/dL Final    Creatinine 01/10/2017 0.9  0.5 - 1.4 mg/dL Final    Calcium 01/10/2017 8.6* 8.7 - 10.5 mg/dL Final    Anion Gap 01/10/2017 8  8 - 16 mmol/L Final    eGFR if African American 01/10/2017 >60  >60 mL/min/1.73 m^2 Final    eGFR if non African American 01/10/2017 >60  >60 mL/min/1.73 m^2 Final    WBC 01/10/2017 14.34* 3.90 - 12.70 K/uL Final    RBC 01/10/2017 4.25* 4.60 - 6.20 M/uL Final    Hemoglobin 01/10/2017 13.3* 14.0 - 18.0 g/dL Final    Hematocrit 01/10/2017 39.5* 40.0 - 54.0 % Final    MCV 01/10/2017 93  82 - 98 fL Final    MCH 01/10/2017 31.3* 27.0 - 31.0 pg Final    MCHC 01/10/2017 33.7  32.0 - 36.0 % Final    RDW 01/10/2017 13.9  11.5 - 14.5 % Final    Platelets 01/10/2017 210  150 - 350 K/uL Final    MPV 01/10/2017 8.6* 9.2 - 12.9 fL Final    Gran # 01/10/2017 12.4* 1.8 - 7.7 K/uL Final    Lymph # 01/10/2017 0.7* 1.0 - 4.8 K/uL Final    Mono # 01/10/2017 1.2* 0.3 - 1.0 K/uL Final    Eos # 01/10/2017 0.1  0.0 - 0.5 K/uL Final    Baso # 01/10/2017 0.02  0.00 - 0.20 K/uL Final    Gran% 01/10/2017 86.2* 38.0 - 73.0 % Final    Lymph% 01/10/2017 5.1* 18.0 - 48.0 % Final    Mono% 01/10/2017 8.2  4.0 - 15.0 % Final    Eosinophil% 01/10/2017 0.4  0.0 - 8.0 % Final    Basophil% 01/10/2017 0.1  0.0 - 1.9 % Final    Differential Method 01/10/2017 Automated   Final    POC PH 01/10/2017 7.416  7.35 - 7.45 Final    POC PCO2 01/10/2017 38.0  35 - 45 mmHg Final    POC PO2 01/10/2017 115* 80 - 100 mmHg Final    POC HCO3 01/10/2017 24.4  24 - 28 mmol/L Final    POC BE 01/10/2017 0  -2 to 2 mmol/L Final    POC SATURATED O2 01/10/2017 99  95 - 100 % Final    POC Glucose 01/10/2017 168* 70 - 110 mg/dL Final    POC Sodium 01/10/2017 138  136 - 145 mmol/L Final    POC Potassium 01/10/2017 4.1   3.5 - 5.1 mmol/L Final    POC Ionized Calcium 01/10/2017 1.18  1.06 - 1.42 mmol/L Final    POC Hematocrit 01/10/2017 37  36 - 54 %PCV Final    Sample 01/10/2017 ARTERIAL   Final    Site 01/10/2017 Zunilda/UAC   Final    Allens Test 01/10/2017 Pass   Final    DelSys 01/10/2017 Nasal Can   Final    Mode 01/10/2017 SPONT   Final    Flow 01/10/2017 3   Final    FiO2 01/10/2017 32   Final    Sp02 01/10/2017 98   Final    Sodium 01/11/2017 136  136 - 145 mmol/L Final    Potassium 01/11/2017 4.1  3.5 - 5.1 mmol/L Final    Chloride 01/11/2017 105  95 - 110 mmol/L Final    CO2 01/11/2017 24  23 - 29 mmol/L Final    Glucose 01/11/2017 155* 70 - 110 mg/dL Final    BUN, Bld 01/11/2017 14  8 - 23 mg/dL Final    Creatinine 01/11/2017 1.0  0.5 - 1.4 mg/dL Final    Calcium 01/11/2017 8.5* 8.7 - 10.5 mg/dL Final    Anion Gap 01/11/2017 7* 8 - 16 mmol/L Final    eGFR if African American 01/11/2017 >60  >60 mL/min/1.73 m^2 Final    eGFR if non African American 01/11/2017 >60  >60 mL/min/1.73 m^2 Final    WBC 01/11/2017 11.60  3.90 - 12.70 K/uL Final    RBC 01/11/2017 3.81* 4.60 - 6.20 M/uL Final    Hemoglobin 01/11/2017 11.8* 14.0 - 18.0 g/dL Final    Hematocrit 01/11/2017 35.9* 40.0 - 54.0 % Final    MCV 01/11/2017 94  82 - 98 fL Final    MCH 01/11/2017 31.0  27.0 - 31.0 pg Final    MCHC 01/11/2017 32.9  32.0 - 36.0 % Final    RDW 01/11/2017 14.0  11.5 - 14.5 % Final    Platelets 01/11/2017 202  150 - 350 K/uL Final    MPV 01/11/2017 9.0* 9.2 - 12.9 fL Final    Gran # 01/11/2017 9.5* 1.8 - 7.7 K/uL Final    Lymph # 01/11/2017 0.8* 1.0 - 4.8 K/uL Final    Mono # 01/11/2017 1.3* 0.3 - 1.0 K/uL Final    Eos # 01/11/2017 0.0  0.0 - 0.5 K/uL Final    Baso # 01/11/2017 0.01  0.00 - 0.20 K/uL Final    Gran% 01/11/2017 81.8* 38.0 - 73.0 % Final    Lymph% 01/11/2017 6.9* 18.0 - 48.0 % Final    Mono% 01/11/2017 11.1  4.0 - 15.0 % Final    Eosinophil% 01/11/2017 0.1  0.0 - 8.0 % Final    Basophil%  01/11/2017 0.1  0.0 - 1.9 % Final    Differential Method 01/11/2017 Automated   Final    WBC 01/12/2017 15.49* 3.90 - 12.70 K/uL Final    RBC 01/12/2017 4.16* 4.60 - 6.20 M/uL Final    Hemoglobin 01/12/2017 13.0* 14.0 - 18.0 g/dL Final    Hematocrit 01/12/2017 39.3* 40.0 - 54.0 % Final    MCV 01/12/2017 95  82 - 98 fL Final    MCH 01/12/2017 31.3* 27.0 - 31.0 pg Final    MCHC 01/12/2017 33.1  32.0 - 36.0 % Final    RDW 01/12/2017 13.9  11.5 - 14.5 % Final    Platelets 01/12/2017 197  150 - 350 K/uL Final    MPV 01/12/2017 8.7* 9.2 - 12.9 fL Final    Gran # 01/12/2017 12.4* 1.8 - 7.7 K/uL Final    Lymph # 01/12/2017 1.2  1.0 - 4.8 K/uL Final    Mono # 01/12/2017 1.8* 0.3 - 1.0 K/uL Final    Eos # 01/12/2017 0.1  0.0 - 0.5 K/uL Final    Baso # 01/12/2017 0.02  0.00 - 0.20 K/uL Final    Gran% 01/12/2017 79.8* 38.0 - 73.0 % Final    Lymph% 01/12/2017 7.9* 18.0 - 48.0 % Final    Mono% 01/12/2017 11.7  4.0 - 15.0 % Final    Eosinophil% 01/12/2017 0.5  0.0 - 8.0 % Final    Basophil% 01/12/2017 0.1  0.0 - 1.9 % Final    Differential Method 01/12/2017 Automated   Final    Sodium 01/12/2017 134* 136 - 145 mmol/L Final    Potassium 01/12/2017 4.0  3.5 - 5.1 mmol/L Final    Chloride 01/12/2017 101  95 - 110 mmol/L Final    CO2 01/12/2017 27  23 - 29 mmol/L Final    Glucose 01/12/2017 138* 70 - 110 mg/dL Final    BUN, Bld 01/12/2017 9  8 - 23 mg/dL Final    Creatinine 01/12/2017 0.9  0.5 - 1.4 mg/dL Final    Calcium 01/12/2017 8.8  8.7 - 10.5 mg/dL Final    Total Protein 01/12/2017 6.0  6.0 - 8.4 g/dL Final    Albumin 01/12/2017 2.7* 3.5 - 5.2 g/dL Final    Total Bilirubin 01/12/2017 1.0  0.1 - 1.0 mg/dL Final    Alkaline Phosphatase 01/12/2017 64  55 - 135 U/L Final    AST 01/12/2017 48* 10 - 40 U/L Final    ALT 01/12/2017 27  10 - 44 U/L Final    Anion Gap 01/12/2017 6* 8 - 16 mmol/L Final    eGFR if African American 01/12/2017 >60  >60 mL/min/1.73 m^2 Final    eGFR if non African  American 01/12/2017 >60  >60 mL/min/1.73 m^2 Final        Meds   Current Facility-Administered Medications   Medication Dose Route Frequency Provider Last Rate Last Dose    albumin human 25% bottle 12.5 g  12.5 g Intravenous Once Kota Ponce MD   12.5 g at 01/11/17 0430    albuterol-ipratropium 2.5mg-0.5mg/3mL nebulizer solution 3 mL  3 mL Nebulization Q8H Negrito Hernandez MD   3 mL at 01/15/17 0759    atorvastatin tablet 20 mg  20 mg Oral QHS Negrito Hernandez MD   20 mg at 01/14/17 2011    bisacodyl suppository 10 mg  10 mg Rectal Daily PRN Oniel Loera NP        ciprofloxacin HCl tablet 500 mg  500 mg Oral Q12H Negrito Hernandez MD   500 mg at 01/15/17 0837    docusate sodium capsule 100 mg  100 mg Oral Daily Oniel Loera NP   100 mg at 01/15/17 0837    enoxaparin injection 40 mg  40 mg Subcutaneous Q24H Negrito Hernandez MD   40 mg at 01/14/17 1254    hydrocodone-acetaminophen 10-325mg per tablet 1 tablet  1 tablet Oral Q4H PRN Negrito Hernandez MD   1 tablet at 01/15/17 0719    hydrocodone-acetaminophen 5-325mg per tablet 1 tablet  1 tablet Oral Q4H PRN Negrito Hernandez MD   1 tablet at 01/14/17 0256    ibuprofen tablet 400 mg  400 mg Oral Q6H PRN MARTHA Mitchell MD   400 mg at 01/12/17 1225    metoprolol succinate (TOPROL-XL) 24 hr tablet 25 mg  25 mg Oral Daily Negrito Hernandez MD   25 mg at 01/15/17 0837    ondansetron injection 4 mg  4 mg Intravenous Q6H PRN Fernanda Mata MD   4 mg at 01/12/17 1820    pneumoc 13-donnie conj-dip cr(PF) 0.5 mL 0.5 mL  0.5 mL Intramuscular vaccine x 1 dose Negrito Hernandez MD        ropivacaine (PF) 0.2% in 0.9 % NaCl epidural   Epidural Continuous Fernanda Mata MD 6 mL/hr at 01/15/17 0719      sodium chloride 0.9% flush 3 mL  3 mL Intravenous PRN Fernanda Mata MD        tamsulosin 24 hr capsule 0.4 mg  0.4 mg Oral QHS Nick Stinson MD   0.4 mg at 01/14/17 2011        Anticoagulant dose Lovenox 40mg at 12:54  "on 1/14/17; next dose due today at 12:00. Will need to wait 12 hours minimum from last dose and catheter removal.    Assessment:     Pain control adequate. Pt reports epidural is helping; still with some pain approximately every 4 hours ( pain level "around 5") but pain is relieved within 15-30 minutes of p.o. pain med administration.    Plan:     Patient doing well, continue present treatment. If pain worsens, recommend increase in epidural infusion rate. Anesthesia will discontinue epidural when primary team ready, likely once chest tubes removed.     Evaluator Samuel Hope MD            "

## 2017-01-16 PROBLEM — C34.92: Status: ACTIVE | Noted: 2017-01-10

## 2017-01-16 LAB
ANION GAP SERPL CALC-SCNC: 9 MMOL/L
ANION GAP SERPL CALC-SCNC: 9 MMOL/L
BASOPHILS # BLD AUTO: 0.07 K/UL
BASOPHILS # BLD AUTO: 0.07 K/UL
BASOPHILS NFR BLD: 0.8 %
BASOPHILS NFR BLD: 0.8 %
BUN SERPL-MCNC: 11 MG/DL
BUN SERPL-MCNC: 12 MG/DL
CALCIUM SERPL-MCNC: 8.7 MG/DL
CALCIUM SERPL-MCNC: 8.8 MG/DL
CHLORIDE SERPL-SCNC: 100 MMOL/L
CHLORIDE SERPL-SCNC: 100 MMOL/L
CO2 SERPL-SCNC: 25 MMOL/L
CO2 SERPL-SCNC: 26 MMOL/L
CREAT SERPL-MCNC: 0.9 MG/DL
CREAT SERPL-MCNC: 0.9 MG/DL
DIFFERENTIAL METHOD: ABNORMAL
DIFFERENTIAL METHOD: ABNORMAL
EOSINOPHIL # BLD AUTO: 0.3 K/UL
EOSINOPHIL # BLD AUTO: 0.3 K/UL
EOSINOPHIL NFR BLD: 2.7 %
EOSINOPHIL NFR BLD: 3.3 %
ERYTHROCYTE [DISTWIDTH] IN BLOOD BY AUTOMATED COUNT: 13.7 %
ERYTHROCYTE [DISTWIDTH] IN BLOOD BY AUTOMATED COUNT: 13.8 %
EST. GFR  (AFRICAN AMERICAN): >60 ML/MIN/1.73 M^2
EST. GFR  (AFRICAN AMERICAN): >60 ML/MIN/1.73 M^2
EST. GFR  (NON AFRICAN AMERICAN): >60 ML/MIN/1.73 M^2
EST. GFR  (NON AFRICAN AMERICAN): >60 ML/MIN/1.73 M^2
GLUCOSE SERPL-MCNC: 141 MG/DL
GLUCOSE SERPL-MCNC: 159 MG/DL
HCT VFR BLD AUTO: 39.7 %
HCT VFR BLD AUTO: 41.8 %
HGB BLD-MCNC: 13.6 G/DL
HGB BLD-MCNC: 14.5 G/DL
LYMPHOCYTES # BLD AUTO: 0.9 K/UL
LYMPHOCYTES # BLD AUTO: 1 K/UL
LYMPHOCYTES NFR BLD: 11 %
LYMPHOCYTES NFR BLD: 9.7 %
MAGNESIUM SERPL-MCNC: 2 MG/DL
MAGNESIUM SERPL-MCNC: 2.1 MG/DL
MCH RBC QN AUTO: 31.6 PG
MCH RBC QN AUTO: 31.9 PG
MCHC RBC AUTO-ENTMCNC: 34.3 %
MCHC RBC AUTO-ENTMCNC: 34.7 %
MCV RBC AUTO: 92 FL
MCV RBC AUTO: 92 FL
MONOCYTES # BLD AUTO: 1.1 K/UL
MONOCYTES # BLD AUTO: 1.1 K/UL
MONOCYTES NFR BLD: 11.7 %
MONOCYTES NFR BLD: 12.9 %
NEUTROPHILS # BLD AUTO: 6.4 K/UL
NEUTROPHILS # BLD AUTO: 6.9 K/UL
NEUTROPHILS NFR BLD: 72 %
NEUTROPHILS NFR BLD: 75.1 %
PHOSPHATE SERPL-MCNC: 3.3 MG/DL
PLATELET # BLD AUTO: 249 K/UL
PLATELET # BLD AUTO: 259 K/UL
PMV BLD AUTO: 8.5 FL
PMV BLD AUTO: 8.5 FL
POTASSIUM SERPL-SCNC: 3.5 MMOL/L
POTASSIUM SERPL-SCNC: 3.6 MMOL/L
RBC # BLD AUTO: 4.31 M/UL
RBC # BLD AUTO: 4.54 M/UL
SODIUM SERPL-SCNC: 134 MMOL/L
SODIUM SERPL-SCNC: 135 MMOL/L
WBC # BLD AUTO: 8.82 K/UL
WBC # BLD AUTO: 9.2 K/UL

## 2017-01-16 PROCEDURE — 84100 ASSAY OF PHOSPHORUS: CPT

## 2017-01-16 PROCEDURE — 27000221 HC OXYGEN, UP TO 24 HOURS

## 2017-01-16 PROCEDURE — 85025 COMPLETE CBC W/AUTO DIFF WBC: CPT

## 2017-01-16 PROCEDURE — 63600175 PHARM REV CODE 636 W HCPCS: Performed by: SURGERY

## 2017-01-16 PROCEDURE — 25000003 PHARM REV CODE 250: Performed by: SURGERY

## 2017-01-16 PROCEDURE — 25000003 PHARM REV CODE 250: Performed by: NURSE PRACTITIONER

## 2017-01-16 PROCEDURE — 36415 COLL VENOUS BLD VENIPUNCTURE: CPT

## 2017-01-16 PROCEDURE — 94761 N-INVAS EAR/PLS OXIMETRY MLT: CPT

## 2017-01-16 PROCEDURE — 99232 SBSQ HOSP IP/OBS MODERATE 35: CPT | Mod: ,,, | Performed by: INTERNAL MEDICINE

## 2017-01-16 PROCEDURE — 83735 ASSAY OF MAGNESIUM: CPT

## 2017-01-16 PROCEDURE — 94640 AIRWAY INHALATION TREATMENT: CPT

## 2017-01-16 PROCEDURE — 21400001 HC TELEMETRY ROOM

## 2017-01-16 PROCEDURE — 25000242 PHARM REV CODE 250 ALT 637 W/ HCPCS: Performed by: SURGERY

## 2017-01-16 PROCEDURE — 80048 BASIC METABOLIC PNL TOTAL CA: CPT

## 2017-01-16 PROCEDURE — 97116 GAIT TRAINING THERAPY: CPT

## 2017-01-16 PROCEDURE — 99900035 HC TECH TIME PER 15 MIN (STAT)

## 2017-01-16 RX ORDER — HYDROCODONE BITARTRATE AND ACETAMINOPHEN 5; 325 MG/1; MG/1
1 TABLET ORAL EVERY 4 HOURS PRN
Status: DISCONTINUED | OUTPATIENT
Start: 2017-01-16 | End: 2017-01-19 | Stop reason: HOSPADM

## 2017-01-16 RX ORDER — MORPHINE SULFATE 2 MG/ML
2 INJECTION, SOLUTION INTRAMUSCULAR; INTRAVENOUS
Status: DISCONTINUED | OUTPATIENT
Start: 2017-01-16 | End: 2017-01-19 | Stop reason: HOSPADM

## 2017-01-16 RX ORDER — HYDROCODONE BITARTRATE AND ACETAMINOPHEN 7.5; 325 MG/1; MG/1
1 TABLET ORAL EVERY 4 HOURS PRN
Status: DISCONTINUED | OUTPATIENT
Start: 2017-01-16 | End: 2017-01-19 | Stop reason: HOSPADM

## 2017-01-16 RX ORDER — ENOXAPARIN SODIUM 100 MG/ML
40 INJECTION SUBCUTANEOUS EVERY 24 HOURS
Status: DISCONTINUED | OUTPATIENT
Start: 2017-01-16 | End: 2017-01-19 | Stop reason: HOSPADM

## 2017-01-16 RX ADMIN — ENOXAPARIN SODIUM 40 MG: 100 INJECTION SUBCUTANEOUS at 04:01

## 2017-01-16 RX ADMIN — MORPHINE SULFATE 2 MG: 2 INJECTION, SOLUTION INTRAMUSCULAR; INTRAVENOUS at 09:01

## 2017-01-16 RX ADMIN — IPRATROPIUM BROMIDE AND ALBUTEROL SULFATE 3 ML: .5; 3 SOLUTION RESPIRATORY (INHALATION) at 11:01

## 2017-01-16 RX ADMIN — HYDROCODONE BITARTRATE AND ACETAMINOPHEN 1 TABLET: 7.5; 325 TABLET ORAL at 11:01

## 2017-01-16 RX ADMIN — DOCUSATE SODIUM 100 MG: 100 CAPSULE, LIQUID FILLED ORAL at 09:01

## 2017-01-16 RX ADMIN — ATORVASTATIN CALCIUM 20 MG: 10 TABLET, FILM COATED ORAL at 08:01

## 2017-01-16 RX ADMIN — HYDROCODONE BITARTRATE AND ACETAMINOPHEN 1 TABLET: 7.5; 325 TABLET ORAL at 08:01

## 2017-01-16 RX ADMIN — IPRATROPIUM BROMIDE AND ALBUTEROL SULFATE 3 ML: .5; 3 SOLUTION RESPIRATORY (INHALATION) at 03:01

## 2017-01-16 RX ADMIN — CIPROFLOXACIN HYDROCHLORIDE 500 MG: 500 TABLET, FILM COATED ORAL at 09:01

## 2017-01-16 RX ADMIN — METOPROLOL SUCCINATE 50 MG: 50 TABLET, EXTENDED RELEASE ORAL at 09:01

## 2017-01-16 RX ADMIN — HYDROCODONE BITARTRATE AND ACETAMINOPHEN 1 TABLET: 10; 325 TABLET ORAL at 07:01

## 2017-01-16 RX ADMIN — HYDROCODONE BITARTRATE AND ACETAMINOPHEN 1 TABLET: 10; 325 TABLET ORAL at 03:01

## 2017-01-16 RX ADMIN — IPRATROPIUM BROMIDE AND ALBUTEROL SULFATE 3 ML: .5; 3 SOLUTION RESPIRATORY (INHALATION) at 07:01

## 2017-01-16 RX ADMIN — IPRATROPIUM BROMIDE AND ALBUTEROL SULFATE 3 ML: .5; 3 SOLUTION RESPIRATORY (INHALATION) at 12:01

## 2017-01-16 RX ADMIN — TAMSULOSIN HYDROCHLORIDE 0.4 MG: 0.4 CAPSULE ORAL at 08:01

## 2017-01-16 RX ADMIN — HYDROCODONE BITARTRATE AND ACETAMINOPHEN 1 TABLET: 7.5; 325 TABLET ORAL at 04:01

## 2017-01-16 NOTE — PLAN OF CARE
Problem: Patient Care Overview  Goal: Plan of Care Review  PT REQUIRES MAX A FOR BED MOBILITY AND MOD A FOR T/F TO CHAIR.   Outcome: Ongoing (interventions implemented as appropriate)  Pt converted to A. Fib with HR 120s-150s briefly, approx 3 hours; asymptomatic; Dr. Stinson called; STAT EKG performed, but pt converted back to NSR with HR 70s-90s before completed; all labs WNL; Metoprolol x 1 dose ordered by Dr. Stinson held; pt up to chair several times throughout night with asst of daughter; NADN.

## 2017-01-16 NOTE — CONSULTS
Met with wife. Discussed changing PCP's to an [facility] PCP. She requested a list of PCP's at the [facility] location to research and make a decision regarding a new PCP. Noted that his PCP is changing to a [facility] on 2/28/17.

## 2017-01-16 NOTE — PT/OT/SLP PROGRESS
Physical Therapy  Treatment    Doron Domínguez   MRN: [id number]   Admitting Diagnosis: Malignant neoplasm of lower lobe of left lung    PT Received On: 17  PT Start Time: 1100     PT Stop Time: 1115    PT Total Time (min): 15 min       Billable Minutes:  Gait [id number]    Treatment Type: Treatment  PT/PTA: PT       General Precautions: Standard, fall, respiratory  Orthopedic Precautions: N/A   Braces: N/A     Subjective:  Communicated with NURSE GRAY prior to session  Pain Ratin/10  Location: back    Objective:   Patient found with: telemetry, chest tube    Functional Mobility:  Bed Mobility:   Scooting/Bridging: Contact Guard Assistance (SEATED SCOOTING IN CHAIR)    Transfers:  Sit <> Stand Assistance: Minimum Assistance  Sit <> Stand Assistive Device: No Assistive Device    Gait:   Gait Distance: PT ' WITHOUT AD WITH FRANCISCO, SLOW PACED GAIT, MILDLY UNSTEADY DUE TO SMALL SHUFFLING STEPS, GUARDED DUE TO BACK PAIN AND L SIDE DISCOMFORT  Assistance 1: Minimum assistance  Gait Assistive Device: No device  Gait Pattern: swing-through gait  Gait Deviation(s): decreased laura, decreased step length, decreased toe-to-floor clearance    Balance:   Static Sit: FAIR  Dynamic Sit: FAIR-  Static Stand: POOR+  Dynamic stand:  POOR+    Therapeutic Activities and Exercises: REVIEWED BLE THEREX FOR PT TO PERFORM WHILE SEATED IN CHAIR    Patient left up in chair with all lines intact, call button in reach, NURSE notified and WIFE present.    Assessment:  Doron Domínguez is a 76 y.o. male with a medical diagnosis of Malignant neoplasm of lower lobe of left lung   PT WILL BENEFIT FROM CONT. SKILLED P.T. TO ADDRESS IMPAIRMENTS    Rehab identified problem list/impairments: Rehab identified problem list/impairments: weakness, impaired endurance, impaired functional mobilty, gait instability, impaired balance, decreased safety awareness, pain, impaired coordination    Rehab potential is good.    Activity tolerance:  Good    Discharge recommendations: Discharge Facility/Level Of Care Needs: home health PT     Barriers to discharge: Barriers to Discharge: None    Equipment recommendations: Equipment Needed After Discharge: walker, rolling, bath bench (IF NEEDED UPON D/C)     GOALS:   Physical Therapy Goals        Problem: Physical Therapy Goal    Goal Priority Disciplines Outcome Goal Variances Interventions   Physical Therapy Goal     PT/OT, PT      Description:  PT WILL BE SEEN FOR P.T. FOR A MIN OF 5 OUT OF 7 DAYS A WEEK  LT17  1. PT WILL COMPLETE BED MOBILITY WITH MIN A.  2. PT WILL STAND WITH RW AND MIN A WITH RW  3. PT WILL GT TRAINED WITH RW AND MIN A X 150'   4. PT WILL COMPLETE B LE FOR ROM AND STRENGTHENING.             PLAN:      (CONT PER POC)  Plan of Care expires: 17  Plan of Care reviewed with: patient, spouse    PT ENCOURAGED TO CALL FOR ASSISTANCE WITH ALL NEEDS DUE TO FALL RISK STATUS, PT AGREEABLE.  PT ENCOURAGED TO INCREASE TIME OOB IN CHAIR, ALL MEALS IN CHAIR OOB, PT AGREEABLE    Suki Bender, PT  2017

## 2017-01-16 NOTE — PLAN OF CARE
Problem: Patient Care Overview  Goal: Plan of Care Review  PT REQUIRES MAX A FOR BED MOBILITY AND MOD A FOR T/F TO CHAIR.   Outcome: Ongoing (interventions implemented as appropriate)  Pt has been free of falls. Pt had one of the two chest tubes removed. Pts PRN pain meds have been switch. Pts ropivacaine has been d/c. Pt moves freely from the bed to chair. Dressing to incision is clean dry and intact with steri strips under dressing. Pt is now off oxygen and stats are in the 90's which Dr. Hernandez said to keep above 90 and to wear oxygen at night. PIV intact NS on the monitor. Call light in reach and hourly rounding made.

## 2017-01-16 NOTE — PROGRESS NOTES
Left anterior chest tube has drained only 20ml over last 12hrs, no fluctuation or air leak. cxr is stable with better aeration so will remove it . Posterior tube with still too much drainage. Will remove epidural. Encourage ambulation.

## 2017-01-16 NOTE — NURSING
Pt remains in A. Fib; pt states he had A. Fib prior to his CABG in 2012, but has had no episodes since then; remains asymptomatic; HR 140s, pt up to bathroom; returns to 100s when back in bed; paged Dr. Stinson; will order STAT EKG.

## 2017-01-16 NOTE — NURSING
Reported from EKG tech, pt is in A.Fib; HR 140s-150s; went into pt's room; pt up using bathroom; rate decreased to 100s-110s when back in bed; family at bedside; pt denies any chest pain, palpatations, or dizziness; will continue to monitor.

## 2017-01-16 NOTE — PROGRESS NOTES
Epidural note, POD #6.    Patient doing well.  Alert and oriented x3.  VSS.  Pain controlled with PO and IV meds.  Patient denies nausea/ vomiting.  Surgeon requests epidural be removed this morning.  Patient's last dose of Lovenox 40 mg was over 12 hours ago and his next dose will be 4 hours after epidural removal.  Epidural catheter removed with tip intact and without any complication.  No swelling, erythema, infection or bleeding noticed at site.

## 2017-01-16 NOTE — PLAN OF CARE
Problem: Patient Care Overview  Goal: Plan of Care Review  PT REQUIRES MAX A FOR BED MOBILITY AND MOD A FOR T/F TO CHAIR.   Outcome: Ongoing (interventions implemented as appropriate)  Pt on O2 tolerating well. No distress noted at this time.

## 2017-01-16 NOTE — PLAN OF CARE
Problem: Patient Care Overview  Goal: Plan of Care Review  PT REQUIRES MAX A FOR BED MOBILITY AND MOD A FOR T/F TO CHAIR.   Outcome: Ongoing (interventions implemented as appropriate)  PT ABLE TO PROGRESS TO GAIT IN HALLWAY TODAY

## 2017-01-16 NOTE — PROGRESS NOTES
Dr. Hernandez pulled one of the two chest tubes. Pt given morphine before it was done. Ropivacaine is to be pulled by anesthesia. Dr Hernandez told me to go ahead and turn it off. Will follow up with pt on pain.

## 2017-01-16 NOTE — PROGRESS NOTES
Progress Note  Pulmonology    Admit Date: 1/10/2017   LOS: 6 days     SUBJECTIVE:     Follow-up For:    SP LLL resection for ALEXANDER  Minimal drainage anterior chest tube  Encourage Incentive spirometry  Stil has epidural  Beta blocker increased  Preop PFT were normal    Continuous Infusions:   Scheduled Meds:   albumin human 25%  12.5 g Intravenous Once    albuterol-ipratropium 2.5mg-0.5mg/3mL  3 mL Nebulization Q8H    atorvastatin  20 mg Oral QHS    docusate sodium  100 mg Oral Daily    enoxaparin  40 mg Subcutaneous Daily    metoprolol succinate  50 mg Oral Daily    metoprolol tartrate  25 mg Oral Once    tamsulosin  0.4 mg Oral QHS       Review of Systems:  Constitutional: no fever or chills  Respiratory: no cough or shortness of breath, positive for pleurisy/chest pain and left side  Cardiovascular: no chest pain or palpitations    OBJECTIVE:     Vital Signs Range (Last 24H):  Temp:  [97.4 °F (36.3 °C)-97.9 °F (36.6 °C)]   Pulse:  []   Resp:  [17-20]   BP: ()/(46-78)   SpO2:  [95 %-99 %]     I & O (Last 24H):  Intake/Output Summary (Last 24 hours) at 01/16/17 1109  Last data filed at 01/16/17 0600   Gross per 24 hour   Intake              502 ml   Output             3323 ml   Net            -2821 ml     Physical Exam:  General: no distress, well developed  Neck: no jugular venous distention  Lungs:  clear to auscultation bilaterally, normal respiratory effort, normal percussion bilaterally and diminished breath sounds LLL and x 2 chest tube, No air leak  Chest Wall: no tenderness  Heart: regular rate and rhythm and no murmur  Abdomen: soft, non-tender non-distended; bowel sounds normal  Extremities: no cyanosis or edema, or clubbing  Neurologic: alert, oriented, thought content appropriate    Laboratory:  CBC:   Recent Labs  Lab 01/16/17 0419   WBC 8.82   RBC 4.31*   HGB 13.6*   HCT 39.7*      MCV 92   MCH 31.6*   MCHC 34.3     CMP:   Recent Labs  Lab 01/12/17  0430  01/16/17 0419   GLU  138*  < > 141*   CALCIUM 8.8  < > 8.7   ALBUMIN 2.7*  --   --    PROT 6.0  --   --    *  < > 135*   K 4.0  < > 3.5   CO2 27  < > 26     < > 100   BUN 9  < > 11   CREATININE 0.9  < > 0.9   ALKPHOS 64  --   --    ALT 27  --   --    AST 48*  --   --    BILITOT 1.0  --   --    < > = values in this interval not displayed.  ABGs:   Recent Labs  Lab 01/10/17  2104   PH 7.416   PCO2 38.0   PO2 115*   HCO3 24.4   POCSATURATED 99   BE 0     Specimen     None          Chest X-Ray: I personally reviewed the films and findings are:, small left apical pneumo    Diagnostic Results:  CT: Reviewed  PATH REPORT  Bronchioloalveolar carcinoma, mucinous type, 9 cm, pathologic staging [id number], without visceral pleura  involvement and with negative bronchial resection margin; see synoptic report.  Post-obstructive pneumonia.  ASSESSMENT/PLAN:     Active Hospital Problems    Diagnosis  POA    *Malignant neoplasm of lower lobe of left lung [C34.32]  Yes     Priority: 1 - High     FINAL PATHOLOGIC DIAGNOSIS  Lung, left lower lobe, biopsy:  Positive for at least adenocarcinoma in situ, see note.  Note: The specimen consists of atypical cells in a lepidic growth pattern. An area of definitive invasion is not  identified, however, a more severe lesion may be present in an unsampled area. Follow up recommended as  clinically indicated. Mutation analyses for EGFR, ALK, ROS-1 and PD-L1 will be sent and the results will be issued  in a supplemental report. Reviewed by Dr. Soraya Garnica, Dr. Mellisa Prasad and Dr. Savannah Campos who concur.  Case discussed with Dr. 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       Plan:   Incentive spirometry  DW Dr Hernandez: epidural and One chest tube to be removed  Ambulation  12lead ekg if repeat arythmia    Thank you for  the courtesy of participating in the care of this patient    Chandana Lewis MD

## 2017-01-16 NOTE — PROGRESS NOTES
Doron Domínguez is a 76 y.o. male patient.   1. Malignant neoplasm of lower lobe of left lung    2. Primary lung cancer, left      Past Medical History   Diagnosis Date    Aortic stenosis 12/29/2016    Arthritis     Asthma     BPH (benign prostatic hyperplasia)     CAD (coronary artery disease)     CAD, multiple vessel 12/29/2016    Cancer      lung    High cholesterol     Hx of CABG 12/29/2016    Hypertension      No past surgical history pertinent negatives on file.  Scheduled Meds:   albumin human 25%  12.5 g Intravenous Once    albuterol-ipratropium 2.5mg-0.5mg/3mL  3 mL Nebulization Q8H    atorvastatin  20 mg Oral QHS    ciprofloxacin HCl  500 mg Oral Q12H    docusate sodium  100 mg Oral Daily    metoprolol succinate  50 mg Oral Daily    metoprolol tartrate  25 mg Oral Once    tamsulosin  0.4 mg Oral QHS     Continuous Infusions:   ropivacaine (PF) 0.2% in 0.9 % NaCl 6 mL/hr at 01/15/17 2300     PRN Meds:bisacodyl, hydrocodone-acetaminophen 10-325mg, hydrocodone-acetaminophen 5-325mg, ibuprofen, ondansetron, pneumoc 13-donnie conj-dip cr(PF), sodium chloride 0.9%    Review of patient's allergies indicates:   Allergen Reactions    Bactrim [sulfamethoxazole-trimethoprim] Other (See Comments)     Severe leg cramps, dizziness    Dilaudid [hydromorphone] Other (See Comments)     Confusion, paranoia     Active Hospital Problems    Diagnosis  POA    *Malignant neoplasm of lower lobe of left lung [C34.32]  Yes     FINAL PATHOLOGIC DIAGNOSIS  Lung, left lower lobe, biopsy:  Positive for at least adenocarcinoma in situ, see note.  Note: The specimen consists of atypical cells in a lepidic growth pattern. An area of definitive invasion is not  identified, however, a more severe lesion may be present in an unsampled area. Follow up recommended as  clinically indicated. Mutation analyses for EGFR, ALK, ROS-1 and PD-L1 will be sent and the results will be issued  in a supplemental report. Reviewed by   "Soraya Garnica, Dr. Mellisa Prasad and Dr. Savannah Campos who concur.  Case discussed with Dr. Dante Massey on 12/21/2016 at 9:00 a      Primary lung cancer [C34.90]  Yes      Resolved Hospital Problems    Diagnosis Date Resolved POA   No resolved problems to display.     Blood pressure 118/78, pulse 85, temperature 97.7 °F (36.5 °C), temperature source Oral, resp. rate 18, height 5' 5.5" (1.664 m), weight 77.5 kg (170 lb 12.8 oz), SpO2 95 %.    Subjective:   Diet: Adequate intake.  Patient reports no nausea or vomiting.    Activity level: Impaired due to pain.    Pain control: Partially controlled.    Wound: Painful (chest tube).      Objective:  Vital signs (most recent): Blood pressure 118/78, pulse 85, temperature 97.7 °F (36.5 °C), temperature source Oral, resp. rate 18, height 5' 5.5" (1.664 m), weight 77.5 kg (170 lb 12.8 oz), SpO2 95 %.  General appearance: Comfortable.    Lungs:  Normal respiratory rate and normal effort.  Breath sounds normal.    Heart: Normal rate.  Regular rhythm.    Chest: Symmetric chest wall expansion.    Abdomen: Abdomen is soft.  No distension.    Bowel sounds:  Bowel sounds are normal.    Wound:  Clean (chest tube have no leak, 273 out).      Exam: XR CHEST 1 VIEW,    Date:  01/16/17 05:10:00    History: Pneumothorax    Comparison:  01/15/2017    Findings: Sternal wires and mediastinal clips are present.  Left chest tube is present, unchanged in position.  Small left apical pneumothorax appears stable or slightly decreased in size.  Persistent atelectasis left lung base.    Cardiomegaly with aortic atherosclerosis.  Right lung is clear with improved aeration.       Impression        Small left apical pneumothorax.      Electronically signed by: ROLY RIOS MD  Date: 01/16/17  Time: 08:03      ekg showed nsr     Assessment:   Post-op time: left lower lobectomy, high chest tube output, stable apical pneumothorax.    Condition: In stable condition.     Plan:  General " Orders/Medications Plan: beta blocker inceased to 50 mg, remove epidural, iv and oral narcotic.           Nick Stinson MD  1/16/2017

## 2017-01-17 PROCEDURE — 25000003 PHARM REV CODE 250: Performed by: SURGERY

## 2017-01-17 PROCEDURE — 25000242 PHARM REV CODE 250 ALT 637 W/ HCPCS: Performed by: SURGERY

## 2017-01-17 PROCEDURE — 25000003 PHARM REV CODE 250: Performed by: NURSE PRACTITIONER

## 2017-01-17 PROCEDURE — 21400001 HC TELEMETRY ROOM

## 2017-01-17 PROCEDURE — 99232 SBSQ HOSP IP/OBS MODERATE 35: CPT | Mod: ,,, | Performed by: INTERNAL MEDICINE

## 2017-01-17 PROCEDURE — 27000221 HC OXYGEN, UP TO 24 HOURS

## 2017-01-17 PROCEDURE — 99900035 HC TECH TIME PER 15 MIN (STAT)

## 2017-01-17 PROCEDURE — 94761 N-INVAS EAR/PLS OXIMETRY MLT: CPT

## 2017-01-17 PROCEDURE — 63600175 PHARM REV CODE 636 W HCPCS: Performed by: SURGERY

## 2017-01-17 PROCEDURE — 94640 AIRWAY INHALATION TREATMENT: CPT

## 2017-01-17 RX ORDER — POLYETHYLENE GLYCOL 3350 17 G/17G
17 POWDER, FOR SOLUTION ORAL 2 TIMES DAILY PRN
Status: DISCONTINUED | OUTPATIENT
Start: 2017-01-17 | End: 2017-01-19 | Stop reason: HOSPADM

## 2017-01-17 RX ADMIN — HYDROCODONE BITARTRATE AND ACETAMINOPHEN 1 TABLET: 7.5; 325 TABLET ORAL at 08:01

## 2017-01-17 RX ADMIN — ATORVASTATIN CALCIUM 20 MG: 10 TABLET, FILM COATED ORAL at 08:01

## 2017-01-17 RX ADMIN — TAMSULOSIN HYDROCHLORIDE 0.4 MG: 0.4 CAPSULE ORAL at 08:01

## 2017-01-17 RX ADMIN — MORPHINE SULFATE 2 MG: 2 INJECTION, SOLUTION INTRAMUSCULAR; INTRAVENOUS at 07:01

## 2017-01-17 RX ADMIN — HYDROCODONE BITARTRATE AND ACETAMINOPHEN 1 TABLET: 7.5; 325 TABLET ORAL at 03:01

## 2017-01-17 RX ADMIN — BISACODYL 10 MG: 10 SUPPOSITORY RECTAL at 09:01

## 2017-01-17 RX ADMIN — HYDROCODONE BITARTRATE AND ACETAMINOPHEN 1 TABLET: 7.5; 325 TABLET ORAL at 11:01

## 2017-01-17 RX ADMIN — IPRATROPIUM BROMIDE AND ALBUTEROL SULFATE 3 ML: .5; 3 SOLUTION RESPIRATORY (INHALATION) at 08:01

## 2017-01-17 RX ADMIN — DOCUSATE SODIUM 100 MG: 100 CAPSULE, LIQUID FILLED ORAL at 09:01

## 2017-01-17 RX ADMIN — METOPROLOL SUCCINATE 50 MG: 50 TABLET, EXTENDED RELEASE ORAL at 09:01

## 2017-01-17 RX ADMIN — ENOXAPARIN SODIUM 40 MG: 100 INJECTION SUBCUTANEOUS at 03:01

## 2017-01-17 RX ADMIN — IPRATROPIUM BROMIDE AND ALBUTEROL SULFATE 3 ML: .5; 3 SOLUTION RESPIRATORY (INHALATION) at 03:01

## 2017-01-17 RX ADMIN — POLYETHYLENE GLYCOL 3350 17 G: 17 POWDER, FOR SOLUTION ORAL at 12:01

## 2017-01-17 NOTE — PROGRESS NOTES
Subjective:       Patient ID: Doron Domínguez is a 76 y.o. male.    Chief Complaint: No chief complaint on file.    HPI Comments: Feeling better. Pain is controlled , eating better, small bm on suppository. Breathing ok, ambulating.     Review of Systems    Objective:      Physical Exam   Constitutional: He is oriented to person, place, and time. He appears well-developed.   Eyes: EOM are normal.   Cardiovascular: Normal rate and regular rhythm.    Pulmonary/Chest: Effort normal.   Rales in left base . Chest tube with no air leak. Drainage about 200ml over 24hrs. Mostly serous. cxr shows lung better expanded. Wound is clean and dry.    Abdominal: Soft. Bowel sounds are normal.   Neurological: He is alert and oriented to person, place, and time.   Psychiatric: He has a normal mood and affect. His behavior is normal.   Vitals reviewed.      Assessment:     stable slowly improving   1. Malignant neoplasm of lower lobe of left lung    2. Primary lung cancer, left        Plan:       Chest tube still draining too much so will leave one more day. Encourage ambulation

## 2017-01-17 NOTE — PLAN OF CARE
Problem: Patient Care Overview  Goal: Plan of Care Review  PT REQUIRES MAX A FOR BED MOBILITY AND MOD A FOR T/F TO CHAIR.   Outcome: Ongoing (interventions implemented as appropriate)  Pt on room air tolerating well. No distress noted at this time.

## 2017-01-17 NOTE — PT/OT/SLP PROGRESS
"Physical Therapy      Doron Dmoínguez  MRN: [id number]    PT MET IN  10:22. PT REFUSED TX. PT SITTING UP IN CHAIR." I TOOK AN ENEMA AND NOW IM JUST WAITING."    Michaelle Mercer, PT,1/17/2017      "

## 2017-01-17 NOTE — PROGRESS NOTES
Progress Note  Pulmonology    Admit Date: 1/10/2017   LOS: 7 days     SUBJECTIVE:     Follow-up For:    SP LLL resection for ALEXANDER  Minimal drainage anterior chest tube  Participating well with  Incentive spirometry  No air leak  Preop PFT were normal    Continuous Infusions:   Scheduled Meds:   albumin human 25%  12.5 g Intravenous Once    albuterol-ipratropium 2.5mg-0.5mg/3mL  3 mL Nebulization Q8H    atorvastatin  20 mg Oral QHS    docusate sodium  100 mg Oral Daily    enoxaparin  40 mg Subcutaneous Daily    metoprolol succinate  50 mg Oral Daily    metoprolol tartrate  25 mg Oral Once    tamsulosin  0.4 mg Oral QHS       Review of Systems:  Constitutional: no fever or chills  Respiratory: no cough or shortness of breath, positive for pleurisy/chest pain and left side  Cardiovascular: no chest pain or palpitations    OBJECTIVE:     Vital Signs Range (Last 24H):  Temp:  [97.5 °F (36.4 °C)-98.3 °F (36.8 °C)]   Pulse:  [78-99]   Resp:  [16-22]   BP: (111-138)/(59-80)   SpO2:  [93 %-98 %]     I & O (Last 24H):    Intake/Output Summary (Last 24 hours) at 01/17/17 1114  Last data filed at 01/17/17 0900   Gross per 24 hour   Intake                0 ml   Output              430 ml   Net             -430 ml     Physical Exam:  General: no distress, well developed  Neck: no jugular venous distention  Lungs:  clear to auscultation bilaterally, normal respiratory effort, normal percussion bilaterally and diminished breath sounds LLL and x 1chest tube, No air leak  Chest Wall: no tenderness  Heart: regular rate and rhythm and no murmur  Abdomen: soft, non-tender non-distended; bowel sounds normal  Extremities: no cyanosis or edema, or clubbing  Neurologic: alert, oriented, thought content appropriate    Laboratory:  CBC:     Recent Labs  Lab 01/16/17  0419   WBC 8.82   RBC 4.31*   HGB 13.6*   HCT 39.7*      MCV 92   MCH 31.6*   MCHC 34.3     CMP:   Recent Labs  Lab 01/12/17  0430  01/16/17  0419   *  < >  141*   CALCIUM 8.8  < > 8.7   ALBUMIN 2.7*  --   --    PROT 6.0  --   --    *  < > 135*   K 4.0  < > 3.5   CO2 27  < > 26     < > 100   BUN 9  < > 11   CREATININE 0.9  < > 0.9   ALKPHOS 64  --   --    ALT 27  --   --    AST 48*  --   --    BILITOT 1.0  --   --    < > = values in this interval not displayed.  ABGs:     Recent Labs  Lab 01/10/17  2104   PH 7.416   PCO2 38.0   PO2 115*   HCO3 24.4   POCSATURATED 99   BE 0     Specimen     None          Chest X-Ray: I personally reviewed the films and findings are:, small left apical pneumo    Diagnostic Results:  CT: Reviewed  PATH REPORT  Bronchioloalveolar carcinoma, mucinous type, 9 cm, pathologic staging [id number], without visceral pleura  involvement and with negative bronchial resection margin; see synoptic report.  Post-obstructive pneumonia.  ASSESSMENT/PLAN:     Active Hospital Problems    Diagnosis  POA    *Malignant neoplasm of lower lobe of left lung [C34.32]  Yes     Priority: 1 - High     FINAL PATHOLOGIC DIAGNOSIS  Lung, left lower lobe, biopsy:  Positive for at least adenocarcinoma in situ, see note.  Note: The specimen consists of atypical cells in a lepidic growth pattern. An area of definitive invasion is not  identified, however, a more severe lesion may be present in an unsampled area. Follow up recommended as  clinically indicated. Mutation analyses for EGFR, ALK, ROS-1 and PD-L1 will be sent and the results will be issued  in a supplemental report. Reviewed by Dr. Soraya Garnica, Dr. Mellisa Prasad and Dr. Savannah Campos who concur.  Case discussed with Dr. 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       Plan:   Incentive spirometry  Chest tube per surgery    Thank you for the courtesy of participating in the care of this patient    Chandana Lewis MD

## 2017-01-17 NOTE — PLAN OF CARE
Problem: Patient Care Overview  Goal: Plan of Care Review  PT REQUIRES MAX A FOR BED MOBILITY AND MOD A FOR T/F TO CHAIR.   Outcome: Ongoing (interventions implemented as appropriate)  Dx lllobectomy  Poc, pt instructed on pain management , pain scale and pain meds. Instructed on dbc and IS 10 x q1h wa to prevent pneumonia. Chest tube site wnl. No bubbling. There is small fluctuations with inpiration. To water seal. Instructed on repositioning q2h to prevent bed sores. Call light, water and phone in reach. Pt with no bm since tues. miralax and supp administered prn,. No falls.

## 2017-01-18 ENCOUNTER — TELEPHONE (OUTPATIENT)
Dept: PULMONOLOGY | Facility: CLINIC | Age: 77
End: 2017-01-18

## 2017-01-18 PROCEDURE — 25000003 PHARM REV CODE 250: Performed by: SURGERY

## 2017-01-18 PROCEDURE — 25000242 PHARM REV CODE 250 ALT 637 W/ HCPCS: Performed by: SURGERY

## 2017-01-18 PROCEDURE — 99900035 HC TECH TIME PER 15 MIN (STAT)

## 2017-01-18 PROCEDURE — 25000003 PHARM REV CODE 250: Performed by: NURSE PRACTITIONER

## 2017-01-18 PROCEDURE — 94799 UNLISTED PULMONARY SVC/PX: CPT

## 2017-01-18 PROCEDURE — 21400001 HC TELEMETRY ROOM

## 2017-01-18 PROCEDURE — 99231 SBSQ HOSP IP/OBS SF/LOW 25: CPT | Mod: ,,, | Performed by: INTERNAL MEDICINE

## 2017-01-18 PROCEDURE — 97116 GAIT TRAINING THERAPY: CPT

## 2017-01-18 PROCEDURE — 94761 N-INVAS EAR/PLS OXIMETRY MLT: CPT

## 2017-01-18 PROCEDURE — 63600175 PHARM REV CODE 636 W HCPCS: Performed by: SURGERY

## 2017-01-18 PROCEDURE — 94640 AIRWAY INHALATION TREATMENT: CPT

## 2017-01-18 RX ADMIN — IPRATROPIUM BROMIDE AND ALBUTEROL SULFATE 3 ML: .5; 3 SOLUTION RESPIRATORY (INHALATION) at 11:01

## 2017-01-18 RX ADMIN — IPRATROPIUM BROMIDE AND ALBUTEROL SULFATE 3 ML: .5; 3 SOLUTION RESPIRATORY (INHALATION) at 12:01

## 2017-01-18 RX ADMIN — IPRATROPIUM BROMIDE AND ALBUTEROL SULFATE 3 ML: .5; 3 SOLUTION RESPIRATORY (INHALATION) at 03:01

## 2017-01-18 RX ADMIN — POLYETHYLENE GLYCOL 3350 17 G: 17 POWDER, FOR SOLUTION ORAL at 08:01

## 2017-01-18 RX ADMIN — ENOXAPARIN SODIUM 40 MG: 100 INJECTION SUBCUTANEOUS at 04:01

## 2017-01-18 RX ADMIN — HYDROCODONE BITARTRATE AND ACETAMINOPHEN 1 TABLET: 7.5; 325 TABLET ORAL at 01:01

## 2017-01-18 RX ADMIN — HYDROCODONE BITARTRATE AND ACETAMINOPHEN 1 TABLET: 7.5; 325 TABLET ORAL at 04:01

## 2017-01-18 RX ADMIN — HYDROCODONE BITARTRATE AND ACETAMINOPHEN 1 TABLET: 7.5; 325 TABLET ORAL at 08:01

## 2017-01-18 RX ADMIN — METOPROLOL SUCCINATE 50 MG: 50 TABLET, EXTENDED RELEASE ORAL at 08:01

## 2017-01-18 RX ADMIN — DOCUSATE SODIUM 100 MG: 100 CAPSULE, LIQUID FILLED ORAL at 08:01

## 2017-01-18 RX ADMIN — IPRATROPIUM BROMIDE AND ALBUTEROL SULFATE 3 ML: .5; 3 SOLUTION RESPIRATORY (INHALATION) at 08:01

## 2017-01-18 RX ADMIN — ATORVASTATIN CALCIUM 20 MG: 10 TABLET, FILM COATED ORAL at 08:01

## 2017-01-18 RX ADMIN — TAMSULOSIN HYDROCHLORIDE 0.4 MG: 0.4 CAPSULE ORAL at 08:01

## 2017-01-18 RX ADMIN — HYDROCODONE BITARTRATE AND ACETAMINOPHEN 1 TABLET: 7.5; 325 TABLET ORAL at 12:01

## 2017-01-18 NOTE — PROGRESS NOTES
Progress Note  Pulmonology    Admit Date: 1/10/2017   LOS: 8 days     SUBJECTIVE:     Follow-up For:    SP LLL resection for ALEXANDER  Participating well with  Incentive spirometry  No cough, minimal pain  Preop PFT were normal    Continuous Infusions:   Scheduled Meds:   albumin human 25%  12.5 g Intravenous Once    albuterol-ipratropium 2.5mg-0.5mg/3mL  3 mL Nebulization Q8H    atorvastatin  20 mg Oral QHS    docusate sodium  100 mg Oral Daily    enoxaparin  40 mg Subcutaneous Daily    metoprolol succinate  50 mg Oral Daily    metoprolol tartrate  25 mg Oral Once    tamsulosin  0.4 mg Oral QHS       Review of Systems:  Constitutional: no fever or chills  Respiratory: no cough or shortness of breath, positive for pleurisy/chest pain and left side  Cardiovascular: no chest pain or palpitations    OBJECTIVE:     Vital Signs Range (Last 24H):  Temp:  [97.7 °F (36.5 °C)-98.4 °F (36.9 °C)]   Pulse:  [72-88]   Resp:  [16-18]   BP: (117-141)/(60-78)   SpO2:  [94 %-97 %]     I & O (Last 24H):    Intake/Output Summary (Last 24 hours) at 01/18/17 1656  Last data filed at 01/18/17 0600   Gross per 24 hour   Intake                0 ml   Output             1735 ml   Net            -1735 ml     Physical Exam:  General: no distress, well developed  Neck: no jugular venous distention  Lungs:  clear to auscultation bilaterally, normal respiratory effort, normal percussion bilaterally and diminished breath sounds [facility]   Chest Wall: no tenderness  Heart: regular rate and rhythm and no murmur  Abdomen: soft, non-tender non-distended; bowel sounds normal  Extremities: no cyanosis or edema, or clubbing  Neurologic: alert, oriented, thought content appropriate    Diagnostic Results:  CT: Reviewed  PATH REPORT  Bronchioloalveolar carcinoma, mucinous type, 9 cm, pathologic staging [id number], without visceral pleura  involvement and with negative bronchial resection margin; see synoptic report.  Post-obstructive pneumonia.  ASSESSMENT/PLAN:      Active Hospital Problems    Diagnosis  POA    *Malignant neoplasm of lower lobe of left lung [C34.32]  Yes     Priority: 1 - High     FINAL PATHOLOGIC DIAGNOSIS  Lung, left lower lobe, biopsy:  Positive for at least adenocarcinoma in situ, see note.  Note: The specimen consists of atypical cells in a lepidic growth pattern. An area of definitive invasion is not  identified, however, a more severe lesion may be present in an unsampled area. Follow up recommended as  clinically indicated. Mutation analyses for EGFR, ALK, ROS-1 and PD-L1 will be sent and the results will be issued  in a supplemental report. Reviewed by Dr. Soraya Garnica, Dr. Mellisa Prasad and Dr. Savannah Campos who concur.  Case discussed with Dr. 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       Plan:   Incentive spirometry  Follow in office with Dr Massey    Thank you for the courtesy of participating in the care of this patient    Chandana Lewis MD

## 2017-01-18 NOTE — PT/OT/SLP PROGRESS
Physical Therapy      Doron Domínguez  MRN: [id number]    ATTEMPTED P.T. TX THIS AM, PT CURRENTLY EATING BREAKFAST, WILL ASSESS AT LATER TIME THIS AM    Suki Bender, PT   1/18/2017  0974

## 2017-01-18 NOTE — PROGRESS NOTES
Chest tube removed per Dr. Hernandez. Pt tolerated well. Vasoline gauze applied to insertion site, covered with sterile gauze and foam tape.

## 2017-01-18 NOTE — PLAN OF CARE
Problem: Patient Care Overview  Goal: Individualization & Mutuality  Outcome: Ongoing (interventions implemented as appropriate)  DX: lung cancer, left  PATIENT REMAINS FREE FROM FALLS, FALL PRECAUTIONS IN PLACE.  VS STABLE  NO OTHER C/O AT THIS TIME  CALL BELL AND BELONGINGS WITHIN REACH, REMINDED TO CALL FOR ASSISTANCE.

## 2017-01-18 NOTE — PLAN OF CARE
Problem: Patient Care Overview  Goal: Plan of Care Review  PT REQUIRES MAX A FOR BED MOBILITY AND MOD A FOR T/F TO CHAIR.   Outcome: Ongoing (interventions implemented as appropriate)  GOOD PARTICIPATION, INCREASED SOB WITH EXERTION

## 2017-01-18 NOTE — PLAN OF CARE
Problem: Patient Care Overview  Goal: Plan of Care Review  PT REQUIRES MAX A FOR BED MOBILITY AND MOD A FOR T/F TO CHAIR.   Outcome: Ongoing (interventions implemented as appropriate)  Plan of care reviewed with patient. AAO x3. V/S stable. Patient on telemetry NS rhythm noted.Chest tube dressing CD&I. Patient ambulating with assistance, fall precautions in place, patient free from fall/injury. Patient has no questions at this time. Will continue to monitor.

## 2017-01-18 NOTE — PT/OT/SLP PROGRESS
Physical Therapy  Treatment    Doron Domínguez   MRN: [id number]   Admitting Diagnosis: Malignant neoplasm of lower lobe of left lung    PT Received On: 17  PT Start Time: 1015     PT Stop Time: 1030    PT Total Time (min): 15 min       Billable Minutes:  Gait [id number]    Treatment Type: Treatment  PT/PTA: PT         General Precautions: Standard, fall, respiratory  Orthopedic Precautions: N/A   Braces: N/A    Subjective:  Communicated with NURSE DENSON prior to session.  Pain Ratin/10    Objective:   Patient found with: telemetry    Functional Mobility:  Bed Mobility:   Scooting/Bridging: Stand by Assistance (SEATED SCOOTING IN CHAIR)    Transfers:  SBA FOR SIT<>STAND TF    Gait:   Gait Distance: PT ' WITHOUT AD, WIFE HOLDING ONTO HIS SIDE, MILDLY UNSTEADY, INCREASED SOB WITH O2 IN TOW  Assistance 1: Contact Guard Assistance  Gait Assistive Device: No device, Hand held assist (PT MAY BENEFIT FROM RW USE DURING GAIT)  Gait Pattern: swing-through gait  Gait Deviation(s): decreased laura, decreased step length    Balance:   Static Sit: GOOD  Dynamic Sit: GOOD  Static Stand: FAIR  Dynamic stand:  FAIR    Therapeutic Activities and Exercises:    Patient left up in chair with all lines intact, call button in reach, NURSE notified and WIFE present.    Assessment:  Doron Domínguez is a 76 y.o. male with a medical diagnosis of Malignant neoplasm of lower lobe of left lung   PT WILL BENEFIT FROM CONT. SKILLED P.T. TO ADDRESS IMPAIRMENTS    Rehab identified problem list/impairments: Rehab identified problem list/impairments: weakness, impaired endurance, impaired functional mobilty, decreased safety awareness, gait instability, impaired balance, impaired coordination    Rehab potential is good.    Activity tolerance: Good    Discharge recommendations: Discharge Facility/Level Of Care Needs: home health PT     Barriers to discharge: Barriers to Discharge: None    Equipment recommendations: Equipment Needed  After Discharge: walker, rolling, bath bench     GOALS:   Physical Therapy Goals        Problem: Physical Therapy Goal    Goal Priority Disciplines Outcome Goal Variances Interventions   Physical Therapy Goal     PT/OT, PT      Description:  PT WILL BE SEEN FOR P.T. FOR A MIN OF 5 OUT OF 7 DAYS A WEEK  LT17  1. PT WILL COMPLETE BED MOBILITY WITH MIN A.  2. PT WILL STAND WITH RW AND MIN A WITH RW  3. PT WILL GT TRAINED WITH RW AND MIN A X 150'   4. PT WILL COMPLETE B LE FOR ROM AND STRENGTHENING.               PLAN:      (CONT PER POC)  Plan of Care expires: 17  Plan of Care reviewed with: patient, spouse    PT ENCOURAGED TO CALL FOR ASSISTANCE WITH ALL NEEDS DUE TO FALL RISK STATUS, PT AGREEABLE.  PT ENCOURAGED TO INCREASE TIME OOB IN CHAIR, ALL MEALS IN CHAIR OOB    Suki Bender, PT  2017

## 2017-01-18 NOTE — PLAN OF CARE
Met with patient and wife. Identified that selected PCP is Dr. Altagracia Blackwood. Wife identified that they need a shower chair and would like to acquire the item through [facility]. Patient identified that he needs a handicapped parking permit.      01/18/17 1214   Discharge Reassessment   Assessment Type Discharge Planning Reassessment   Can the patient answer the patient profile reliably? Yes, cognitively intact   How does the patient rate their overall health at the present time? Fair   Describe the patient's ability to walk at the present time. Minor restrictions or changes   How often would a person be available to care for the patient? Whenever needed   Discharge Plan A Home   Discharge Plan B Home Health   Change in patient condition or support system No   Patient choice form signed by patient/caregiver N/A   Involved the patient/caregiver in establishing a new discharge plan: Yes

## 2017-01-18 NOTE — PROGRESS NOTES
Subjective:       Patient ID: Doron Domínguez is a 76 y.o. male.    Chief Complaint: No chief complaint on file.    HPI Comments: FEELS BETTER , STILL SOME MILD CONSTIPATION, VOIDING WELL , BREATHING WELL ON ROOM AIR, AMBULATING OK    Review of Systems    Objective:      Physical Exam   Constitutional: He is oriented to person, place, and time. He appears well-developed.   HENT:   Head: Atraumatic.   Eyes: EOM are normal.   Cardiovascular: Normal rate, regular rhythm and normal heart sounds.    Pulmonary/Chest: Effort normal.   DECREASE BS IN LEFT BASE. CHEST TUBE WITH MINIMAL DRAINAGE. CXR  IMPROVING AERATION. SATS NORMAL ON ROOM AIR   Abdominal: Soft. Bowel sounds are normal.   Neurological: He is alert and oriented to person, place, and time.   Psychiatric: He has a normal mood and affect.   Vitals reviewed.      Assessment:     IMPROVING POST OP   1. Malignant neoplasm of lower lobe of left lung    2. Primary lung cancer, left        Plan:       REMOVE CHEST TUBE, PROBABLE HOME IN AM

## 2017-01-18 NOTE — TELEPHONE ENCOUNTER
----- Message from Nadege Jj sent at 1/9/2017  2:01 PM CST -----  Contact: Ms Domínguez/   wife  Ms Domínguez states doctor recommended a doctor for patient to see.   Patient need to speak with nurse.   Please call pt at [phone] or [phone] for more detail info

## 2017-01-19 VITALS
HEIGHT: 66 IN | OXYGEN SATURATION: 95 % | HEART RATE: 87 BPM | BODY MASS INDEX: 27.64 KG/M2 | DIASTOLIC BLOOD PRESSURE: 59 MMHG | WEIGHT: 172 LBS | SYSTOLIC BLOOD PRESSURE: 106 MMHG | RESPIRATION RATE: 18 BRPM | TEMPERATURE: 98 F

## 2017-01-19 PROCEDURE — 63600175 PHARM REV CODE 636 W HCPCS: Performed by: SURGERY

## 2017-01-19 PROCEDURE — 27000221 HC OXYGEN, UP TO 24 HOURS

## 2017-01-19 PROCEDURE — G0009 ADMIN PNEUMOCOCCAL VACCINE: HCPCS | Performed by: SURGERY

## 2017-01-19 PROCEDURE — 25000003 PHARM REV CODE 250: Performed by: SURGERY

## 2017-01-19 PROCEDURE — 97110 THERAPEUTIC EXERCISES: CPT

## 2017-01-19 PROCEDURE — 99900035 HC TECH TIME PER 15 MIN (STAT)

## 2017-01-19 PROCEDURE — 97116 GAIT TRAINING THERAPY: CPT

## 2017-01-19 PROCEDURE — 94640 AIRWAY INHALATION TREATMENT: CPT

## 2017-01-19 PROCEDURE — 25000242 PHARM REV CODE 250 ALT 637 W/ HCPCS: Performed by: SURGERY

## 2017-01-19 PROCEDURE — 90670 PCV13 VACCINE IM: CPT | Performed by: SURGERY

## 2017-01-19 PROCEDURE — 25000003 PHARM REV CODE 250: Performed by: NURSE PRACTITIONER

## 2017-01-19 PROCEDURE — 94761 N-INVAS EAR/PLS OXIMETRY MLT: CPT

## 2017-01-19 PROCEDURE — 90471 IMMUNIZATION ADMIN: CPT | Performed by: SURGERY

## 2017-01-19 RX ORDER — HYDROCODONE BITARTRATE AND ACETAMINOPHEN 7.5; 325 MG/1; MG/1
1 TABLET ORAL EVERY 4 HOURS PRN
Qty: 20 TABLET | Refills: 0 | Status: SHIPPED | OUTPATIENT
Start: 2017-01-19 | End: 2017-01-27

## 2017-01-19 RX ADMIN — METOPROLOL SUCCINATE 50 MG: 50 TABLET, EXTENDED RELEASE ORAL at 09:01

## 2017-01-19 RX ADMIN — HYDROCODONE BITARTRATE AND ACETAMINOPHEN 1 TABLET: 7.5; 325 TABLET ORAL at 01:01

## 2017-01-19 RX ADMIN — HYDROCODONE BITARTRATE AND ACETAMINOPHEN 1 TABLET: 7.5; 325 TABLET ORAL at 05:01

## 2017-01-19 RX ADMIN — HYDROCODONE BITARTRATE AND ACETAMINOPHEN 1 TABLET: 7.5; 325 TABLET ORAL at 09:01

## 2017-01-19 RX ADMIN — PNEUMOCOCCAL 13-VALENT CONJUGATE VACCINE 0.5 ML: 2.2; 2.2; 2.2; 2.2; 2.2; 4.4; 2.2; 2.2; 2.2; 2.2; 2.2; 2.2; 2.2 INJECTION, SUSPENSION INTRAMUSCULAR at 01:01

## 2017-01-19 RX ADMIN — POLYETHYLENE GLYCOL 3350 17 G: 17 POWDER, FOR SOLUTION ORAL at 09:01

## 2017-01-19 RX ADMIN — IPRATROPIUM BROMIDE AND ALBUTEROL SULFATE 3 ML: .5; 3 SOLUTION RESPIRATORY (INHALATION) at 09:01

## 2017-01-19 RX ADMIN — DOCUSATE SODIUM 100 MG: 100 CAPSULE, LIQUID FILLED ORAL at 09:01

## 2017-01-19 NOTE — PLAN OF CARE
Problem: Patient Care Overview  Goal: Plan of Care Review  PT REQUIRES MAX A FOR BED MOBILITY AND MOD A FOR T/F TO CHAIR.   Outcome: Ongoing (interventions implemented as appropriate)  ra sat=98%; tolerates txs well.

## 2017-01-19 NOTE — PLAN OF CARE
01/19/17 1040   Final Note   Assessment Type Final Discharge Note   Discharge Disposition Home-Health   Hospital Follow Up  Appt(s) scheduled? Yes   Referral to / orders for Home Health Complete? Yes  ([facility])   Right Care Referral Info   Post Acute Recommendation Home-care   Facility Name [facility]   Met with patient and wife. Preference expressed for [facility]. Signed patient preference form placed on chart. Provided bedside commode (new, donated) to patient. Requested that the item be donated to [facility] when no longer using the item. Patient and wife expressed appreciation.   Phoned [facility], [phone]. Faxed to [phone] as requested.

## 2017-01-19 NOTE — PT/OT/SLP PROGRESS
Physical Therapy      Doron Domínguez  MRN: [id number]    S:  PT AGREEABLE TO P.T. TX THIS AM, C/O TOO MANY PEOPLE COMING IN AT ONCE, TOO BUSY  O:  PT FOUND SEATED IN CHAIR UPON ARRIVAL, JUST FINISHING WITH BATH, SIT TO STAND WITH SBA, PT ' WITH RW AND CGA, NO LOB OR SOB ON ROOM AIR, PT WITH ELEVATED HR OF 150BPM DURING GAIT TRIAL SO ASSISTED BACK TO ROOM, HR QUICK TO DECREASE IN SITTING TO 116BPM, NURSE ISAAC AWARE.  PT LEFT SEATED WITH ALL NEEDS MET, WIFE PRESENT  A:  GOOD PROGRESS WITH THERAPY POC, PT WITH IMPROVED DYNAMIC BALANCE WITH RW USE DURING GAIT  P: CONT PER POC    Suki Bender, PT   1/19/2017  [id number]

## 2017-01-19 NOTE — PLAN OF CARE
Problem: Patient Care Overview  Goal: Plan of Care Review  PT REQUIRES MAX A FOR BED MOBILITY AND MOD A FOR T/F TO CHAIR.   Outcome: Ongoing (interventions implemented as appropriate)  Plan of care reviewed with patient. AAO x3. V/S stable. Patient complaining of pain at old chest tube site, pain medication given Q4h. Patient on telemetry NS rhythm noted. Patient ambulating with assistance, fall precautions in place, patient free from fall/injury. Patient has no questions at this time. Will continue to monitor.

## 2017-01-19 NOTE — PLAN OF CARE
Problem: Patient Care Overview  Goal: Plan of Care Review  PT REQUIRES MAX A FOR BED MOBILITY AND MOD A FOR T/F TO CHAIR.   Outcome: Ongoing (interventions implemented as appropriate)  GOOD TOLERANCE TO P.T. TX., ELEVATED HR DURING GAIT

## 2017-01-19 NOTE — PT/OT/SLP PROGRESS
Physical Therapy      Doron Domínguez  MRN: [id number]    PT FOUND SEATED IN CHAIR UPON ARRIVAL, PT PERFORMED BLE THEREX X 20 REPS AROM, PT C/O 5/10 BACK PAIN, PT LEFT SEATED IN CHAIR WITH ALL NEEDS MET, WIFE PRESENT    Suki Bender, PT   1/19/2017  [id number]

## 2017-01-23 NOTE — DISCHARGE SUMMARY
[facility] -   Discharge Summary  General Surgery      Admit Date: 1/10/2017    Discharge Date and Time: 1/23/2017 2:58 PM    Attending Physician: No att. providers found     Discharge Provider: Negrito Hernandez    Reason for Admission: remove left lower lobe lung cancer    Procedures Performed: Procedure(s) (LRB):  THORACOSCOPY (Left)  THORACOTOMY/LOBECTOMY (Left)    Hospital Course (synopsis of major diagnoses, care, treatment, and services provided during the course of the hospital stay): see dictation # [id number]     Consults: pulmonary/intensive care    Significant Diagnostic Studies:   Specimen     None          Final Diagnoses:   Principal Problem: Malignant neoplasm of lower lobe of left lung   Secondary Diagnoses:   Active Hospital Problems    Diagnosis  POA    *Malignant neoplasm of lower lobe of left lung [C34.32]  Yes     FINAL PATHOLOGIC DIAGNOSIS  Lung, left lower lobe, biopsy:  Positive for at least adenocarcinoma in situ, see note.  Note: The specimen consists of atypical cells in a lepidic growth pattern. An area of definitive invasion is not  identified, however, a more severe lesion may be present in an unsampled area. Follow up recommended as  clinically indicated. Mutation analyses for EGFR, ALK, ROS-1 and PD-L1 will be sent and the results will be issued  in a supplemental report. Reviewed by Dr. Soraya Garnica, Dr. Mellisa Prasad and Dr. Savannah Campos who concur.  Case discussed with Dr. 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       Discharged Condition: good    Disposition: Home-Health Care [facility]    Follow Up/Patient Instructions:     Medications:  Reconciled Home Medications:   Discharge Medication List as of 1/20/2017  4:28 PM      START taking these medications    Details   hydrocodone-acetaminophen 7.5-325mg (NORCO)  7.5-325 mg per tablet Take 1 tablet by mouth every 4 (four) hours as needed., Starting 1/19/2017, Until Discontinued, Normal         CONTINUE these medications which have NOT CHANGED    Details   albuterol 90 mcg/actuation inhaler Inhale 2 puffs into the lungs every 4 (four) hours as needed for Wheezing., Starting 10/18/2016, Until Discontinued, Normal      albuterol-ipratropium 2.5mg-0.5mg/3mL (DUO-NEB) 0.5 mg-3 mg(2.5 mg base)/3 mL nebulizer solution Take 3 mLs by nebulization every 6 (six) hours., Starting 11/2/2016, Until Thu 11/2/17, Normal      atorvastatin (LIPITOR) 20 MG tablet Take 20 mg by mouth every evening. , Starting 9/20/2016, Until Discontinued, Historical Med      glucosamine-chondroitin 500-400 mg tablet Take 1 tablet by mouth 2 (two) times daily. , Until Discontinued, Historical Med      ibuprofen (ADVIL,MOTRIN) 800 MG tablet Take 1 tablet (800 mg total) by mouth every 6 (six) hours as needed for Pain., Starting 1/1/2017, Until Discontinued, Print      inhalation device (AEROCHAMBER PLUS FLOW-VU) Use as directed for inhalation., No Print      metoprolol succinate (TOPROL-XL) 25 MG 24 hr tablet Take 25 mg by mouth once daily. , Starting 7/12/2016, Until Discontinued, Historical Med      multivitamin with minerals tablet Take 1 tablet by mouth once daily., Until Discontinued, Historical Med      tamsulosin (FLOMAX) 0.4 mg Cp24 Take 0.4 mg by mouth every evening. , Starting 9/22/2016, Until Discontinued, Historical Med      budesonide (PULMICORT) 0.5 mg/2 mL nebulizer solution Take 2 mLs (0.5 mg total) by nebulization 2 (two) times daily. Wash out mouth after using., Starting 11/2/2016, Until Thu 11/2/17, Normal      potassium gluconate 595 mg (99 mg) Tab Take by mouth., Until Discontinued, Historical Med         STOP taking these medications       levoFLOXacin (LEVAQUIN) 500 MG tablet Comments:   Reason for Stopping:         promethazine-codeine 6.25-10 mg/5 ml (PHENERGAN WITH CODEINE) 6.25-10 mg/5  "mL syrup Comments:   Reason for Stopping:               Discharge Procedure Orders  BATH/SHOWER CHAIR FOR HOME USE   Order Specific Question Answer Comments   Height: 5' 5.5" (1.664 m)    Weight: 78 kg (172 lb)    Does patient have medical equipment at home? none    Length of need (1-99 months): 99    Type: With back      Diet general     Lifting restrictions   Order Comments: Avoid lifting over 20lbs     Call MD for:  temperature >100.4     Call MD for:  persistent nausea and vomiting or diarrhea     Call MD for:  severe uncontrolled pain     Call MD for:  redness, tenderness, or signs of infection (pain, swelling, redness, odor or green/yellow discharge around incision site)     Call MD for:  difficulty breathing or increased cough     Call MD for:  increased confusion or weakness     Remove dressing in 24 hours       Follow-up Information     Follow up with Altagracia Blackwood DO In 2 weeks.    Specialty:  Internal Medicine    Contact information:    [address] [place] [address] [address] [place] [address]  [phone]          Follow up with Shant Leon MD In 2 weeks.    Specialty:  Hematology and Oncology    Contact information:    [address] [facility] [place] [id number] [phone]          Follow up with [facility] - Jennifer Macedo.    Specialties:  Wound Care, Home Health Services    Why:  Home Health, SN and PT        Follow up with Negrito Hernandez MD On 1/27/2017.    Specialty:  General Surgery    Contact information:    [address] [place] [id number] [phone]          "

## 2017-01-24 NOTE — DISCHARGE SUMMARY
DATE OF ADMISSION:  01/10/2017    DATE OF DISCHARGE:  01/18/2017    The patient's history and physical have already been dictated.  I will dictate   the hospital course.    HOSPITAL COURSE:  On the date of admission, the patient underwent left   thoracoscopy, then he underwent a left thoracotomy, left lobectomy of the lung   of what proved to be a bronchioalveolar carcinoma of the left lower lobe with   the pleura intact and lymph nodes intact.  Preoperatively, he had SCD hose and   he also had perioperative antibiotics.  He has been on Cipro for the cough and   fever, was kept on that for a few days after surgery.  Also, preoperatively he   had epidural placed.  After surgery, the patient was taken to Recovery Room and   then to the Intensive Care Unit.  On the first day of surgery, he was out of   bed.  His blood pressure was a running little low; however, his urine output was   good and his pain was minimal.  On the first postoperative night, he had little   numbness in his legs and his epidural was stopped; however, it was restarted on   the first postoperative day at a lower rate and controlled his pain well.  The   patient's labs remained stable postop with hematocrit around mid 30s.  By second   postoperative day, he was tolerating clear liquids.  His diet was advanced.    His urine output was good, little over 2 liters out over 24 hours and his sats   remained stable on 2 L nasal cannula.  Physical therapy helped with the   ambulation.  He was moved to the Telemetry floor.  On the third and fourth   postoperative day, chest tube drainage slowed down.  There was minimal apical   pneumothorax on the left which resolved over the next day.  By the sixth   postoperative day, his chest tube has been water seal with only 20 mL over the   last 12 hours.  There was no fluctuation and chest tube was removed.  His Almaraz   catheter was removed approximately third postoperative day and he voided well.    His epidural was  also removed on the sixth postoperative day.  The patient was   somewhat slow moving; however, with physical therapy he was able to move around   better.  His pain was controlled on p.o. pain medicine.  One chest tube was   removed on the sixth postoperative day.  On the eighth postoperative day, the   second chest tube was removed.  His post-tube removal x-ray looked good.  He was   having small bowel movements.  He was passing gas.  His room air sats were   normal and he was then discharged doing well.    FINAL DIAGNOSES:  Malignant neoplasm of the left lower lobe of the lung,   bronchoalveolar carcinoma, aortic stenosis mild, history of coronary artery   disease.    SPECIAL PROCEDURES:  Left thoracoscopy, left thoracotomy, and left lower   lobectomy of the lung.    DISPOSITION:  The patient was to return home and he will get home health with a   check on his wound.  He is to remove the dressing in the chest tube site in 48   hours.  He was encouraged to ambulate and Physical Therapy help him with   ambulation at home.  He was told to take MiraLax p.r.n. as needed for   constipation and to force the fluids.  He was discharged on his regular home   medicines.  In addition to that, he was given Norco 7.5 one every 4 to 6 hours   p.r.n. pain.  During the hospital course, his heart rate increased somewhat on    the fourth day and so his metoprolol was increased to 50 mg a day.  He is to   take that until his return.  He is to return to see me in followup in   approximately 10 days, return sooner should he develop severe shortness of   breath, severe wound redness or drainage or high fevers.      GAETANO  dd: 01/23/2017 14:58:23 (CST)  td: 01/23/2017 17:29:29 (CST)  Doc ID   #[id number]  Job ID #[id number]    CC: [facility] [employer]

## 2017-01-26 ENCOUNTER — OFFICE VISIT (OUTPATIENT)
Dept: HEMATOLOGY/ONCOLOGY | Facility: CLINIC | Age: 77
End: 2017-01-26
Payer: MEDICARE

## 2017-01-26 VITALS
TEMPERATURE: 98 F | HEIGHT: 66 IN | WEIGHT: 169.06 LBS | HEART RATE: 81 BPM | OXYGEN SATURATION: 96 % | BODY MASS INDEX: 27.17 KG/M2 | SYSTOLIC BLOOD PRESSURE: 124 MMHG | DIASTOLIC BLOOD PRESSURE: 61 MMHG

## 2017-01-26 DIAGNOSIS — C34.92 BRONCHIOLO-ALVEOLAR ADENOCARCINOMA OF LEFT LUNG: ICD-10-CM

## 2017-01-26 DIAGNOSIS — C34.32 MALIGNANT NEOPLASM OF LOWER LOBE OF LEFT LUNG: Primary | ICD-10-CM

## 2017-01-26 PROCEDURE — 99213 OFFICE O/P EST LOW 20 MIN: CPT | Mod: PBBFAC,PO | Performed by: INTERNAL MEDICINE

## 2017-01-26 PROCEDURE — 99999 PR PBB SHADOW E&M-EST. PATIENT-LVL III: CPT | Mod: PBBFAC,,, | Performed by: INTERNAL MEDICINE

## 2017-01-26 PROCEDURE — 99215 OFFICE O/P EST HI 40 MIN: CPT | Mod: S$PBB,,, | Performed by: INTERNAL MEDICINE

## 2017-01-27 ENCOUNTER — OFFICE VISIT (OUTPATIENT)
Dept: SURGERY | Facility: CLINIC | Age: 77
End: 2017-01-27
Payer: MEDICARE

## 2017-01-27 VITALS
WEIGHT: 171.06 LBS | TEMPERATURE: 98 F | DIASTOLIC BLOOD PRESSURE: 70 MMHG | HEART RATE: 78 BPM | SYSTOLIC BLOOD PRESSURE: 103 MMHG | BODY MASS INDEX: 28.04 KG/M2

## 2017-01-27 DIAGNOSIS — Z98.890 POST-OPERATIVE STATE: Primary | ICD-10-CM

## 2017-01-27 PROCEDURE — 99024 POSTOP FOLLOW-UP VISIT: CPT | Mod: ,,, | Performed by: SURGERY

## 2017-01-27 PROCEDURE — 99213 OFFICE O/P EST LOW 20 MIN: CPT | Mod: PBBFAC | Performed by: SURGERY

## 2017-01-27 PROCEDURE — 99999 PR PBB SHADOW E&M-EST. PATIENT-LVL III: CPT | Mod: PBBFAC,,, | Performed by: SURGERY

## 2017-01-27 NOTE — MR AVS SNAPSHOT
[facility] - General Surgery  [address] [facility] [address]  Phone: [phone]  Fax: [phone]                  Doron Domínguez   2017 11:15 AM   Office Visit    Description:  Male : 1940   Provider:  Negrito Hernandez MD   Department:  [facility] - General Surgery           Reason for Visit     Post-op Evaluation                To Do List           Future Appointments        Provider Department Dept Phone    2017 3:20 PM Dante Massey MD [facility] - Pulmonary Services [phone]    2017 11:00 AM Negrito Hernandez MD [facility] General Surgery [phone]    3/29/2017 3:40 PM Jaren Shelton MD [facility] - Cardiology [phone]      Goals (5 Years of Data)     None      [facility]     [facility] On Call Nurse [facility] -  Assistance  Registered nurses in the [facility] On Call Center provide clinical advisement, health education, appointment booking, and other advisory services.  Call for this free service at [phone].             Medications           Message regarding Medications     Verify the changes and/or additions to your medication regime listed below are the same as discussed with your clinician today.  If any of these changes or additions are incorrect, please notify your healthcare provider.        STOP taking these medications     hydrocodone-acetaminophen 7.5-325mg (NORCO) 7.5-325 mg per tablet Take 1 tablet by mouth every 4 (four) hours as needed.           Verify that the below list of medications is an accurate representation of the medications you are currently taking.  If none reported, the list may be blank. If incorrect, please contact your healthcare provider. Carry this list with you in case of emergency.           Current Medications     albuterol 90 mcg/actuation inhaler Inhale 2 puffs into the lungs every 4 (four) hours as needed for Wheezing.    albuterol-ipratropium 2.5mg-0.5mg/3mL (DUO-NEB) 0.5 mg-3 mg(2.5 mg base)/3 mL nebulizer  solution Take 3 mLs by nebulization every 6 (six) hours.    atorvastatin (LIPITOR) 20 MG tablet Take 20 mg by mouth every evening.     budesonide (PULMICORT) 0.5 mg/2 mL nebulizer solution Take 2 mLs (0.5 mg total) by nebulization 2 (two) times daily. Wash out mouth after using.    glucosamine-chondroitin 500-400 mg tablet Take 1 tablet by mouth 2 (two) times daily.     ibuprofen (ADVIL,MOTRIN) 800 MG tablet Take 1 tablet (800 mg total) by mouth every 6 (six) hours as needed for Pain.    inhalation device (AEROCHAMBER PLUS FLOW-VU) Use as directed for inhalation.    metoprolol succinate (TOPROL-XL) 25 MG 24 hr tablet Take 50 mg by mouth once daily.     multivitamin with minerals tablet Take 1 tablet by mouth once daily.    potassium gluconate 595 mg (99 mg) Tab Take by mouth.    tamsulosin (FLOMAX) 0.4 mg Cp24 Take 0.4 mg by mouth every evening.            Clinical Reference Information           Vital Signs - Last Recorded  Most recent update: 1/27/2017 11:23 AM by Maria E Shelton MA    BP Pulse Temp Wt BMI    103/70 78 98 °F (36.7 °C) (Oral) 77.6 kg (171 lb 1.2 oz) 28.04 kg/m2      Blood Pressure          Most Recent Value    BP  103/70      Allergies as of 1/27/2017     Bactrim [Sulfamethoxazole-trimethoprim]    Dilaudid [Hydromorphone]      Immunizations Administered on Date of Encounter - 1/27/2017     None

## 2017-01-27 NOTE — PROGRESS NOTES
Subjective:       Patient ID: Doron Domínguez is a 76 y.o. male.    Chief Complaint: Post-op Evaluation    HPI Comments: Patient is now 17 days post left lower lobectomy of lung for t3,no,mo bronchioalveolar cancer. Currently, he feels much better, eating better and now with good bms and voiding well. The cough is gone. Only pain med now is some motrin and tylenol prn. He gets a little sob with fast walking. Denies wound complaints    Review of Systems    Objective:      Physical Exam   Constitutional: He is oriented to person, place, and time. He appears well-developed and well-nourished.   Eyes: EOM are normal.   Cardiovascular: Normal rate, regular rhythm and normal heart sounds.    Pulmonary/Chest: Effort normal and breath sounds normal. No respiratory distress.   Wound is clean and dry, healing well. Minimal decrease in rom.    Abdominal: Soft. Bowel sounds are normal.   Neurological: He is alert and oriented to person, place, and time.   Psychiatric: He has a normal mood and affect.   Vitals reviewed.      Assessment:     doing well , post surgery slowly improving , getting pt instructions     Plan:       Observation no ctx for now. Told him to see his cardiologist about his metoprolol dose as it was increased while in hospital . Will see pulmonary next week and determine albuterol doses. See me in one month,work on rom of arm.

## 2017-01-27 NOTE — PROGRESS NOTES
Subjective:       Patient ID: Doron Domínguez is a 76 y.o. male.    Chief Complaint: Results and Lung Cancer    HPI 76-year-old male T3 N0 M0 bronchoalveolar carcinoma epidermal growth factor receptor negative, Ross 1 negative,ALK1 negative, PD1 less than 1% returns for evaluation of possible adjuvant chemotherapy ECOG status 2    Past Medical History   Diagnosis Date    Aortic stenosis 12/29/2016    Arthritis     Asthma     BPH (benign prostatic hyperplasia)     CAD (coronary artery disease)     CAD, multiple vessel 12/29/2016    Cancer      lung    High cholesterol     Hx of CABG 12/29/2016    Hypertension     Lung cancer 01/2017     T3 N0 M0 bronchoalveolar     History reviewed. No pertinent family history.  Social History     Social History    Marital status:      Spouse name: N/A    Number of children: N/A    Years of education: N/A     Occupational History    Not on file.     Social History Main Topics    Smoking status: Never Smoker    Smokeless tobacco: Never Used    Alcohol use No    Drug use: No    Sexual activity: Not on file     Other Topics Concern    Not on file     Social History Narrative     Past Surgical History   Procedure Laterality Date    Coronary artery bypass graft  2012     [facility]G x 3 at [facility]    Cardiac surgery      Appendectomy         Labs:  Lab Results   Component Value Date    WBC 8.82 01/16/2017    HGB 13.6 (L) 01/16/2017    HCT 39.7 (L) 01/16/2017    MCV 92 01/16/2017     01/16/2017     BMP  Lab Results   Component Value Date     (L) 01/16/2017    K 3.5 01/16/2017     01/16/2017    CO2 26 01/16/2017    BUN 11 01/16/2017    CREATININE 0.9 01/16/2017    CALCIUM 8.7 01/16/2017    ANIONGAP 9 01/16/2017    ESTGFRAFRICA >60 01/16/2017    EGFRNONAA >60 01/16/2017     Lab Results   Component Value Date    ALT 27 01/12/2017    AST 48 (H) 01/12/2017    ALKPHOS 64 01/12/2017    BILITOT 1.0 01/12/2017       No results found for: IRON, TIBC, FERRITIN,  SATURATEDIRO  No results found for: IMTLEMGE17  No results found for: FOLATE  No results found for: TSH      Review of Systems   Constitutional: Positive for activity change and fatigue. Negative for appetite change, chills, diaphoresis, fever and unexpected weight change.   HENT: Negative for congestion, dental problem, drooling, ear discharge, ear pain, facial swelling, hearing loss, mouth sores, nosebleeds, postnasal drip, rhinorrhea, sinus pressure, sneezing, sore throat, tinnitus, trouble swallowing and voice change.    Eyes: Negative for photophobia, pain, discharge, redness, itching and visual disturbance.   Respiratory: Negative for apnea, cough, choking, chest tightness, shortness of breath, wheezing and stridor.    Cardiovascular: Positive for chest pain. Negative for palpitations and leg swelling.   Gastrointestinal: Negative for abdominal distention, abdominal pain, anal bleeding, blood in stool, constipation, diarrhea, nausea, rectal pain and vomiting.   Endocrine: Negative for cold intolerance, heat intolerance, polydipsia, polyphagia and polyuria.   Genitourinary: Negative for decreased urine volume, difficulty urinating, discharge, dysuria, enuresis, flank pain, frequency, genital sores, hematuria, penile pain, penile swelling, scrotal swelling, testicular pain and urgency.   Musculoskeletal: Negative for arthralgias, back pain, gait problem, joint swelling, myalgias, neck pain and neck stiffness.   Skin: Negative for color change, pallor, rash and wound.   Allergic/Immunologic: Negative for environmental allergies, food allergies and immunocompromised state.   Neurological: Positive for weakness. Negative for dizziness, tremors, seizures, syncope, facial asymmetry, speech difficulty, light-headedness, numbness and headaches.   Hematological: Negative for adenopathy. Does not bruise/bleed easily.   Psychiatric/Behavioral: Negative for agitation, behavioral problems, confusion, decreased  concentration, dysphoric mood, hallucinations, self-injury, sleep disturbance and suicidal ideas. The patient is not nervous/anxious and is not hyperactive.        Objective:      Physical Exam   Constitutional: He is oriented to person, place, and time. He appears well-developed and well-nourished. No distress.   HENT:   Head: Normocephalic.   Right Ear: External ear normal.   Left Ear: External ear normal.   Nose: Nose normal. Right sinus exhibits no maxillary sinus tenderness and no frontal sinus tenderness. Left sinus exhibits no maxillary sinus tenderness and no frontal sinus tenderness.   Mouth/Throat: Oropharynx is clear and moist. No oropharyngeal exudate.   Eyes: EOM and lids are normal. Pupils are equal, round, and reactive to light. Right eye exhibits no discharge. Left eye exhibits no discharge. Right conjunctiva is not injected. Right conjunctiva has no hemorrhage. Left conjunctiva is not injected. Left conjunctiva has no hemorrhage. No scleral icterus. Right eye exhibits normal extraocular motion. Left eye exhibits normal extraocular motion.   Neck: Normal range of motion. Neck supple. No JVD present. No tracheal deviation present. No thyromegaly present.   Cardiovascular: Normal rate and regular rhythm.    Pulmonary/Chest: Effort normal. No stridor. No respiratory distress.   Abdominal: Soft. He exhibits no mass. There is no hepatosplenomegaly, splenomegaly or hepatomegaly. There is no tenderness.   Musculoskeletal: Normal range of motion. He exhibits no edema or tenderness.   Lymphadenopathy:        Head (right side): No posterior auricular and no occipital adenopathy present.        Head (left side): No posterior auricular and no occipital adenopathy present.     He has no cervical adenopathy.        Right cervical: No superficial cervical, no deep cervical and no posterior cervical adenopathy present.       Left cervical: No superficial cervical, no deep cervical and no posterior cervical  adenopathy present.     He has no axillary adenopathy.        Right: No supraclavicular adenopathy present.        Left: No supraclavicular adenopathy present.   Neurological: He is alert and oriented to person, place, and time. He has normal strength. No cranial nerve deficit. Coordination normal.   Skin: Skin is dry. No rash noted. He is not diaphoretic. No cyanosis or erythema. Nails show no clubbing.   Psychiatric: He has a normal mood and affect. His behavior is normal. Judgment and thought content normal. Cognition and memory are normal.   Vitals reviewed.          Assessment:       1. Malignant neoplasm of lower lobe of left lung    2. Bronchiolo-alveolar adenocarcinoma of left lung            Plan:     extensive conversation with patient wife and daughter present all pathology reports and molecular markers given to them article from up-to-date on adjuvant treatment for stage IIB non-small cell lung carcinoma discussed absolute improvement and benefit of approximate 5% over baseline no treatment on at this point they have decided not to proceed with adjuvant chemotherapy.  Should they decide and change their mind I would be happy to reassess them.  Will see back in 6 months with CT chest is new baseline time spent 40 minutes face-to-face coordination of care discussed with family articles emailed to them and copies made

## 2017-01-31 ENCOUNTER — OFFICE VISIT (OUTPATIENT)
Dept: PULMONOLOGY | Facility: CLINIC | Age: 77
End: 2017-01-31
Payer: MEDICARE

## 2017-01-31 VITALS
SYSTOLIC BLOOD PRESSURE: 98 MMHG | WEIGHT: 166.88 LBS | RESPIRATION RATE: 20 BRPM | HEART RATE: 78 BPM | DIASTOLIC BLOOD PRESSURE: 52 MMHG | HEIGHT: 66 IN | BODY MASS INDEX: 26.82 KG/M2 | OXYGEN SATURATION: 95 %

## 2017-01-31 DIAGNOSIS — I10 ESSENTIAL HYPERTENSION: ICD-10-CM

## 2017-01-31 DIAGNOSIS — N40.1 BENIGN PROSTATIC HYPERPLASIA WITH LOWER URINARY TRACT SYMPTOMS, UNSPECIFIED MORPHOLOGY: ICD-10-CM

## 2017-01-31 DIAGNOSIS — I25.10 CORONARY ARTERY DISEASE, ANGINA PRESENCE UNSPECIFIED, UNSPECIFIED VESSEL OR LESION TYPE, UNSPECIFIED WHETHER NATIVE OR TRANSPLANTED HEART: ICD-10-CM

## 2017-01-31 DIAGNOSIS — E78.5 HYPERLIPIDEMIA, UNSPECIFIED HYPERLIPIDEMIA TYPE: ICD-10-CM

## 2017-01-31 DIAGNOSIS — C34.92 BRONCHIOLO-ALVEOLAR ADENOCARCINOMA OF LEFT LUNG: Primary | ICD-10-CM

## 2017-01-31 PROCEDURE — 99214 OFFICE O/P EST MOD 30 MIN: CPT | Mod: S$PBB,,, | Performed by: INTERNAL MEDICINE

## 2017-01-31 PROCEDURE — 99214 OFFICE O/P EST MOD 30 MIN: CPT | Mod: PBBFAC | Performed by: INTERNAL MEDICINE

## 2017-01-31 PROCEDURE — 99999 PR PBB SHADOW E&M-EST. PATIENT-LVL IV: CPT | Mod: PBBFAC,,, | Performed by: INTERNAL MEDICINE

## 2017-01-31 RX ORDER — ATORVASTATIN CALCIUM 20 MG/1
20 TABLET, FILM COATED ORAL NIGHTLY
Qty: 30 TABLET | Refills: 11 | Status: SHIPPED | OUTPATIENT
Start: 2017-01-31 | End: 2017-04-06 | Stop reason: SDUPTHER

## 2017-01-31 RX ORDER — METOPROLOL SUCCINATE 50 MG/1
50 TABLET, EXTENDED RELEASE ORAL DAILY
Qty: 30 TABLET | Refills: 11 | Status: SHIPPED | OUTPATIENT
Start: 2017-01-31

## 2017-01-31 NOTE — MR AVS SNAPSHOT
[facility]  [address] [facility] [address] [facility]  [place] [address]  Phone: [phone]  Fax: [phone]                  Doron Domínguez   2017 3:20 PM   Office Visit    Description:  Male : 1940   Provider:  Dante Massey MD   Department:  [facility] - Pulmonary Services           Reason for Visit     bronchiolo-alveolar adenocarcinoma left lung     lobectomy, LLL           Diagnoses this Visit        Comments    Bronchiolo-alveolar adenocarcinoma of left lung    -  Primary     Coronary artery disease, angina presence unspecified, unspecified vessel or lesion type, unspecified whether native or transplanted heart         Benign prostatic hyperplasia with lower urinary tract symptoms, unspecified morphology         Hyperlipidemia, unspecified hyperlipidemia type         Essential hypertension                To Do List           Future Appointments        Provider Department Dept Phone    2017 11:00 AM Negrito Hernandez MD [facility] General Surgery [phone]    3/29/2017 3:40 PM Jaren Shelton MD [facility] - Cardiology [phone]    2017 1:45 PM [facility]ATORY, [place] [facility] [phone]    2017 2:30 PM [facility] CT1 LIMIT 500 LBS [facility] -  [phone]    2017 4:00 PM Shant Leon MD [facility] Hemotology Oncology [phone]      Goals (5 Years of Data)     None      Follow-Up and Disposition     Return in about 6 months (around 2017) for cxr and pft.       These Medications        Disp Refills Start End    metoprolol succinate (TOPROL-XL) 50 MG 24 hr tablet 30 tablet 11 2017     Take 1 tablet (50 mg total) by mouth once daily. - Oral    Ph[facility] [place] [id number] [facility] [place] [facility] [address] [address] [facility] [address] Ph #: [phone]       atorvastatin (LIPITOR) 20 MG tablet 30 tablet 11 2017     Take 1 tablet (20 mg total) by mouth every evening. - Oral    Pharmacy: [place] [id number] -  [place] [address] [facility] #: [phone]         [facility]     [facility] On Call Nurse [facility] Line - 24/7 Assistance  Registered nurses in the [facility] Center provide clinical advisement, health education, appointment booking, and other advisory services.  Call for this free service at [phone].             Medications           Message regarding Medications     Verify the changes and/or additions to your medication regime listed below are the same as discussed with your clinician today.  If any of these changes or additions are incorrect, please notify your healthcare provider.        CHANGE how you are taking these medications     Start Taking Instead of    metoprolol succinate (TOPROL-XL) 50 MG 24 hr tablet metoprolol succinate (TOPROL-XL) 25 MG 24 hr tablet    Dosage:  Take 1 tablet (50 mg total) by mouth once daily. Dosage:  Take 50 mg by mouth once daily.     Reason for Change:  Reorder     atorvastatin (LIPITOR) 20 MG tablet atorvastatin (LIPITOR) 20 MG tablet    Dosage:  Take 1 tablet (20 mg total) by mouth every evening. Dosage:  Take 20 mg by mouth every evening.     Reason for Change:  Reorder       STOP taking these medications     budesonide (PULMICORT) 0.5 mg/2 mL nebulizer solution Take 2 mLs (0.5 mg total) by nebulization 2 (two) times daily. Wash out mouth after using.           Verify that the below list of medications is an accurate representation of the medications you are currently taking.  If none reported, the list may be blank. If incorrect, please contact your healthcare provider. Carry this list with you in case of emergency.           Current Medications     albuterol 90 mcg/actuation inhaler Inhale 2 puffs into the lungs every 4 (four) hours as needed for Wheezing.    albuterol-ipratropium 2.5mg-0.5mg/3mL (DUO-NEB) 0.5 mg-3 mg(2.5 mg base)/3 mL nebulizer solution Take 3 mLs by nebulization every 6 (six) hours.    atorvastatin (LIPITOR) 20 MG tablet Take 1  "tablet (20 mg total) by mouth every evening.    glucosamine-chondroitin 500-400 mg tablet Take 1 tablet by mouth 2 (two) times daily.     ibuprofen (ADVIL,MOTRIN) 800 MG tablet Take 1 tablet (800 mg total) by mouth every 6 (six) hours as needed for Pain.    inhalation device (AEROCHAMBER PLUS FLOW-VU) Use as directed for inhalation.    metoprolol succinate (TOPROL-XL) 50 MG 24 hr tablet Take 1 tablet (50 mg total) by mouth once daily.    multivitamin with minerals tablet Take 1 tablet by mouth once daily.    potassium gluconate 595 mg (99 mg) Tab Take by mouth.    tamsulosin (FLOMAX) 0.4 mg Cp24 Take 0.4 mg by mouth every evening.            Clinical Reference Information           Vital Signs - Last Recorded  Most recent update: 1/31/2017  3:52 PM by Lori Mena LPN    BP Pulse Resp Ht Wt SpO2    (!) 98/52 78 20 5' 5.5" (1.664 m) 75.7 kg (166 lb 14.2 oz) 95%    BMI                27.35 kg/m2          Blood Pressure          Most Recent Value    BP  (!)  98/52      Allergies as of 1/31/2017     Bactrim [Sulfamethoxazole-trimethoprim]    Dilaudid [Hydromorphone]      Immunizations Administered on Date of Encounter - 1/31/2017     None      Orders Placed During Today's Visit      Normal Orders This Visit    Ambulatory Referral to Internal Medicine     Future Labs/Procedures Expected by Expires    Complete PFT with bronchodilator  As directed 1/31/2018    X-Ray Chest PA And Lateral  As directed 1/31/2019      "

## 2017-01-31 NOTE — PROGRESS NOTES
Subjective:       Patient ID: Doron Domínguez is a 76 y.o. male.    Chief Complaint:   He   presents for evaluation and treatment of lobectomy left lower lobe for lung cancer after being discharged from the hospital  2  weeks ago. Since discharge symptoms have gradually improving course since that time. Patient denies chest pain or fever or chills. Symptoms are aggravated by activity. Symptoms improve with rest.  Respiratory: positive for dyspnea on exertion; Cardiovascular: no chest pain or palpitations.  Patient currently is not on home oxygen therapy..  Lightheaded in the morning      bronchiolo-alveolar adenocarcinoma left lung (surgery follow up 3 wks) and lobectomy, LLL    HPI  Past Medical History   Diagnosis Date    Aortic stenosis 12/29/2016    Arthritis     Asthma     BPH (benign prostatic hyperplasia)     CAD (coronary artery disease)     CAD, multiple vessel 12/29/2016    Cancer      lung    High cholesterol     Hx of CABG 12/29/2016    Hypertension     Lung cancer 01/2017     T3 N0 M0 bronchoalveolar     Past Surgical History   Procedure Laterality Date    Coronary artery bypass graft  2012     [facility] at [facility]    Cardiac surgery      Appendectomy       Social History     Social History    Marital status:      Spouse name: N/A    Number of children: N/A    Years of education: N/A     Occupational History    Not on file.     Social History Main Topics    Smoking status: Never Smoker    Smokeless tobacco: Never Used    Alcohol use No    Drug use: No    Sexual activity: Not on file     Other Topics Concern    Not on file     Social History Narrative     Review of Systems   Constitutional: Negative for fever and fatigue.   HENT: Negative for postnasal drip, rhinorrhea, sinus pressure and congestion.    Respiratory: Negative for cough, hemoptysis, sputum production, chest tightness, shortness of breath, wheezing, orthopnea, asthma nighttime symptoms and use of rescue inhaler.     Cardiovascular: Negative for chest pain and palpitations.   Musculoskeletal: Negative for arthralgias.   Skin: Negative for rash.   Gastrointestinal: Negative for nausea.   Neurological: Negative for dizziness and weakness.   Hematological: Negative for adenopathy.   Psychiatric/Behavioral: Negative for sleep disturbance. The patient is not nervous/anxious.        Objective:      Physical Exam   Constitutional: He is oriented to person, place, and time. He appears well-developed and well-nourished.   HENT:   Head: Normocephalic and atraumatic.   Eyes: Conjunctivae are normal. Pupils are equal, round, and reactive to light.   Neck: Neck supple. No JVD present. No tracheal deviation present. No thyromegaly present.   Cardiovascular: Normal rate, regular rhythm and normal heart sounds.    Pulmonary/Chest: Effort normal. He has decreased breath sounds in the left lower field.   Abdominal: Soft.   Musculoskeletal: Normal range of motion.   Lymphadenopathy:     He has no cervical adenopathy.   Neurological: He is alert and oriented to person, place, and time.   Skin: Skin is warm and dry.   Nursing note and vitals reviewed.    Personal Diagnostic Review  Chest x-ray: no recent  .GMGPFTNEW  No flowsheet data found.      Assessment:       1. Bronchiolo-alveolar adenocarcinoma of left lung    2. Coronary artery disease, angina presence unspecified, unspecified vessel or lesion type, unspecified whether native or transplanted heart    3. Benign prostatic hyperplasia with lower urinary tract symptoms, unspecified morphology    4. Hyperlipidemia, unspecified hyperlipidemia type    5. Essential hypertension        Outpatient Encounter Prescriptions as of 1/31/2017   Medication Sig Dispense Refill    albuterol 90 mcg/actuation inhaler Inhale 2 puffs into the lungs every 4 (four) hours as needed for Wheezing. 8.5 g 1    albuterol-ipratropium 2.5mg-0.5mg/3mL (DUO-NEB) 0.5 mg-3 mg(2.5 mg base)/3 mL nebulizer solution Take 3 mLs  by nebulization every 6 (six) hours. 120 vial 12    atorvastatin (LIPITOR) 20 MG tablet Take 1 tablet (20 mg total) by mouth every evening. 30 tablet 11    glucosamine-chondroitin 500-400 mg tablet Take 1 tablet by mouth 2 (two) times daily.       ibuprofen (ADVIL,MOTRIN) 800 MG tablet Take 1 tablet (800 mg total) by mouth every 6 (six) hours as needed for Pain. 20 tablet 0    inhalation device (AEROCHAMBER PLUS FLOW-VU) Use as directed for inhalation. 1 Device 0    metoprolol succinate (TOPROL-XL) 50 MG 24 hr tablet Take 1 tablet (50 mg total) by mouth once daily. 30 tablet 11    multivitamin with minerals tablet Take 1 tablet by mouth once daily.      potassium gluconate 595 mg (99 mg) Tab Take by mouth.      tamsulosin (FLOMAX) 0.4 mg Cp24 Take 0.4 mg by mouth every evening.       [DISCONTINUED] atorvastatin (LIPITOR) 20 MG tablet Take 20 mg by mouth every evening.       [DISCONTINUED] metoprolol succinate (TOPROL-XL) 25 MG 24 hr tablet Take 50 mg by mouth once daily.       [DISCONTINUED] budesonide (PULMICORT) 0.5 mg/2 mL nebulizer solution Take 2 mLs (0.5 mg total) by nebulization 2 (two) times daily. Wash out mouth after using. 120 mL 12     No facility-administered encounter medications on file as of 1/31/2017.      Orders Placed This Encounter   Procedures    X-Ray Chest PA And Lateral     Standing Status:   Future     Standing Expiration Date:   1/31/2019     Order Specific Question:   Reason for Exam:     Answer:   SOB    Ambulatory Referral to Internal Medicine     Referral Priority:   Routine     Referral Type:   Consultation     Referral Reason:   Specialty Services Required     Requested Specialty:   Internal Medicine     Number of Visits Requested:   1    Complete PFT with bronchodilator     Standing Status:   Future     Standing Expiration Date:   1/31/2018     Plan:       Requested Prescriptions     Signed Prescriptions Disp Refills    metoprolol succinate (TOPROL-XL) 50 MG 24 hr  tablet 30 tablet 11     Sig: Take 1 tablet (50 mg total) by mouth once daily.    atorvastatin (LIPITOR) 20 MG tablet 30 tablet 11     Sig: Take 1 tablet (20 mg total) by mouth every evening.     Bronchiolo-alveolar adenocarcinoma of left lung  -     X-Ray Chest PA And Lateral; Future  -     Complete PFT with bronchodilator; Future    Coronary artery disease, angina presence unspecified, unspecified vessel or lesion type, unspecified whether native or transplanted heart  -     Ambulatory Referral to Internal Medicine    Benign prostatic hyperplasia with lower urinary tract symptoms, unspecified morphology  -     Ambulatory Referral to Internal Medicine    Hyperlipidemia, unspecified hyperlipidemia type  -     atorvastatin (LIPITOR) 20 MG tablet; Take 1 tablet (20 mg total) by mouth every evening.  Dispense: 30 tablet; Refill: 11    Essential hypertension  -     metoprolol succinate (TOPROL-XL) 50 MG 24 hr tablet; Take 1 tablet (50 mg total) by mouth once daily.  Dispense: 30 tablet; Refill: 11           Return in about 6 months (around 7/31/2017) for cxr and pft.   MEDICAL DECISION MAKING: Moderate  complexity.  Overall, the multiple problems listed are of moderate severity that may impact quality of life and activities of daily living. Side effects of medications, treatment plan as well as options and alternatives reviewed and discussed with patient. There was counseling of patient concerning these issues.    Total time spent in face to face counseling and coordination of care - 25 minutes over 50% of time was used in discussion of prognosis, risks, benefits of treatment, instructions and compliance with regimen . Discussion with other physicians and/or health care providers ( home health or for use of durable medical equipment (oxygen, nebulizers, CPAP, BiPAP) occurred.

## 2017-01-31 NOTE — LETTER
January 31, 2017    [facility] Pulmonary Services  [address] [place] [address]  Phone: [phone]  Fax: [phone] January 31, 2017     Patient: Doron Domínguez    YOB: 1940   Date of Visit: 1/31/2017       To Whom It May Concern:    It is my medical opinion that Doron Domínguez may return to limited participation on 3/1/2017.    If you have any questions or concerns, please don't hesitate to call.    Sincerely,        Dante Massey MD

## 2017-02-02 ENCOUNTER — OFFICE VISIT (OUTPATIENT)
Dept: INTERNAL MEDICINE | Facility: CLINIC | Age: 77
End: 2017-02-02
Payer: MEDICARE

## 2017-02-02 VITALS
DIASTOLIC BLOOD PRESSURE: 70 MMHG | HEIGHT: 65 IN | WEIGHT: 170.88 LBS | HEART RATE: 87 BPM | SYSTOLIC BLOOD PRESSURE: 134 MMHG | TEMPERATURE: 98 F | OXYGEN SATURATION: 98 % | BODY MASS INDEX: 28.47 KG/M2 | RESPIRATION RATE: 18 BRPM

## 2017-02-02 DIAGNOSIS — I35.0 NONRHEUMATIC AORTIC VALVE STENOSIS: ICD-10-CM

## 2017-02-02 DIAGNOSIS — M54.50 CHRONIC MIDLINE LOW BACK PAIN WITHOUT SCIATICA: ICD-10-CM

## 2017-02-02 DIAGNOSIS — Z95.1 HX OF CABG: ICD-10-CM

## 2017-02-02 DIAGNOSIS — I25.10 CAD, MULTIPLE VESSEL: ICD-10-CM

## 2017-02-02 DIAGNOSIS — G89.29 CHRONIC MIDLINE LOW BACK PAIN WITHOUT SCIATICA: ICD-10-CM

## 2017-02-02 DIAGNOSIS — N40.1 BENIGN NON-NODULAR PROSTATIC HYPERPLASIA WITH LOWER URINARY TRACT SYMPTOMS: ICD-10-CM

## 2017-02-02 DIAGNOSIS — Z00.00 WELL ADULT EXAM: Primary | ICD-10-CM

## 2017-02-02 PROCEDURE — 99999 PR PBB SHADOW E&M-EST. PATIENT-LVL III: CPT | Mod: PBBFAC,,, | Performed by: PEDIATRICS

## 2017-02-02 PROCEDURE — 99205 OFFICE O/P NEW HI 60 MIN: CPT | Mod: S$PBB,,, | Performed by: PEDIATRICS

## 2017-02-02 PROCEDURE — 99213 OFFICE O/P EST LOW 20 MIN: CPT | Mod: PBBFAC,PO | Performed by: PEDIATRICS

## 2017-02-02 NOTE — LETTER
February 2, 2017      Dante Massey MD  [address] [facility] [facility] [address] [place] [place] [address]           [facility] Internal Medicine  [address] [place] [address]  Phone: [phone]  Fax: [phone]          Patient: Doron Domínguez   MR Number: [id number]   YOB: 1940   Date of Visit: 2/2/2017       Dear Dr. Dante Massey:    Thank you for referring Doron Domínguez to me for evaluation. Attached you will find relevant portions of my assessment and plan of care.    If you have questions, please do not hesitate to call me. I look forward to following Doron Domínguez along with you.    Sincerely,    SVITLANA Art Jr., MD    Enclosure  CC:  No Recipients    If you would like to receive this communication electronically, please contact [email or web address] or [phone] to request more information on [employer] Link access.    For providers and/or their staff who would like to refer a patient to [facility], please contact us through our one-stop-shop provider referral line, [facility], at [phone].    If you feel you have received this communication in error or would no longer like to receive these types of communications, please e-mail [email or web address]

## 2017-02-02 NOTE — MR AVS SNAPSHOT
[facility] Internal Medicine  [address] [facility] [place] [address]  Phone: [phone]  Fax: [phone]                  Doron Domínguez   2017 9:40 AM   Office Visit    Description:  Male : 1940   Provider:  SVITLANA Art Jr., MD   Department:  [facility] - Internal Medicine           Reason for Visit     Establish Care     Hospital Follow Up           Diagnoses this Visit        Comments    Well adult exam    -  Primary     Nonrheumatic aortic valve stenosis         CAD, multiple vessel         Hx of CABG         Benign non-nodular prostatic hyperplasia with lower urinary tract symptoms         Chronic midline low back pain without sciatica                To Do List           Future Appointments        Provider Department Dept Phone    2017 11:00 AM MD [facility] - General Surgery [phone]    3/29/2017 3:40 PM [place] - Cardiology [phone]    2017 7:45 AM [facility], [facility] [phone]    2017 10:00 AM SVITLANA Art Jr., MD [facility] Internal Medicine [phone]    2017 1:15 PM [facility]ATORY, [facility] [phone]      Goals (5 Years of Data)     None      Follow-Up and Disposition     Return in about 3 months (around 2017).      [facility]     [facility] Care Line -  Assistance  Registered nurses in the [facility] provide clinical advisement, health education, appointment booking, and other advisory services.  Call for this free service at [phone].             Medications           Message regarding Medications     Verify the changes and/or additions to your medication regime listed below are the same as discussed with your clinician today.  If any of these changes or additions are incorrect, please notify your healthcare provider.        STOP taking these medications     ibuprofen (ADVIL,MOTRIN) 800 MG tablet Take 1 tablet (800 mg total)  "by mouth every 6 (six) hours as needed for Pain.           Verify that the below list of medications is an accurate representation of the medications you are currently taking.  If none reported, the list may be blank. If incorrect, please contact your healthcare provider. Carry this list with you in case of emergency.           Current Medications     albuterol 90 mcg/actuation inhaler Inhale 2 puffs into the lungs every 4 (four) hours as needed for Wheezing.    albuterol-ipratropium 2.5mg-0.5mg/3mL (DUO-NEB) 0.5 mg-3 mg(2.5 mg base)/3 mL nebulizer solution Take 3 mLs by nebulization every 6 (six) hours.    atorvastatin (LIPITOR) 20 MG tablet Take 1 tablet (20 mg total) by mouth every evening.    glucosamine-chondroitin 500-400 mg tablet Take 1 tablet by mouth 2 (two) times daily.     metoprolol succinate (TOPROL-XL) 50 MG 24 hr tablet Take 1 tablet (50 mg total) by mouth once daily.    multivitamin with minerals tablet Take 1 tablet by mouth once daily.    potassium gluconate 595 mg (99 mg) Tab Take by mouth.    tamsulosin (FLOMAX) 0.4 mg Cp24 Take 0.4 mg by mouth every evening.     inhalation device (AEROCHAMBER PLUS FLOW-VU) Use as directed for inhalation.           Clinical Reference Information           Vital Signs - Last Recorded  Most recent update: 2/2/2017  9:45 AM by Teddy Sal LPN    BP Pulse Temp Resp    134/70 (BP Location: Left arm, Patient Position: Sitting, BP Method: Manual) 87 97.6 °F (36.4 °C) (Tympanic) 18    Ht Wt SpO2 BMI    5' 5" (1.651 m) 77.5 kg (170 lb 13.7 oz) 98% 28.43 kg/m2      Blood Pressure          Most Recent Value    BP  134/70      Allergies as of 2/2/2017     Bactrim [Sulfamethoxazole-trimethoprim]    Dilaudid [Hydromorphone]      Immunizations Administered on Date of Encounter - 2/2/2017     None      Orders Placed During Today's Visit     Future Labs/Procedures Expected by Expires    ALT (SGPT)  2/2/2017 4/3/2018    AST (SGOT)  2/2/2017 4/3/2018    Lipid panel  2/2/2017 " 2/2/2018    PROSTATE SPECIFIC ANTIGEN, DIAGNOSTIC  2/2/2017 4/3/2018

## 2017-02-02 NOTE — PROGRESS NOTES
Subjective:       Patient ID: Doron Domínguez is a 76 y.o. male.    Chief Complaint: Establish Care and Hospital Follow Up    HPI Comments: New patient here to establish care with me due to Dr Ramirez's practice becoming [facility].    PMH/PSH/SH/FH reviewed with patient wife and updated on EPIC.  Subspecialty notes reviewed.        Review of Systems   Constitutional: Negative for fever and unexpected weight change.   HENT: Negative for congestion and rhinorrhea.    Eyes: Negative for discharge and redness.   Respiratory: Positive for cough and shortness of breath. Negative for wheezing.         All post surgical and improving   Cardiovascular: Positive for chest pain. Negative for palpitations and leg swelling.        All post surgical and improving. Has hx of CAD, followed by outside cards, no angina   Gastrointestinal: Negative for constipation, diarrhea and vomiting.   Endocrine: Negative for cold intolerance, heat intolerance, polydipsia, polyphagia and polyuria.   Genitourinary: Negative for decreased urine volume, difficulty urinating, dysuria, frequency and hematuria.        Flomax works for BPH symptoms.   Musculoskeletal: Positive for back pain (chronic gives hx of L3-5 disc disease.) and gait problem. Negative for arthralgias, joint swelling and neck pain.   Skin: Negative for rash and wound.   Neurological: Negative for syncope and headaches.   Psychiatric/Behavioral: Negative for behavioral problems and sleep disturbance.       Objective:      Physical Exam   Constitutional: He is oriented to person, place, and time. He appears well-developed and well-nourished. No distress.   HENT:   Head: Normocephalic and atraumatic.   Right Ear: Tympanic membrane and external ear normal.   Left Ear: Tympanic membrane and external ear normal.   Nose: Nose normal.   Mouth/Throat: Uvula is midline, oropharynx is clear and moist and mucous membranes are normal. Normal dentition.   Eyes: Conjunctivae, EOM and lids are normal. Pupils  are equal, round, and reactive to light.   Neck: Trachea normal and normal range of motion. Neck supple. No thyromegaly present.   Cardiovascular: Normal rate, regular rhythm, S1 normal, S2 normal, normal heart sounds and normal pulses.  Exam reveals no gallop and no friction rub.    No murmur heard.  Pulmonary/Chest: Effort normal and breath sounds normal. He has no wheezes. He has no rales. He exhibits tenderness (left axillary incision well healed, mildly tender over site.).   Abdominal: Soft. Normal appearance and bowel sounds are normal. He exhibits no mass. There is no hepatosplenomegaly. There is no tenderness. There is no rebound and no guarding.   Musculoskeletal:   Moves a little stiffly due to thoracotomy and DJD back, mildly stooped.   Lymphadenopathy:     He has no cervical adenopathy.   Neurological: He is alert and oriented to person, place, and time. He has normal strength. He displays normal reflexes. No cranial nerve deficit. He exhibits normal muscle tone (mild head vicky). Coordination and gait normal.   Skin: Skin is warm and intact. No rash noted.   Psychiatric: He has a normal mood and affect. His speech is normal and behavior is normal. Judgment and thought content normal.       Assessment:       1. Well adult exam    2. Nonrheumatic aortic valve stenosis    3. CAD, multiple vessel    4. Hx of CABG    5. Benign non-nodular prostatic hyperplasia with lower urinary tract symptoms    6. Chronic midline low back pain without sciatica        Plan:       Well adult exam    Nonrheumatic aortic valve stenosis    CAD, multiple vessel  -     Lipid panel; Future; Expected date: 2/2/17  -     ALT (SGPT); Future; Expected date: 2/2/17  -     AST (SGOT); Future; Expected date: 2/2/17    Hx of CABG    Benign non-nodular prostatic hyperplasia with lower urinary tract symptoms  -     PROSTATE SPECIFIC ANTIGEN, DIAGNOSTIC; Future; Expected date: 2/2/17    Chronic midline low back pain without sciatica    Meds  reviewed, ROCCO from Dr Ramirez. Keep subspecialty care. F/U in 3 months with labs. Once cleared by surgery and pulmonary, he is anxiuos to get back to work and we will pursue chronic back complaints.

## 2017-02-15 ENCOUNTER — TELEPHONE (OUTPATIENT)
Dept: PULMONOLOGY | Facility: CLINIC | Age: 77
End: 2017-02-15

## 2017-02-15 NOTE — TELEPHONE ENCOUNTER
----- Message from Alea Barcenas sent at 2/15/2017 10:56 AM CST -----  Contact: Anna Domínguez (Spouse)  Anna called and stated she needed to speak to the nurse. She stated she needs to know if the pt needs to schedule an appt today. She stated he is coughing up during the night. She can be reached at  [phone]- or [phone].    Thanks,  TF

## 2017-02-15 NOTE — TELEPHONE ENCOUNTER
Spoke with patient's wife regarding patient's symptoms  Coughing clear sputum especially during the night  Denies fever or chills  Having some soreness to ribcage  Had lung surgery in January  Concerned especially since just had surgery  Has cough syrup promethazine with codeine.  Instructed per Dr. Massey OK to take.  Note to Dr. Massey to call patient's wife Anna.

## 2017-02-24 ENCOUNTER — OFFICE VISIT (OUTPATIENT)
Dept: SURGERY | Facility: CLINIC | Age: 77
End: 2017-02-24
Payer: MEDICARE

## 2017-02-24 VITALS
TEMPERATURE: 98 F | HEART RATE: 73 BPM | SYSTOLIC BLOOD PRESSURE: 116 MMHG | BODY MASS INDEX: 28.4 KG/M2 | WEIGHT: 170.63 LBS | DIASTOLIC BLOOD PRESSURE: 70 MMHG

## 2017-02-24 DIAGNOSIS — Z98.890 POST-OPERATIVE STATE: Primary | ICD-10-CM

## 2017-02-24 PROCEDURE — 99213 OFFICE O/P EST LOW 20 MIN: CPT | Mod: PBBFAC | Performed by: SURGERY

## 2017-02-24 PROCEDURE — 99024 POSTOP FOLLOW-UP VISIT: CPT | Mod: ,,, | Performed by: SURGERY

## 2017-02-24 PROCEDURE — 99999 PR PBB SHADOW E&M-EST. PATIENT-LVL III: CPT | Mod: PBBFAC,,, | Performed by: SURGERY

## 2017-02-24 NOTE — MR AVS SNAPSHOT
[facility]  [address] [facility] [address]  Phone: [phone]  Fax: [phone]                  Doron Domínguez   2017 11:00 AM   Office Visit    Description:  Male : 1940   Provider:  Negrito Hernandez MD   Department:  [facility]           Reason for Visit     Post-op Evaluation                To Do List           Future Appointments        Provider Department Dept Phone    2017 7:45 AM LABORATORY, [facility] [phone]    2017 10:00 AM SVITLANA Art Jr., MD [facility] [phone]    2017 1:15 PM LABORATORY, [facility] [phone]    2017 2:10 PM [facility] LIMIT 500 LBS [facility] [phone]    2017 2:30 PM PULMONARY LAB, [facility] [phone]      Goals (5 Years of Data)     None      [facility]     [facility] -  Assistance  Registered nurses in the [facility] provide clinical advisement, health education, appointment booking, and other advisory services.  Call for this free service at [phone].             Medications           Message regarding Medications     Verify the changes and/or additions to your medication regime listed below are the same as discussed with your clinician today.  If any of these changes or additions are incorrect, please notify your healthcare provider.             Verify that the below list of medications is an accurate representation of the medications you are currently taking.  If none reported, the list may be blank. If incorrect, please contact your healthcare provider. Carry this list with you in case of emergency.           Current Medications     albuterol 90 mcg/actuation inhaler Inhale 2 puffs into the lungs every 4 (four) hours as needed for Wheezing.    albuterol-ipratropium 2.5mg-0.5mg/3mL (DUO-NEB) 0.5 mg-3 mg(2.5  mg base)/3 mL nebulizer solution Take 3 mLs by nebulization every 6 (six) hours.    atorvastatin (LIPITOR) 20 MG tablet Take 1 tablet (20 mg total) by mouth every evening.    glucosamine-chondroitin 500-400 mg tablet Take 1 tablet by mouth 2 (two) times daily.     inhalation device (AEROCHAMBER PLUS FLOW-VU) Use as directed for inhalation.    metoprolol succinate (TOPROL-XL) 50 MG 24 hr tablet Take 1 tablet (50 mg total) by mouth once daily.    multivitamin with minerals tablet Take 1 tablet by mouth once daily.    potassium gluconate 595 mg (99 mg) Tab Take by mouth.    tamsulosin (FLOMAX) 0.4 mg Cp24 Take 0.4 mg by mouth every evening.            Clinical Reference Information           Your Vitals Were     BP                   116/70           Blood Pressure          Most Recent Value    BP  116/70      Allergies as of 2/24/2017     Bactrim [Sulfamethoxazole-trimethoprim]    Dilaudid [Hydromorphone]      Immunizations Administered on Date of Encounter - 2/24/2017     None      Language Assistance Services     ATTENTION: Language assistance services are available, free of charge. Please call [phone].      ATENCIÓN: Si javier saenz, tiene a moreno disposición servicios gratuitos de asistencia lingüística. Llame al [phone].     JUAN Ý: N?u b?n nói Ti?ng Vi?t, có các d?ch v? h? tr? ngôn ng? mi?n phí dành cho b?n. G?i s? [phone].         [facility] complies with applicable Federal civil rights laws and does not discriminate on the basis of race, color, national origin, age, disability, or sex.

## 2017-02-24 NOTE — PROGRESS NOTES
Subjective:       Patient ID: Doron Domínguez is a 76 y.o. male.    Chief Complaint: Post-op Evaluation    HPI Comments: Returns now 6 weeks post left lower lobectomy of lung for a bronchioalveolar ca t3no. Currently, he feels well, eating well, weight stable, no resp. Complaints except, post nasal drip at night. Denies any wound pain . He is back at preop activities.     Review of Systems    Objective:      Physical Exam   Constitutional: He is oriented to person, place, and time. He appears well-developed and well-nourished.   HENT:   Head: Atraumatic.   Neck: Normal range of motion. Neck supple. No thyromegaly present.   Cardiovascular: Normal rate, regular rhythm and normal heart sounds.    Pulmonary/Chest: Effort normal and breath sounds normal.   Minimal rales in bottom of lungs, equal bs bilaterally. The incision is healing in well with no tenderness   Abdominal: Soft.   Musculoskeletal:   Good rom of ue,s   Lymphadenopathy:     He has no cervical adenopathy.   Neurological: He is alert and oriented to person, place, and time.   Psychiatric: He has a normal mood and affect. His behavior is normal.       Assessment:     doing well post left lower lobectomy of lung for bronchioalveolar ca.     Plan:       May resume all activities. See me prn.

## 2017-03-01 ENCOUNTER — TELEPHONE (OUTPATIENT)
Dept: PULMONOLOGY | Facility: CLINIC | Age: 77
End: 2017-03-01

## 2017-03-01 NOTE — TELEPHONE ENCOUNTER
----- Message from Lissy Lang sent at 3/1/2017  2:31 PM CST -----  Call Shannon Guzman with [employer] at [phone]//calling to see what limit antipitation mean before pt come back to work///shreya khan

## 2017-03-01 NOTE — TELEPHONE ENCOUNTER
Spoke with Shannon with [employer] employer, they will need more detail letter regarding limitations.

## 2017-03-29 ENCOUNTER — OFFICE VISIT (OUTPATIENT)
Dept: INTERNAL MEDICINE | Facility: CLINIC | Age: 77
End: 2017-03-29
Payer: MEDICARE

## 2017-03-29 ENCOUNTER — HOSPITAL ENCOUNTER (OUTPATIENT)
Dept: RADIOLOGY | Facility: HOSPITAL | Age: 77
Discharge: HOME | End: 2017-03-29
Attending: PEDIATRICS
Payer: MEDICARE

## 2017-03-29 VITALS
HEIGHT: 65 IN | DIASTOLIC BLOOD PRESSURE: 60 MMHG | TEMPERATURE: 98 F | BODY MASS INDEX: 29.31 KG/M2 | WEIGHT: 175.94 LBS | SYSTOLIC BLOOD PRESSURE: 106 MMHG

## 2017-03-29 DIAGNOSIS — C34.92 BRONCHIOLO-ALVEOLAR ADENOCARCINOMA OF LEFT LUNG: ICD-10-CM

## 2017-03-29 DIAGNOSIS — J22 LOWER RESP. TRACT INFECTION: ICD-10-CM

## 2017-03-29 DIAGNOSIS — J22 LOWER RESP. TRACT INFECTION: Primary | ICD-10-CM

## 2017-03-29 PROBLEM — E66.3 OVERWEIGHT (BMI 25.0-29.9): Status: ACTIVE | Noted: 2017-03-29

## 2017-03-29 PROCEDURE — 99213 OFFICE O/P EST LOW 20 MIN: CPT | Mod: S$PBB,,, | Performed by: PHYSICIAN ASSISTANT

## 2017-03-29 PROCEDURE — 99999 PR PBB SHADOW E&M-EST. PATIENT-LVL III: CPT | Mod: PBBFAC,,, | Performed by: PHYSICIAN ASSISTANT

## 2017-03-29 PROCEDURE — 71020 XR CHEST PA AND LATERAL: CPT | Mod: 26,,, | Performed by: RADIOLOGY

## 2017-03-29 PROCEDURE — 71020 XR CHEST PA AND LATERAL: CPT | Mod: TC,PO

## 2017-03-29 RX ORDER — ASPIRIN 81 MG/1
81 TABLET ORAL NIGHTLY
COMMUNITY
End: 2017-04-06

## 2017-03-29 RX ORDER — GUAIFENESIN 600 MG/1
600 TABLET, EXTENDED RELEASE ORAL 2 TIMES DAILY
COMMUNITY

## 2017-03-29 RX ORDER — CETIRIZINE HYDROCHLORIDE 10 MG/1
10 TABLET ORAL
COMMUNITY

## 2017-03-29 RX ORDER — AZITHROMYCIN 500 MG/1
500 TABLET, FILM COATED ORAL DAILY
Qty: 10 TABLET | Refills: 0 | Status: SHIPPED | OUTPATIENT
Start: 2017-03-29 | End: 2017-04-06 | Stop reason: ALTCHOICE

## 2017-03-29 RX ORDER — BENZONATATE 200 MG/1
200 CAPSULE ORAL 3 TIMES DAILY PRN
COMMUNITY

## 2017-03-29 RX ORDER — ECONAZOLE NITRATE 10 MG/G
1 CREAM TOPICAL
COMMUNITY
Start: 2017-02-10

## 2017-03-29 NOTE — PROGRESS NOTES
Subjective:       Patient ID: Doron Domínguez is a 76 y.o.W/ male.    Chief Complaint: Cough; Chest Congestion; and Nasal Congestion    HPI         He comes in today accompanied by his wife and has the above problem.  He has been having both nose and chest congestion now for about 2 weeks.  He was hospitalized in 19 January because of left lower lobe carcinoma and had to have a lobectomy done.  His doctors included Dr. Buenrostro-surgery, Edward-hem onc, and [facility]-pulmonary medicine.  So far he has been told that he is not going to require any radiation treatment or chemotherapy.  He been doing quite well up until about the past 2 weeks when he developed this nose and chest congestion.  He does take nebulized albuterol treatments twice a day since his surgery because of his dyspnea.  He never really hears himself wheeze or whistle when he breathes.  He also has not had any mucus production from his chest but he is getting a little clear to whitish mucus from his nose.  There has been no blood from the nose or the chest.  He doesn't have any fever, chills, rigors, headache, earache, sore throat, clammy feeling, pleurisy, CP, cough spasms that he can control, sweats, diaphoresis, night sweats or near syncope.  His appetite is remained good but not like it was prior to his surgery.  He has lost a little weight following his surgery.    Review of Systems    Otherwise negative concerning the ENT, RESPIRATORY, PULMONARY, CV, and GI system review.    Objective:      Physical Exam    EARS: Clear without any redness or swelling of the canals and tympanic membranes are pearly gray and transparent.  No bulging evident.  NOSE: Open and clear without any mucopurulence or rhinorrhea present.  There is no redness or swelling to the turbinates.  THROAT: Clear and open without any redness to the pharynx or the soft palate.  He has no exudates or halitosis present.  There is no tender glands or adenopathy present in the anterior  cervical chain.  CHEST: Breath sounds are diminished in the left lower lung field.  Otherwise the breath sounds are clear anterior to posterior with no wheeze, rhonchi, or rales.  He does not appear to be in any respiratory distress and he is not retracting any rib muscles in order to breathe.    Assessment:       1. Lower resp. tract infection    2. Bronchiolo-alveolar adenocarcinoma of left lung        Plan:     1.  I want him to start on Mucinex DM 1200 mg every 12 hours to help loosen promote drainage from chest.  He should drink 6 ounces of fluid 4 times per day in order to help this medicine work.  2.  Start on Zithromax 500 mg daily with food ×10 days.  3.  Recheck in 10 days of cough persists.  He should also increases nebulization to every 4 hours as needed for shortness of breath or wheezing.

## 2017-03-30 ENCOUNTER — TELEPHONE (OUTPATIENT)
Dept: INTERNAL MEDICINE | Facility: CLINIC | Age: 77
End: 2017-03-30

## 2017-03-30 ENCOUNTER — TELEPHONE (OUTPATIENT)
Dept: RADIOLOGY | Facility: HOSPITAL | Age: 77
End: 2017-03-30

## 2017-03-30 DIAGNOSIS — J18.9 PNEUMONIA OF BOTH LOWER LOBES DUE TO INFECTIOUS ORGANISM: Primary | ICD-10-CM

## 2017-03-30 NOTE — TELEPHONE ENCOUNTER
----- Message from Errol Benson sent at 3/30/2017  8:18 AM CDT -----  Contact: Pt Spouse  Caller request call from nurse to get results of x ray and labs done on 03-29-17, please contact caller at [phone] or [phone]

## 2017-03-31 ENCOUNTER — HOSPITAL ENCOUNTER (OUTPATIENT)
Dept: RADIOLOGY | Facility: HOSPITAL | Age: 77
Discharge: HOME | End: 2017-03-31
Attending: PEDIATRICS
Payer: MEDICARE

## 2017-03-31 DIAGNOSIS — J18.9 PNEUMONIA OF BOTH LOWER LOBES DUE TO INFECTIOUS ORGANISM: ICD-10-CM

## 2017-03-31 PROCEDURE — 71250 CT THORAX DX C-: CPT | Mod: 26,,, | Performed by: RADIOLOGY

## 2017-03-31 PROCEDURE — 71250 CT THORAX DX C-: CPT | Mod: TC,PO

## 2017-04-06 ENCOUNTER — OFFICE VISIT (OUTPATIENT)
Dept: PULMONOLOGY | Facility: CLINIC | Age: 77
End: 2017-04-06
Payer: MEDICARE

## 2017-04-06 VITALS
DIASTOLIC BLOOD PRESSURE: 66 MMHG | HEART RATE: 73 BPM | HEIGHT: 66 IN | SYSTOLIC BLOOD PRESSURE: 116 MMHG | WEIGHT: 171.94 LBS | BODY MASS INDEX: 27.63 KG/M2 | OXYGEN SATURATION: 97 %

## 2017-04-06 DIAGNOSIS — J40 BRONCHITIS: ICD-10-CM

## 2017-04-06 DIAGNOSIS — C34.90 RECURRENT NON-SMALL CELL LUNG CANCER: Primary | ICD-10-CM

## 2017-04-06 DIAGNOSIS — R06.2 EXPIRATORY WHEEZING ON LEFT SIDE OF CHEST: ICD-10-CM

## 2017-04-06 DIAGNOSIS — E78.5 HYPERLIPIDEMIA, UNSPECIFIED HYPERLIPIDEMIA TYPE: ICD-10-CM

## 2017-04-06 DIAGNOSIS — N40.1 BENIGN NON-NODULAR PROSTATIC HYPERPLASIA WITH LOWER URINARY TRACT SYMPTOMS: ICD-10-CM

## 2017-04-06 PROCEDURE — 99999 PR PBB SHADOW E&M-EST. PATIENT-LVL III: CPT | Mod: PBBFAC,,, | Performed by: INTERNAL MEDICINE

## 2017-04-06 PROCEDURE — 99215 OFFICE O/P EST HI 40 MIN: CPT | Mod: S$PBB,,, | Performed by: INTERNAL MEDICINE

## 2017-04-06 PROCEDURE — 99213 OFFICE O/P EST LOW 20 MIN: CPT | Mod: PBBFAC | Performed by: INTERNAL MEDICINE

## 2017-04-06 RX ORDER — LEVOFLOXACIN 500 MG/1
500 TABLET, FILM COATED ORAL DAILY
Qty: 10 TABLET | Refills: 0 | Status: SHIPPED | OUTPATIENT
Start: 2017-04-06 | End: 2017-06-29 | Stop reason: ALTCHOICE

## 2017-04-06 RX ORDER — ATORVASTATIN CALCIUM 20 MG/1
20 TABLET, FILM COATED ORAL NIGHTLY
Qty: 90 TABLET | Refills: 4 | Status: SHIPPED | OUTPATIENT
Start: 2017-04-06

## 2017-04-06 RX ORDER — LEVOFLOXACIN 500 MG/1
500 TABLET, FILM COATED ORAL DAILY
Qty: 10 TABLET | Refills: 0 | Status: SHIPPED | OUTPATIENT
Start: 2017-04-06 | End: 2017-04-06 | Stop reason: SDUPTHER

## 2017-04-06 RX ORDER — ALBUTEROL SULFATE 90 UG/1
2 AEROSOL, METERED RESPIRATORY (INHALATION) EVERY 4 HOURS PRN
Qty: 3 INHALER | Refills: 4 | Status: SHIPPED | OUTPATIENT
Start: 2017-04-06 | End: 2017-12-13

## 2017-04-06 RX ORDER — AMOXICILLIN 500 MG
2 CAPSULE ORAL DAILY
COMMUNITY
End: 2017-05-25 | Stop reason: ALTCHOICE

## 2017-04-06 RX ORDER — TAMSULOSIN HYDROCHLORIDE 0.4 MG/1
0.4 CAPSULE ORAL NIGHTLY
Qty: 90 CAPSULE | Refills: 4 | Status: ON HOLD | OUTPATIENT
Start: 2017-04-06 | End: 2017-08-09

## 2017-04-06 NOTE — PATIENT INSTRUCTIONS
Levofloxacin oral solution  What is this medicine?  LEVOFLOXACIN(shanda palumbo FLOX a sin) is a quinolone antibiotic. It is used to treat certain kinds of bacterial infections. It will not work for colds, flu, or other viral infections.  How should I use this medicine?  Take this medicine by mouth 1 hour before, or 2 hours after eating. Follow the directions on the prescription label. Use a specially marked spoon to measure the dose. Household spoons are not accurate. Take the medicine at the same times each day. Finish all of the medicine even if you think you are better.  A special MedGuide will be given to you by the pharmacist with each prescription and refill. Be sure to read this information carefully each time.  Talk to your pediatrician regarding the use of this medicine in children. While this drug may be prescribed for children as young as 6 months for selected conditions, precautions do apply.  What side effects may I notice from receiving this medicine?  Side effects that you should report to your doctor or health care professional as soon as possible:  · allergic reactions like skin rash or hives, swelling of the face, lips, or tongue  · anxious  · confusion  · depressed mood  · diarrhea  · fast, irregular heartbeat  · hallucination, loss of contact with reality  · joint, muscle, or tendon pain or swelling  · pain, tingling, numbness in the hands or feet  · suicidal thoughts or other mood changes  · sunburn  · unusually weak or tired  Side effects that usually do not require medical attention (report to your doctor or health care professional if they continue or are bothersome):  · dry mouth  · headache  · nausea  · trouble sleeping  What may interact with this medicine?  Do not take this medicine with any of the following medications:  · arsenic trioxide  · chloroquine  · droperidol  · medicines for irregular heart rhythm like amiodarone, disopyramide, dofetilide, flecainide, quinidine, procainamide,  sotalol  · some medicines for depression or mental problems like phenothiazines, pimozide, and ziprasidone  This medicine may also interact with the following medications:  · amoxapine  · antacids  · birth control pills  · cisapride  · dairy products  · didanosine (ddI) buffered tablets or powder  · haloperidol  · multivitamins  · NSAIDS, medicines for pain and inflammation, like ibuprofen or naproxen  · retinoid products like tretinoin or isotretinoin  · risperidone  · some other antibiotics like clarithromycin or erythromycin  · sucralfate  · theophylline  · warfarin  What if I miss a dose?  If you miss a dose, take it as soon as you can. If it is almost time for your next dose, take only that dose. Do not take double or extra doses.  Where should I keep my medicine?  Keep out of the reach of children.  Store at room temperature of 15 to 30 degrees C (59 to 86 degrees F). Throw away any unused medicine after the expiration date.  What should I tell my health care provider before I take this medicine?  They need to know if you have any of these conditions:  · bone problems  · cerebral disease  · history of low levels of potassium in the blood  · irregular heartbeat  · joint problems  · kidney disease  · myasthenia gravis  · seizures  · tendon problems  · tingling of the fingers or toes, or other nerve disorder  · an unusual or allergic reaction to levofloxacin, other quinolone antibiotics, foods, dyes, or preservatives  · pregnant or trying to get pregnant  · breast-feeding  What should I watch for while using this medicine?  Tell your doctor or health care professional if your symptoms do not begin to improve in a few days. Drink several glasses of water a day and cut down on drinks that contain caffeine. You must not get dehydrated.  You may get drowsy or dizzy. Do not drive, use machinery, or do anything that needs mental alertness until you know how this medicine affects you. Do not stand or sit up quickly,  especially if you are an older patient. This reduces the risk of dizzy or fainting spells.  This medicine can make you more sensitive to the sun. Keep out of the sun. If you cannot avoid being in the sun, wear protective clothing and use a sunscreen. Do not use sun lamps or tanning beds/booths. Contact your doctor if you get a sunburn.  If you are a diabetic monitor your blood glucose carefully. If you get an unusual reading stop taking this medicine and call your doctor right away.  Do not treat diarrhea with over-the-counter products. Contact your doctor if you have diarrhea that lasts more than 2 days or if the diarrhea is severe and watery.  Avoid antacids, calcium, iron, and zinc products for 2 hours before and 2 hours after taking a dose of this medicine.  Date Last Reviewed:   NOTE:This sheet is a summary. It may not cover all possible information. If you have questions about this medicine, talk to your doctor, pharmacist, or health care provider. Copyright© 2016 [employer]

## 2017-04-06 NOTE — PROGRESS NOTES
Subjective:       Patient ID: Doron Domínguez is a 76 y.o. male.    Chief Complaint: He       No chief complaint on file.    HPI   Cough and chest congestion 8 - 10 days ago  Started on antibiotics and minimal or no improvement.  He presents for evaluation and treatment of lobectomy left lower lobe for lung cancer  (bronchiolo-alveolar adenocarcinoma left lung) after being discharged from the hospital 3 months ago. Since discharge symptoms have gradually worsening course since that time. He has developed cough and chest congestion over the past 8-10 days. Placed on Zithromax with little improvement. CT scan performed on 3/31/2017 suggests metastatic disease. Symptoms are similar to what he had when he first presented with bronchoalveolar cancer. Patient denies chest pain or fever or chills. Symptoms are aggravated by activity. Symptoms improve with rest. Respiratory: positive for dyspnea on exertion; Cardiovascular: no chest pain or palpitations.  Patient currently is not on home oxygen therapy..  Lightheaded in the morning. Weight is stable.       Past Medical History:   Diagnosis Date    Aortic stenosis 12/29/2016    Arthritis     Asthma     BPH (benign prostatic hyperplasia)     CAD (coronary artery disease)     CAD, multiple vessel 12/29/2016    Cancer     lung    High cholesterol     Hx of CABG 12/29/2016    Hypertension     Lung cancer 01/2017    T3 N0 M0 bronchoalveolar     Past Surgical History:   Procedure Laterality Date    APPENDECTOMY      CARDIAC SURGERY      CORONARY ARTERY BYPASS GRAFT  2012    CABG x 3 at [facility]    LUNG LOBECTOMY Left 01/10/2017    Left lower lobe     Social History     Social History    Marital status:      Spouse name: N/A    Number of children: N/A    Years of education: 3     Occupational History    Part-time employed      Social History Main Topics    Smoking status: Never Smoker    Smokeless tobacco: Never Used    Alcohol use No    Drug use: No     Sexual activity: No     Other Topics Concern    Not on file     Social History Narrative    Part-time employed, , 3 children.     Review of Systems   Constitutional: Positive for fatigue. Negative for fever.   HENT: Negative for postnasal drip, rhinorrhea and congestion.    Eyes: Negative for redness and itching.   Respiratory: Positive for cough, shortness of breath, dyspnea on extertion, use of rescue inhaler and Paroxysmal Nocturnal Dyspnea. Negative for sputum production.    Cardiovascular: Negative for chest pain, palpitations and leg swelling.   Genitourinary: Negative for difficulty urinating and hematuria.   Endocrine: Negative for cold intolerance and heat intolerance.    Skin: Negative for rash.   Gastrointestinal: Negative for nausea and abdominal pain.   Neurological: Negative for dizziness, syncope, weakness and light-headedness.   Hematological: Negative for adenopathy. Does not bruise/bleed easily.   Psychiatric/Behavioral: Negative for sleep disturbance. The patient is not nervous/anxious.        Objective:      Physical Exam   Constitutional: He is oriented to person, place, and time. He appears well-developed and well-nourished.   HENT:   Head: Normocephalic and atraumatic.   Mouth/Throat: No oropharyngeal exudate.   Eyes: Conjunctivae are normal. Pupils are equal, round, and reactive to light.   Neck: Neck supple. No JVD present. No tracheal deviation present. No thyromegaly present.   Cardiovascular: Normal rate, regular rhythm and normal heart sounds.    No murmur heard.  Pulmonary/Chest: Effort normal. No respiratory distress. He has decreased breath sounds. He has no wheezes. He has no rhonchi. He has rales in the right lower field and the left lower field. He exhibits no tenderness.   Abdominal: Soft. Bowel sounds are normal.   Musculoskeletal: Normal range of motion. He exhibits no edema or tenderness.   Lymphadenopathy:     He has no cervical adenopathy.   Neurological: He is alert  and oriented to person, place, and time.   Skin: Skin is warm and dry.   Nursing note and vitals reviewed.    Personal Diagnostic Review  CT of chest performed on 3/31/2017 without contrast revealed multiple areaos of nodular infiltrates consistent with recurrence of lung cancer.    No flowsheet data found.      Assessment:       1. Recurrent non-small cell lung cancer    2. Bronchitis    3. Expiratory wheezing on left side of chest    4. Benign non-nodular prostatic hyperplasia with lower urinary tract symptoms    5. Hyperlipidemia, unspecified hyperlipidemia type        Outpatient Encounter Prescriptions as of 4/6/2017   Medication Sig Dispense Refill    albuterol 90 mcg/actuation inhaler Inhale 2 puffs into the lungs every 4 (four) hours as needed for Wheezing. 3 Inhaler 4    albuterol-ipratropium 2.5mg-0.5mg/3mL (DUO-NEB) 0.5 mg-3 mg(2.5 mg base)/3 mL nebulizer solution Take 3 mLs by nebulization every 6 (six) hours. 120 vial 12    atorvastatin (LIPITOR) 20 MG tablet Take 1 tablet (20 mg total) by mouth every evening. 90 tablet 4    cetirizine (ZYRTEC) 10 MG tablet Take 10 mg by mouth as needed for Allergies.      econazole nitrate 1 % cream Apply 1 application topically as needed.      fish oil-omega-3 fatty acids 300-1,000 mg capsule Take 2 g by mouth once daily.      glucosamine-chondroitin 500-400 mg tablet Take 1 tablet by mouth 2 (two) times daily.       guaifenesin (MUCINEX) 600 mg 12 hr tablet Take 600 mg by mouth as needed for Congestion.      inhalation device (AEROCHAMBER PLUS FLOW-VU) Use as directed for inhalation. 1 Device 0    metoprolol succinate (TOPROL-XL) 50 MG 24 hr tablet Take 1 tablet (50 mg total) by mouth once daily. 30 tablet 11    potassium gluconate 595 mg (99 mg) Tab Take 1 tablet by mouth once daily.       tamsulosin (FLOMAX) 0.4 mg Cp24 Take 1 capsule (0.4 mg total) by mouth every evening. 90 capsule 4    [DISCONTINUED] albuterol 90 mcg/actuation inhaler Inhale 2 puffs  into the lungs every 4 (four) hours as needed for Wheezing. 8.5 g 1    [DISCONTINUED] aspirin (ECOTRIN) 81 MG EC tablet Take 81 mg by mouth every evening.      [DISCONTINUED] atorvastatin (LIPITOR) 20 MG tablet Take 1 tablet (20 mg total) by mouth every evening. 30 tablet 11    [DISCONTINUED] azithromycin (ZITHROMAX) 500 MG tablet Take 1 tablet (500 mg total) by mouth once daily. Take with food. 10 tablet 0    [DISCONTINUED] tamsulosin (FLOMAX) 0.4 mg Cp24 Take 0.4 mg by mouth every evening.       benzonatate (TESSALON) 200 MG capsule Take 200 mg by mouth 3 (three) times daily as needed for Cough.      levoFLOXacin (LEVAQUIN) 500 MG tablet Take 1 tablet (500 mg total) by mouth once daily. 10 tablet 0    multivitamin with minerals tablet Take 1 tablet by mouth once daily.      [DISCONTINUED] levoFLOXacin (LEVAQUIN) 500 MG tablet Take 1 tablet (500 mg total) by mouth once daily. 10 tablet 0     No facility-administered encounter medications on file as of 4/6/2017.      No orders of the defined types were placed in this encounter.    Plan:       Requested Prescriptions     Signed Prescriptions Disp Refills    albuterol 90 mcg/actuation inhaler 3 Inhaler 4     Sig: Inhale 2 puffs into the lungs every 4 (four) hours as needed for Wheezing.    tamsulosin (FLOMAX) 0.4 mg Cp24 90 capsule 4     Sig: Take 1 capsule (0.4 mg total) by mouth every evening.    atorvastatin (LIPITOR) 20 MG tablet 90 tablet 4     Sig: Take 1 tablet (20 mg total) by mouth every evening.    levoFLOXacin (LEVAQUIN) 500 MG tablet 10 tablet 0     Sig: Take 1 tablet (500 mg total) by mouth once daily.     Recurrent non-small cell lung cancer  -     Cytology Specimen- Pulmonary Medical Cytology; Future; Expected date: 4/6/17    Bronchitis  -     Discontinue: levoFLOXacin (LEVAQUIN) 500 MG tablet; Take 1 tablet (500 mg total) by mouth once daily.  Dispense: 10 tablet; Refill: 0  -     albuterol 90 mcg/actuation inhaler; Inhale 2 puffs into the  lungs every 4 (four) hours as needed for Wheezing.  Dispense: 3 Inhaler; Refill: 4  -     levoFLOXacin (LEVAQUIN) 500 MG tablet; Take 1 tablet (500 mg total) by mouth once daily.  Dispense: 10 tablet; Refill: 0    Expiratory wheezing on left side of chest  -     albuterol 90 mcg/actuation inhaler; Inhale 2 puffs into the lungs every 4 (four) hours as needed for Wheezing.  Dispense: 3 Inhaler; Refill: 4    Benign non-nodular prostatic hyperplasia with lower urinary tract symptoms  -     tamsulosin (FLOMAX) 0.4 mg Cp24; Take 1 capsule (0.4 mg total) by mouth every evening.  Dispense: 90 capsule; Refill: 4    Hyperlipidemia, unspecified hyperlipidemia type  -     atorvastatin (LIPITOR) 20 MG tablet; Take 1 tablet (20 mg total) by mouth every evening.  Dispense: 90 tablet; Refill: 4           Return for WILL CALL BACK NEXT WEEK.    MEDICAL DECISION MAKING: Moderate to high complexity.  Overall, the multiple problems listed are of moderate to high severity that may impact quality of life and activities of daily living. Side effects of medications, treatment plan as well as options and alternatives reviewed and discussed with patient. There was counseling of patient concerning these issues.    Total time spent in face to face counseling and coordination of care - 45  minutes over 50% of time was used in discussion of prognosis, risks, benefits of treatment, instructions and compliance with regimen . Discussion with other physicians or health care providers (DME, NP, pharmacy, respiratory therapy) occurred.

## 2017-04-06 NOTE — MR AVS SNAPSHOT
[facility]  [address] [facility] [address]  Phone: [phone]  Fax: [phone]                  Doron Domínguez   2017 3:00 PM   Office Visit    Description:  Male : 1940   Provider:  Dante Massey MD   Department:  [facility]           Diagnoses this Visit        Comments    Recurrent non-small cell lung cancer    -  Primary     Bronchitis         Expiratory wheezing on left side of chest         Benign non-nodular prostatic hyperplasia with lower urinary tract symptoms         Hyperlipidemia, unspecified hyperlipidemia type                To Do List           Future Appointments        Provider Department Dept Phone    4/10/2017 3:20 PM Shant Leon MD [facility] Hemotology Oncology [phone]    2017 7:45 AM [facility], [facility] [phone]    2017 10:00 AM SVITLANA Art Jr., MD [facility] Internal Medicine [phone]    2017 1:15 PM [facility], [facility] [phone]    2017 2:10 PM [facility] CT1 LIMIT 500 LBS [facility] [phone]      Goals (5 Years of Data)     None      Follow-Up and Disposition     Return for WILL CALL BACK NEXT WEEK.       These Medications        Disp Refills Start End    albuterol 90 mcg/actuation inhaler 3 Inhaler 4 2017    Inhale 2 puffs into the lungs every 4 (four) hours as needed for Wheezing. - Inhalation    Pharmacy: [facility] - [address] Ph #: [phone]       tamsulosin (FLOMAX) 0.4 mg Cp24 90 capsule 4 2017     Take 1 capsule (0.4 mg total) by mouth every evening. - Oral    Pharmacy: [facility] - [address] Ph #: [phone]       atorvastatin (LIPITOR) 20 MG tablet 90 tablet 4 2017     Take 1 tablet (20 mg total) by mouth every evening. - Oral    Pharmacy: [facility] -  [place], [place] - [address] Ph #: [phone]       levoFLOXacin (LEVAQUIN) 500 MG tablet 10 tablet 0 4/6/2017     Take 1 tablet (500 mg total) by mouth once daily. - Oral    Pharmacy: [address] [place]A [place] [id number] [facility] Ph #: [phone]         [facility]     [facility] Nurse Care Line - 24/7 Assistance  Unless otherwise directed by your provider, please contact [facility], our nurse care line that is available for 24/7 assistance.     Registered nurses in the [facility] provide: appointment scheduling, clinical advisement, health education, and other advisory services.  Call: [phone] (toll free)               Medications           Message regarding Medications     Verify the changes and/or additions to your medication regime listed below are the same as discussed with your clinician today.  If any of these changes or additions are incorrect, please notify your healthcare provider.        START taking these NEW medications        Refills    levoFLOXacin (LEVAQUIN) 500 MG tablet 0    Sig: Take 1 tablet (500 mg total) by mouth once daily.    Class: Normal    Route: Oral      CHANGE how you are taking these medications     Start Taking Instead of    tamsulosin (FLOMAX) 0.4 mg Cp24 tamsulosin (FLOMAX) 0.4 mg Cp24    Dosage:  Take 1 capsule (0.4 mg total) by mouth every evening. Dosage:  Take 0.4 mg by mouth every evening.     Reason for Change:  Reorder       STOP taking these medications     aspirin (ECOTRIN) 81 MG EC tablet Take 81 mg by mouth every evening.    azithromycin (ZITHROMAX) 500 MG tablet Take 1 tablet (500 mg total) by mouth once daily. Take with food.           Verify that the below list of medications is an accurate representation of the medications you are currently taking.  If none reported, the list may be blank. If incorrect, please contact your healthcare provider. Carry this list with you in case of emergency.     "       Current Medications     albuterol 90 mcg/actuation inhaler Inhale 2 puffs into the lungs every 4 (four) hours as needed for Wheezing.    albuterol-ipratropium 2.5mg-0.5mg/3mL (DUO-NEB) 0.5 mg-3 mg(2.5 mg base)/3 mL nebulizer solution Take 3 mLs by nebulization every 6 (six) hours.    atorvastatin (LIPITOR) 20 MG tablet Take 1 tablet (20 mg total) by mouth every evening.    cetirizine (ZYRTEC) 10 MG tablet Take 10 mg by mouth as needed for Allergies.    econazole nitrate 1 % cream Apply 1 application topically as needed.    fish oil-omega-3 fatty acids 300-1,000 mg capsule Take 2 g by mouth once daily.    glucosamine-chondroitin 500-400 mg tablet Take 1 tablet by mouth 2 (two) times daily.     guaifenesin (MUCINEX) 600 mg 12 hr tablet Take 600 mg by mouth as needed for Congestion.    inhalation device (AEROCHAMBER PLUS FLOW-VU) Use as directed for inhalation.    metoprolol succinate (TOPROL-XL) 50 MG 24 hr tablet Take 1 tablet (50 mg total) by mouth once daily.    potassium gluconate 595 mg (99 mg) Tab Take 1 tablet by mouth once daily.     tamsulosin (FLOMAX) 0.4 mg Cp24 Take 1 capsule (0.4 mg total) by mouth every evening.    benzonatate (TESSALON) 200 MG capsule Take 200 mg by mouth 3 (three) times daily as needed for Cough.    levoFLOXacin (LEVAQUIN) 500 MG tablet Take 1 tablet (500 mg total) by mouth once daily.    multivitamin with minerals tablet Take 1 tablet by mouth once daily.           Clinical Reference Information           Your Vitals Were     BP Pulse Height Weight SpO2 BMI    116/66 73 5' 5.5" (1.664 m) 78 kg (171 lb 15.3 oz) 97% 28.18 kg/m2      Blood Pressure          Most Recent Value    BP  116/66      Allergies as of 4/6/2017     Bactrim [Sulfamethoxazole-trimethoprim]    Dilaudid [Hydromorphone]    Sulfa (Sulfonamide Antibiotics)      Immunizations Administered on Date of Encounter - 4/6/2017     None      Instructions      Levofloxacin oral solution  What is this " medicine?  LEVOFLOXACIN(shanda palumbo FLOX a sin) is a quinolone antibiotic. It is used to treat certain kinds of bacterial infections. It will not work for colds, flu, or other viral infections.  How should I use this medicine?  Take this medicine by mouth 1 hour before, or 2 hours after eating. Follow the directions on the prescription label. Use a specially marked spoon to measure the dose. Household spoons are not accurate. Take the medicine at the same times each day. Finish all of the medicine even if you think you are better.  A special MedGuide will be given to you by the pharmacist with each prescription and refill. Be sure to read this information carefully each time.  Talk to your pediatrician regarding the use of this medicine in children. While this drug may be prescribed for children as young as 6 months for selected conditions, precautions do apply.  What side effects may I notice from receiving this medicine?  Side effects that you should report to your doctor or health care professional as soon as possible:  · allergic reactions like skin rash or hives, swelling of the face, lips, or tongue  · anxious  · confusion  · depressed mood  · diarrhea  · fast, irregular heartbeat  · hallucination, loss of contact with reality  · joint, muscle, or tendon pain or swelling  · pain, tingling, numbness in the hands or feet  · suicidal thoughts or other mood changes  · sunburn  · unusually weak or tired  Side effects that usually do not require medical attention (report to your doctor or health care professional if they continue or are bothersome):  · dry mouth  · headache  · nausea  · trouble sleeping  What may interact with this medicine?  Do not take this medicine with any of the following medications:  · arsenic trioxide  · chloroquine  · droperidol  · medicines for irregular heart rhythm like amiodarone, disopyramide, dofetilide, flecainide, quinidine, procainamide, sotalol  · some medicines for depression or  mental problems like phenothiazines, pimozide, and ziprasidone  This medicine may also interact with the following medications:  · amoxapine  · antacids  · birth control pills  · cisapride  · dairy products  · didanosine (ddI) buffered tablets or powder  · haloperidol  · multivitamins  · NSAIDS, medicines for pain and inflammation, like ibuprofen or naproxen  · retinoid products like tretinoin or isotretinoin  · risperidone  · some other antibiotics like clarithromycin or erythromycin  · sucralfate  · theophylline  · warfarin  What if I miss a dose?  If you miss a dose, take it as soon as you can. If it is almost time for your next dose, take only that dose. Do not take double or extra doses.  Where should I keep my medicine?  Keep out of the reach of children.  Store at room temperature of 15 to 30 degrees C (59 to 86 degrees F). Throw away any unused medicine after the expiration date.  What should I tell my health care provider before I take this medicine?  They need to know if you have any of these conditions:  · bone problems  · cerebral disease  · history of low levels of potassium in the blood  · irregular heartbeat  · joint problems  · kidney disease  · myasthenia gravis  · seizures  · tendon problems  · tingling of the fingers or toes, or other nerve disorder  · an unusual or allergic reaction to levofloxacin, other quinolone antibiotics, foods, dyes, or preservatives  · pregnant or trying to get pregnant  · breast-feeding  What should I watch for while using this medicine?  Tell your doctor or health care professional if your symptoms do not begin to improve in a few days. Drink several glasses of water a day and cut down on drinks that contain caffeine. You must not get dehydrated.  You may get drowsy or dizzy. Do not drive, use machinery, or do anything that needs mental alertness until you know how this medicine affects you. Do not stand or sit up quickly, especially if you are an older patient. This  reduces the risk of dizzy or fainting spells.  This medicine can make you more sensitive to the sun. Keep out of the sun. If you cannot avoid being in the sun, wear protective clothing and use a sunscreen. Do not use sun lamps or tanning beds/booths. Contact your doctor if you get a sunburn.  If you are a diabetic monitor your blood glucose carefully. If you get an unusual reading stop taking this medicine and call your doctor right away.  Do not treat diarrhea with over-the-counter products. Contact your doctor if you have diarrhea that lasts more than 2 days or if the diarrhea is severe and watery.  Avoid antacids, calcium, iron, and zinc products for 2 hours before and 2 hours after taking a dose of this medicine.  Date Last Reviewed:   NOTE:This sheet is a summary. It may not cover all possible information. If you have questions about this medicine, talk to your doctor, pharmacist, or health care provider. Copyright© 2016 [employer]             Language Assistance Services     ATTENTION: Language assistance services are available, free of charge. Please call [phone].      ATENCIÓN: Si habla español, tiene a moreno disposición servicios gratuitos de asistencia lingüística. Llame al [phone].     JUAN Ý: N?u b?n nói Ti?ng Vi?t, có các d?ch v? h? tr? ngôn ng? mi?n phí dành cho b?n. G?i s? [phone].         [facility] complies with applicable Federal civil rights laws and does not discriminate on the basis of race, color, national origin, age, disability, or sex.

## 2017-04-07 PROCEDURE — 88112 CYTOPATH CELL ENHANCE TECH: CPT | Mod: 26,,, | Performed by: PATHOLOGY

## 2017-04-07 PROCEDURE — 88112 CYTOPATH CELL ENHANCE TECH: CPT | Performed by: PATHOLOGY

## 2017-04-10 ENCOUNTER — TELEPHONE (OUTPATIENT)
Dept: HEMATOLOGY/ONCOLOGY | Facility: CLINIC | Age: 77
End: 2017-04-10

## 2017-04-10 ENCOUNTER — OFFICE VISIT (OUTPATIENT)
Dept: HEMATOLOGY/ONCOLOGY | Facility: CLINIC | Age: 77
End: 2017-04-10
Payer: MEDICARE

## 2017-04-10 DIAGNOSIS — C34.92 BRONCHIOLO-ALVEOLAR ADENOCARCINOMA OF LEFT LUNG: Primary | ICD-10-CM

## 2017-04-10 DIAGNOSIS — C78.00 MALIGNANT NEOPLASM METASTATIC TO LUNG, UNSPECIFIED LATERALITY: ICD-10-CM

## 2017-04-10 PROCEDURE — 99215 OFFICE O/P EST HI 40 MIN: CPT | Mod: S$PBB,,, | Performed by: INTERNAL MEDICINE

## 2017-04-10 PROCEDURE — 99212 OFFICE O/P EST SF 10 MIN: CPT | Mod: PBBFAC,PO | Performed by: INTERNAL MEDICINE

## 2017-04-10 PROCEDURE — 99999 PR PBB SHADOW E&M-EST. PATIENT-LVL II: CPT | Mod: PBBFAC,,, | Performed by: INTERNAL MEDICINE

## 2017-04-10 NOTE — TELEPHONE ENCOUNTER
Please have labs on 13th before you see Ms Altamont. In order to start chemo per Dr Leon, thank you

## 2017-04-10 NOTE — PROGRESS NOTES
Subjective:       Patient ID: Doron Domínguez is a 76 y.o. male.    Chief Complaint: Lung Cancer and Results (CT Scan)    HPI 76-year-old male with increasing cough patient underwent CT scan of his chest which unfortunately reveals progressive metastatic lung carcinoma with bilateral pulmonary nodules left-sided pleural fluid    Past Medical History:   Diagnosis Date    Aortic stenosis 12/29/2016    Arthritis     Asthma     BPH (benign prostatic hyperplasia)     CAD (coronary artery disease)     CAD, multiple vessel 12/29/2016    Cancer     lung    High cholesterol     Hx of CABG 12/29/2016    Hypertension     Lung cancer 01/2017    T3 N0 M0 bronchoalveolar     Family History   Problem Relation Age of Onset    Diabetes Mother     Diabetes Sister     Diabetes Brother      Social History     Social History    Marital status:      Spouse name: N/A    Number of children: N/A    Years of education: 3     Occupational History    Part-time employed      Social History Main Topics    Smoking status: Never Smoker    Smokeless tobacco: Never Used    Alcohol use No    Drug use: No    Sexual activity: No     Other Topics Concern    Not on file     Social History Narrative    Part-time employed, , 3 children.     Past Surgical History:   Procedure Laterality Date    APPENDECTOMY      CARDIAC SURGERY      CORONARY ARTERY BYPASS GRAFT  2012    CABG x 3 at [facility]    LUNG LOBECTOMY Left 01/10/2017    Left lower lobe       Labs:  Lab Results   Component Value Date    WBC 8.61 03/29/2017    HGB 15.0 03/29/2017    HCT 46.1 03/29/2017    MCV 95 03/29/2017     03/29/2017     BMP  Lab Results   Component Value Date     (L) 01/16/2017    K 3.5 01/16/2017     01/16/2017    CO2 26 01/16/2017    BUN 11 01/16/2017    CREATININE 0.9 01/16/2017    CALCIUM 8.7 01/16/2017    ANIONGAP 9 01/16/2017    ESTGFRAFRICA >60 01/16/2017    EGFRNONAA >60 01/16/2017     Lab Results   Component Value  Date    ALT 27 01/12/2017    AST 48 (H) 01/12/2017    ALKPHOS 64 01/12/2017    BILITOT 1.0 01/12/2017       No results found for: IRON, TIBC, FERRITIN, SATURATEDIRO  No results found for: [id number]  No results found for: FOLATE  No results found for: TSH      Review of Systems   Constitutional: Positive for appetite change, fatigue and unexpected weight change. Negative for activity change, chills, diaphoresis and fever.   HENT: Negative for congestion, dental problem, drooling, ear discharge, ear pain, facial swelling, hearing loss, mouth sores, nosebleeds, postnasal drip, rhinorrhea, sinus pressure, sneezing, sore throat, tinnitus, trouble swallowing and voice change.    Eyes: Negative for photophobia, pain, discharge, redness, itching and visual disturbance.   Respiratory: Positive for cough and shortness of breath. Negative for apnea, choking, chest tightness, wheezing and stridor.    Cardiovascular: Negative for chest pain, palpitations and leg swelling.   Gastrointestinal: Negative for abdominal distention, abdominal pain, anal bleeding, blood in stool, constipation, diarrhea, nausea, rectal pain and vomiting.   Endocrine: Negative for cold intolerance, heat intolerance, polydipsia, polyphagia and polyuria.   Genitourinary: Negative for decreased urine volume, difficulty urinating, discharge, dysuria, enuresis, flank pain, frequency, genital sores, hematuria, penile pain, penile swelling, scrotal swelling, testicular pain and urgency.   Musculoskeletal: Positive for back pain. Negative for arthralgias, gait problem, joint swelling, myalgias, neck pain and neck stiffness.   Skin: Negative for color change, pallor, rash and wound.   Allergic/Immunologic: Negative for environmental allergies, food allergies and immunocompromised state.   Neurological: Negative for dizziness, tremors, seizures, syncope, facial asymmetry, speech difficulty, weakness, light-headedness, numbness and headaches.   Hematological:  Negative for adenopathy. Does not bruise/bleed easily.   Psychiatric/Behavioral: Positive for dysphoric mood. Negative for agitation, behavioral problems, confusion, decreased concentration, hallucinations, self-injury, sleep disturbance and suicidal ideas. The patient is nervous/anxious. The patient is not hyperactive.        Objective:      Physical Exam   Constitutional: He is oriented to person, place, and time. He appears well-developed. He has a sickly appearance. He appears ill. He appears distressed.   HENT:   Head: Normocephalic.   Right Ear: External ear normal.   Left Ear: External ear normal.   Nose: Nose normal. Right sinus exhibits no maxillary sinus tenderness and no frontal sinus tenderness. Left sinus exhibits no maxillary sinus tenderness and no frontal sinus tenderness.   Mouth/Throat: Oropharynx is clear and moist. No oropharyngeal exudate.   Eyes: EOM and lids are normal. Pupils are equal, round, and reactive to light. Right eye exhibits no discharge. Left eye exhibits no discharge. Right conjunctiva is not injected. Right conjunctiva has no hemorrhage. Left conjunctiva is not injected. Left conjunctiva has no hemorrhage. No scleral icterus. Right eye exhibits normal extraocular motion. Left eye exhibits normal extraocular motion.   Neck: Normal range of motion. Neck supple. No JVD present. No tracheal deviation present. No thyromegaly present.   Cardiovascular: Normal rate, regular rhythm and normal heart sounds.    Pulmonary/Chest: Effort normal and breath sounds normal. No stridor. No respiratory distress.   Abdominal: Soft. Bowel sounds are normal. He exhibits no mass. There is no hepatosplenomegaly, splenomegaly or hepatomegaly. There is no tenderness.   Musculoskeletal: Normal range of motion. He exhibits no edema or tenderness.   Lymphadenopathy:        Head (right side): No posterior auricular and no occipital adenopathy present.        Head (left side): No posterior auricular and no  occipital adenopathy present.     He has no cervical adenopathy.        Right cervical: No superficial cervical, no deep cervical and no posterior cervical adenopathy present.       Left cervical: No superficial cervical, no deep cervical and no posterior cervical adenopathy present.     He has no axillary adenopathy.        Right: No supraclavicular adenopathy present.        Left: No supraclavicular adenopathy present.   Neurological: He is alert and oriented to person, place, and time. He has normal strength. No cranial nerve deficit. Coordination normal.   Skin: Skin is dry. No rash noted. He is not diaphoretic. No cyanosis or erythema. Nails show no clubbing.   Psychiatric: His behavior is normal. Judgment and thought content normal. His mood appears anxious. Cognition and memory are normal. He exhibits a depressed mood.   Vitals reviewed.          Assessment:       1. Bronchiolo-alveolar adenocarcinoma of left lung    2. Malignant neoplasm metastatic to lung, unspecified laterality            Plan:     reviewed situation with patient daughter as well as granddaughter who is a physician at [employer] medical school in [place] by phone reviewed situation patient has no targeted molecular marker on 9 cm primary that was recently resected including PD1 epidermal growth factor mutation Ross 1; ALK discuss treatment options metastatic non-small cell lung carcinoma would recommend initial treatment with week carboplatinum and Taxol to see how well patient tolerates therapy discussed risk complications of treatment will need MediPort placement orders a been placed will start teaching and [facility] evaluation.  Time spent 40 minutes face-to-face coordination of care [phone]

## 2017-04-10 NOTE — TELEPHONE ENCOUNTER
----- Message from Shant Leon MD sent at 4/10/2017  4:29 PM CDT -----  Please asked the patient to get a CBC and a CMP so I can place orders for carboplatin and Taxol to begin the process I cannot sign them until a fresh labs are done

## 2017-04-10 NOTE — MR AVS SNAPSHOT
Patient Information     Patient Name Sex     Doron Domínguez Male 1940      Visit Information        Provider Department Dept Phone Center    4/10/2017 3:20 PM Shant Leon MD [facility] Hematology Oncology [phone] [facility]      Patient Instructions     None      Your Current Medications Are     albuterol 90 mcg/actuation inhaler    albuterol-ipratropium 2.5mg-0.5mg/3mL (DUO-NEB) 0.5 mg-3 mg(2.5 mg base)/3 mL nebulizer solution    atorvastatin (LIPITOR) 20 MG tablet    benzonatate (TESSALON) 200 MG capsule    cetirizine (ZYRTEC) 10 MG tablet    econazole nitrate 1 % cream    fish oil-omega-3 fatty acids 300-1,000 mg capsule    glucosamine-chondroitin 500-400 mg tablet    guaifenesin (MUCINEX) 600 mg 12 hr tablet    inhalation device (AEROCHAMBER PLUS FLOW-VU)    levoFLOXacin (LEVAQUIN) 500 MG tablet    metoprolol succinate (TOPROL-XL) 50 MG 24 hr tablet    multivitamin with minerals tablet    potassium gluconate 595 mg (99 mg) Tab    tamsulosin (FLOMAX) 0.4 mg Cp24      Appointments for Next Year     2017  3:00 PM EDUCATION (60 min.) [facility] - Hemotology Oncology Luly Ann NP    Arrive at check-in approximately 15 minutes before your scheduled appointment time. Bring all outside medical records and imaging, along with a list of your current medications and insurance card.    (off [facility]) 3rd Floor    2017  7:45 AM FASTING LAB (5 min.) [facility] LABORATORY [facility]    1. Do not eat or drink anything for TEN HOURS (10) PRIOR TO TEST. Do not chew gum or eat candy mints, even those claiming to be sugar free. Water is allowed but do not drink any other fluids 2. Take your regular daily medicines as your doctor has ordered. If you are diabetic, do not take your insulin or other diabetic medication until your blood is drawn and you are ready to eat. Your physician may have special instructions for diabetics. Check with your doctor if you have any questions.3. Alcoholic  beverages are not allowed starting at 6:00pm the evening before your appointment.    (off [address]) 2nd floor    5/2/2017 10:00 AM ESTABLISHED ADULT PATIENT (20 min.) [facility] - Internal Medicine SVITLANA Art Jr., MD    Arrive at check-in approximately 15 minutes before your scheduled appointment time. Bring all outside medical records and imaging, along with a list of your current medications and insurance card.    (off [address]) 1st floor    7/24/2017  1:15 PM NON FASTING LAB (5 min.) [facility]    Arrive at check-in approximately 15 minutes before your scheduled appointment time. Bring all outside medical records and imaging, along with a list of your current medications and insurance card.    (off [address]) 2nd floor    7/24/2017  2:10 PM CT CHEST W CONTRAST (20 min.) [facility] CT1 LIMIT 500 LBS    Please fast for 4 hours prior to appointment. Arrive at check-in approximately 30 minutes before your scheduled appointment time. Bring all outside medical records and imaging, along with a list of your current medications and insurance card.    (off [address]) 2nd Floor    7/24/2017  2:30 PM COMPLETE PFT (40 min.) [facility] Pul[facility] Function [facility]    Please do not use any inhalers or respiratory medications on the day of your appointment, however if you feel like you need to use your rescue inhaler for urgent relief prior to your appointment please do so.    (off [address]) 3rd floor    7/24/2017  3:20 PM ESTABLISHED PATIENT (20 min.) [facility] - Pulmonary Services Dante Massey MD    Arrive at check-in approximately 15 minutes before your scheduled appointment time. Bring all outside medical records and imaging, along with a list of your current medications and insurance card.    (off [email or web address]) 3th floor    7/24/2017  4:20 PM ESTABLISHED PATIENT (20 min.) [facility] - Hemotology Oncology Shant Leon,  MD    Arrive at check-in approximately 15 minutes before your scheduled appointment time. Bring all outside medical records and imaging, along with a list of your current medications and insurance card.    (off [facility]) 3rd Floor      Allergies     Bactrim [Sulfamethoxazole-trimethoprim] Other (See Comments)    Severe leg cramps, dizziness    Dilaudid [Hydromorphone] Other (See Comments)    Confusion, paranoia    Sulfa (Sulfonamide Antibiotics)     Severe leg cramps, dizziness.      Current Discharge Medication List     Cannot display discharge medications since this is not an admission.

## 2017-04-12 ENCOUNTER — LAB VISIT (OUTPATIENT)
Dept: LAB | Facility: HOSPITAL | Age: 77
End: 2017-04-12
Attending: INTERNAL MEDICINE
Payer: MEDICARE

## 2017-04-12 ENCOUNTER — TELEPHONE (OUTPATIENT)
Dept: HEMATOLOGY/ONCOLOGY | Facility: CLINIC | Age: 77
End: 2017-04-12

## 2017-04-12 ENCOUNTER — OFFICE VISIT (OUTPATIENT)
Dept: SURGERY | Facility: CLINIC | Age: 77
End: 2017-04-12
Payer: MEDICARE

## 2017-04-12 ENCOUNTER — ANESTHESIA EVENT (OUTPATIENT)
Dept: SURGERY | Facility: HOSPITAL | Age: 77
End: 2017-04-12
Payer: MEDICARE

## 2017-04-12 VITALS
TEMPERATURE: 98 F | BODY MASS INDEX: 27.82 KG/M2 | SYSTOLIC BLOOD PRESSURE: 120 MMHG | DIASTOLIC BLOOD PRESSURE: 72 MMHG | WEIGHT: 169.75 LBS | HEART RATE: 78 BPM

## 2017-04-12 DIAGNOSIS — C34.90 MALIGNANT NEOPLASM OF LUNG, UNSPECIFIED LATERALITY, UNSPECIFIED PART OF LUNG: Primary | ICD-10-CM

## 2017-04-12 DIAGNOSIS — C78.00 MALIGNANT NEOPLASM METASTATIC TO LUNG, UNSPECIFIED LATERALITY: ICD-10-CM

## 2017-04-12 DIAGNOSIS — C34.92 BILATERAL LUNG CANCER: ICD-10-CM

## 2017-04-12 DIAGNOSIS — C78.00 MALIGNANT NEOPLASM METASTATIC TO LUNG, UNSPECIFIED LATERALITY: Primary | ICD-10-CM

## 2017-04-12 DIAGNOSIS — C34.91 BILATERAL LUNG CANCER: ICD-10-CM

## 2017-04-12 DIAGNOSIS — C34.92 BRONCHIOLO-ALVEOLAR ADENOCARCINOMA OF LEFT LUNG: ICD-10-CM

## 2017-04-12 DIAGNOSIS — C34.92 BRONCHIOLO-ALVEOLAR ADENOCARCINOMA OF LEFT LUNG: Primary | ICD-10-CM

## 2017-04-12 LAB
ALBUMIN SERPL BCP-MCNC: 3.6 G/DL
ALP SERPL-CCNC: 86 U/L
ALT SERPL W/O P-5'-P-CCNC: 24 U/L
ANION GAP SERPL CALC-SCNC: 8 MMOL/L
AST SERPL-CCNC: 30 U/L
BASOPHILS # BLD AUTO: 0.06 K/UL
BASOPHILS NFR BLD: 0.6 %
BILIRUB SERPL-MCNC: 0.7 MG/DL
BUN SERPL-MCNC: 17 MG/DL
CALCIUM SERPL-MCNC: 10.1 MG/DL
CHLORIDE SERPL-SCNC: 102 MMOL/L
CO2 SERPL-SCNC: 26 MMOL/L
CREAT SERPL-MCNC: 1.1 MG/DL
DIFFERENTIAL METHOD: ABNORMAL
EOSINOPHIL # BLD AUTO: 0.2 K/UL
EOSINOPHIL NFR BLD: 2.5 %
ERYTHROCYTE [DISTWIDTH] IN BLOOD BY AUTOMATED COUNT: 14 %
EST. GFR  (AFRICAN AMERICAN): >60 ML/MIN/1.73 M^2
EST. GFR  (NON AFRICAN AMERICAN): >60 ML/MIN/1.73 M^2
GLUCOSE SERPL-MCNC: 96 MG/DL
HCT VFR BLD AUTO: 45.3 %
HGB BLD-MCNC: 14.9 G/DL
LYMPHOCYTES # BLD AUTO: 1 K/UL
LYMPHOCYTES NFR BLD: 11 %
MCH RBC QN AUTO: 30.7 PG
MCHC RBC AUTO-ENTMCNC: 32.9 %
MCV RBC AUTO: 93 FL
MONOCYTES # BLD AUTO: 1.6 K/UL
MONOCYTES NFR BLD: 17.1 %
NEUTROPHILS # BLD AUTO: 6.4 K/UL
NEUTROPHILS NFR BLD: 68.8 %
PLATELET # BLD AUTO: 177 K/UL
PMV BLD AUTO: 9.1 FL
POTASSIUM SERPL-SCNC: 4.6 MMOL/L
PROT SERPL-MCNC: 7.3 G/DL
RBC # BLD AUTO: 4.86 M/UL
SODIUM SERPL-SCNC: 136 MMOL/L
WBC # BLD AUTO: 9.24 K/UL

## 2017-04-12 PROCEDURE — 99212 OFFICE O/P EST SF 10 MIN: CPT | Mod: S$PBB,,, | Performed by: SURGERY

## 2017-04-12 PROCEDURE — 99213 OFFICE O/P EST LOW 20 MIN: CPT | Mod: PBBFAC,PO | Performed by: SURGERY

## 2017-04-12 PROCEDURE — 99999 PR PBB SHADOW E&M-EST. PATIENT-LVL III: CPT | Mod: PBBFAC,,, | Performed by: SURGERY

## 2017-04-12 RX ORDER — DEXAMETHASONE 4 MG/1
20 TABLET ORAL EVERY 6 HOURS
Qty: 20 TABLET | Refills: 0 | Status: SHIPPED | OUTPATIENT
Start: 2017-04-12 | End: 2017-06-29

## 2017-04-12 RX ORDER — EPINEPHRINE 0.3 MG/.3ML
0.3 INJECTION SUBCUTANEOUS ONCE AS NEEDED
Status: CANCELLED | OUTPATIENT
Start: 2017-04-20 | End: 2017-04-12

## 2017-04-12 RX ORDER — SODIUM CHLORIDE 9 MG/ML
INJECTION, SOLUTION INTRAVENOUS CONTINUOUS
Status: CANCELLED | OUTPATIENT
Start: 2017-04-12

## 2017-04-12 RX ORDER — NAPROXEN SODIUM 220 MG/1
81 TABLET, FILM COATED ORAL DAILY
COMMUNITY

## 2017-04-12 RX ORDER — FAMOTIDINE 10 MG/ML
20 INJECTION INTRAVENOUS
Status: CANCELLED | OUTPATIENT
Start: 2017-04-20

## 2017-04-12 RX ORDER — HEPARIN 100 UNIT/ML
500 SYRINGE INTRAVENOUS
Status: CANCELLED | OUTPATIENT
Start: 2017-04-20

## 2017-04-12 RX ORDER — SODIUM CHLORIDE 0.9 % (FLUSH) 0.9 %
10 SYRINGE (ML) INJECTION
Status: CANCELLED | OUTPATIENT
Start: 2017-04-20

## 2017-04-12 RX ORDER — DIPHENHYDRAMINE HYDROCHLORIDE 50 MG/ML
50 INJECTION INTRAMUSCULAR; INTRAVENOUS ONCE AS NEEDED
Status: CANCELLED | OUTPATIENT
Start: 2017-04-20 | End: 2017-04-12

## 2017-04-12 RX ORDER — DEXAMETHASONE 4 MG/1
20 TABLET ORAL EVERY 6 HOURS
Qty: 20 TABLET | Refills: 0 | Status: SHIPPED | OUTPATIENT
Start: 2017-04-12 | End: 2017-04-12 | Stop reason: SDUPTHER

## 2017-04-12 NOTE — TELEPHONE ENCOUNTER
----- Message from Yani Ruth sent at 4/12/2017  2:54 PM CDT -----  Contact: [facility]  Needs to verify the direction on Dexamethasone script, can be reached at [phone]///thxMW

## 2017-04-12 NOTE — TELEPHONE ENCOUNTER
Confirmed  Take 5 tablets (20 mg total) by mouth every 6 (six) hours. Take 20 mg 6& 12 hours prior to TAXOL - Oral

## 2017-04-12 NOTE — PROGRESS NOTES
Subjective:       Patient ID: Doron Domínguez is a 76 y.o. male.    Chief Complaint: Follow-up (Mediport placement)    HPI Comments: Patient known to me post left lower lobectomy of lung in Abilio 10. 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 Now has recurrent cough, and found on recent ct scan to have findings c/w metastatic spread to both lungs. He is here for counseling for port.  PMH: LUNG CANCER, AS, CABG IN PAST.   Review of Systems   Constitutional: Positive for activity change. Negative for fever and unexpected weight change.   HENT: Positive for congestion.    Eyes: Negative.    Respiratory: Positive for cough and chest tightness.    Cardiovascular: Negative.    Gastrointestinal: Negative.    Endocrine: Negative.    Genitourinary: Negative.    Musculoskeletal: Negative.    Skin: Negative.    Neurological: Negative.    Hematological: Negative.        Objective:      Physical Exam   Constitutional: He is oriented to person, place, and time. He appears well-developed and well-nourished.   Eyes: EOM are normal. Pupils are equal, round, and reactive to light.   Neck: Normal range of motion. Neck supple.   Cardiovascular: Normal rate and regular rhythm.    Pulmonary/Chest: Effort normal. No respiratory distress.   Rales in both bases   Abdominal: Soft. Bowel sounds are normal.   Musculoskeletal: Normal range of motion.   Lymphadenopathy:     He has no cervical adenopathy.   Neurological: He is alert and oriented to person, place, and time.   Psychiatric: He has a normal mood and affect. His behavior is normal.   Nursing note and vitals reviewed.      Assessment:     RECURRENT LUNG CANCER     Plan:       PORT . The need for MediPort placement was discussed. The risks,benefits, and potential complications were discussed.  This includes infection, bleeding, Pneumothorax, hemothorax, injury to the great vessels, loss of the Guidewire or catheter.  I also discussed embolism of air or fat, pericardial  tamponade, narrowing or stenosis of the vessels and infection of the port site.    Complete list of complications were reviewed and are listed on the consent form.  Informed consent was obtained.  Surgery  was scheduled.  Preoperative instructions were given

## 2017-04-12 NOTE — PRE ADMISSION SCREENING
Pre op instructions reviewed with patient per phone:    To confirm, Your surgeon has instructed you:  Surgery is scheduled 04/13/17 at 1025.      [facility] 1st floor main lobby by 0900.      INSTRUCTIONS IMPORTANT!!!  ¨ Do not eat, drink, or smoke after 12 midnight-including water. OK to brush teeth, no gum, candy or mints!    ¨ Take only these medicines with a small swallow of water-morning of surgery.  Metoprolol, use Duo Neb      ____  Do not wear makeup, including mascara.  ____  No powder, lotions or creams to surgical area.  ____  Please remove all jewelry, including piercings and leave at home.  ____  No money or valuables needed. Please leave at home.  ____  Please bring identification and insurance information to hospital.  ____  If going home the same day, arrange for a ride home. You will not be able to   drive if Anesthesia was used.  ____  Children, under 12 years old, must remain in the waiting room with an adult.  They are not allowed in patient areas.  ____  Wear loose fitting clothing. Allow for dressings, bandages.  ____  Stop Aspirin, Ibuprofen, Motrin and Aleve at least 5-7 days before surgery, unless otherwise instructed by your doctor, or the nurse.   You MAY use Tylenol/acetaminophen until day of surgery.  ____  If you take diabetic medication, do not take am of surgery unless instructed by   Doctor.  ____ Stop taking any Fish Oil supplement or any Vitamins that contain Vitamin E at least 5 days prior to surgery.          Bathing Instructions-- The night before surgery and the morning prior to coming to the hospital:   -Do not shave the surgical area.   -Shower and wash your hair and body as usual with anti-bacterial  soap and shampoo.   -Rinse your hair and body completely.   -Use one packet of hibiclens to wash the surgical site (using your hand) gently for 5 minutes.  Do not scrub you skin too hard.   -Do not use hibiclens on your head, face, or  genitals.   -Do not wash with anti-bacterial soap after you use the hibiclens.   -Rinse your body thoroughly.   -Dry with clean, soft towel.  Do not use lotion, cream, deodorant, or powders on   the surgical site.    Use antibacterial soap in place of hibiclens if your surgery is on the head, face or genitals.         Surgical Site Infection    Prevention of surgical site infections:     -Keep incisions clean and dry.   -Do not soak/submerge incisions in water until completely healed.   -Do not apply lotions, powders, creams, or deodorants to site.   -Always make sure hands are cleaned with antibacterial soap/ alcohol-based   prior to touching the surgical site.  (This includes doctors, nurses, staff, and yourself.)    Signs and symptoms:   -Redness and pain around the area where you had surgery   -Drainage of cloudy fluid from your surgical wound   -Fever over 100.4  I have read or had read and explained to me, and understand the above information.

## 2017-04-12 NOTE — MR AVS SNAPSHOT
[facility]  [address] [place] [address]  Phone: [phone]  Fax: [phone]                  Doron Domínguez   2017 11:00 AM   Office Visit    Description:  Male : 1940   Provider:  Negrito Hernandez MD   Department:  [facility]           Reason for Visit     Follow-up           Diagnoses this Visit        Comments    Malignant neoplasm of lung, unspecified laterality, unspecified part of lung    -  Primary     Bilateral lung cancer                To Do List           Future Appointments        Provider Department Dept Phone    2017 1:00 PM Luly Ann NP [facility] Hemotology Oncology [phone]    2017 7:45 AM LABORATORY, [facility] [phone]    2017 10:00 AM SVITLANA Art Jr., MD [facility] Internal Medicine [phone]    2017 1:15 PM LABORATORY, [facility] [phone]    2017 2:10 PM [facility] CT1 LIMIT 500 LBS [facility] [phone]      Goals (5 Years of Data)     None      [facility]     [facility] Nurse Care Line -  Assistance  Unless otherwise directed by your provider, please contact [facility], our nurse care line that is available for  assistance.     Registered nurses in the [facility] Center provide: appointment scheduling, clinical advisement, health education, and other advisory services.  Call: [phone] (toll free)               Medications           Message regarding Medications     Verify the changes and/or additions to your medication regime listed below are the same as discussed with your clinician today.  If any of these changes or additions are incorrect, please notify your healthcare provider.             Verify that the below list of medications is an accurate representation of the medications you are currently taking.  If none reported, the list may be blank. If incorrect, please contact your  healthcare provider. Carry this list with you in case of emergency.           Current Medications     albuterol-ipratropium 2.5mg-0.5mg/3mL (DUO-NEB) 0.5 mg-3 mg(2.5 mg base)/3 mL nebulizer solution Take 3 mLs by nebulization every 6 (six) hours.    atorvastatin (LIPITOR) 20 MG tablet Take 1 tablet (20 mg total) by mouth every evening.    benzonatate (TESSALON) 200 MG capsule Take 200 mg by mouth 3 (three) times daily as needed for Cough.    cetirizine (ZYRTEC) 10 MG tablet Take 10 mg by mouth as needed for Allergies.    econazole nitrate 1 % cream Apply 1 application topically as needed.    fish oil-omega-3 fatty acids 300-1,000 mg capsule Take 2 g by mouth once daily.    glucosamine-chondroitin 500-400 mg tablet Take 1 tablet by mouth 2 (two) times daily.     guaifenesin (MUCINEX) 600 mg 12 hr tablet Take 600 mg by mouth as needed for Congestion.    inhalation device (AEROCHAMBER PLUS FLOW-VU) Use as directed for inhalation.    levoFLOXacin (LEVAQUIN) 500 MG tablet Take 1 tablet (500 mg total) by mouth once daily.    metoprolol succinate (TOPROL-XL) 50 MG 24 hr tablet Take 1 tablet (50 mg total) by mouth once daily.    multivitamin with minerals tablet Take 1 tablet by mouth once daily.    potassium gluconate 595 mg (99 mg) Tab Take 1 tablet by mouth once daily.     tamsulosin (FLOMAX) 0.4 mg Cp24 Take 1 capsule (0.4 mg total) by mouth every evening.    albuterol 90 mcg/actuation inhaler Inhale 2 puffs into the lungs every 4 (four) hours as needed for Wheezing.           Clinical Reference Information           Your Vitals Were     BP Pulse Temp Weight BMI    120/72 78 98.2 °F (36.8 °C) (Oral) 77 kg (169 lb 12.1 oz) 27.82 kg/m2      Blood Pressure          Most Recent Value    BP  120/72      Allergies as of 4/12/2017     Bactrim [Sulfamethoxazole-trimethoprim]    Dilaudid [Hydromorphone]    Sulfa (Sulfonamide Antibiotics)      Immunizations Administered on Date of Encounter - 4/12/2017     None      Orders Placed  During Today's Visit      Normal Orders This Visit    Case Request Operating Room: INSERTION-PORT       Language Assistance Services     ATTENTION: Language assistance services are available, free of charge. Please call [phone].      ATENCIÓN: Si javier saenz, tiene a moreno disposición servicios gratuitos de asistencia lingüística. Llame al [phone].     CHÚ Ý: N?u b?n nói Ti?ng Vi?t, có các d?ch v? h? tr? ngôn ng? mi?n phí dành cho b?n. G?i s? [phone].         [facility] complies with applicable Federal civil rights laws and does not discriminate on the basis of race, color, national origin, age, disability, or sex.

## 2017-04-12 NOTE — H&P (VIEW-ONLY)
Subjective:       Patient ID: Doron Domínguez is a 76 y.o. male.    Chief Complaint: Lung Cancer and Results (CT Scan)    HPI 76-year-old male with increasing cough patient underwent CT scan of his chest which unfortunately reveals progressive metastatic lung carcinoma with bilateral pulmonary nodules left-sided pleural fluid    Past Medical History:   Diagnosis Date    Aortic stenosis 12/29/2016    Arthritis     Asthma     BPH (benign prostatic hyperplasia)     CAD (coronary artery disease)     CAD, multiple vessel 12/29/2016    Cancer     lung    High cholesterol     Hx of CABG 12/29/2016    Hypertension     Lung cancer 01/2017    T3 N0 M0 bronchoalveolar     Family History   Problem Relation Age of Onset    Diabetes Mother     Diabetes Sister     Diabetes Brother      Social History     Social History    Marital status:      Spouse name: N/A    Number of children: N/A    Years of education: 3     Occupational History    Part-time employed      Social History Main Topics    Smoking status: Never Smoker    Smokeless tobacco: Never Used    Alcohol use No    Drug use: No    Sexual activity: No     Other Topics Concern    Not on file     Social History Narrative    Part-time employed, , 3 children.     Past Surgical History:   Procedure Laterality Date    APPENDECTOMY      CARDIAC SURGERY      CORONARY ARTERY BYPASS GRAFT  2012    CABG x 3 at [facility]    LUNG LOBECTOMY Left 01/10/2017    Left lower lobe       Labs:  Lab Results   Component Value Date    WBC 8.61 03/29/2017    HGB 15.0 03/29/2017    HCT 46.1 03/29/2017    MCV 95 03/29/2017     03/29/2017     BMP  Lab Results   Component Value Date     (L) 01/16/2017    K 3.5 01/16/2017     01/16/2017    CO2 26 01/16/2017    BUN 11 01/16/2017    CREATININE 0.9 01/16/2017    CALCIUM 8.7 01/16/2017    ANIONGAP 9 01/16/2017    ESTGFRAFRICA >60 01/16/2017    EGFRNONAA >60 01/16/2017     Lab Results   Component Value  Date    ALT 27 01/12/2017    AST 48 (H) 01/12/2017    ALKPHOS 64 01/12/2017    BILITOT 1.0 01/12/2017       No results found for: IRON, TIBC, FERRITIN, SATURATEDIRO  No results found for: MKHXOEAQ88  No results found for: FOLATE  No results found for: TSH      Review of Systems   Constitutional: Positive for appetite change, fatigue and unexpected weight change. Negative for activity change, chills, diaphoresis and fever.   HENT: Negative for congestion, dental problem, drooling, ear discharge, ear pain, facial swelling, hearing loss, mouth sores, nosebleeds, postnasal drip, rhinorrhea, sinus pressure, sneezing, sore throat, tinnitus, trouble swallowing and voice change.    Eyes: Negative for photophobia, pain, discharge, redness, itching and visual disturbance.   Respiratory: Positive for cough and shortness of breath. Negative for apnea, choking, chest tightness, wheezing and stridor.    Cardiovascular: Negative for chest pain, palpitations and leg swelling.   Gastrointestinal: Negative for abdominal distention, abdominal pain, anal bleeding, blood in stool, constipation, diarrhea, nausea, rectal pain and vomiting.   Endocrine: Negative for cold intolerance, heat intolerance, polydipsia, polyphagia and polyuria.   Genitourinary: Negative for decreased urine volume, difficulty urinating, discharge, dysuria, enuresis, flank pain, frequency, genital sores, hematuria, penile pain, penile swelling, scrotal swelling, testicular pain and urgency.   Musculoskeletal: Positive for back pain. Negative for arthralgias, gait problem, joint swelling, myalgias, neck pain and neck stiffness.   Skin: Negative for color change, pallor, rash and wound.   Allergic/Immunologic: Negative for environmental allergies, food allergies and immunocompromised state.   Neurological: Negative for dizziness, tremors, seizures, syncope, facial asymmetry, speech difficulty, weakness, light-headedness, numbness and headaches.   Hematological:  Negative for adenopathy. Does not bruise/bleed easily.   Psychiatric/Behavioral: Positive for dysphoric mood. Negative for agitation, behavioral problems, confusion, decreased concentration, hallucinations, self-injury, sleep disturbance and suicidal ideas. The patient is nervous/anxious. The patient is not hyperactive.        Objective:      Physical Exam   Constitutional: He is oriented to person, place, and time. He appears well-developed. He has a sickly appearance. He appears ill. He appears distressed.   HENT:   Head: Normocephalic.   Right Ear: External ear normal.   Left Ear: External ear normal.   Nose: Nose normal. Right sinus exhibits no maxillary sinus tenderness and no frontal sinus tenderness. Left sinus exhibits no maxillary sinus tenderness and no frontal sinus tenderness.   Mouth/Throat: Oropharynx is clear and moist. No oropharyngeal exudate.   Eyes: EOM and lids are normal. Pupils are equal, round, and reactive to light. Right eye exhibits no discharge. Left eye exhibits no discharge. Right conjunctiva is not injected. Right conjunctiva has no hemorrhage. Left conjunctiva is not injected. Left conjunctiva has no hemorrhage. No scleral icterus. Right eye exhibits normal extraocular motion. Left eye exhibits normal extraocular motion.   Neck: Normal range of motion. Neck supple. No JVD present. No tracheal deviation present. No thyromegaly present.   Cardiovascular: Normal rate, regular rhythm and normal heart sounds.    Pulmonary/Chest: Effort normal and breath sounds normal. No stridor. No respiratory distress.   Abdominal: Soft. Bowel sounds are normal. He exhibits no mass. There is no hepatosplenomegaly, splenomegaly or hepatomegaly. There is no tenderness.   Musculoskeletal: Normal range of motion. He exhibits no edema or tenderness.   Lymphadenopathy:        Head (right side): No posterior auricular and no occipital adenopathy present.        Head (left side): No posterior auricular and no  occipital adenopathy present.     He has no cervical adenopathy.        Right cervical: No superficial cervical, no deep cervical and no posterior cervical adenopathy present.       Left cervical: No superficial cervical, no deep cervical and no posterior cervical adenopathy present.     He has no axillary adenopathy.        Right: No supraclavicular adenopathy present.        Left: No supraclavicular adenopathy present.   Neurological: He is alert and oriented to person, place, and time. He has normal strength. No cranial nerve deficit. Coordination normal.   Skin: Skin is dry. No rash noted. He is not diaphoretic. No cyanosis or erythema. Nails show no clubbing.   Psychiatric: His behavior is normal. Judgment and thought content normal. His mood appears anxious. Cognition and memory are normal. He exhibits a depressed mood.   Vitals reviewed.          Assessment:       1. Bronchiolo-alveolar adenocarcinoma of left lung    2. Malignant neoplasm metastatic to lung, unspecified laterality            Plan:     reviewed situation with patient daughter as well as granddaughter who is a physician at [employer] medical school in [place] by phone reviewed situation patient has no targeted molecular marker on 9 cm primary that was recently resected including PD1 epidermal growth factor mutation Ross 1; ALK discuss treatment options metastatic non-small cell lung carcinoma would recommend initial treatment with week carboplatinum and Taxol to see how well patient tolerates therapy discussed risk complications of treatment will need MediPort placement orders a been placed will start teaching and MediPort evaluation.  Time spent 40 minutes face-to-face coordination of care [phone]

## 2017-04-12 NOTE — LETTER
April 12, 2017      Shant Leon MD  [address] [place] [address]           [facility] General Surgery  [address]  [place] [address]  Phone: [phone]  Fax: [phone]          Patient: Doron Domínguez   MR Number: [id number]   YOB: 1940   Date of Visit: 4/12/2017       Dear Dr. Shant Leon:    Thank you for referring Doron Domínguez to me for evaluation. Attached you will find relevant portions of my assessment and plan of care.    If you have questions, please do not hesitate to call me. I look forward to following Doron Domínguez along with you.    Sincerely,    Negrito Hernandez MD    Enclosure  CC:  No Recipients    If you would like to receive this communication electronically, please contact [email or web address] or [phone] to request more information on [employer] Link access.    For providers and/or their staff who would like to refer a patient to [facility], please contact us through our one-stop-shop provider referral line, [facility], at [phone].    If you feel you have received this communication in error or would no longer like to receive these types of communications, please e-mail [email or web address]

## 2017-04-13 ENCOUNTER — TELEPHONE (OUTPATIENT)
Dept: PULMONOLOGY | Facility: CLINIC | Age: 77
End: 2017-04-13

## 2017-04-13 ENCOUNTER — SURGERY (OUTPATIENT)
Age: 77
End: 2017-04-13

## 2017-04-13 ENCOUNTER — HOSPITAL ENCOUNTER (OUTPATIENT)
Facility: HOSPITAL | Age: 77
Discharge: HOME | End: 2017-04-13
Attending: SURGERY | Admitting: SURGERY
Payer: MEDICARE

## 2017-04-13 ENCOUNTER — ANESTHESIA (OUTPATIENT)
Dept: SURGERY | Facility: HOSPITAL | Age: 77
End: 2017-04-13
Payer: MEDICARE

## 2017-04-13 VITALS
BODY MASS INDEX: 26.82 KG/M2 | HEART RATE: 89 BPM | DIASTOLIC BLOOD PRESSURE: 79 MMHG | RESPIRATION RATE: 19 BRPM | HEIGHT: 66 IN | TEMPERATURE: 98 F | OXYGEN SATURATION: 97 % | WEIGHT: 166.88 LBS | SYSTOLIC BLOOD PRESSURE: 129 MMHG

## 2017-04-13 DIAGNOSIS — C34.91 BILATERAL LUNG CANCER: ICD-10-CM

## 2017-04-13 DIAGNOSIS — C34.92 BILATERAL LUNG CANCER: ICD-10-CM

## 2017-04-13 PROCEDURE — 71000039 HC RECOVERY, EACH ADD'L HOUR: Performed by: SURGERY

## 2017-04-13 PROCEDURE — 36000707: Performed by: SURGERY

## 2017-04-13 PROCEDURE — 71000015 HC POSTOP RECOV 1ST HR: Performed by: SURGERY

## 2017-04-13 PROCEDURE — 77001 FLUOROGUIDE FOR VEIN DEVICE: CPT | Mod: 26,,, | Performed by: SURGERY

## 2017-04-13 PROCEDURE — 25000003 PHARM REV CODE 250: Performed by: CLINIC/CENTER

## 2017-04-13 PROCEDURE — C1788 PORT, INDWELLING, IMP: HCPCS | Performed by: SURGERY

## 2017-04-13 PROCEDURE — 37000009 HC ANESTHESIA EA ADD 15 MINS: Performed by: SURGERY

## 2017-04-13 PROCEDURE — 25000003 PHARM REV CODE 250: Performed by: SURGERY

## 2017-04-13 PROCEDURE — 71000033 HC RECOVERY, INTIAL HOUR: Performed by: SURGERY

## 2017-04-13 PROCEDURE — 37000008 HC ANESTHESIA 1ST 15 MINUTES: Performed by: SURGERY

## 2017-04-13 PROCEDURE — 25000003 PHARM REV CODE 250: Performed by: ANESTHESIOLOGY

## 2017-04-13 PROCEDURE — 63600175 PHARM REV CODE 636 W HCPCS: Performed by: SURGERY

## 2017-04-13 PROCEDURE — 36561 INSERT TUNNELED CV CATH: CPT | Mod: ,,, | Performed by: SURGERY

## 2017-04-13 PROCEDURE — 63600175 PHARM REV CODE 636 W HCPCS: Performed by: CLINIC/CENTER

## 2017-04-13 PROCEDURE — 36000706: Performed by: SURGERY

## 2017-04-13 DEVICE — PORT POWER CLEAR VIEW: Type: IMPLANTABLE DEVICE | Site: NECK | Status: FUNCTIONAL

## 2017-04-13 RX ORDER — ONDANSETRON 2 MG/ML
INJECTION INTRAMUSCULAR; INTRAVENOUS
Status: DISCONTINUED | OUTPATIENT
Start: 2017-04-13 | End: 2017-04-13

## 2017-04-13 RX ORDER — HYDROCODONE BITARTRATE AND ACETAMINOPHEN 5; 325 MG/1; MG/1
1 TABLET ORAL EVERY 4 HOURS PRN
Status: DISCONTINUED | OUTPATIENT
Start: 2017-04-13 | End: 2017-04-13 | Stop reason: HOSPADM

## 2017-04-13 RX ORDER — ETOMIDATE 2 MG/ML
INJECTION INTRAVENOUS
Status: DISCONTINUED | OUTPATIENT
Start: 2017-04-13 | End: 2017-04-13

## 2017-04-13 RX ORDER — ROCURONIUM BROMIDE 10 MG/ML
INJECTION, SOLUTION INTRAVENOUS
Status: DISCONTINUED | OUTPATIENT
Start: 2017-04-13 | End: 2017-04-13

## 2017-04-13 RX ORDER — HEPARIN SODIUM 1000 [USP'U]/ML
INJECTION, SOLUTION INTRAVENOUS; SUBCUTANEOUS
Status: DISCONTINUED | OUTPATIENT
Start: 2017-04-13 | End: 2017-04-13 | Stop reason: HOSPADM

## 2017-04-13 RX ORDER — HEPARIN 100 UNIT/ML
SYRINGE INTRAVENOUS
Status: DISCONTINUED | OUTPATIENT
Start: 2017-04-13 | End: 2017-04-13 | Stop reason: HOSPADM

## 2017-04-13 RX ORDER — SODIUM CHLORIDE 9 MG/ML
3 INJECTION, SOLUTION INTRAMUSCULAR; INTRAVENOUS; SUBCUTANEOUS EVERY 8 HOURS
Status: DISCONTINUED | OUTPATIENT
Start: 2017-04-13 | End: 2017-04-13 | Stop reason: HOSPADM

## 2017-04-13 RX ORDER — LIDOCAINE HYDROCHLORIDE AND EPINEPHRINE 10; 10 MG/ML; UG/ML
INJECTION, SOLUTION INFILTRATION; PERINEURAL
Status: DISCONTINUED | OUTPATIENT
Start: 2017-04-13 | End: 2017-04-13 | Stop reason: HOSPADM

## 2017-04-13 RX ORDER — SODIUM CHLORIDE 9 MG/ML
3 INJECTION, SOLUTION INTRAMUSCULAR; INTRAVENOUS; SUBCUTANEOUS
Status: DISCONTINUED | OUTPATIENT
Start: 2017-04-13 | End: 2017-04-13 | Stop reason: HOSPADM

## 2017-04-13 RX ORDER — FENTANYL CITRATE 50 UG/ML
INJECTION, SOLUTION INTRAMUSCULAR; INTRAVENOUS
Status: DISCONTINUED | OUTPATIENT
Start: 2017-04-13 | End: 2017-04-13

## 2017-04-13 RX ORDER — ONDANSETRON 2 MG/ML
4 INJECTION INTRAMUSCULAR; INTRAVENOUS DAILY PRN
Status: DISCONTINUED | OUTPATIENT
Start: 2017-04-13 | End: 2017-04-13 | Stop reason: HOSPADM

## 2017-04-13 RX ORDER — CEFAZOLIN SODIUM 2 G/50ML
2 SOLUTION INTRAVENOUS
Status: COMPLETED | OUTPATIENT
Start: 2017-04-13 | End: 2017-04-13

## 2017-04-13 RX ORDER — LIDOCAINE HYDROCHLORIDE 10 MG/ML
INJECTION INFILTRATION; PERINEURAL
Status: DISCONTINUED | OUTPATIENT
Start: 2017-04-13 | End: 2017-04-13

## 2017-04-13 RX ORDER — SODIUM CHLORIDE 9 MG/ML
INJECTION, SOLUTION INTRAVENOUS CONTINUOUS
Status: DISCONTINUED | OUTPATIENT
Start: 2017-04-13 | End: 2017-04-13 | Stop reason: HOSPADM

## 2017-04-13 RX ORDER — FENTANYL CITRATE 50 UG/ML
25 INJECTION, SOLUTION INTRAMUSCULAR; INTRAVENOUS EVERY 5 MIN PRN
Status: DISCONTINUED | OUTPATIENT
Start: 2017-04-13 | End: 2017-04-13 | Stop reason: HOSPADM

## 2017-04-13 RX ORDER — HYDROCODONE BITARTRATE AND ACETAMINOPHEN 5; 325 MG/1; MG/1
1 TABLET ORAL EVERY 6 HOURS PRN
Qty: 18 TABLET | Refills: 0 | Status: SHIPPED | OUTPATIENT
Start: 2017-04-13 | End: 2017-05-04 | Stop reason: ALTCHOICE

## 2017-04-13 RX ORDER — SODIUM CHLORIDE, SODIUM LACTATE, POTASSIUM CHLORIDE, CALCIUM CHLORIDE 600; 310; 30; 20 MG/100ML; MG/100ML; MG/100ML; MG/100ML
INJECTION, SOLUTION INTRAVENOUS CONTINUOUS
Status: DISCONTINUED | OUTPATIENT
Start: 2017-04-13 | End: 2017-04-13 | Stop reason: HOSPADM

## 2017-04-13 RX ORDER — ONDANSETRON 8 MG/1
8 TABLET, ORALLY DISINTEGRATING ORAL EVERY 8 HOURS PRN
Status: DISCONTINUED | OUTPATIENT
Start: 2017-04-13 | End: 2017-04-13 | Stop reason: HOSPADM

## 2017-04-13 RX ORDER — PROPOFOL 10 MG/ML
VIAL (ML) INTRAVENOUS
Status: DISCONTINUED | OUTPATIENT
Start: 2017-04-13 | End: 2017-04-13

## 2017-04-13 RX ORDER — SUCCINYLCHOLINE CHLORIDE 20 MG/ML
INJECTION INTRAMUSCULAR; INTRAVENOUS
Status: DISCONTINUED | OUTPATIENT
Start: 2017-04-13 | End: 2017-04-13

## 2017-04-13 RX ORDER — BUPIVACAINE HYDROCHLORIDE AND EPINEPHRINE 2.5; 5 MG/ML; UG/ML
INJECTION, SOLUTION EPIDURAL; INFILTRATION; INTRACAUDAL; PERINEURAL
Status: DISCONTINUED | OUTPATIENT
Start: 2017-04-13 | End: 2017-04-13 | Stop reason: HOSPADM

## 2017-04-13 RX ADMIN — LIDOCAINE HYDROCHLORIDE AND EPINEPHRINE 20 ML: 10; 10 INJECTION, SOLUTION INFILTRATION; PERINEURAL at 12:04

## 2017-04-13 RX ADMIN — FENTANYL CITRATE 25 MCG: 50 INJECTION, SOLUTION INTRAMUSCULAR; INTRAVENOUS at 11:04

## 2017-04-13 RX ADMIN — SODIUM CHLORIDE, SODIUM LACTATE, POTASSIUM CHLORIDE, AND CALCIUM CHLORIDE: 600; 310; 30; 20 INJECTION, SOLUTION INTRAVENOUS at 11:04

## 2017-04-13 RX ADMIN — ONDANSETRON 4 MG: 2 INJECTION, SOLUTION INTRAMUSCULAR; INTRAVENOUS at 11:04

## 2017-04-13 RX ADMIN — CEFAZOLIN SODIUM 2 G: 2 SOLUTION INTRAVENOUS at 11:04

## 2017-04-13 RX ADMIN — FENTANYL CITRATE 25 MCG: 50 INJECTION, SOLUTION INTRAMUSCULAR; INTRAVENOUS at 12:04

## 2017-04-13 RX ADMIN — LIDOCAINE HYDROCHLORIDE 80 MG: 10 INJECTION, SOLUTION INFILTRATION; PERINEURAL at 11:04

## 2017-04-13 RX ADMIN — SUCCINYLCHOLINE CHLORIDE 100 MG: 20 INJECTION, SOLUTION INTRAMUSCULAR; INTRAVENOUS at 11:04

## 2017-04-13 RX ADMIN — PROPOFOL 30 MG: 10 INJECTION, EMULSION INTRAVENOUS at 11:04

## 2017-04-13 RX ADMIN — PROPOFOL 100 MG: 10 INJECTION, EMULSION INTRAVENOUS at 11:04

## 2017-04-13 RX ADMIN — BUPIVACAINE HYDROCHLORIDE AND EPINEPHRINE BITARTRATE 30 ML: 2.5; .0091 INJECTION, SOLUTION EPIDURAL; INFILTRATION; INTRACAUDAL; PERINEURAL at 12:04

## 2017-04-13 RX ADMIN — ROCURONIUM BROMIDE 5 MG: 10 INJECTION, SOLUTION INTRAVENOUS at 11:04

## 2017-04-13 RX ADMIN — ETOMIDATE 20 MG: 2 INJECTION, SOLUTION INTRAVENOUS at 11:04

## 2017-04-13 RX ADMIN — HEPARIN SODIUM 10000 UNITS: 1000 INJECTION, SOLUTION INTRAVENOUS; SUBCUTANEOUS at 12:04

## 2017-04-13 RX ADMIN — HEPARIN 1500 UNITS: 100 SYRINGE at 12:04

## 2017-04-13 NOTE — IP AVS SNAPSHOT
[address] [place] [address]           Patient Discharge Instructions   Our goal is to set you up for success. This packet includes information on your condition, medications, and your home care.  It will help you care for yourself to prevent having to return to the hospital.     Please ask your nurse if you have any questions.      There are many details to remember when preparing to leave the hospital. Here is what you will need to do:    1. Take your medicine. If you are prescribed medications, review your Medication List on the following pages. You may have new medications to  at the pharmacy and others that you'll need to stop taking. Review the instructions for how and when to take your medications. Talk with your doctor or nurses if you are unsure of what to do.     2. Go to your follow-up appointments. Specific follow-up information is listed in the following pages. Your may be contacted by a nurse or clinical provider about future appointments. Be sure we have all of the phone numbers to reach you. Please contact your provider's office if you are unable to make an appointment.     3. Watch for warning signs. Your doctor or nurse will give you detailed warning signs to watch for and when to call for assistance. These instructions may also include educational information about your condition. If you experience any of warning signs to your health, call your doctor.               ** Verify the list of medication(s) below is accurate and up to date. Carry this with you in case of emergency. If your medications have changed, please notify your healthcare provider.             Medication List      START taking these medications        Additional Info                      hydrocodone-acetaminophen 5-325mg 5-325 mg per tablet   Commonly known as:  NORCO   Quantity:  18 tablet   Refills:  0   Dose:  1 tablet    Instructions:  Take 1 tablet by mouth every 6  (six) hours as needed for Pain.     Begin Date    AM    Noon    PM    Bedtime         CONTINUE taking these medications        Additional Info                      albuterol 90 mcg/actuation inhaler   Quantity:  3 Inhaler   Refills:  4   Dose:  2 puff    Instructions:  Inhale 2 puffs into the lungs every 4 (four) hours as needed for Wheezing.     Begin Date    AM    Noon    PM    Bedtime       albuterol-ipratropium 2.5mg-0.5mg/3mL 0.5 mg-3 mg(2.5 mg base)/3 mL nebulizer solution   Commonly known as:  DUO-NEB   Quantity:  120 vial   Refills:  12   Dose:  3 mL    Instructions:  Take 3 mLs by nebulization every 6 (six) hours.     Begin Date    AM    Noon    PM    Bedtime       aspirin 81 MG Chew   Refills:  0   Dose:  81 mg    Instructions:  Take 81 mg by mouth once daily.     Begin Date    AM    Noon    PM    Bedtime       atorvastatin 20 MG tablet   Commonly known as:  LIPITOR   Quantity:  90 tablet   Refills:  4   Dose:  20 mg    Instructions:  Take 1 tablet (20 mg total) by mouth every evening.     Begin Date    AM    Noon    PM    Bedtime       benzonatate 200 MG capsule   Commonly known as:  TESSALON   Refills:  0   Dose:  200 mg    Instructions:  Take 200 mg by mouth 3 (three) times daily as needed for Cough.     Begin Date    AM    Noon    PM    Bedtime       cetirizine 10 MG tablet   Commonly known as:  ZYRTEC   Refills:  0   Dose:  10 mg    Instructions:  Take 10 mg by mouth as needed for Allergies.     Begin Date    AM    Noon    PM    Bedtime       dexamethasone 4 MG Tab   Commonly known as:  DECADRON   Quantity:  20 tablet   Refills:  0   Dose:  20 mg    Instructions:  Take 5 tablets (20 mg total) by mouth every 6 (six) hours. Take 20 mg 6& 12 hours prior to TAXOL     Begin Date    AM    Noon    PM    Bedtime       econazole nitrate 1 % cream   Refills:  0   Dose:  1 application    Instructions:  Apply 1 application topically as needed.     Begin Date    AM    Noon    PM    Bedtime       fish oil-omega-3  fatty acids 300-1,000 mg capsule   Refills:  0   Dose:  2 g    Instructions:  Take 2 g by mouth once daily.     Begin Date    AM    Noon    PM    Bedtime       glucosamine-chondroitin 500-400 mg tablet   Refills:  0   Dose:  1 tablet    Instructions:  Take 1 tablet by mouth 2 (two) times daily.     Begin Date    AM    Noon    PM    Bedtime       inhalation spacing device   Quantity:  1 Device   Refills:  0   Comments:  Gave in clinic with instruction for use with albuterol inhaler.    Instructions:  Use as directed for inhalation.     Begin Date    AM    Noon    PM    Bedtime       levoFLOXacin 500 MG tablet   Commonly known as:  LEVAQUIN   Quantity:  10 tablet   Refills:  0   Dose:  500 mg    Instructions:  Take 1 tablet (500 mg total) by mouth once daily.     Begin Date    AM    Noon    PM    Bedtime       metoprolol succinate 50 MG 24 hr tablet   Commonly known as:  TOPROL-XL   Quantity:  30 tablet   Refills:  11   Dose:  50 mg    Instructions:  Take 1 tablet (50 mg total) by mouth once daily.     Begin Date    AM    Noon    PM    Bedtime       MUCINEX 600 mg 12 hr tablet   Refills:  0   Dose:  600 mg   Generic drug:  guaifenesin    Instructions:  Take 600 mg by mouth 2 (two) times daily.     Begin Date    AM    Noon    PM    Bedtime       multivitamin with minerals tablet   Refills:  0   Dose:  1 tablet    Instructions:  Take 1 tablet by mouth once daily.     Begin Date    AM    Noon    PM    Bedtime       potassium gluconate 595 mg (99 mg) Tab   Refills:  0   Dose:  1 tablet    Instructions:  Take 1 tablet by mouth once daily.     Begin Date    AM    Noon    PM    Bedtime       tamsulosin 0.4 mg Cp24   Commonly known as:  FLOMAX   Quantity:  90 capsule   Refills:  4   Dose:  0.4 mg    Instructions:  Take 1 capsule (0.4 mg total) by mouth every evening.     Begin Date    AM    Noon    PM    Bedtime            Where to Get Your Medications      You can get these medications from any pharmacy     Bring a paper  prescription for each of these medications     hydrocodone-acetaminophen 5-325mg 5-325 mg per tablet                  Please bring to all follow up appointments:    1. A copy of your discharge instructions.  2. All medicines you are currently taking in their original bottles.  3. Identification and insurance card.    Please arrive 15 minutes ahead of scheduled appointment time.    Please call 24 hours in advance if you must reschedule your appointment and/or time.        Your Scheduled Appointments     Apr 18, 2017  1:00 PM CDT   Diabetes Education with Luly Ann NP   [facility] - Hemotology Oncology ([facility])    [address] [place] [id number]   [phone]            Apr 20, 2017  8:30 AM CDT   Infusion 240 Min with CHAIR 05 [facility] ([facility])    [address] [place] [id number]   [phone]            Apr 20, 2017  1:00 PM CDT   Established Patient Visit with Shant Leon MD   [facility] - Hematology [facility]cology ([facility])    [address] [place] [id number]   [phone]            Apr 25, 2017  7:45 AM CDT   Fasting Lab with LABORATORY, [facility] ([facility]    [address] [place] [id number]   [phone]            May 02, 2017 10:00 AM CDT   Adult Established Patient with SVITLANA Art Jr., MD   [facility] - Internal Medicine ([facility])    [address] [place] [id number]   [phone]              Follow-up Information     Please follow up.    Why:  has appt. with oncology        Discharge Instructions     Future Orders    Call MD for:  difficulty breathing, headache or visual disturbances     Call MD for:  redness, tenderness, or signs of infection (pain, swelling, redness, odor or green/yellow discharge around incision site)     Call MD for:  temperature >100.4     Diet general     Questions:    Total calories:      Fat restriction, if any:       Protein restriction, if any:      Na restriction, if any:      Fluid restriction:      Additional restrictions:      Lifting restrictions     Remove dressing in 48 hours     Shower on day dressing removed (No bath)         Discharge Instructions         Vascular Access Port Implantation   Port implantation is surgery to place (implant) a port under the skin. For vascular access, it is placed into a vein. The port allows medicines or nutrition to be sent right into your bloodstream. Blood can also be taken or given through the port. During the procedure, a long, thin tube called a catheter is threaded into one of your large veins. The tube is then attached to the port. This usually sits under the skin of your chest and causes a small bump. To use the port, a special needle is passed through your skin and into the port. The needle can stay in your skin for up to 7 days, if needed. A port can stay in place for weeks or months or longer.    Why is a vascular access port needed?  A vascular access port may allow healthcare providers to give you:  · Chemotherapy or other cancer-fighting drugs  · IV treatments, such as antibiotics or nutrition  · Hemodialysis (for kidney failure)  The port may also be used to draw blood.  Before the procedure  Follow any instructions you are given on how to prepare.  Tell your provider about any medicines you are taking. This includes:  · All prescription medicines  · Over-the-counter medicines such as aspirin or ibuprofen  · Herbs, vitamins, and other supplements  Also be sure your provider knows:  · If you are pregnant or think you may be pregnant  · If you are allergic to any medicines or substances, especially local anesthetics or iodine  · Your full medical history, including why you will need the port  · If you plan on doing any contact sports  During the procedure  · Before the procedure, an IV may be put into a vein in your arm or hand. This gives you fluids and medicines. You may  be given medicine through the IV to help you relax during the procedure. This is called sedation. But some surgeons place ports using general anesthesia.  · The chest is used most often for the port. In some cases, your belly (abdomen) or arm will be used instead.  · The skin over the insertion area is numbed with local anesthetic.  · Ultrasound or X-rays are used to help the healthcare provider guide the catheter into the proper location during the procedure.  · A cut (incision) is made in the skin where the port will be placed. A small pocket for the port is formed under the skin.  · A second small incision is made in the skin near the first incision. A tunnel under the skin is created. The catheter is put through the tunnel and into the blood vessel.  · The skin is closed over the port. It is held shut with stitches (sutures) or surgical glue or tape. The second small incision is also closed.  · A chest X-ray may be done to make sure the port is placed properly.  After the procedure  You may be taken to a recovery room where youll recover from the sedation. Nurses will check on you as you rest. If you have pain, nurses can give you medicine. If you are not staying in the hospital overnight, you will be sent home a few hours after the procedure is done. A healthcare provider will tell you when you can go home. An adult family member or friend will need to drive you home.  Recovering at home  · Take pain medicine as directed by your healthcare provider.  · Take it easy for 24 hours after the procedure. Avoid physical activity and heavy lifting until your healthcare provider says its OK.  · Keep the port clean and dry. Ask when you can shower again. You will need to keep the port dry by covering it when you shower.  · Care for the insertion site as you are directed.  · Dont swim, bathe, or do other activities that cause water to cover the insertion site.  · To keep the port from getting blocked with blood clots,  flush it as often as directed. You should be shown the proper way to flush the port before you go home. It is important to follow these directions.     Risks and possible complications of implantation  · Bleeding  · Infection of the insertion site  · Damage to a blood vessel  · Nerve injury or irritation  · Collapsed lung (for chest port placements)  · Skin breakdown over the port  Risks and possible complications of having a port  · Blocked  port or catheter  · Leakage or breakage of the port or catheter  · The port moves out of position  · Blood clot  · Skin or bloodstream infection  · Skin breakdown over the port      When to seek medical care  Call your healthcare provider right away if you have any of the following:  · A fever of 100.4°F (38.0°C) or higher  · You can't access or use the port properly  · You can't flush the port or get a blood return  · The skin near the port is red, warm, swollen, or broken  · You have shoulder pain on the side where the port is located  · You feel a heart flutter or racing heart   · Swollen arm, if the port is placed in your arm   Date Last Reviewed: 7/1/2016  © 4242-9523 [employer]. [address] [place] [address]. All rights reserved. This information is not intended as a substitute for professional medical care. Always follow your healthcare professional's instructions.        General Information:    1.  Do not drink alcoholic beverages including beer for 24 hours or as long as you are on pain medication..  2.  Do not drive a motor vehicle, operate machinery or power tools, or signs legal papers for 24 hours or as long as you are on pain medication.   3.  You may experience light-headedness, dizziness, and sleepiness following surgery. Please do not stay alone. A responsible adult should be with you for this 24 hour period.  4.  Go home and rest.    5. Progress slowly to a normal diet unless instructed.  Otherwise, begin with liquids such as soft  drinks, then soup and crackers working up to solid foods. Drink plenty of nonalcoholic fluids.  6.  Certain anesthetics and pain medications produce nausea and vomiting in certain       individuals. If nausea becomes a problem at home, call you doctor.    7. A nurse will be calling you sometime after surgery. Do not be alarmed. This is our way of finding out how you are doing.    8. Several times every hour while you are awake, take 2-3 deep breaths and cough. If you had stomach surgery hold a pillow or rolled towel firmly against your stomach before you cough. This will help with any pain the cough might cause.  9. Several times every hour while you are awake, pump and flex your feet 5-6 times and do foot circles. This will help prevent blood clots.    10.Call your doctor for severe pain, bleeding, fever, or signs or symptoms of infection (pain, swelling, redness, foul odor, drainage).    11.You can contact your doctor anytime by [phone] for the [facility] (at [facility]) or [phone] for the [facility] on [facility].   [email or web address] is another way to contact your doctor if you are an active participant online with [facility].        Acetaminophen; Hydrocodone tablets or capsules  What is this medicine?  ACETAMINOPHEN; HYDROCODONE (a set a STEFFEN jhonatan fen; harrison droe KOE done) is a pain reliever. It is used to treat moderate to severe pain.  How should I use this medicine?  Take this medicine by mouth with a glass of water. Follow the directions on the prescription label. You can take it with or without food. If it upsets your stomach, take it with food. Do not take your medicine more often than directed.  A special MedGuide will be given to you by the pharmacist with each prescription and refill. Be sure to read this information carefully each time.  Talk to your pediatrician regarding the use of this medicine in children. Special care may be needed.  What side effects may I notice from  receiving this medicine?  Side effects that you should report to your doctor or health care professional as soon as possible:  · allergic reactions like skin rash, itching or hives, swelling of the face, lips, or tongue  · breathing problems  · confusion  · redness, blistering, peeling or loosening of the skin, including inside the mouth  · signs and symptoms of low blood pressure like dizziness; feeling faint or lightheaded, falls; unusually weak or tired  · trouble passing urine or change in the amount of urine  · yellowing of the eyes or skin  Side effects that usually do not require medical attention (report to your doctor or health care professional if they continue or are bothersome):  · constipation  · dry mouth  · nausea, vomiting  · tiredness  What may interact with this medicine?  This medicine may interact with the following medications:  · alcohol  · antiviral medicines for HIV or AIDS  · atropine  · antihistamines for allergy, cough and cold  · certain antibiotics like erythromycin, clarithromycin  · certain medicines for anxiety or sleep  · certain medicines for bladder problems like oxybutynin, tolterodine  · certain medicines for depression like amitriptyline, fluoxetine, sertraline  · certain medicines for fungal infections like ketoconazole and itraconazole  · certain medicines for Parkinson's disease like benztropine, trihexyphenidyl  · certain medicines for seizures like carbamazepine, phenobarbital, phenytoin, primidone  · certain medicines for stomach problems like dicyclomine, hyoscyamine  · certain medicines for travel sickness like scopolamine  · general anesthetics like halothane, isoflurane, methoxyflurane, propofol  · ipratropium  · local anesthetics like lidocaine, pramoxine, tetracaine  · MAOIs like Carbex, Eldepryl, Marplan, Nardil, and Parnate  · medicines that relax muscles for surgery  · other medicines with acetaminophen  · other narcotic medicines for pain or  cough  · phenothiazines like chlorpromazine, mesoridazine, prochlorperazine, thioridazine  · rifampin  What if I miss a dose?  If you miss a dose, take it as soon as you can. If it is almost time for your next dose, take only that dose. Do not take double or extra doses.  Where should I keep my medicine?  Keep out of the reach of children. This medicine can be abused. Keep your medicine in a safe place to protect it from theft. Do not share this medicine with anyone. Selling or giving away this medicine is dangerous and against the law.  This medicine may cause accidental overdose and death if it taken by other adults, children, or pets. Mix any unused medicine with a substance like cat litter or coffee grounds. Then throw the medicine away in a sealed container like a sealed bag or a coffee can with a lid. Do not use the medicine after the expiration date.  Store at room temperature between 15 and 30 degrees C (59 and 86 degrees F).  What should I tell my health care provider before I take this medicine?  They need to know if you have any of these conditions:  · brain tumor  · Crohn's disease, inflammatory bowel disease, or ulcerative colitis  · drug abuse or addiction  · head injury  · heart or circulation problems  · if you often drink alcohol  · kidney disease or problems going to the bathroom  · liver disease  · lung disease, asthma, or breathing problems  · an unusual or allergic reaction to acetaminophen, hydrocodone, other opioid analgesics, other medicines, foods, dyes, or preservatives  · pregnant or trying to get pregnant  · breast-feeding  What should I watch for while using this medicine?  Tell your doctor or health care professional if your pain does not go away, if it gets worse, or if you have new or a different type of pain. You may develop tolerance to the medicine. Tolerance means that you will need a higher dose of the medicine for pain relief. Tolerance is normal and is expected if you take the  medicine for a long time.  Do not suddenly stop taking your medicine because you may develop a severe reaction. Your body becomes used to the medicine. This does NOT mean you are addicted. Addiction is a behavior related to getting and using a drug for a non-medical reason. If you have pain, you have a medical reason to take pain medicine. Your doctor will tell you how much medicine to take. If your doctor wants you to stop the medicine, the dose will be slowly lowered over time to avoid any side effects.  There are different types of narcotic medicines (opiates). If you take more than one type at the same time or if you are taking another medicine that also causes drowsiness, you may have more side effects. Give your health care provider a list of all medicines you use. Your doctor will tell you how much medicine to take. Do not take more medicine than directed. Call emergency for help if you have problems breathing or unusual sleepiness.  Do not take other medicines that contain acetaminophen with this medicine. Always read labels carefully. If you have questions, ask your doctor or pharmacist.  If you take too much acetaminophen get medical help right away. Too much acetaminophen can be very dangerous and cause liver damage. Even if you do not have symptoms, it is important to get help right away.  You may get drowsy or dizzy. Do not drive, use machinery, or do anything that needs mental alertness until you know how this medicine affects you. Do not stand or sit up quickly, especially if you are an older patient. This reduces the risk of dizzy or fainting spells. Alcohol may interfere with the effect of this medicine. Avoid alcoholic drinks.  The medicine will cause constipation. Try to have a bowel movement at least every 2 to 3 days. If you do not have a bowel movement for 3 days, call your doctor or health care professional.  Your mouth may get dry. Chewing sugarless gum or sucking hard candy, and drinking  "plenty of water may help. Contact your doctor if the problem does not go away or is severe.  Date Last Reviewed:   NOTE:This sheet is a summary. It may not cover all possible information. If you have questions about this medicine, talk to your doctor, pharmacist, or health care provider. Copyright© 2016 [employer]            Primary Diagnosis     Your primary diagnosis was:  Bilateral Lung Cancer      Admission Information     Date & Time Provider Department CSN    4/13/2017  8:41 AM Negrito Hernandez MD [facility] -  [id number]      Care Providers     Provider Role Specialty Primary office phone    Negrito Hernandez MD Attending Provider General Surgery [phone]    Negrito Hernandez MD Surgeon  General Surgery [phone]      Your Vitals Were     BP Pulse Temp Resp Height Weight    129/75 83 97.8 °F (36.6 °C) (Oral) 25 5' 5.5" (1.664 m) 75.7 kg (166 lb 14.2 oz)    SpO2 BMI             96% 27.35 kg/m2         Recent Lab Values     No lab values to display.      Allergies as of 4/13/2017        Reactions    Bactrim [Sulfamethoxazole-trimethoprim] Other (See Comments)    Severe leg cramps, dizziness    Dilaudid [Hydromorphone] Other (See Comments)    Confusion, paranoia    Sulfa (Sulfonamide Antibiotics)     Severe leg cramps, dizziness.      [facility]     [facility] Nurse Care Line - 24/7 Assistance  Unless otherwise directed by your provider, please contact [facility], our nurse care line that is available for 24/7 assistance.     Registered nurses in the [facility] provide clinical advisement, health education, appointment booking, and other advisory services.  Call for this free service at [phone].        Advance Directives     An advance directive is a document which, in the event you are no longer able to make decisions for yourself, tells your healthcare team what kind of treatment you do or do not want to receive, or who you would like to make those " decisions for you.  If you do not currently have an advance directive, [facility] encourages you to create one.  For more information call:  [phone]), [phone] ([phone]),  or log on to [email or web address].        Language Assistance Services     ATTENTION: Language assistance services are available, free of charge. Please call [phone].      ATENCIÓN: Si habla español, tiene a moreno disposición servicios gratuitos de asistencia lingüística. Llame al [phone].     [facility] Ý: N?u b?n nói Ti?ng Vi?t, có các d?ch v? h? tr? ngôn ng? mi?n phí dành cho b?n. G?i s? [phone].        Pneumonmia Discharge Instructions                 [facility] complies with applicable Federal civil rights laws and does not discriminate on the basis of race, color, national origin, age, disability, or sex.

## 2017-04-13 NOTE — DISCHARGE INSTRUCTIONS
Vascular Access Port Implantation   Port implantation is surgery to place (implant) a port under the skin. For vascular access, it is placed into a vein. The port allows medicines or nutrition to be sent right into your bloodstream. Blood can also be taken or given through the port. During the procedure, a long, thin tube called a catheter is threaded into one of your large veins. The tube is then attached to the port. This usually sits under the skin of your chest and causes a small bump. To use the port, a special needle is passed through your skin and into the port. The needle can stay in your skin for up to 7 days, if needed. A port can stay in place for weeks or months or longer.    Why is a vascular access port needed?  A vascular access port may allow healthcare providers to give you:  · Chemotherapy or other cancer-fighting drugs  · IV treatments, such as antibiotics or nutrition  · Hemodialysis (for kidney failure)  The port may also be used to draw blood.  Before the procedure  Follow any instructions you are given on how to prepare.  Tell your provider about any medicines you are taking. This includes:  · All prescription medicines  · Over-the-counter medicines such as aspirin or ibuprofen  · Herbs, vitamins, and other supplements  Also be sure your provider knows:  · If you are pregnant or think you may be pregnant  · If you are allergic to any medicines or substances, especially local anesthetics or iodine  · Your full medical history, including why you will need the port  · If you plan on doing any contact sports  During the procedure  · Before the procedure, an IV may be put into a vein in your arm or hand. This gives you fluids and medicines. You may be given medicine through the IV to help you relax during the procedure. This is called sedation. But some surgeons place ports using general anesthesia.  · The chest is used most often for the port. In some cases, your belly (abdomen) or arm will be  used instead.  · The skin over the insertion area is numbed with local anesthetic.  · Ultrasound or X-rays are used to help the healthcare provider guide the catheter into the proper location during the procedure.  · A cut (incision) is made in the skin where the port will be placed. A small pocket for the port is formed under the skin.  · A second small incision is made in the skin near the first incision. A tunnel under the skin is created. The catheter is put through the tunnel and into the blood vessel.  · The skin is closed over the port. It is held shut with stitches (sutures) or surgical glue or tape. The second small incision is also closed.  · A chest X-ray may be done to make sure the port is placed properly.  After the procedure  You may be taken to a recovery room where youll recover from the sedation. Nurses will check on you as you rest. If you have pain, nurses can give you medicine. If you are not staying in the hospital overnight, you will be sent home a few hours after the procedure is done. A healthcare provider will tell you when you can go home. An adult family member or friend will need to drive you home.  Recovering at home  · Take pain medicine as directed by your healthcare provider.  · Take it easy for 24 hours after the procedure. Avoid physical activity and heavy lifting until your healthcare provider says its OK.  · Keep the port clean and dry. Ask when you can shower again. You will need to keep the port dry by covering it when you shower.  · Care for the insertion site as you are directed.  · Dont swim, bathe, or do other activities that cause water to cover the insertion site.  · To keep the port from getting blocked with blood clots, flush it as often as directed. You should be shown the proper way to flush the port before you go home. It is important to follow these directions.     Risks and possible complications of implantation  · Bleeding  · Infection of the insertion  site  · Damage to a blood vessel  · Nerve injury or irritation  · Collapsed lung (for chest port placements)  · Skin breakdown over the port  Risks and possible complications of having a port  · Blocked  port or catheter  · Leakage or breakage of the port or catheter  · The port moves out of position  · Blood clot  · Skin or bloodstream infection  · Skin breakdown over the port      When to seek medical care  Call your healthcare provider right away if you have any of the following:  · A fever of 100.4°F (38.0°C) or higher  · You can't access or use the port properly  · You can't flush the port or get a blood return  · The skin near the port is red, warm, swollen, or broken  · You have shoulder pain on the side where the port is located  · You feel a heart flutter or racing heart   · Swollen arm, if the port is placed in your arm   Date Last Reviewed: 7/1/2016  © 3968-8303 [employer]. [address] [place] [address]. All rights reserved. This information is not intended as a substitute for professional medical care. Always follow your healthcare professional's instructions.        General Information:    1.  Do not drink alcoholic beverages including beer for 24 hours or as long as you are on pain medication..  2.  Do not drive a motor vehicle, operate machinery or power tools, or signs legal papers for 24 hours or as long as you are on pain medication.   3.  You may experience light-headedness, dizziness, and sleepiness following surgery. Please do not stay alone. A responsible adult should be with you for this 24 hour period.  4.  Go home and rest.    5. Progress slowly to a normal diet unless instructed.  Otherwise, begin with liquids such as soft drinks, then soup and crackers working up to solid foods. Drink plenty of nonalcoholic fluids.  6.  Certain anesthetics and pain medications produce nausea and vomiting in certain       individuals. If nausea becomes a problem at home, call you  doctor.    7. A nurse will be calling you sometime after surgery. Do not be alarmed. This is our way of finding out how you are doing.    8. Several times every hour while you are awake, take 2-3 deep breaths and cough. If you had stomach surgery hold a pillow or rolled towel firmly against your stomach before you cough. This will help with any pain the cough might cause.  9. Several times every hour while you are awake, pump and flex your feet 5-6 times and do foot circles. This will help prevent blood clots.    10.Call your doctor for severe pain, bleeding, fever, or signs or symptoms of infection (pain, swelling, redness, foul odor, drainage).    11.You can contact your doctor anytime by [phone] for the [facility] (at [facility]) or [phone] for the [facility] on [facility].   [email or web address] is another way to contact your doctor if you are an active participant online with [facility].        Acetaminophen; Hydrocodone tablets or capsules  What is this medicine?  ACETAMINOPHEN; HYDROCODONE (a set a STEFFEN jhonatan fen; harrison droe KOE done) is a pain reliever. It is used to treat moderate to severe pain.  How should I use this medicine?  Take this medicine by mouth with a glass of water. Follow the directions on the prescription label. You can take it with or without food. If it upsets your stomach, take it with food. Do not take your medicine more often than directed.  A special MedGuide will be given to you by the pharmacist with each prescription and refill. Be sure to read this information carefully each time.  Talk to your pediatrician regarding the use of this medicine in children. Special care may be needed.  What side effects may I notice from receiving this medicine?  Side effects that you should report to your doctor or health care professional as soon as possible:  · allergic reactions like skin rash, itching or hives, swelling of the face, lips, or tongue  · breathing  problems  · confusion  · redness, blistering, peeling or loosening of the skin, including inside the mouth  · signs and symptoms of low blood pressure like dizziness; feeling faint or lightheaded, falls; unusually weak or tired  · trouble passing urine or change in the amount of urine  · yellowing of the eyes or skin  Side effects that usually do not require medical attention (report to your doctor or health care professional if they continue or are bothersome):  · constipation  · dry mouth  · nausea, vomiting  · tiredness  What may interact with this medicine?  This medicine may interact with the following medications:  · alcohol  · antiviral medicines for HIV or AIDS  · atropine  · antihistamines for allergy, cough and cold  · certain antibiotics like erythromycin, clarithromycin  · certain medicines for anxiety or sleep  · certain medicines for bladder problems like oxybutynin, tolterodine  · certain medicines for depression like amitriptyline, fluoxetine, sertraline  · certain medicines for fungal infections like ketoconazole and itraconazole  · certain medicines for Parkinson's disease like benztropine, trihexyphenidyl  · certain medicines for seizures like carbamazepine, phenobarbital, phenytoin, primidone  · certain medicines for stomach problems like dicyclomine, hyoscyamine  · certain medicines for travel sickness like scopolamine  · general anesthetics like halothane, isoflurane, methoxyflurane, propofol  · ipratropium  · local anesthetics like lidocaine, pramoxine, tetracaine  · MAOIs like Carbex, Eldepryl, Marplan, Nardil, and Parnate  · medicines that relax muscles for surgery  · other medicines with acetaminophen  · other narcotic medicines for pain or cough  · phenothiazines like chlorpromazine, mesoridazine, prochlorperazine, thioridazine  · rifampin  What if I miss a dose?  If you miss a dose, take it as soon as you can. If it is almost time for your next dose, take only that dose. Do not take  double or extra doses.  Where should I keep my medicine?  Keep out of the reach of children. This medicine can be abused. Keep your medicine in a safe place to protect it from theft. Do not share this medicine with anyone. Selling or giving away this medicine is dangerous and against the law.  This medicine may cause accidental overdose and death if it taken by other adults, children, or pets. Mix any unused medicine with a substance like cat litter or coffee grounds. Then throw the medicine away in a sealed container like a sealed bag or a coffee can with a lid. Do not use the medicine after the expiration date.  Store at room temperature between 15 and 30 degrees C (59 and 86 degrees F).  What should I tell my health care provider before I take this medicine?  They need to know if you have any of these conditions:  · brain tumor  · Crohn's disease, inflammatory bowel disease, or ulcerative colitis  · drug abuse or addiction  · head injury  · heart or circulation problems  · if you often drink alcohol  · kidney disease or problems going to the bathroom  · liver disease  · lung disease, asthma, or breathing problems  · an unusual or allergic reaction to acetaminophen, hydrocodone, other opioid analgesics, other medicines, foods, dyes, or preservatives  · pregnant or trying to get pregnant  · breast-feeding  What should I watch for while using this medicine?  Tell your doctor or health care professional if your pain does not go away, if it gets worse, or if you have new or a different type of pain. You may develop tolerance to the medicine. Tolerance means that you will need a higher dose of the medicine for pain relief. Tolerance is normal and is expected if you take the medicine for a long time.  Do not suddenly stop taking your medicine because you may develop a severe reaction. Your body becomes used to the medicine. This does NOT mean you are addicted. Addiction is a behavior related to getting and using a drug  for a non-medical reason. If you have pain, you have a medical reason to take pain medicine. Your doctor will tell you how much medicine to take. If your doctor wants you to stop the medicine, the dose will be slowly lowered over time to avoid any side effects.  There are different types of narcotic medicines (opiates). If you take more than one type at the same time or if you are taking another medicine that also causes drowsiness, you may have more side effects. Give your health care provider a list of all medicines you use. Your doctor will tell you how much medicine to take. Do not take more medicine than directed. Call emergency for help if you have problems breathing or unusual sleepiness.  Do not take other medicines that contain acetaminophen with this medicine. Always read labels carefully. If you have questions, ask your doctor or pharmacist.  If you take too much acetaminophen get medical help right away. Too much acetaminophen can be very dangerous and cause liver damage. Even if you do not have symptoms, it is important to get help right away.  You may get drowsy or dizzy. Do not drive, use machinery, or do anything that needs mental alertness until you know how this medicine affects you. Do not stand or sit up quickly, especially if you are an older patient. This reduces the risk of dizzy or fainting spells. Alcohol may interfere with the effect of this medicine. Avoid alcoholic drinks.  The medicine will cause constipation. Try to have a bowel movement at least every 2 to 3 days. If you do not have a bowel movement for 3 days, call your doctor or health care professional.  Your mouth may get dry. Chewing sugarless gum or sucking hard candy, and drinking plenty of water may help. Contact your doctor if the problem does not go away or is severe.  Date Last Reviewed:   NOTE:This sheet is a summary. It may not cover all possible information. If you have questions about this medicine, talk to your doctor,  pharmacist, or health care provider. Copyright© 2016 [employer]

## 2017-04-13 NOTE — ANESTHESIA PREPROCEDURE EVALUATION
04/13/2017  Doron Domínguez is a 76 y.o., male.    [facility] Pre-op Assessment    I have reviewed the Patient Summary Reports.     I have reviewed the Nursing Notes.   I have reviewed the Medications.     Review of Systems  Anesthesia Hx:  No problems with previous Anesthesia Denies Hx of Anesthetic complications  History of prior surgery of interest to airway management or planning: Previous anesthesia: General Denies Family Hx of Anesthesia complications.   Denies Personal Hx of Anesthesia complications.   Social:  Non-Smoker    Hematology/Oncology:        Current/Recent Cancer. (lung cancer) bilateral   Cardiovascular:   Hypertension Valvular problems/Murmurs (mild AS), AS CAD  CABG/stent  CONCLUSIONS     1 - Mild left atrial enlargement.     2 - Concentric remodeling.     3 - Normal left ventricular systolic function (EF 60-65%).     4 - Normal left ventricular diastolic function.     5 - Right ventricular enlargement with mildly depressed systolic function.     6 - The estimated PA systolic pressure is 20 mmHg.     7 - Mild aortic stenosis, LIZZETTE = 1.55 cm2, peak velocity = 2.0 m/s, mean gradient = 9.0 mmHg.      Pulmonary:   Asthma Shortness of breath Lung cancer, someorthopnea /coughing with supine position Asthma:        Physical Exam  General:  Well nourished    Airway/Jaw/Neck:  Airway Findings: Mouth Opening: Normal Tongue: Normal  Mallampati: II       Chest/Lungs:  Chest/Lungs Findings: (occasional crackles bilateral) Decreased Breathe Sounds Left, Expiratory Wheezes, Mild     Heart/Vascular:  Heart Findings: Rate: Normal        Mental Status:  Mental Status Findings:  Cooperative, Alert and Oriented         Anesthesia Plan  Type of Anesthesia, risks & benefits discussed:  Anesthesia Type:  MAC  Patient's Preference:   Intra-op Monitoring Plan:   Intra-op Monitoring Plan Comments:   Post Op Pain Control  Plan:   Post Op Pain Control Plan Comments:   Induction:   IV  Beta Blocker:  Patient is on a Beta-Blocker and has received one dose within the past 24 hours (No further documentation required).       Informed Consent:    ASA Score: 3     Day of Surgery Review of History & Physical:

## 2017-04-13 NOTE — ANESTHESIA POSTPROCEDURE EVALUATION
"Anesthesia Post Evaluation    Patient: Doron Domínguez    Procedure(s) Performed: Procedure(s) (LRB):  INSERTION-PORT (Left)    Final Anesthesia Type: general  Patient location during evaluation: PACU  Patient participation: Yes- Able to Participate  Level of consciousness: awake and alert  Post-procedure vital signs: reviewed and stable  Pain management: adequate  Airway patency: patent  PONV status at discharge: No PONV  Anesthetic complications: no      Cardiovascular status: blood pressure returned to baseline  Respiratory status: unassisted  Hydration status: euvolemic  Follow-up not needed.        Visit Vitals    /79    Pulse 89    Temp 36.7 °C (98 °F) (Temporal)    Resp 19    Ht 5' 5.5" (1.664 m)    Wt 75.7 kg (166 lb 14.2 oz)    SpO2 97%    BMI 27.35 kg/m2       Pain/Feliz Score: Pain Assessment Performed: Yes (4/13/2017  9:13 AM)  Presence of Pain: denies (4/13/2017  2:45 PM)  Feliz Score: 10 (4/13/2017  2:45 PM)      "

## 2017-04-13 NOTE — BRIEF OP NOTE
[facility]  Brief Operative Note     SUMMARY     Surgery Date: 4/13/2017     Surgeon(s) and Role:     * Negrito Hernandez MD - Primary    Assisting Surgeon: None    Pre-op Diagnosis:  Malignant neoplasm of lung, unspecified laterality, unspecified part of lung [C34.90]    Post-op Diagnosis:  Post-Op Diagnosis Codes:     * Malignant neoplasm of lung, unspecified laterality, unspecified part of lung [C34.90]    Procedure(s) (LRB):  INSERTION-PORT (Left) internal jugular vein    Anesthesia: General with 23 ml of .25%marcaine with epi     Description of the findings of the procedure:      Findings/Key Components:      Estimated Blood Loss: 25 mL         Specimens:   Specimen     None          Discharge Note    SUMMARY     Admit Date: 4/13/2017    Discharge Date and Time:  04/13/2017 1:26 PM    Hospital Course (synopsis of major diagnoses, care, treatment, and services provided during the course of the hospital stay): tolerated procedure well     Final Diagnosis: Post-Op Diagnosis Codes:     * Malignant neoplasm of lung, unspecified laterality, unspecified part of lung [C34.90]    Disposition: Home or Self Care    Follow Up/Patient Instructions:     Medications:  Reconciled Home Medications:   Current Discharge Medication List      START taking these medications    Details   hydrocodone-acetaminophen 5-325mg (NORCO) 5-325 mg per tablet Take 1 tablet by mouth every 6 (six) hours as needed for Pain.  Qty: 18 tablet, Refills: 0         CONTINUE these medications which have NOT CHANGED    Details   albuterol 90 mcg/actuation inhaler Inhale 2 puffs into the lungs every 4 (four) hours as needed for Wheezing.  Qty: 3 Inhaler, Refills: 4    Associated Diagnoses: Expiratory wheezing on left side of chest; Bronchitis      albuterol-ipratropium 2.5mg-0.5mg/3mL (DUO-NEB) 0.5 mg-3 mg(2.5 mg base)/3 mL nebulizer solution Take 3 mLs by nebulization every 6 (six) hours.  Qty: 120 vial, Refills: 12    Associated  Diagnoses: Chronic bronchitis, unspecified chronic bronchitis type; Asthma with COPD      aspirin 81 MG Chew Take 81 mg by mouth once daily.      atorvastatin (LIPITOR) 20 MG tablet Take 1 tablet (20 mg total) by mouth every evening.  Qty: 90 tablet, Refills: 4    Associated Diagnoses: Hyperlipidemia, unspecified hyperlipidemia type      cetirizine (ZYRTEC) 10 MG tablet Take 10 mg by mouth as needed for Allergies.      econazole nitrate 1 % cream Apply 1 application topically as needed.      guaifenesin (MUCINEX) 600 mg 12 hr tablet Take 600 mg by mouth 2 (two) times daily.       levoFLOXacin (LEVAQUIN) 500 MG tablet Take 1 tablet (500 mg total) by mouth once daily.  Qty: 10 tablet, Refills: 0    Associated Diagnoses: Bronchitis      metoprolol succinate (TOPROL-XL) 50 MG 24 hr tablet Take 1 tablet (50 mg total) by mouth once daily.  Qty: 30 tablet, Refills: 11    Associated Diagnoses: Essential hypertension      multivitamin with minerals tablet Take 1 tablet by mouth once daily.      potassium gluconate 595 mg (99 mg) Tab Take 1 tablet by mouth once daily.       tamsulosin (FLOMAX) 0.4 mg Cp24 Take 1 capsule (0.4 mg total) by mouth every evening.  Qty: 90 capsule, Refills: 4    Associated Diagnoses: Benign non-nodular prostatic hyperplasia with lower urinary tract symptoms      benzonatate (TESSALON) 200 MG capsule Take 200 mg by mouth 3 (three) times daily as needed for Cough.      dexamethasone (DECADRON) 4 MG Tab Take 5 tablets (20 mg total) by mouth every 6 (six) hours. Take 20 mg 6& 12 hours prior to TAXOL  Qty: 20 tablet, Refills: 0    Associated Diagnoses: Bronchiolo-alveolar adenocarcinoma of left lung      fish oil-omega-3 fatty acids 300-1,000 mg capsule Take 2 g by mouth once daily.      glucosamine-chondroitin 500-400 mg tablet Take 1 tablet by mouth 2 (two) times daily.       inhalation device (AEROCHAMBER PLUS FLOW-VU) Use as directed for inhalation.  Qty: 1 Device, Refills: 0    Comments: Gave in  clinic with instruction for use with albuterol inhaler.             Discharge Procedure Orders  Diet general     Shower on day dressing removed (No bath)     Lifting restrictions     Call MD for:  temperature >100.4     Call MD for:  difficulty breathing, headache or visual disturbances     Call MD for:  redness, tenderness, or signs of infection (pain, swelling, redness, odor or green/yellow discharge around incision site)     Remove dressing in 48 hours       Follow-up Information     Please follow up.    Why:  has appt. with oncology

## 2017-04-13 NOTE — TRANSFER OF CARE
"Anesthesia Transfer of Care Note    Patient: Doron Domínguez    Procedure(s) Performed: Procedure(s) (LRB):  INSERTION-PORT (Left)    Patient location: PACU    Anesthesia Type: general    Transport from OR: Transported from OR on room air with adequate spontaneous ventilation    Post pain: adequate analgesia    Post assessment: no apparent anesthetic complications and tolerated procedure well    Post vital signs: stable    Level of consciousness: sedated    Nausea/Vomiting: no nausea/vomiting    Complications: none          Last vitals:   Visit Vitals    BP (!) 153/76    Pulse 80    Temp 36.6 °C (97.8 °F) (Oral)    Resp 15    Ht 5' 5.5" (1.664 m)    Wt 75.7 kg (166 lb 14.2 oz)    SpO2 (!) 93%    BMI 27.35 kg/m2     "

## 2017-04-17 NOTE — OP NOTE
DATE OF PROCEDURE:  04/13/2017    PREOPERATIVE DIAGNOSIS:  Metastatic lung cancer.    POSTOPERATIVE DIAGNOSIS:  Metastatic lung cancer.    OPERATIVE PROCEDURE:  BardPort PowerPort placement via left internal jugular   vein.    SURGEON:  Negrito Hernandez M.D.    ESTIMATED BLOOD LOSS:  20 mL.    FLUIDS RECEIVED:  700 mL of crystalloid.    ANESTHESIA:  General endotracheal and also approximately 23 mL of 0.25% Marcaine   with epinephrine injected locally.    DESCRIPTION OF PROCEDURE:  After adequate general endotracheal anesthesia   obtained in supine position, the patient's arms were tucked and his chest and   neck were prepped and draped in usual sterile fashion.  Next, an incision was   made inferior to the left clavicle after injecting this area with local   anesthetic, carried through the skin using knife, through subcutaneous tissue   using cautery down to the pectoralis fascia.  Using careful blunt dissection and   cautery, subcutaneous pocket was elevated just inferior to the incision.  The   pocket was made large enough to hold the BardPort well.  The patient was placed   in Trendelenburg and several attempts were made at subclavian venipuncture via   infraclavicular approach.  The vein was accessed a couple of times, but wire   could not be placed through the vein.  Therefore, jugular approach was done.    The patient's head was turned somewhat toward the right.  Using a posterior   approach, needle was placed just posterior to the sternomastoid and the needle   was run deep to the sternomastoid and lateral to the carotid pulse and jugular   venipuncture was obtained.  Wire was then placed through the needle.  The needle   was withdrawn.  The wire was guided under fluoro to the right atrium.  After   this was done, skin and subcutaneous tissue between this venipuncture site and   the port well site infraclavicularly was injected.  Using the tunneler, the   catheter was tunneled from the port pocket site into  the jugular venipuncture   site.  The catheter was cut to what was felt to be more than adequate length.    Dilator and sheath were placed over the wire.  The dilator and wire were   removed.  The catheter was then placed.  It appeared to be in good position.  The   sheath was peeled away and the catheter was checked and it appeared to be at   the junction what appeared to be a very tortuous innominate in the superior vena   cava; however, was up against the sidewall of the vena cava and did not make   the turn.  Did aspirate venous blood and was concerned that may flip or retract   out of the central vein.  Therefore, this catheter was cut and then a guidewire   was placed through the catheter and the old catheter was removed.  After this   was done, a new catheter port was then placed in the port access site and   brought over the tunnel and brought through the old tunnel.  This was cut about   4 cm longer to make sure it may have been curved.  Dilator and sheath were then   placed over the wire again.  Dilator and wire were removed.  A new catheter was   placed and the sheath was peeled away and appeared to be in good position at the   junction of superior vena cava and right atrium.  It did make sort of a   tortuous curve and did not flip up and studded down toward the vena cava.  It   easily aspirated dark venous blood and it was flushed with heparinized saline   solution.  The well was secured to the underlying pectoralis fascia in its   pocket using 0 Vicryl, subcutaneous tissue was closed using running 3-0 Vicryl.    Subcutaneous tissue at the venipuncture site was also closed using 3-0 Vicryl,   and skin was closed in both incisions using 4-0 Vicryl subcuticular.    Steri-Strips were applied, 4 x 4s and tape.  The patient was then awakened in   the Operating Room and taken to Recovery Room in stable condition.      GAETANO  dd: 04/13/2017 17:07:35 (CDT)  td: 04/13/2017 22:20:05 (CDT)  Doc ID   #[id number]  Job  ID #[id number]    CC: [facility]

## 2017-04-18 ENCOUNTER — OFFICE VISIT (OUTPATIENT)
Dept: HEMATOLOGY/ONCOLOGY | Facility: CLINIC | Age: 77
End: 2017-04-18
Payer: MEDICARE

## 2017-04-18 VITALS
HEIGHT: 65 IN | HEART RATE: 80 BPM | RESPIRATION RATE: 18 BRPM | WEIGHT: 169 LBS | SYSTOLIC BLOOD PRESSURE: 122 MMHG | DIASTOLIC BLOOD PRESSURE: 62 MMHG | TEMPERATURE: 97 F | BODY MASS INDEX: 28.16 KG/M2

## 2017-04-18 DIAGNOSIS — Z71.89 ENCOUNTER FOR MEDICATION COUNSELING: ICD-10-CM

## 2017-04-18 DIAGNOSIS — Z79.899 HIGH RISK MEDICATION USE: ICD-10-CM

## 2017-04-18 DIAGNOSIS — C34.92 BRONCHIOLO-ALVEOLAR ADENOCARCINOMA OF LEFT LUNG: Primary | ICD-10-CM

## 2017-04-18 DIAGNOSIS — Z55.9 EDUCATIONAL CIRCUMSTANCE: ICD-10-CM

## 2017-04-18 PROCEDURE — 99215 OFFICE O/P EST HI 40 MIN: CPT | Mod: S$PBB,,, | Performed by: NURSE PRACTITIONER

## 2017-04-18 PROCEDURE — 99999 PR PBB SHADOW E&M-EST. PATIENT-LVL IV: CPT | Mod: PBBFAC,,, | Performed by: NURSE PRACTITIONER

## 2017-04-18 PROCEDURE — 99214 OFFICE O/P EST MOD 30 MIN: CPT | Mod: PBBFAC,PO | Performed by: NURSE PRACTITIONER

## 2017-04-18 SDOH — SOCIAL DETERMINANTS OF HEALTH (SDOH): PROBLEMS RELATED TO EDUCATION AND LITERACY, UNSPECIFIED: Z55.9

## 2017-04-18 NOTE — PROGRESS NOTES
HEMATOLOGY/ONCOLOGY    ONCOLOGIST: ANY Leon MD    CC: Chemotherapy Teaching    DIAGNOSIS: Bronchoalveolar lung cancer    CHEMOTHERAPY:  Carboplatin/taxol weekly X 6 weeks.    77 y/o male accompanied by his wife and daughter for chemotherapy teaching. Pt given the Navigation Notebook. Explained how to use notebook. Discussed with pt and family rationale for chemotherapy, how works, the process of treatment, potential side effects and symptoms to report.     Reviewed specific medications and gave them a handout describing the side effects of each medication. Stressed the importance of taking his dexamethasone prior to Taxol treatment 12 and 6 hours prior.  This was given to him in writing when to take and at what times. Side effects of dexamethasone discussed.     Pt oriented to the unit. Instructed can bring food and entertainment. Discussed role of family members and when they can visit.     Discussed potential side effects such as:  Nausea and vomiting  Myelosuppression  Fatigue  Anorexia  Alopecia  Stomatitis  Diarrhea  Cystitis  Gastritis  Fever > 100.0  Antiemetics instructions  Skin care  Constipation  Rash  Hyperpigmentation  Rash  Photosensitivity  Sunscreen  Small freq meals  Increased protein  Increased calories  Vitamin support   Taste alterations  Neuropathys  Hydration  Renal toxicity  Port management  Community resources  Thrombocytopenia precautions  Hand and foot syndrome- symptoms and self care tips  Importance of monitoring blood sugars if diabetic and reporting elevations or lows    Pt verbalized understanding of the information given.    Verbalizes understanding of contact information during and after clinic hours. Pt listened and asked appropriate questions.     Community and social resources brought to his attention. Time spent with pt and family was 60 minutes face to face with 100% of time spent educating and counseling. Please see further documentation in the pt education flow sheet.      Luly Ann RN, MSN, NP-C

## 2017-04-19 DIAGNOSIS — C78.00 MALIGNANT NEOPLASM METASTATIC TO LUNG, UNSPECIFIED LATERALITY: Primary | ICD-10-CM

## 2017-04-19 RX ORDER — ONDANSETRON 2 MG/ML
8 INJECTION INTRAMUSCULAR; INTRAVENOUS ONCE
Status: CANCELLED
Start: 2017-04-20 | End: 2017-04-19

## 2017-04-20 ENCOUNTER — INFUSION (OUTPATIENT)
Dept: INFUSION THERAPY | Facility: HOSPITAL | Age: 77
End: 2017-04-20
Attending: INTERNAL MEDICINE
Payer: MEDICARE

## 2017-04-20 ENCOUNTER — OFFICE VISIT (OUTPATIENT)
Dept: HEMATOLOGY/ONCOLOGY | Facility: CLINIC | Age: 77
End: 2017-04-20
Payer: MEDICARE

## 2017-04-20 ENCOUNTER — SOCIAL WORK (OUTPATIENT)
Dept: HEMATOLOGY/ONCOLOGY | Facility: CLINIC | Age: 77
End: 2017-04-20

## 2017-04-20 VITALS
HEART RATE: 95 BPM | WEIGHT: 170 LBS | RESPIRATION RATE: 18 BRPM | TEMPERATURE: 98 F | DIASTOLIC BLOOD PRESSURE: 78 MMHG | BODY MASS INDEX: 28.32 KG/M2 | SYSTOLIC BLOOD PRESSURE: 120 MMHG | HEIGHT: 65 IN | OXYGEN SATURATION: 93 %

## 2017-04-20 VITALS
DIASTOLIC BLOOD PRESSURE: 66 MMHG | WEIGHT: 170 LBS | HEIGHT: 66 IN | BODY MASS INDEX: 27.32 KG/M2 | SYSTOLIC BLOOD PRESSURE: 121 MMHG | HEART RATE: 81 BPM

## 2017-04-20 DIAGNOSIS — C34.92 BRONCHIOLO-ALVEOLAR ADENOCARCINOMA OF LEFT LUNG: Primary | ICD-10-CM

## 2017-04-20 DIAGNOSIS — C34.92 BILATERAL LUNG CANCER: ICD-10-CM

## 2017-04-20 DIAGNOSIS — C34.92 BRONCHIOLO-ALVEOLAR ADENOCARCINOMA OF LEFT LUNG: ICD-10-CM

## 2017-04-20 DIAGNOSIS — C34.91 BILATERAL LUNG CANCER: ICD-10-CM

## 2017-04-20 DIAGNOSIS — C78.00 MALIGNANT NEOPLASM METASTATIC TO LUNG, UNSPECIFIED LATERALITY: ICD-10-CM

## 2017-04-20 DIAGNOSIS — C78.00 MALIGNANT NEOPLASM METASTATIC TO LUNG, UNSPECIFIED LATERALITY: Primary | ICD-10-CM

## 2017-04-20 PROCEDURE — 96375 TX/PRO/DX INJ NEW DRUG ADDON: CPT

## 2017-04-20 PROCEDURE — 25000003 PHARM REV CODE 250: Performed by: INTERNAL MEDICINE

## 2017-04-20 PROCEDURE — 63600175 PHARM REV CODE 636 W HCPCS: Performed by: INTERNAL MEDICINE

## 2017-04-20 PROCEDURE — 96415 CHEMO IV INFUSION ADDL HR: CPT

## 2017-04-20 PROCEDURE — 96367 TX/PROPH/DG ADDL SEQ IV INF: CPT

## 2017-04-20 PROCEDURE — 99215 OFFICE O/P EST HI 40 MIN: CPT | Mod: 25,S$PBB,, | Performed by: INTERNAL MEDICINE

## 2017-04-20 PROCEDURE — 96417 CHEMO IV INFUS EACH ADDL SEQ: CPT

## 2017-04-20 PROCEDURE — 99999 PR PBB SHADOW E&M-EST. PATIENT-LVL III: CPT | Mod: PBBFAC,,, | Performed by: INTERNAL MEDICINE

## 2017-04-20 PROCEDURE — 96413 CHEMO IV INFUSION 1 HR: CPT

## 2017-04-20 RX ORDER — HEPARIN 100 UNIT/ML
500 SYRINGE INTRAVENOUS
Status: DISCONTINUED | OUTPATIENT
Start: 2017-04-20 | End: 2017-04-20 | Stop reason: HOSPADM

## 2017-04-20 RX ORDER — ONDANSETRON 4 MG/1
4 TABLET, FILM COATED ORAL EVERY 8 HOURS PRN
Qty: 20 TABLET | Refills: 0 | Status: SHIPPED | OUTPATIENT
Start: 2017-04-20 | End: 2017-06-22 | Stop reason: SDUPTHER

## 2017-04-20 RX ORDER — FAMOTIDINE 20 MG/50ML
20 INJECTION, SOLUTION INTRAVENOUS
Status: COMPLETED | OUTPATIENT
Start: 2017-04-20 | End: 2017-04-20

## 2017-04-20 RX ORDER — PROCHLORPERAZINE MALEATE 5 MG
5 TABLET ORAL EVERY 6 HOURS PRN
Qty: 20 TABLET | Refills: 4 | Status: ON HOLD | OUTPATIENT
Start: 2017-04-20 | End: 2017-08-02 | Stop reason: CLARIF

## 2017-04-20 RX ORDER — ONDANSETRON 2 MG/ML
8 INJECTION INTRAMUSCULAR; INTRAVENOUS ONCE
Status: COMPLETED | OUTPATIENT
Start: 2017-04-20 | End: 2017-04-20

## 2017-04-20 RX ADMIN — HEPARIN 500 UNITS: 100 SYRINGE at 01:04

## 2017-04-20 RX ADMIN — PACLITAXEL 84 MG: 6 INJECTION, SOLUTION INTRAVENOUS at 11:04

## 2017-04-20 RX ADMIN — ONDANSETRON 8 MG: 2 INJECTION INTRAMUSCULAR; INTRAVENOUS at 09:04

## 2017-04-20 RX ADMIN — CARBOPLATIN 170 MG: 10 INJECTION, SOLUTION INTRAVENOUS at 01:04

## 2017-04-20 RX ADMIN — SODIUM CHLORIDE: 0.9 INJECTION, SOLUTION INTRAVENOUS at 09:04

## 2017-04-20 RX ADMIN — DIPHENHYDRAMINE HYDROCHLORIDE 50 MG: 50 INJECTION, SOLUTION INTRAMUSCULAR; INTRAVENOUS at 10:04

## 2017-04-20 RX ADMIN — FAMOTIDINE 20 MG: 20 INJECTION, SOLUTION INTRAVENOUS at 09:04

## 2017-04-20 RX ADMIN — DEXAMETHASONE SODIUM PHOSPHATE 20 MG: 4 INJECTION, SOLUTION INTRAMUSCULAR; INTRAVENOUS at 10:04

## 2017-04-20 NOTE — PROGRESS NOTES
Met with pt briefly at [facility] at pt's request to refer him to [facility]. Pt is concerned about nutrition and weight loss. Pt asked if he could have chocolate flavor and the infusion nurse said he should be able to have it as long as he does not have any acid reflux from it. SW asked if he had any other immediate concerns or needs at this time at which he responded no. SW provided pt with her contact information and explained she would meet with the pt next week at [facility] for a more in-depth discussion about needs and services available to him. He thanked the SW and verbalized understanding. SW faxed his referral to [facility] today.

## 2017-04-20 NOTE — PROGRESS NOTES
Subjective:       Patient ID: Doron Domínguez is a 76 y.o. male.    Chief Complaint: Follow-up; Results; Chemotherapy; and Lung Cancer    HPI 76-year-old male history of metastatic non-small cell lung carcinoma.  Patient returns to start cycle 1 day 1 of carboplatinum Taxol.  No actionable mutation present    Past Medical History:   Diagnosis Date    Aortic stenosis 12/29/2016    Arthritis     Asthma     Back pain     due to calcium deposits in back    BPH (benign prostatic hyperplasia)     CAD (coronary artery disease)     CAD, multiple vessel 12/29/2016    Cancer     lung    High cholesterol     Hx of CABG 12/29/2016    Hypertension     Lung cancer 01/2017    T3 N0 M0 bronchoalveolar     Family History   Problem Relation Age of Onset    Diabetes Mother     Diabetes Sister     Diabetes Brother      Social History     Social History    Marital status:      Spouse name: N/A    Number of children: N/A    Years of education: 3     Occupational History    Part-time employed      Social History Main Topics    Smoking status: Never Smoker    Smokeless tobacco: Never Used    Alcohol use No    Drug use: No    Sexual activity: No     Other Topics Concern    Not on file     Social History Narrative    Part-time employed, , 3 children.     Past Surgical History:   Procedure Laterality Date    APPENDECTOMY      CARDIAC SURGERY      CORONARY ARTERY BYPASS GRAFT  2012    CABG x 3 at [facility]    LUNG LOBECTOMY Left 01/10/2017    Left lower lobe       Labs:  Lab Results   Component Value Date    WBC 9.24 04/12/2017    HGB 14.9 04/12/2017    HCT 45.3 04/12/2017    MCV 93 04/12/2017     04/12/2017     BMP  Lab Results   Component Value Date     04/12/2017    K 4.6 04/12/2017     04/12/2017    CO2 26 04/12/2017    BUN 17 04/12/2017    CREATININE 1.1 04/12/2017    CALCIUM 10.1 04/12/2017    ANIONGAP 8 04/12/2017    ESTGFRAFRICA >60 04/12/2017    EGFRNONAA >60 04/12/2017     Lab  Results   Component Value Date    ALT 24 04/12/2017    AST 30 04/12/2017    ALKPHOS 86 04/12/2017    BILITOT 0.7 04/12/2017       No results found for: IRON, TIBC, FERRITIN, SATURATEDIRO  No results found for: LUCHIQRX10  No results found for: FOLATE  No results found for: TSH      Review of Systems   Constitutional: Negative for activity change, appetite change, chills, diaphoresis, fatigue, fever and unexpected weight change.   HENT: Negative for congestion, dental problem, drooling, ear discharge, ear pain, facial swelling, hearing loss, mouth sores, nosebleeds, postnasal drip, rhinorrhea, sinus pressure, sneezing, sore throat, tinnitus, trouble swallowing and voice change.    Eyes: Negative for photophobia, pain, discharge, redness, itching and visual disturbance.   Respiratory: Positive for cough and shortness of breath. Negative for apnea, choking, chest tightness, wheezing and stridor.    Cardiovascular: Negative for chest pain, palpitations and leg swelling.   Gastrointestinal: Negative for abdominal distention, abdominal pain, anal bleeding, blood in stool, constipation, diarrhea, nausea, rectal pain and vomiting.   Endocrine: Negative for cold intolerance, heat intolerance, polydipsia, polyphagia and polyuria.   Genitourinary: Negative for decreased urine volume, difficulty urinating, discharge, dysuria, enuresis, flank pain, frequency, genital sores, hematuria, penile pain, penile swelling, scrotal swelling, testicular pain and urgency.   Musculoskeletal: Positive for back pain. Negative for arthralgias, gait problem, joint swelling, myalgias, neck pain and neck stiffness.   Skin: Negative for color change, pallor, rash and wound.   Allergic/Immunologic: Negative for environmental allergies, food allergies and immunocompromised state.   Neurological: Negative for dizziness, tremors, seizures, syncope, facial asymmetry, speech difficulty, weakness, light-headedness, numbness and headaches.   Hematological:  Negative for adenopathy. Does not bruise/bleed easily.   Psychiatric/Behavioral: Negative for agitation, behavioral problems, confusion, decreased concentration, dysphoric mood, hallucinations, self-injury, sleep disturbance and suicidal ideas. The patient is nervous/anxious. The patient is not hyperactive.        Objective:      Physical Exam   Constitutional: He is oriented to person, place, and time. He appears well-developed. He has a sickly appearance. He appears ill. He appears distressed.   HENT:   Head: Normocephalic.   Right Ear: External ear normal.   Left Ear: External ear normal.   Nose: Nose normal. Right sinus exhibits no maxillary sinus tenderness and no frontal sinus tenderness. Left sinus exhibits no maxillary sinus tenderness and no frontal sinus tenderness.   Mouth/Throat: Oropharynx is clear and moist. No oropharyngeal exudate.   Eyes: EOM and lids are normal. Pupils are equal, round, and reactive to light. Right eye exhibits no discharge. Left eye exhibits no discharge. Right conjunctiva is not injected. Right conjunctiva has no hemorrhage. Left conjunctiva is not injected. Left conjunctiva has no hemorrhage. No scleral icterus. Right eye exhibits normal extraocular motion. Left eye exhibits normal extraocular motion.   Neck: Normal range of motion. Neck supple. No JVD present. No tracheal deviation present. No thyromegaly present.   Cardiovascular: Normal rate and regular rhythm.    Pulmonary/Chest: Effort normal. No stridor. No respiratory distress.   Abdominal: Soft. He exhibits no mass. There is no hepatosplenomegaly, splenomegaly or hepatomegaly. There is no tenderness.   Musculoskeletal: Normal range of motion. He exhibits no edema or tenderness.   Lymphadenopathy:        Head (right side): No posterior auricular and no occipital adenopathy present.        Head (left side): No posterior auricular and no occipital adenopathy present.     He has no cervical adenopathy.        Right cervical:  No superficial cervical, no deep cervical and no posterior cervical adenopathy present.       Left cervical: No superficial cervical, no deep cervical and no posterior cervical adenopathy present.     He has no axillary adenopathy.        Right: No supraclavicular adenopathy present.        Left: No supraclavicular adenopathy present.   Neurological: He is alert and oriented to person, place, and time. He has normal strength. No cranial nerve deficit. Coordination normal.   Skin: Skin is dry. No rash noted. He is not diaphoretic. No cyanosis or erythema. Nails show no clubbing.   Psychiatric: His behavior is normal. Judgment and thought content normal. His mood appears anxious. Cognition and memory are normal. He exhibits a depressed mood.   Vitals reviewed.          Assessment:       1. Bronchiolo-alveolar adenocarcinoma of left lung    2. Malignant neoplasm metastatic to lung, unspecified laterality    3. Bilateral lung cancer            Plan:     patient has had instructions for chemotherapy will began weekly carboplatinum and Taxol patient has metastatic non-small cell lung carcinoma to start palliative chemotherapy risk complications of chemotherapy explained including the potential for hair loss nausea vomiting pancytopenia mucositis consent form signed chemotherapy orders written return in one week with CBC CMP

## 2017-04-20 NOTE — MR AVS SNAPSHOT
Patient Information     Patient Name Sex     Doron Domínguez Male 1940      Visit Information        Provider Department Dept Phone Center    2017 8:30 AM [facility] [phone] [facility]      Patient Instructions      [facility]  [address]  [address] [address]  [phone] phone     [phone] fax  Hours of Operation: Monday- Friday 8:00am- 5:00pm  After hours phone  [phone]  Hematology / Oncology Physicians on call      Dr. Edward Holloway    Please call with any concerns regarding your appointment today.    FALL PREVENTION   Falls often occur due to slipping, tripping or losing your balance. Here are ways to reduce your risk of falling again.   Was there anything that caused your fall that can be fixed, removed or replaced?   Make your home safe by keeping walkways clear of objects you may trip over.   Use non-slip pads under rugs.   Do not walk in poorly lit areas.   Do not stand on chairs or wobbly ladders.   Use caution when reaching overhead or looking upward. This position can cause a loss of balance.   Be sure your shoes fit properly, have non-slip bottoms and are in good condition.   Be cautious when going up and down stairs, curbs, and when walking on uneven sidewalks.   If your balance is poor, consider using a cane or walker.   If your fall was related to alcohol use, stop or limit alcohol intake.   If your fall was related to use of sleeping medicines, talk to your doctor about this. You may need to reduce your dosage at bedtime if you awaken during the night to go to the bathroom.   To reduce the need for nighttime bathroom trips:   Avoid drinking fluids for several hours before going to bed   Empty your bladder before going to bed   Men can keep a urinal at the bedside   © 1319-0626 Dariela Owusu, [address] [place] [address], [place] [address]. All rights  reserved. This information is not intended as a substitute for professional medical care. Always follow your healthcare professional's instructions.    HOME CARE AFTER CHEMOTHERAPY   Meals   Many patients feel sick and lose their appetites during treatment. Eat small meals several times a day. Choose bland foods with little taste or smell if you have problems with nausea. Be sure to cook all food thoroughly. This kills bacteria and helps you avoid intestinal infection. Soft foods are easier to swallow and digest.   Activity   Exercise keeps you strong and keeps your heart and lungs active. Talk to your doctor about an appropriate exercise program for you.   Skin Care   To prevent a skin infection, bathe or shower once a day. Use a moisturizing soap and wash with warm water. Avoid very hot or cold water. Chemotherapy can make your skin dry . Apply moisturizing lotion to help relieve dry skin. Some drugs used in high doses can cause slight burns to appear (like sunburn). Ask for a special cream to help relieve the burn and protect your skin.   Prevent Mouth Sores   During chemotherapy, many people get mouth sores. Do the following to help prevent mouth sores or to ease discomfort.   Brush your teeth with a soft-bristle toothbrush after every meal.  Don't use dental floss if your platelet count is below 50,000. Your doctor or nurse will tell you if this is the case.  Use an oral swab or special soft toothbrush if your gums bleed during regular brushing.  Use mouthwash as directed. If you can't tolerate commercial mouthwash, use salt and baking soda to clean your mouth. Mix 1 teaspoon of salt and 1 teaspoon of baking soda into a glass of water. Swish and spit.  Call your doctor or return to this facility if you develop any of the following:   Sore throat   White patches in the mouth or throat   Fever of 100.4ºF (38ºC) or higher, or as directed by your healthcare provider  © 9585-4377 Dariela Owusu, [address] [place] [address]  Road, [place] [address]. All rights reserved. This information is not intended as a substitute for professional medical care. Always follow your healthcare professional's     YOU HAVE STARTED ON CHEMOTHERAPY. IF YOU EXPERIENCE ANY OF THE FOLLOWING PROBLEMS, CALL THE OFFICE IMMEDIATELY.    *FEVER .0 OR GREATER    *CHILLS, ESPECIALLY SHAKING CHILLS, OR SWEATING    *A SEVERE COUGH OR SORE THROAT, OR SINUS PAIN/     PRESSURE    *REDNESS, SWELLING, OR TENDERNESS AROUND A WOUND,     SORE, PIMPLE, RECTAL AREA, OR IV SITE    *SORES OR ULCERS IN THE MOUTH    *BLISTERS ON THE LIPS OR SKIN    *FREQUENT URGENCY TO URINATE OR A BURNING FEELING   WHEN YOU URINATE    *BLOOD IN THE URINE OR STOOL    *ANY UNEXPLAINED BRUISING OR PROLONGED BLEEDING,     (NOSEBLEEDS OR BLEEDING GUMS)    *LOOSE BOWEL MOVEMENTS THAT DO NOT RESPOND TO     IMODIUM OR MORE THAN THREE TIMES A DAY    *VOMITING UNRESPONSIVE TO ANTINAUSEA MEDICINE    *ANY UNUSUAL PHYSICAL SYMPTOMS THAT BEGAN AFTER     CHEMOTHERAPY    DURING WEEKDAYS, CALL AND ASK TO SPEAK DIRECTLY TO A NURSE.  AT OTHER TIMES, CALL THE OFFICE PHONE NUMBER; THE ANSWERING SERVICE WILL CONTACT THE ON-CALL PHYSICIAN.  SOMEONE IS AVAILABLE 24 HOURS A DAY, SEVEN DAYS A WEEK.       Your Current Medications Are     albuterol 90 mcg/actuation inhaler    albuterol-ipratropium 2.5mg-0.5mg/3mL (DUO-NEB) 0.5 mg-3 mg(2.5 mg base)/3 mL nebulizer solution    aspirin 81 MG Chew    atorvastatin (LIPITOR) 20 MG tablet    dexamethasone (DECADRON) 4 MG Tab    fish oil-omega-3 fatty acids 300-1,000 mg capsule    glucosamine-chondroitin 500-400 mg tablet    guaifenesin (MUCINEX) 600 mg 12 hr tablet    hydrocodone-acetaminophen 5-325mg (NORCO) 5-325 mg per tablet    inhalation device (AEROCHAMBER PLUS FLOW-VU)    metoprolol succinate (TOPROL-XL) 50 MG 24 hr tablet    multivitamin with minerals tablet    potassium gluconate 595 mg (99 mg) Tab    tamsulosin (FLOMAX) 0.4 mg Cp24    benzonatate (TESSALON) 200 MG  capsule    cetirizine (ZYRTEC) 10 MG tablet    econazole nitrate 1 % cream    levoFLOXacin (LEVAQUIN) 500 MG tablet    ondansetron (ZOFRAN) 4 MG tablet    prochlorperazine (COMPAZINE) 5 MG tablet      Facility-Administered Medications     carboplatin (PARAPLATIN) 170 mg in sodium chloride 0.9% 267 mL chemo infusion    dexamethasone (DECADRON) 20 mg in sodium chloride 0.9% 50 mL IVPB    diphenhydrAMINE (BENADRYL) 50 mg in sodium chloride 0.9% 50 mL IVPB    famotidine IVPB 20 mg    heparin, porcine (PF) 100 unit/mL injection flush 500 Units    ondansetron injection 8 mg    paclitaxel (TAXOL) 45 mg/m2 = 84 mg in sodium chloride 0.9% 264 mL chemo infusion    sodium chloride 0.9% 250 mL flush bag      Appointments for Next Year     4/25/2017 11:00 AM FASTING LAB (10 min.) [facility], [facility]    1. Do not eat or drink anything for TEN HOURS (10) PRIOR TO TEST. Do not chew gum or eat candy mints, even those claiming to be sugar free. Water is allowed but do not drink any other fluids 2. Take your regular daily medicines as your doctor has ordered. If you are diabetic, do not take your insulin or other diabetic medication until your blood is drawn and you are ready to eat. Your physician may have special instructions for diabetics. Check with your doctor if you have any questions.3. Alcoholic beverages are not allowed starting at 6:00pm the evening before your appointment.    4/27/2017  9:35 AM NON FASTING LAB (5 min.) [facility]    Arrive at check-in approximately 15 minutes before your scheduled appointment time. Bring all outside medical records and imaging, along with a list of your current medications and insurance card.    (off [facility]) 2nd floor    4/27/2017 10:00 AM ESTABLISHED PATIENT (20 min.) [facility] Hemotology Oncology Shant Leon MD    Arrive at check-in approximately 15 minutes before your scheduled appointment time. Bring all  outside medical records and imaging, along with a list of your current medications and insurance card.    (off [address]) 3rd Floor    4/27/2017 10:30 AM INFUSION 210 MIN (210 min.) [facility]    Arrive at check-in approximately 15 minutes before your scheduled appointment time. Bring all outside medical records and imaging, along with a list of your current medications and insurance card.    5/2/2017 10:00 AM ESTABLISHED ADULT PATIENT (20 min.) [facility] - Internal Medicine SVITLANA Art Jr., MD    Arrive at check-in approximately 15 minutes before your scheduled appointment time. Bring all outside medical records and imaging, along with a list of your current medications and insurance card.    (off [address]) 1st floor    7/24/2017  1:15 PM NON FASTING LAB (5 min.) [facility]    Arrive at check-in approximately 15 minutes before your scheduled appointment time. Bring all outside medical records and imaging, along with a list of your current medications and insurance card.    (off [address]) 2nd floor    7/24/2017  2:10 PM CT CHEST W CONTRAST (20 min.) [facility] CT1 LIMIT 500 LBS    Please fast for 4 hours prior to appointment. Arrive at check-in approximately 30 minutes before your scheduled appointment time. Bring all outside medical records and imaging, along with a list of your current medications and insurance card.    (off [address]) 2nd Floor    7/24/2017  2:30 PM COMPLETE PFT (40 min.) [facility] Pulmonary Function [facility]    Please do not use any inhalers or respiratory medications on the day of your appointment, however if you feel like you need to use your rescue inhaler for urgent relief prior to your appointment please do so.    (off [address]) 3rd floor    7/24/2017  3:20 PM ESTABLISHED PATIENT (20 min.) [facility] - Pulmonary Services Dante Massey MD    Arrive at  "check-in approximately 15 minutes before your scheduled appointment time. Bring all outside medical records and imaging, along with a list of your current medications and insurance card.    (off [employer]) 3th floor    7/24/2017  4:20 PM ESTABLISHED PATIENT (20 min.) [facility] Hemotology Oncology Shant Leon MD    Arrive at check-in approximately 15 minutes before your scheduled appointment time. Bring all outside medical records and imaging, along with a list of your current medications and insurance card.    (off [employer]) 3rd Floor         Default Flowsheet Data (last 24 hours)      Amb Complex Vitals John        04/20/17 1301 04/20/17 1139 04/20/17 1126 04/20/17 1104 04/20/17 0900    Measurements    Weight     77.1 kg (169 lb 15.6 oz)    Height     5' 5.5" (1.664 m)    BSA (Calculated - sq m)     1.89 sq meters    BMI (Calculated)     27.9    /62 (!)  106/58 108/60 124/73     Pulse 72 71 68 83     Pain Assessment    Pain Score     Zero       04/20/17 0817                Measurements    Weight 77.1 kg (169 lb 15.6 oz)        Height 5' 5" (1.651 m)        BSA (Calculated - sq m) 1.88 sq meters        BMI (Calculated) 28.3        /78        Temp 97.8 °F (36.6 °C)        Pulse 95        Resp 18        SpO2 (!)  93 %        Pain Assessment    Pain Score Zero                Allergies     Bactrim [Sulfamethoxazole-trimethoprim] Other (See Comments)    Severe leg cramps, dizziness    Dilaudid [Hydromorphone] Other (See Comments)    Confusion, paranoia    Sulfa (Sulfonamide Antibiotics)     Severe leg cramps, dizziness.      Medications You Received from 04/19/2017 1308 to 04/20/2017 1308        Date/Time Order Dose Route Action     04/20/2017 1301 carboplatin (PARAPLATIN) 170 mg in sodium chloride 0.9% 267 mL chemo infusion 170 mg Intravenous New Bag     04/20/2017 1012 dexamethasone (DECADRON) 20 mg in sodium chloride 0.9% 50 mL IVPB 20 mg Intravenous New Bag     04/20/2017 1035 " diphenhydrAMINE (BENADRYL) 50 mg in sodium chloride 0.9% 50 mL IVPB 50 mg Intravenous New Bag     04/20/2017 0954 famotidine IVPB 20 mg 20 mg Intravenous New Bag     04/20/2017 0935 ondansetron injection 8 mg 8 mg Intravenous Given     04/20/2017 1108 paclitaxel (TAXOL) 45 mg/m2 = 84 mg in sodium chloride 0.9% 264 mL chemo infusion 84 mg Intravenous New Bag     04/20/2017 0920 sodium chloride 0.9% 250 mL flush bag   Intravenous New Bag      Current Discharge Medication List     Cannot display discharge medications since this is not an admission.

## 2017-04-20 NOTE — PATIENT INSTRUCTIONS
[facility]  [address]  [address] [facility] [address]  [phone] phone     [phone] fax  Hours of Operation: Monday- Friday 8:00am- 5:00pm  After hours phone  [phone]  Hematology / Oncology Physicians on call      Dr. Edward Holloway    Please call with any concerns regarding your appointment today.    FALL PREVENTION   Falls often occur due to slipping, tripping or losing your balance. Here are ways to reduce your risk of falling again.   Was there anything that caused your fall that can be fixed, removed or replaced?   Make your home safe by keeping walkways clear of objects you may trip over.   Use non-slip pads under rugs.   Do not walk in poorly lit areas.   Do not stand on chairs or wobbly ladders.   Use caution when reaching overhead or looking upward. This position can cause a loss of balance.   Be sure your shoes fit properly, have non-slip bottoms and are in good condition.   Be cautious when going up and down stairs, curbs, and when walking on uneven sidewalks.   If your balance is poor, consider using a cane or walker.   If your fall was related to alcohol use, stop or limit alcohol intake.   If your fall was related to use of sleeping medicines, talk to your doctor about this. You may need to reduce your dosage at bedtime if you awaken during the night to go to the bathroom.   To reduce the need for nighttime bathroom trips:   Avoid drinking fluids for several hours before going to bed   Empty your bladder before going to bed   Men can keep a urinal at the bedside   © 4424-7587 Dariela Owusu, [address] [place] [address]. All rights reserved. This information is not intended as a substitute for professional medical care. Always follow your healthcare professional's instructions.    HOME CARE AFTER CHEMOTHERAPY   Meals   Many patients feel sick and lose their appetites during treatment. Eat small meals  several times a day. Choose bland foods with little taste or smell if you have problems with nausea. Be sure to cook all food thoroughly. This kills bacteria and helps you avoid intestinal infection. Soft foods are easier to swallow and digest.   Activity   Exercise keeps you strong and keeps your heart and lungs active. Talk to your doctor about an appropriate exercise program for you.   Skin Care   To prevent a skin infection, bathe or shower once a day. Use a moisturizing soap and wash with warm water. Avoid very hot or cold water. Chemotherapy can make your skin dry . Apply moisturizing lotion to help relieve dry skin. Some drugs used in high doses can cause slight burns to appear (like sunburn). Ask for a special cream to help relieve the burn and protect your skin.   Prevent Mouth Sores   During chemotherapy, many people get mouth sores. Do the following to help prevent mouth sores or to ease discomfort.   Brush your teeth with a soft-bristle toothbrush after every meal.  Don't use dental floss if your platelet count is below 50,000. Your doctor or nurse will tell you if this is the case.  Use an oral swab or special soft toothbrush if your gums bleed during regular brushing.  Use mouthwash as directed. If you can't tolerate commercial mouthwash, use salt and baking soda to clean your mouth. Mix 1 teaspoon of salt and 1 teaspoon of baking soda into a glass of water. Swish and spit.  Call your doctor or return to this facility if you develop any of the following:   Sore throat   White patches in the mouth or throat   Fever of 100.4ºF (38ºC) or higher, or as directed by your healthcare provider  © 4050-1313 Dariela Owusu, [address] [place] [address], [place] [address]. All rights reserved. This information is not intended as a substitute for professional medical care. Always follow your healthcare professional's     YOU HAVE STARTED ON CHEMOTHERAPY. IF YOU EXPERIENCE ANY OF THE FOLLOWING PROBLEMS, CALL THE OFFICE  IMMEDIATELY.    *FEVER .0 OR GREATER    *CHILLS, ESPECIALLY SHAKING CHILLS, OR SWEATING    *A SEVERE COUGH OR SORE THROAT, OR SINUS PAIN/     PRESSURE    *REDNESS, SWELLING, OR TENDERNESS AROUND A WOUND,     SORE, PIMPLE, RECTAL AREA, OR IV SITE    *SORES OR ULCERS IN THE MOUTH    *BLISTERS ON THE LIPS OR SKIN    *FREQUENT URGENCY TO URINATE OR A BURNING FEELING   WHEN YOU URINATE    *BLOOD IN THE URINE OR STOOL    *ANY UNEXPLAINED BRUISING OR PROLONGED BLEEDING,     (NOSEBLEEDS OR BLEEDING GUMS)    *LOOSE BOWEL MOVEMENTS THAT DO NOT RESPOND TO     IMODIUM OR MORE THAN THREE TIMES A DAY    *VOMITING UNRESPONSIVE TO ANTINAUSEA MEDICINE    *ANY UNUSUAL PHYSICAL SYMPTOMS THAT BEGAN AFTER     CHEMOTHERAPY    DURING WEEKDAYS, CALL AND ASK TO SPEAK DIRECTLY TO A NURSE.  AT OTHER TIMES, CALL THE OFFICE PHONE NUMBER; THE ANSWERING SERVICE WILL CONTACT THE ON-CALL PHYSICIAN.  SOMEONE IS AVAILABLE 24 HOURS A DAY, SEVEN DAYS A WEEK.

## 2017-04-20 NOTE — PLAN OF CARE
Problem: Patient Care Overview  Goal: Plan of Care Review  Outcome: Ongoing (interventions implemented as appropriate)  Pt states that he is ready to get started with his treatment today.  States that he had teaching with [facility] and does not have any questions at this time.

## 2017-04-27 ENCOUNTER — INFUSION (OUTPATIENT)
Dept: INFUSION THERAPY | Facility: HOSPITAL | Age: 77
End: 2017-04-27
Attending: INTERNAL MEDICINE
Payer: MEDICARE

## 2017-04-27 ENCOUNTER — SOCIAL WORK (OUTPATIENT)
Dept: HEMATOLOGY/ONCOLOGY | Facility: CLINIC | Age: 77
End: 2017-04-27

## 2017-04-27 ENCOUNTER — OFFICE VISIT (OUTPATIENT)
Dept: HEMATOLOGY/ONCOLOGY | Facility: CLINIC | Age: 77
End: 2017-04-27
Payer: MEDICARE

## 2017-04-27 VITALS
SYSTOLIC BLOOD PRESSURE: 109 MMHG | WEIGHT: 168 LBS | HEIGHT: 66 IN | HEART RATE: 67 BPM | BODY MASS INDEX: 27 KG/M2 | DIASTOLIC BLOOD PRESSURE: 62 MMHG

## 2017-04-27 VITALS
TEMPERATURE: 98 F | SYSTOLIC BLOOD PRESSURE: 124 MMHG | BODY MASS INDEX: 27.07 KG/M2 | OXYGEN SATURATION: 94 % | WEIGHT: 168.44 LBS | HEART RATE: 85 BPM | DIASTOLIC BLOOD PRESSURE: 80 MMHG | RESPIRATION RATE: 18 BRPM | HEIGHT: 66 IN

## 2017-04-27 DIAGNOSIS — J98.09 BRONCHORRHEA: ICD-10-CM

## 2017-04-27 DIAGNOSIS — C34.92 BILATERAL LUNG CANCER: ICD-10-CM

## 2017-04-27 DIAGNOSIS — C34.92 BRONCHIOLO-ALVEOLAR ADENOCARCINOMA OF LEFT LUNG: ICD-10-CM

## 2017-04-27 DIAGNOSIS — C78.00 MALIGNANT NEOPLASM METASTATIC TO LUNG, UNSPECIFIED LATERALITY: ICD-10-CM

## 2017-04-27 DIAGNOSIS — C34.91 BILATERAL LUNG CANCER: ICD-10-CM

## 2017-04-27 DIAGNOSIS — C34.92 BRONCHIOLO-ALVEOLAR ADENOCARCINOMA OF LEFT LUNG: Primary | ICD-10-CM

## 2017-04-27 DIAGNOSIS — C78.00 MALIGNANT NEOPLASM METASTATIC TO LUNG, UNSPECIFIED LATERALITY: Primary | ICD-10-CM

## 2017-04-27 PROCEDURE — 25000003 PHARM REV CODE 250: Mod: PO | Performed by: INTERNAL MEDICINE

## 2017-04-27 PROCEDURE — 96368 THER/DIAG CONCURRENT INF: CPT | Mod: PO

## 2017-04-27 PROCEDURE — 96413 CHEMO IV INFUSION 1 HR: CPT | Mod: PO

## 2017-04-27 PROCEDURE — 96417 CHEMO IV INFUS EACH ADDL SEQ: CPT | Mod: PO

## 2017-04-27 PROCEDURE — 99999 PR PBB SHADOW E&M-EST. PATIENT-LVL III: CPT | Mod: PBBFAC,,, | Performed by: INTERNAL MEDICINE

## 2017-04-27 PROCEDURE — 99215 OFFICE O/P EST HI 40 MIN: CPT | Mod: 25,S$PBB,, | Performed by: INTERNAL MEDICINE

## 2017-04-27 PROCEDURE — 96415 CHEMO IV INFUSION ADDL HR: CPT | Mod: PO

## 2017-04-27 PROCEDURE — 63600175 PHARM REV CODE 636 W HCPCS: Mod: PO | Performed by: INTERNAL MEDICINE

## 2017-04-27 PROCEDURE — 96367 TX/PROPH/DG ADDL SEQ IV INF: CPT | Mod: PO

## 2017-04-27 RX ORDER — HEPARIN 100 UNIT/ML
500 SYRINGE INTRAVENOUS
Status: CANCELLED | OUTPATIENT
Start: 2017-04-27

## 2017-04-27 RX ORDER — HEPARIN 100 UNIT/ML
500 SYRINGE INTRAVENOUS
Status: DISCONTINUED | OUTPATIENT
Start: 2017-04-27 | End: 2017-04-27 | Stop reason: HOSPADM

## 2017-04-27 RX ORDER — FAMOTIDINE 10 MG/ML
20 INJECTION INTRAVENOUS
Status: DISCONTINUED | OUTPATIENT
Start: 2017-04-27 | End: 2017-04-27

## 2017-04-27 RX ORDER — FAMOTIDINE 20 MG/50ML
20 INJECTION, SOLUTION INTRAVENOUS
Status: COMPLETED | OUTPATIENT
Start: 2017-04-27 | End: 2017-04-27

## 2017-04-27 RX ORDER — EPINEPHRINE 0.3 MG/.3ML
0.3 INJECTION SUBCUTANEOUS ONCE AS NEEDED
Status: CANCELLED | OUTPATIENT
Start: 2017-04-27 | End: 2017-04-27

## 2017-04-27 RX ORDER — ONDANSETRON 2 MG/ML
8 INJECTION INTRAMUSCULAR; INTRAVENOUS ONCE
Status: DISCONTINUED | OUTPATIENT
Start: 2017-04-27 | End: 2017-04-27

## 2017-04-27 RX ORDER — DIPHENHYDRAMINE HYDROCHLORIDE 50 MG/ML
50 INJECTION INTRAMUSCULAR; INTRAVENOUS ONCE AS NEEDED
Status: CANCELLED | OUTPATIENT
Start: 2017-04-27 | End: 2017-04-27

## 2017-04-27 RX ORDER — FAMOTIDINE 10 MG/ML
20 INJECTION INTRAVENOUS
Status: CANCELLED | OUTPATIENT
Start: 2017-04-27

## 2017-04-27 RX ORDER — SODIUM CHLORIDE 0.9 % (FLUSH) 0.9 %
10 SYRINGE (ML) INJECTION
Status: CANCELLED | OUTPATIENT
Start: 2017-04-27

## 2017-04-27 RX ORDER — ONDANSETRON 2 MG/ML
8 INJECTION INTRAMUSCULAR; INTRAVENOUS ONCE
Status: CANCELLED
Start: 2017-04-27 | End: 2017-04-27

## 2017-04-27 RX ADMIN — PACLITAXEL 84 MG: 6 INJECTION, SOLUTION, CONCENTRATE INTRAVENOUS at 11:04

## 2017-04-27 RX ADMIN — DIPHENHYDRAMINE HYDROCHLORIDE 50 MG: 50 INJECTION INTRAMUSCULAR; INTRAVENOUS at 10:04

## 2017-04-27 RX ADMIN — FAMOTIDINE 20 MG: 20 INJECTION, SOLUTION INTRAVENOUS at 10:04

## 2017-04-27 RX ADMIN — CARBOPLATIN 170 MG: 10 INJECTION, SOLUTION INTRAVENOUS at 01:04

## 2017-04-27 RX ADMIN — HEPARIN 500 UNITS: 100 SYRINGE at 01:04

## 2017-04-27 RX ADMIN — DEXAMETHASONE SODIUM PHOSPHATE: 4 INJECTION, SOLUTION INTRA-ARTICULAR; INTRALESIONAL; INTRAMUSCULAR; INTRAVENOUS; SOFT TISSUE at 11:04

## 2017-04-27 NOTE — PROGRESS NOTES
Oncology Social Work New Patient Assessment     Demographic Information   Name:Doron Domínguez   MRN: [id number]  SSN:    : 1940 Age: 76 y.o. Sex: male   Race: White Marital status:         Home address: [address] [place] [address]    Mailing address: same      Patient Contact Information:   [phone] (home)    No e-mail address on record    Emergency Contact Information/Next of kin:  Extended Emergency Contact Information  Primary Emergency Contact: Anna Domínguez  Address: [address] [place] [address] [facility]  Home Phone: [phone]  Mobile Phone: [phone]  Relation: Spouse    Additional contacts: none    Medical Information   PCP: SHANEKA Art Jr, MD Primary oncologist: Shant Leon MD     Patient Active Problem List   Diagnosis    Aortic stenosis    CAD, multiple vessel    Hx of CABG    Shortness of breath    Bronchiolo-alveolar adenocarcinoma of left lung    Benign non-nodular prostatic hyperplasia with lower urinary tract symptoms    Chronic midline low back pain without sciatica    Overweight (BMI 25.0-29.9)    Secondary lung cancer    Bilateral lung cancer    Bronchorrhea       Date of cancer diagnosis: 17  Treatment start date: 17   New Diagnosis (y/n): yes Previous diagnosis/treatment: none   Current treatment meds: carbo/taxol Treatment frequency: weekly   Radiation Treatment: none at this time Radiation Provider: n/a   Surgery: yes, lobectomy previously done      Preferred Pharmacy:   [facility] - [place], [place] - [address]  [address] [facility] [address]  Phone: [phone] Fax: [phone]    [facility] [address] [place], [place] [address] [address] [address] [facility]  [address] [address] [facility] [address]  Phone: [phone] Fax: [phone]       Insurance/Benefits Information   Primary Insurance: Medicare A&B Secondary Insurance: [facility]/AARP Supplement    Policy #: [id number] Policy #: [id number]   Group #: n/a Group #: n/a   Subscriber: patient Subscriber: patient       Prescription Coverage: AARP? Info NOF Additional Policies/Benefits:   RxID #: [id number] Disability Insurance: none     RxBIN #: [id number] Cancer policy: not addressed today    RxGrp #: [id number] [place] benefits: none reported   RxPCN #: [id number] Long Term Care Benefits: none reported     Other: n/a     Employment/Income/Financial Assistance Information   Employment status: part-time Employer/job: [employer]   Sources of income: employment, Social Security Financial concerns: none at this time   Available assistance: deferred unless needed Met with financial services: not indicated at this time   Medicaid screening: not indicated at this time      Psychosocial History   Mental status: alert, oriented, in good spirits Education level: some college   Work History: supervisory/maintenance Family involvement: wife is present   Living Arrangements: with wife    Transportation Concerns: none reported  Nutrition/weight loss concerns: referred to [employer] for [employer]   HH: none at this time HME: none at this time   ADLs: independent Literate (y/n): yes   Primary language: English Need : No   Psychiatric history: denies  Legal concerns: none reported; POA on file   Sources of support: family, [employer] Emotional concerns: none reported   Lutheran/spiritual endorsement/concerns: no concerns reported Amish/Spirituality: Zoroastrianism     Understanding of diagnosis: unable to verbalize but says they have been well-informed and are pleased with the care at [facility] Treatment expectations: no concerns reported   Main Concern(s): no one main concern Communication style: friendly, open   Distress Screening Score: 5 Date given: 1/4/17     Additional history:   Social History     Social History    Marital status:      Spouse name: N/A    Number of children: N/A    Years of education: 3     Occupational History     Part-time employed      Social History Main Topics    Smoking status: Never Smoker    Smokeless tobacco: Never Used    Alcohol use No    Drug use: No    Sexual activity: No     Other Topics Concern    Not on file     Social History Narrative    Part-time employed, , 3 children.       Referrals  Cancer Services: referred 4/20/17 [employer]: declined referral Financial Services: not indicated at thistime   Medicaid Screening/Application: not indicated at this time Hem/Onc/Psych: not indicated at this time Support Groups: not indicated at this time   Counseling/other supportive care needs: n/a         Additional comments/notes: Met with pt and his wife, Anna, to complete full needs assessment. Pt receiving second round of chemo. He is content and appears to be comfortable, evidenced by good dialogue and pt expressing humor. He reports good support and has no immediate needs for SW at this time. Referrals as per above. He was provided with SW contact info and encouraged to reach out to SW if any needs arise.    Plan: Will follow and assist further as supportive care needs arise.

## 2017-04-27 NOTE — PROGRESS NOTES
Subjective:       Patient ID: Doron Domínguez is a 76 y.o. male.    Chief Complaint: Follow-up; Chemotherapy; Lung Cancer; and Results    HPI 76-year-old male history of metastatic non-small cell carcinoma with bronchorrhea patient presents for cycle 2 of weekly carboplatin Taxol ECOG status 1    Past Medical History:   Diagnosis Date    Aortic stenosis 12/29/2016    Arthritis     Asthma     Back pain     due to calcium deposits in back    BPH (benign prostatic hyperplasia)     CAD (coronary artery disease)     CAD, multiple vessel 12/29/2016    Cancer     lung    High cholesterol     Hx of CABG 12/29/2016    Hypertension     Lung cancer 01/2017    T3 N0 M0 bronchoalveolar     Family History   Problem Relation Age of Onset    Diabetes Mother     Diabetes Sister     Diabetes Brother      Social History     Social History    Marital status:      Spouse name: N/A    Number of children: N/A    Years of education: 3     Occupational History    Part-time employed      Social History Main Topics    Smoking status: Never Smoker    Smokeless tobacco: Never Used    Alcohol use No    Drug use: No    Sexual activity: No     Other Topics Concern    Not on file     Social History Narrative    Part-time employed, , 3 children.     Past Surgical History:   Procedure Laterality Date    APPENDECTOMY      CARDIAC SURGERY      CORONARY ARTERY BYPASS GRAFT  2012    CABG x 3 at [facility]    LUNG LOBECTOMY Left 01/10/2017    Left lower lobe       Labs:  Lab Results   Component Value Date    WBC 6.17 04/27/2017    HGB 14.5 04/27/2017    HCT 43.6 04/27/2017    MCV 93 04/27/2017     04/27/2017     BMP  Lab Results   Component Value Date     (L) 04/27/2017    K 4.5 04/27/2017     04/27/2017    CO2 23 04/27/2017    BUN 21 04/27/2017    CREATININE 1.1 04/27/2017    CALCIUM 9.4 04/27/2017    ANIONGAP 11 04/27/2017    ESTGFRAFRICA >60 04/27/2017    EGFRNONAA >60 04/27/2017     Lab Results    Component Value Date    ALT 28 04/27/2017    AST 26 04/27/2017    ALKPHOS 94 04/27/2017    BILITOT 0.7 04/27/2017       No results found for: IRON, TIBC, FERRITIN, SATURATEDIRO  No results found for: LMKRBGRJ35  No results found for: FOLATE  No results found for: TSH      Review of Systems   Constitutional: Positive for fatigue. Negative for activity change, appetite change, chills, diaphoresis, fever and unexpected weight change.   HENT: Negative for congestion, dental problem, drooling, ear discharge, ear pain, facial swelling, hearing loss, mouth sores, nosebleeds, postnasal drip, rhinorrhea, sinus pressure, sneezing, sore throat, tinnitus, trouble swallowing and voice change.    Eyes: Negative for photophobia, pain, discharge, redness, itching and visual disturbance.   Respiratory: Positive for cough and shortness of breath. Negative for apnea, choking, chest tightness, wheezing and stridor.    Cardiovascular: Negative for chest pain, palpitations and leg swelling.   Gastrointestinal: Negative for abdominal distention, abdominal pain, anal bleeding, blood in stool, constipation, diarrhea, nausea, rectal pain and vomiting.   Endocrine: Negative for cold intolerance, heat intolerance, polydipsia, polyphagia and polyuria.   Genitourinary: Negative for decreased urine volume, difficulty urinating, discharge, dysuria, enuresis, flank pain, frequency, genital sores, hematuria, penile pain, penile swelling, scrotal swelling, testicular pain and urgency.   Musculoskeletal: Negative for arthralgias, back pain, gait problem, joint swelling, myalgias, neck pain and neck stiffness.   Skin: Negative for color change, pallor, rash and wound.   Allergic/Immunologic: Negative for environmental allergies, food allergies and immunocompromised state.   Neurological: Positive for weakness. Negative for dizziness, tremors, seizures, syncope, facial asymmetry, speech difficulty, light-headedness, numbness and headaches.    Hematological: Negative for adenopathy. Does not bruise/bleed easily.   Psychiatric/Behavioral: Negative for agitation, behavioral problems, confusion, decreased concentration, dysphoric mood, hallucinations, self-injury, sleep disturbance and suicidal ideas. The patient is not nervous/anxious and is not hyperactive.        Objective:      Physical Exam   Constitutional: He is oriented to person, place, and time. He appears well-developed and well-nourished. He has a sickly appearance. He appears ill. He appears distressed.   HENT:   Head: Normocephalic.   Right Ear: External ear normal.   Left Ear: External ear normal.   Nose: Nose normal. Right sinus exhibits no maxillary sinus tenderness and no frontal sinus tenderness. Left sinus exhibits no maxillary sinus tenderness and no frontal sinus tenderness.   Mouth/Throat: Oropharynx is clear and moist. No oropharyngeal exudate.   Eyes: EOM and lids are normal. Pupils are equal, round, and reactive to light. Right eye exhibits no discharge. Left eye exhibits no discharge. Right conjunctiva is not injected. Right conjunctiva has no hemorrhage. Left conjunctiva is not injected. Left conjunctiva has no hemorrhage. No scleral icterus. Right eye exhibits normal extraocular motion. Left eye exhibits normal extraocular motion.   Neck: Normal range of motion. Neck supple. No JVD present. No tracheal deviation present. No thyromegaly present.   Cardiovascular: Normal rate, regular rhythm and normal heart sounds.    Pulmonary/Chest: Effort normal. No stridor. No respiratory distress. He has wheezes.   Abdominal: Soft. Bowel sounds are normal. He exhibits no mass. There is no hepatosplenomegaly, splenomegaly or hepatomegaly. There is no tenderness.   Musculoskeletal: Normal range of motion. He exhibits no edema or tenderness.   Lymphadenopathy:        Head (right side): No posterior auricular and no occipital adenopathy present.        Head (left side): No posterior auricular  and no occipital adenopathy present.     He has no cervical adenopathy.        Right cervical: No superficial cervical, no deep cervical and no posterior cervical adenopathy present.       Left cervical: No superficial cervical, no deep cervical and no posterior cervical adenopathy present.     He has no axillary adenopathy.        Right: No supraclavicular adenopathy present.        Left: No supraclavicular adenopathy present.   Neurological: He is alert and oriented to person, place, and time. He has normal strength. No cranial nerve deficit. Coordination normal.   Skin: Skin is dry. No rash noted. He is not diaphoretic. No cyanosis or erythema. Nails show no clubbing.   Psychiatric: His behavior is normal. Judgment and thought content normal. His mood appears anxious. Cognition and memory are normal. He exhibits a depressed mood.   Vitals reviewed.          Assessment:       1. Bronchiolo-alveolar adenocarcinoma of left lung    2. Bilateral lung cancer    3. Malignant neoplasm metastatic to lung, unspecified laterality    4. Bronchorrhea            Plan:         patient tolerated first cycle well no nausea vomiting fever chills or night sweats proceed with cycle 2 reviewed CBC CMP stable treatment options discussed continue to treat with Tussionex DM cough related to bronchorrhea secondary to metastatic bronchorrhea carcinoma

## 2017-04-27 NOTE — PLAN OF CARE
Problem: Chemotherapy Effects (Adult)  Intervention: Prevent Infection/Maximize Resistance  Reviewed s/s of infection and ways to prevent infection. Also stressed importance of rest, understanding verbalized.

## 2017-04-27 NOTE — PLAN OF CARE
"Problem: Patient Care Overview  Goal: Plan of Care Review  Outcome: Ongoing (interventions implemented as appropriate)  Pt states, " I have been feeling tired as well as SOB"      "

## 2017-04-27 NOTE — PATIENT INSTRUCTIONS
.  [facility]  [address]  [address] [address]  [phone] phone     [phone] fax  Hours of Operation: Monday- Friday 8:00am- 5:00pm  After hours phone  [phone]  Hematology / Oncology Physicians on call      Dr. Edward Holloway    Please call with any concerns regarding your appointment today.    .FALL PREVENTION   Falls often occur due to slipping, tripping or losing your balance. Here are ways to reduce your risk of falling again.   Was there anything that caused your fall that can be fixed, removed or replaced?   Make your home safe by keeping walkways clear of objects you may trip over.   Use non-slip pads under rugs.   Do not walk in poorly lit areas.   Do not stand on chairs or wobbly ladders.   Use caution when reaching overhead or looking upward. This position can cause a loss of balance.   Be sure your shoes fit properly, have non-slip bottoms and are in good condition.   Be cautious when going up and down stairs, curbs, and when walking on uneven sidewalks.   If your balance is poor, consider using a cane or walker.   If your fall was related to alcohol use, stop or limit alcohol intake.   If your fall was related to use of sleeping medicines, talk to your doctor about this. You may need to reduce your dosage at bedtime if you awaken during the night to go to the bathroom.   To reduce the need for nighttime bathroom trips:   Avoid drinking fluids for several hours before going to bed   Empty your bladder before going to bed   Men can keep a urinal at the bedside   © 7912-9906 [employer]iela Owusu, [address] [place] [address], [place] [address]. All rights reserved. This information is not intended as a substitute for professional medical care. Always follow your healthcare professional's instructions.    .WAYS TO HELP PREVENT INFECTION         WASH YOUR HANDS OFTEN DURING THE DAY, ESPECIALLY BEFORE YOU EAT, AFTER USING THE  BATHROOM, AND AFTER TOUCHING ANIMALS     STAY AWAY FROM PEOPLE WHO HAVE ILLNESSES YOU CAN CATCH; SUCH AS COLDS, FLU, CHICKEN POX     TRY TO AVOID CROWDS     STAY AWAY FROM CHILDREN WHO RECENTLY HAVE RECEIVED LIVE VIRUS VACCINES     MAINTAIN GOOD MOUTH CARE     DO NOT SQUEEZE OR SCRATCH PIMPLES     CLEAN CUTS & SCRAPES RIGHT AWAY AND DAILY UNTIL HEALED WITH WARM WATER, SOAP & AN ANTISEPTIC     AVOID CONTACT WITH LITTER BOXES, BIRD CAGES, & FISH TANKS     AVOID STANDING WATER, IE., BIRD BATHS, FLOWER POTS/VASES, OR HUMIDIFIERS     WEAR GLOVES WHEN GARDENING OR CLEANING UP AFTER OTHERS, ESPECIALLY BABIES & SMALL CHILDREN     DO NOT EAT RAW FISH, SEAFOOD, MEAT, OR EGGS    .HOME CARE AFTER CHEMOTHERAPY   Meals   Many patients feel sick and lose their appetites during treatment. Eat small meals several times a day. Choose bland foods with little taste or smell if you have problems with nausea. Be sure to cook all food thoroughly. This kills bacteria and helps you avoid intestinal infection. Soft foods are easier to swallow and digest.   Activity   Exercise keeps you strong and keeps your heart and lungs active. Talk to your doctor about an appropriate exercise program for you.   Skin Care   To prevent a skin infection, bathe or shower once a day. Use a moisturizing soap and wash with warm water. Avoid very hot or cold water. Chemotherapy can make your skin dry . Apply moisturizing lotion to help relieve dry skin. Some drugs used in high doses can cause slight burns to appear (like sunburn). Ask for a special cream to help relieve the burn and protect your skin.   Prevent Mouth Sores   During chemotherapy, many people get mouth sores. Do the following to help prevent mouth sores or to ease discomfort.   Brush your teeth with a soft-bristle toothbrush after every meal.  Don't use dental floss if your platelet count is below 50,000. Your doctor or nurse will tell you if this is the case.  Use an oral swab or  special soft toothbrush if your gums bleed during regular brushing.  Use mouthwash as directed. If you can't tolerate commercial mouthwash, use salt and baking soda to clean your mouth. Mix 1 teaspoon of salt and 1 teaspoon of baking soda into a glass of water. Swish and spit.  Call your doctor or return to this facility if you develop any of the following:   Sore throat   White patches in the mouth or throat   Fever of 100.4ºF (38ºC) or higher, or as directed by your healthcare provider  © 4074-7883 Dariela Owusu, [address] [place] [address], [place] [address]. All rights reserved. This information is not intended as a substitute for professional medical care. Always follow your healthcare professional's

## 2017-04-27 NOTE — MR AVS SNAPSHOT
Patient Information     Patient Name Sex     Doron Domínguez Male 1940      Visit Information        Provider Department Dept Phone Center    2017 10:30 AM [facility] Chemo Infusion [facility] [phone] [facility]      Patient Instructions    .  [facility]  [address] [facility] [address]  [address] [address]  [phone] phone     [phone] fax  Hours of Operation: Monday- Friday 8:00am- 5:00pm  After hours phone  [phone]  Hematology / Oncology Physicians on call      Dr. Edward Holloway    Please call with any concerns regarding your appointment today.    .FALL PREVENTION   Falls often occur due to slipping, tripping or losing your balance. Here are ways to reduce your risk of falling again.   Was there anything that caused your fall that can be fixed, removed or replaced?   Make your home safe by keeping walkways clear of objects you may trip over.   Use non-slip pads under rugs.   Do not walk in poorly lit areas.   Do not stand on chairs or wobbly ladders.   Use caution when reaching overhead or looking upward. This position can cause a loss of balance.   Be sure your shoes fit properly, have non-slip bottoms and are in good condition.   Be cautious when going up and down stairs, curbs, and when walking on uneven sidewalks.   If your balance is poor, consider using a cane or walker.   If your fall was related to alcohol use, stop or limit alcohol intake.   If your fall was related to use of sleeping medicines, talk to your doctor about this. You may need to reduce your dosage at bedtime if you awaken during the night to go to the bathroom.   To reduce the need for nighttime bathroom trips:   Avoid drinking fluids for several hours before going to bed   Empty your bladder before going to bed   Men can keep a urinal at the bedside   © 4694-9815 Dariela Owusu, [address] [place] [address], [place] [address]. All rights  reserved. This information is not intended as a substitute for professional medical care. Always follow your healthcare professional's instructions.    .WAYS TO HELP PREVENT INFECTION         WASH YOUR HANDS OFTEN DURING THE DAY, ESPECIALLY BEFORE YOU EAT, AFTER USING THE BATHROOM, AND AFTER TOUCHING ANIMALS     STAY AWAY FROM PEOPLE WHO HAVE ILLNESSES YOU CAN CATCH; SUCH AS COLDS, FLU, CHICKEN POX     TRY TO AVOID CROWDS     STAY AWAY FROM CHILDREN WHO RECENTLY HAVE RECEIVED LIVE VIRUS VACCINES     MAINTAIN GOOD MOUTH CARE     DO NOT SQUEEZE OR SCRATCH PIMPLES     CLEAN CUTS & SCRAPES RIGHT AWAY AND DAILY UNTIL HEALED WITH WARM WATER, SOAP & AN ANTISEPTIC     AVOID CONTACT WITH LITTER BOXES, BIRD CAGES, & FISH TANKS     AVOID STANDING WATER, IE., BIRD BATHS, FLOWER POTS/VASES, OR HUMIDIFIERS     WEAR GLOVES WHEN GARDENING OR CLEANING UP AFTER OTHERS, ESPECIALLY BABIES & SMALL CHILDREN     DO NOT EAT RAW FISH, SEAFOOD, MEAT, OR EGGS    .HOME CARE AFTER CHEMOTHERAPY   Meals   Many patients feel sick and lose their appetites during treatment. Eat small meals several times a day. Choose bland foods with little taste or smell if you have problems with nausea. Be sure to cook all food thoroughly. This kills bacteria and helps you avoid intestinal infection. Soft foods are easier to swallow and digest.   Activity   Exercise keeps you strong and keeps your heart and lungs active. Talk to your doctor about an appropriate exercise program for you.   Skin Care   To prevent a skin infection, bathe or shower once a day. Use a moisturizing soap and wash with warm water. Avoid very hot or cold water. Chemotherapy can make your skin dry . Apply moisturizing lotion to help relieve dry skin. Some drugs used in high doses can cause slight burns to appear (like sunburn). Ask for a special cream to help relieve the burn and protect your skin.   Prevent Mouth Sores   During chemotherapy, many people get mouth sores. Do the  following to help prevent mouth sores or to ease discomfort.   Brush your teeth with a soft-bristle toothbrush after every meal.  Don't use dental floss if your platelet count is below 50,000. Your doctor or nurse will tell you if this is the case.  Use an oral swab or special soft toothbrush if your gums bleed during regular brushing.  Use mouthwash as directed. If you can't tolerate commercial mouthwash, use salt and baking soda to clean your mouth. Mix 1 teaspoon of salt and 1 teaspoon of baking soda into a glass of water. Swish and spit.  Call your doctor or return to this facility if you develop any of the following:   Sore throat   White patches in the mouth or throat   Fever of 100.4ºF (38ºC) or higher, or as directed by your healthcare provider  © 8479-3806 [employer] [facility], [address] [place] [address]. All rights reserved. This information is not intended as a substitute for professional medical care. Always follow your healthcare professional's            Your Current Medications Are     albuterol 90 mcg/actuation inhaler    albuterol-ipratropium 2.5mg-0.5mg/3mL (DUO-NEB) 0.5 mg-3 mg(2.5 mg base)/3 mL nebulizer solution    aspirin 81 MG Chew    atorvastatin (LIPITOR) 20 MG tablet    benzonatate (TESSALON) 200 MG capsule    cetirizine (ZYRTEC) 10 MG tablet    dexamethasone (DECADRON) 4 MG Tab    econazole nitrate 1 % cream    fish oil-omega-3 fatty acids 300-1,000 mg capsule    glucosamine-chondroitin 500-400 mg tablet    guaifenesin (MUCINEX) 600 mg 12 hr tablet    hydrocodone-acetaminophen 5-325mg (NORCO) 5-325 mg per tablet    inhalation device (AEROCHAMBER PLUS FLOW-VU)    levoFLOXacin (LEVAQUIN) 500 MG tablet    metoprolol succinate (TOPROL-XL) 50 MG 24 hr tablet    multivitamin with minerals tablet    ondansetron (ZOFRAN) 4 MG tablet    potassium gluconate 595 mg (99 mg) Tab    prochlorperazine (COMPAZINE) 5 MG tablet    tamsulosin (FLOMAX) 0.4 mg Cp24      Facility-Administered  Medications     carboplatin (PARAPLATIN) 170 mg in sodium chloride 0.9% 267 mL chemo infusion    diphenhydrAMINE (BENADRYL) 50 mg in sodium chloride 0.9% 100 mL IVPB    famotidine IVPB 20 mg    heparin, porcine (PF) 100 unit/mL injection flush 500 Units    ondansetron (ZOFRAN) 8 mg, dexamethasone (DECADRON) 20 mg in sodium chloride 0.9% 100 mL IVPB    paclitaxel (TAXOL) 45 mg/m2 = 84 mg in sodium chloride 0.9% 264 mL chemo infusion    sodium chloride 0.9% 250 mL flush bag    dexamethasone (DECADRON) 20 mg in sodium chloride 0.9% 50 mL IVPB (Discontinued)    diphenhydramine IVPB 50 mg (Discontinued)    famotidine (PF) 20 mg/2 mL injection 20 mg (Discontinued)    ondansetron injection 8 mg (Discontinued)      Appointments for Next Year     5/2/2017 10:00 AM ESTABLISHED ADULT PATIENT (20 min.) [facility] - Internal Medicine ESandra Art Jr., MD    Arrive at check-in approximately 15 minutes before your scheduled appointment time. Bring all outside medical records and imaging, along with a list of your current medications and insurance card.    (off [facility]) 1st floor    5/4/2017 11:40 AM NON FASTING LAB (10 min.) [facility]    Arrive at check-in approximately 15 minutes before your scheduled appointment time. Bring all outside medical records and imaging, along with a list of your current medications and insurance card.    5/4/2017  1:00 PM ESTABLISHED PATIENT (20 min.) [facility] - Hematology Oncology Shant eLon MD    Arrive at check-in approximately 15 minutes before your scheduled appointment time. Bring all outside medical records and imaging, along with a list of your current medications and insurance card.    (off [facility]) 1st Floor Appointments with Luly Ann - 2nd Floor    5/4/2017  1:30 PM INFUSION 180 MIN (180 min.) [facility] CHAIR 01 ONLH    Arrive at check-in approximately 15 minutes before your scheduled appointment time. Bring all  outside medical records and imaging, along with a list of your current medications and insurance card.    (off [facility]) 1st floor    7/24/2017  1:15 PM NON FASTING LAB (5 min.) [facility]    Arrive at check-in approximately 15 minutes before your scheduled appointment time. Bring all outside medical records and imaging, along with a list of your current medications and insurance card.    (off [address]) 2nd floor    7/24/2017  2:10 PM CT CHEST W CONTRAST (20 min.) [facility] CT1 LIMIT 500 LBS    Please fast for 4 hours prior to appointment. Arrive at check-in approximately 30 minutes before your scheduled appointment time. Bring all outside medical records and imaging, along with a list of your current medications and insurance card.    (off [address]) 2nd Floor    7/24/2017  2:30 PM COMPLETE PFT (40 min.) [facility] Pulmonary Function [facility]    Please do not use any inhalers or respiratory medications on the day of your appointment, however if you feel like you need to use your rescue inhaler for urgent relief prior to your appointment please do so.    (off [address]) 3rd floor    7/24/2017  3:20 PM ESTABLISHED PATIENT (20 min.) [facility] - Pulmonary Services Dante Massey MD    Arrive at check-in approximately 15 minutes before your scheduled appointment time. Bring all outside medical records and imaging, along with a list of your current medications and insurance card.    (off [address]) 3th floor    7/24/2017  4:20 PM ESTABLISHED PATIENT (20 min.) [facility] Hemotology Oncology Shant Leon MD    Arrive at check-in approximately 15 minutes before your scheduled appointment time. Bring all outside medical records and imaging, along with a list of your current medications and insurance card.    (off [address]) 3rd Floor         Default Flowsheet Data (last 24 hours)      Amb Complex Vitals John        04/27/17  "1306 04/27/17 1143 04/27/17 1131 04/27/17 1032 04/27/17 1021    Measurements    Weight    76.2 kg (168 lb) 76.4 kg (168 lb 6.9 oz)    Height    5' 5.5" (1.664 m) 5' 5.5" (1.664 m)    BSA (Calculated - sq m)    1.88 sq meters 1.88 sq meters    BMI (Calculated)    27.6 27.7    /62 111/66 103/60  124/80    Temp     97.7 °F (36.5 °C)    Pulse 67 78 72  85    Resp     18    SpO2     (!)  94 %    Pain Assessment    Pain Score    Zero Zero            Allergies     Bactrim [Sulfamethoxazole-trimethoprim] Other (See Comments)    Severe leg cramps, dizziness    Dilaudid [Hydromorphone] Other (See Comments)    Confusion, paranoia    Sulfa (Sulfonamide Antibiotics)     Severe leg cramps, dizziness.      Medications You Received from 04/26/2017 1318 to 04/27/2017 1318        Date/Time Order Dose Route Action     04/27/2017 1314 carboplatin (PARAPLATIN) 170 mg in sodium chloride 0.9% 267 mL chemo infusion 170 mg Intravenous New Bag     04/27/2017 1049 diphenhydrAMINE (BENADRYL) 50 mg in sodium chloride 0.9% 100 mL IVPB 50 mg Intravenous New Bag     04/27/2017 1049 famotidine IVPB 20 mg 20 mg Intravenous New Bag     04/27/2017 1114 ondansetron (ZOFRAN) 8 mg, dexamethasone (DECADRON) 20 mg in sodium chloride 0.9% 100 mL IVPB   Intravenous New Bag     04/27/2017 1132 paclitaxel (TAXOL) 45 mg/m2 = 84 mg in sodium chloride 0.9% 264 mL chemo infusion 84 mg Intravenous New Bag      Current Discharge Medication List     Cannot display discharge medications since this is not an admission.      "

## 2017-05-02 ENCOUNTER — OFFICE VISIT (OUTPATIENT)
Dept: INTERNAL MEDICINE | Facility: CLINIC | Age: 77
End: 2017-05-02
Payer: MEDICARE

## 2017-05-02 VITALS
HEART RATE: 86 BPM | TEMPERATURE: 98 F | DIASTOLIC BLOOD PRESSURE: 64 MMHG | SYSTOLIC BLOOD PRESSURE: 100 MMHG | BODY MASS INDEX: 26.29 KG/M2 | HEIGHT: 66 IN | OXYGEN SATURATION: 94 % | WEIGHT: 163.56 LBS

## 2017-05-02 DIAGNOSIS — E78.00 PURE HYPERCHOLESTEROLEMIA: ICD-10-CM

## 2017-05-02 DIAGNOSIS — I25.10 CAD, MULTIPLE VESSEL: ICD-10-CM

## 2017-05-02 DIAGNOSIS — N40.1 BENIGN NON-NODULAR PROSTATIC HYPERPLASIA WITH LOWER URINARY TRACT SYMPTOMS: Primary | ICD-10-CM

## 2017-05-02 DIAGNOSIS — C34.92 BRONCHIOLO-ALVEOLAR ADENOCARCINOMA OF LEFT LUNG: ICD-10-CM

## 2017-05-02 DIAGNOSIS — R73.09 ELEVATED GLUCOSE: ICD-10-CM

## 2017-05-02 PROCEDURE — 99999 PR PBB SHADOW E&M-EST. PATIENT-LVL III: CPT | Mod: PBBFAC,,, | Performed by: PEDIATRICS

## 2017-05-02 PROCEDURE — 99214 OFFICE O/P EST MOD 30 MIN: CPT | Mod: S$PBB,,, | Performed by: PEDIATRICS

## 2017-05-02 PROCEDURE — 99213 OFFICE O/P EST LOW 20 MIN: CPT | Mod: PBBFAC,PO | Performed by: PEDIATRICS

## 2017-05-02 RX ORDER — AMOXICILLIN 250 MG
1 CAPSULE ORAL DAILY
Status: ON HOLD | COMMUNITY
End: 2017-08-09

## 2017-05-02 RX ORDER — CHOLECALCIFEROL (VITAMIN D3) 25 MCG
2000 TABLET ORAL DAILY
COMMUNITY
End: 2017-06-29

## 2017-05-02 RX ORDER — POLYETHYLENE GLYCOL 3350 17 G/17G
POWDER, FOR SOLUTION ORAL
COMMUNITY
End: 2017-06-29 | Stop reason: ALTCHOICE

## 2017-05-02 NOTE — PROGRESS NOTES
Subjective:       Patient ID: Doron Domínguez is a 76 y.o. male.    Chief Complaint: Follow-up    HPI Comments: Subspecialty notes reviewed with patient and wife. He has actually felt largely well through chemo, only mild nausea, fatigue, and weight loss.  LIPIDS:following D&E, tolerating and compliant with med(s).  Elevated glucose due to decadron: no hyperglycemic symptoms.  CAD: no CP, LUX(mild from lung cancer). Maintaining work.  BPH: stable of flomax.    Still with chronic back pain years standing.    LABS REVIEWED AND DISCUSSED WITH PATIENT    Review of Systems   Constitutional: Positive for unexpected weight change. Negative for fever.   HENT: Negative for congestion and rhinorrhea.    Eyes: Negative for discharge and redness.   Respiratory: Positive for cough and shortness of breath. Negative for wheezing.    Cardiovascular: Negative for chest pain, palpitations and leg swelling.   Gastrointestinal: Negative for constipation, diarrhea and vomiting.   Endocrine: Negative for cold intolerance, heat intolerance, polydipsia, polyphagia and polyuria.   Genitourinary: Positive for decreased urine volume and urgency. Negative for difficulty urinating, dysuria and hematuria.   Musculoskeletal: Negative for arthralgias and joint swelling.   Skin: Negative for rash and wound.   Neurological: Negative for syncope and headaches.   Psychiatric/Behavioral: Negative for behavioral problems, dysphoric mood, self-injury, sleep disturbance and suicidal ideas. The patient is not nervous/anxious.         He and wife have good supports.       Objective:      Physical Exam   Constitutional: He is oriented to person, place, and time. No distress (chronically ill but NAD, in good spirits.).   Neck: Normal range of motion. Neck supple. No JVD present.   Cardiovascular: Normal rate, regular rhythm and normal heart sounds.    No murmur heard.  Pulmonary/Chest: Effort normal. No respiratory distress. He has rales (few scattered  rhonchi).   Abdominal: Soft. Bowel sounds are normal. He exhibits no distension and no mass.   Musculoskeletal: He exhibits no edema.   Lymphadenopathy:     He has no cervical adenopathy.   Neurological: He is alert and oriented to person, place, and time. He displays normal reflexes. No cranial nerve deficit. He exhibits normal muscle tone. Coordination normal.   Psychiatric: He has a normal mood and affect. His behavior is normal. Judgment and thought content normal.       Assessment:       1. Benign non-nodular prostatic hyperplasia with lower urinary tract symptoms    2. Pure hypercholesterolemia    3. CAD, multiple vessel    4. Bronchiolo-alveolar adenocarcinoma of left lung    5. Elevated glucose        Plan:       Benign non-nodular prostatic hyperplasia with lower urinary tract symptoms    Pure hypercholesterolemia  -     Lipid panel; Future; Expected date: 5/2/17  -     ALT (SGPT); Future; Expected date: 5/2/17  -     AST (SGOT); Future; Expected date: 5/2/17    CAD, multiple vessel    Bronchiolo-alveolar adenocarcinoma of left lung    Elevated glucose  -     Hemoglobin A1c; Future; Expected date: 5/2/17    keep subspecialty care. Meds reviewed. F/U 6 months with labs. Will monitor BS for now. If/when Dr Leon says ok, we will proceed with MRI and PMR consult.

## 2017-05-02 NOTE — MR AVS SNAPSHOT
[facility]  [address] [place] [address]  Phone: [phone]  Fax: [phone]                  Doron Domínguez   2017 10:00 AM   Office Visit    Description:  Male : 1940   Provider:  SVITLANA Art Jr., MD   Department:  [facility] Internal Medicine           Reason for Visit     Follow-up           Diagnoses this Visit        Comments    Benign non-nodular prostatic hyperplasia with lower urinary tract symptoms    -  Primary     Pure hypercholesterolemia         CAD, multiple vessel         Bronchiolo-alveolar adenocarcinoma of left lung         Elevated glucose                To Do List           Future Appointments        Provider Department Dept Phone    2017 11:40 AM LABORATORY, [address] [facility] [phone]    2017 1:00 PM MD [facility] H[facility]ology Oncology [phone]    2017 1:30 PM [facility] 01 ONLH [facility] [phone]    2017 1:15 PM LABORATORY, [facility] [phone]    2017 2:10 PM [facility] CT1 LIMIT 500 LBS [facility] [phone]      Goals (5 Years of Data)     None      Follow-Up and Disposition     Return in about 6 months (around 2017).    Follow-up and Disposition History      [facility]     [facility] Nurse Care Line -  Assistance  Unless otherwise directed by your provider, please contact [facility], our nurse care line that is available for  assistance.     Registered nurses in the [facility] provide: appointment scheduling, clinical advisement, health education, and other advisory services.  Call: [phone] (toll free)               Medications           Message regarding Medications     Verify the changes and/or additions to your medication regime listed below are the same as discussed with your clinician today.  If any of these changes or additions are incorrect, please notify your  healthcare provider.             Verify that the below list of medications is an accurate representation of the medications you are currently taking.  If none reported, the list may be blank. If incorrect, please contact your healthcare provider. Carry this list with you in case of emergency.           Current Medications     albuterol 90 mcg/actuation inhaler Inhale 2 puffs into the lungs every 4 (four) hours as needed for Wheezing.    albuterol-ipratropium 2.5mg-0.5mg/3mL (DUO-NEB) 0.5 mg-3 mg(2.5 mg base)/3 mL nebulizer solution Take 3 mLs by nebulization every 6 (six) hours.    aspirin 81 MG Chew Take 81 mg by mouth once daily.    atorvastatin (LIPITOR) 20 MG tablet Take 1 tablet (20 mg total) by mouth every evening.    benzonatate (TESSALON) 200 MG capsule Take 200 mg by mouth 3 (three) times daily as needed for Cough.    cetirizine (ZYRTEC) 10 MG tablet Take 10 mg by mouth as needed for Allergies.    econazole nitrate 1 % cream Apply 1 application topically as needed.    fish oil-omega-3 fatty acids 300-1,000 mg capsule Take 2 g by mouth once daily.    glucosamine-chondroitin 500-400 mg tablet Take 1 tablet by mouth 2 (two) times daily.     guaifenesin (MUCINEX) 600 mg 12 hr tablet Take 600 mg by mouth 2 (two) times daily.     inhalation device (AEROCHAMBER PLUS FLOW-VU) Use as directed for inhalation.    levoFLOXacin (LEVAQUIN) 500 MG tablet Take 1 tablet (500 mg total) by mouth once daily.    metoprolol succinate (TOPROL-XL) 50 MG 24 hr tablet Take 1 tablet (50 mg total) by mouth once daily.    multivitamin with minerals tablet Take 1 tablet by mouth once daily.    ondansetron (ZOFRAN) 4 MG tablet Take 1 tablet (4 mg total) by mouth every 8 (eight) hours as needed for Nausea.    polyethylene glycol (GLYCOLAX) 17 gram PwPk Take by mouth.    potassium gluconate 595 mg (99 mg) Tab Take 1 tablet by mouth once daily.     prochlorperazine (COMPAZINE) 5 MG tablet Take 1 tablet (5 mg total) by mouth every 6 (six)  "hours as needed for Nausea.    senna-docusate 8.6-50 mg (SENNA WITH DOCUSATE SODIUM) 8.6-50 mg per tablet Take 1 tablet by mouth once daily.    tamsulosin (FLOMAX) 0.4 mg Cp24 Take 1 capsule (0.4 mg total) by mouth every evening.    vitamin D 1000 units Tab Take 2,000 Units by mouth once daily.    dexamethasone (DECADRON) 4 MG Tab Take 5 tablets (20 mg total) by mouth every 6 (six) hours. Take 20 mg 6& 12 hours prior to TAXOL    hydrocodone-acetaminophen 5-325mg (NORCO) 5-325 mg per tablet Take 1 tablet by mouth every 6 (six) hours as needed for Pain.           Clinical Reference Information           Your Vitals Were     BP Pulse Temp Height    100/64 (BP Location: Left arm, Patient Position: Sitting, BP Method: Manual) 86 98 °F (36.7 °C) (Tympanic) 5' 5.55" (1.665 m)    Weight SpO2 BMI    74.2 kg (163 lb 9.3 oz) 94% 26.77 kg/m2      Blood Pressure          Most Recent Value    BP  100/64      Allergies as of 5/2/2017     Bactrim [Sulfamethoxazole-trimethoprim]    Dilaudid [Hydromorphone]    Sulfa (Sulfonamide Antibiotics)      Immunizations Administered on Date of Encounter - 5/2/2017     None      Orders Placed During Today's Visit     Future Labs/Procedures Expected by Expires    ALT (SGPT)  5/2/2017 7/1/2018    AST (SGOT)  5/2/2017 7/1/2018    Hemoglobin A1c  5/2/2017 7/1/2018    Lipid panel  5/2/2017 5/2/2018      Language Assistance Services     ATTENTION: Language assistance services are available, free of charge. Please call [phone].      ATENCIÓN: Si habla español, tiene a moreno disposición servicios gratuitos de asistencia lingüística. Llame al [phone].     CHÚ Ý: N?u b?n nói Ti?ng Vi?t, có các d?ch v? h? tr? ngôn ng? mi?n phí dành cho b?n. G?i s? [phone].         [facility] complies with applicable Federal civil rights laws and does not discriminate on the basis of race, color, national origin, age, disability, or sex.        "

## 2017-05-04 ENCOUNTER — INFUSION (OUTPATIENT)
Dept: INFUSION THERAPY | Facility: HOSPITAL | Age: 77
End: 2017-05-04
Attending: INTERNAL MEDICINE
Payer: MEDICARE

## 2017-05-04 ENCOUNTER — OFFICE VISIT (OUTPATIENT)
Dept: HEMATOLOGY/ONCOLOGY | Facility: CLINIC | Age: 77
End: 2017-05-04
Payer: MEDICARE

## 2017-05-04 VITALS
HEIGHT: 66 IN | OXYGEN SATURATION: 96 % | WEIGHT: 169.75 LBS | BODY MASS INDEX: 27.28 KG/M2 | TEMPERATURE: 97 F | SYSTOLIC BLOOD PRESSURE: 122 MMHG | HEART RATE: 78 BPM | DIASTOLIC BLOOD PRESSURE: 60 MMHG

## 2017-05-04 DIAGNOSIS — G89.29 CHRONIC MIDLINE LOW BACK PAIN WITHOUT SCIATICA: ICD-10-CM

## 2017-05-04 DIAGNOSIS — C78.00 MALIGNANT NEOPLASM METASTATIC TO LUNG, UNSPECIFIED LATERALITY: Primary | ICD-10-CM

## 2017-05-04 DIAGNOSIS — C78.00 MALIGNANT NEOPLASM METASTATIC TO LUNG, UNSPECIFIED LATERALITY: ICD-10-CM

## 2017-05-04 DIAGNOSIS — C34.91 BILATERAL LUNG CANCER: ICD-10-CM

## 2017-05-04 DIAGNOSIS — J98.09 BRONCHORRHEA: ICD-10-CM

## 2017-05-04 DIAGNOSIS — C34.92 BRONCHIOLO-ALVEOLAR ADENOCARCINOMA OF LEFT LUNG: Primary | ICD-10-CM

## 2017-05-04 DIAGNOSIS — M54.50 CHRONIC MIDLINE LOW BACK PAIN WITHOUT SCIATICA: ICD-10-CM

## 2017-05-04 DIAGNOSIS — C34.92 BILATERAL LUNG CANCER: ICD-10-CM

## 2017-05-04 DIAGNOSIS — C34.92 BRONCHIOLO-ALVEOLAR ADENOCARCINOMA OF LEFT LUNG: ICD-10-CM

## 2017-05-04 PROCEDURE — 96417 CHEMO IV INFUS EACH ADDL SEQ: CPT

## 2017-05-04 PROCEDURE — 63600175 PHARM REV CODE 636 W HCPCS: Performed by: INTERNAL MEDICINE

## 2017-05-04 PROCEDURE — 25000003 PHARM REV CODE 250: Performed by: INTERNAL MEDICINE

## 2017-05-04 PROCEDURE — 99215 OFFICE O/P EST HI 40 MIN: CPT | Mod: 25,S$PBB,, | Performed by: INTERNAL MEDICINE

## 2017-05-04 PROCEDURE — 96367 TX/PROPH/DG ADDL SEQ IV INF: CPT

## 2017-05-04 PROCEDURE — 99999 PR PBB SHADOW E&M-EST. PATIENT-LVL III: CPT | Mod: 25,PBBFAC,, | Performed by: INTERNAL MEDICINE

## 2017-05-04 PROCEDURE — 96413 CHEMO IV INFUSION 1 HR: CPT

## 2017-05-04 RX ORDER — FAMOTIDINE 20 MG/50ML
20 INJECTION, SOLUTION INTRAVENOUS
Status: COMPLETED | OUTPATIENT
Start: 2017-05-04 | End: 2017-05-04

## 2017-05-04 RX ORDER — SODIUM CHLORIDE 0.9 % (FLUSH) 0.9 %
10 SYRINGE (ML) INJECTION
Status: DISCONTINUED | OUTPATIENT
Start: 2017-05-04 | End: 2017-05-04 | Stop reason: HOSPADM

## 2017-05-04 RX ORDER — SODIUM CHLORIDE 0.9 % (FLUSH) 0.9 %
10 SYRINGE (ML) INJECTION
Status: CANCELLED | OUTPATIENT
Start: 2017-05-04

## 2017-05-04 RX ORDER — EPINEPHRINE 0.3 MG/.3ML
0.3 INJECTION SUBCUTANEOUS ONCE AS NEEDED
Status: CANCELLED | OUTPATIENT
Start: 2017-05-04 | End: 2017-05-04

## 2017-05-04 RX ORDER — HEPARIN 100 UNIT/ML
500 SYRINGE INTRAVENOUS
Status: CANCELLED | OUTPATIENT
Start: 2017-05-04

## 2017-05-04 RX ORDER — DIPHENHYDRAMINE HYDROCHLORIDE 50 MG/ML
50 INJECTION INTRAMUSCULAR; INTRAVENOUS ONCE AS NEEDED
Status: CANCELLED | OUTPATIENT
Start: 2017-05-04 | End: 2017-05-04

## 2017-05-04 RX ORDER — FAMOTIDINE 20 MG/50ML
20 INJECTION, SOLUTION INTRAVENOUS
Status: CANCELLED
Start: 2017-05-04

## 2017-05-04 RX ORDER — HEPARIN 100 UNIT/ML
500 SYRINGE INTRAVENOUS
Status: DISCONTINUED | OUTPATIENT
Start: 2017-05-04 | End: 2017-05-04 | Stop reason: HOSPADM

## 2017-05-04 RX ADMIN — PACLITAXEL 84 MG: 6 INJECTION, SOLUTION INTRAVENOUS at 03:05

## 2017-05-04 RX ADMIN — CARBOPLATIN 195 MG: 10 INJECTION, SOLUTION INTRAVENOUS at 04:05

## 2017-05-04 RX ADMIN — HEPARIN 500 UNITS: 100 SYRINGE at 05:05

## 2017-05-04 RX ADMIN — FAMOTIDINE 20 MG: 20 INJECTION, SOLUTION INTRAVENOUS at 02:05

## 2017-05-04 RX ADMIN — DIPHENHYDRAMINE HYDROCHLORIDE 50 MG: 50 INJECTION, SOLUTION INTRAMUSCULAR; INTRAVENOUS at 03:05

## 2017-05-04 RX ADMIN — DEXAMETHASONE SODIUM PHOSPHATE: 4 INJECTION, SOLUTION INTRAMUSCULAR; INTRAVENOUS at 02:05

## 2017-05-04 NOTE — PROGRESS NOTES
Subjective:       Patient ID: Doron Domínguez is a 76 y.o. male.    Chief Complaint: Anemia; Chemotherapy; and Lung Cancer    HPI 76-year-old male cycle 1 week 3 of platinum and Taxol probability carcinoma with bronchorrhea patient reports decreasing cough feels better just returned from work ECO[employer] status 1    Past Medical History:   Diagnosis Date    Aortic stenosis 12/29/2016    Arthritis     Asthma     Back pain     due to calcium deposits in back    BPH (benign prostatic hyperplasia)     CAD (coronary artery disease)     CAD, multiple vessel 12/29/2016    Cancer     lung    High cholesterol     Hx of CABG 12/29/2016    Hypertension     Lung cancer 01/2017    T3 N0 M0 bronchoalveolar     Family History   Problem Relation Age of Onset    Diabetes Mother     Diabetes Sister     Diabetes Brother      Social History     Social History    Marital status:      Spouse name: N/A    Number of children: N/A    Years of education: 3     Occupational History    Part-time employed      Social History Main Topics    Smoking status: Never Smoker    Smokeless tobacco: Never Used    Alcohol use No    Drug use: No    Sexual activity: No     Other Topics Concern    Not on file     Social History Narrative    Part-time employed, , 3 children.     Past Surgical History:   Procedure Laterality Date    APPENDECTOMY      CARDIAC SURGERY      CORONARY ARTERY BYPASS GRAFT  2012    CABG x 3 at [facility]    LUNG LOBECTOMY Left 01/10/2017    Left lower lobe       Labs:  Lab Results   Component Value Date    WBC 4.73 05/04/2017    HGB 14.1 05/04/2017    HCT 42.1 05/04/2017    MCV 93 05/04/2017     05/04/2017     BMP  Lab Results   Component Value Date     05/04/2017    K 4.9 05/04/2017     05/04/2017    CO2 29 05/04/2017    BUN 14 05/04/2017    CREATININE 0.9 05/04/2017    CALCIUM 9.5 05/04/2017    ANIONGAP 8 05/04/2017    ESTGFRAFRICA >60 05/04/2017    EGFRNONAA >60 05/04/2017     Lab  Results   Component Value Date    ALT 31 05/04/2017    AST 29 05/04/2017    ALKPHOS 92 05/04/2017    BILITOT 0.6 05/04/2017       No results found for: IRON, TIBC, FERRITIN, SATURATEDIRO  No results found for: [id number]  No results found for: FOLATE  No results found for: TSH      Review of Systems   Constitutional: Negative for activity change, appetite change, chills, diaphoresis, fatigue, fever and unexpected weight change.   HENT: Negative for congestion, dental problem, drooling, ear discharge, ear pain, facial swelling, hearing loss, mouth sores, nosebleeds, postnasal drip, rhinorrhea, sinus pressure, sneezing, sore throat, tinnitus, trouble swallowing and voice change.    Eyes: Negative for photophobia, pain, discharge, redness, itching and visual disturbance.   Respiratory: Positive for cough. Negative for apnea, choking, chest tightness, shortness of breath, wheezing and stridor.    Cardiovascular: Negative for chest pain, palpitations and leg swelling.   Gastrointestinal: Negative for abdominal distention, abdominal pain, anal bleeding, blood in stool, constipation, diarrhea, nausea, rectal pain and vomiting.   Endocrine: Negative for cold intolerance, heat intolerance, polydipsia, polyphagia and polyuria.   Genitourinary: Negative for decreased urine volume, difficulty urinating, discharge, dysuria, enuresis, flank pain, frequency, genital sores, hematuria, penile pain, penile swelling, scrotal swelling, testicular pain and urgency.   Musculoskeletal: Negative for arthralgias, back pain, gait problem, joint swelling, myalgias, neck pain and neck stiffness.   Skin: Negative for color change, pallor, rash and wound.   Allergic/Immunologic: Negative for environmental allergies, food allergies and immunocompromised state.   Neurological: Negative for dizziness, tremors, seizures, syncope, facial asymmetry, speech difficulty, weakness, light-headedness, numbness and headaches.   Hematological: Negative for  adenopathy. Does not bruise/bleed easily.   Psychiatric/Behavioral: Negative for agitation, behavioral problems, confusion, decreased concentration, dysphoric mood, hallucinations, self-injury, sleep disturbance and suicidal ideas. The patient is not nervous/anxious and is not hyperactive.        Objective:      Physical Exam   Constitutional: He is oriented to person, place, and time. He appears well-developed and well-nourished. No distress.   HENT:   Head: Normocephalic.   Right Ear: External ear normal.   Left Ear: External ear normal.   Nose: Nose normal. Right sinus exhibits no maxillary sinus tenderness and no frontal sinus tenderness. Left sinus exhibits no maxillary sinus tenderness and no frontal sinus tenderness.   Mouth/Throat: Oropharynx is clear and moist. No oropharyngeal exudate.   Eyes: EOM and lids are normal. Pupils are equal, round, and reactive to light. Right eye exhibits no discharge. Left eye exhibits no discharge. Right conjunctiva is not injected. Right conjunctiva has no hemorrhage. Left conjunctiva is not injected. Left conjunctiva has no hemorrhage. No scleral icterus. Right eye exhibits normal extraocular motion. Left eye exhibits normal extraocular motion.   Neck: Normal range of motion. Neck supple. No JVD present. No tracheal deviation present. No thyromegaly present.   Cardiovascular: Normal rate, regular rhythm and normal heart sounds.    Pulmonary/Chest: Effort normal and breath sounds normal. No stridor. No respiratory distress.   Abdominal: Soft. Bowel sounds are normal. He exhibits no mass. There is no hepatosplenomegaly, splenomegaly or hepatomegaly. There is no tenderness.   Musculoskeletal: Normal range of motion. He exhibits no edema or tenderness.   Lymphadenopathy:        Head (right side): No posterior auricular and no occipital adenopathy present.        Head (left side): No posterior auricular and no occipital adenopathy present.     He has no cervical adenopathy.         Right cervical: No superficial cervical, no deep cervical and no posterior cervical adenopathy present.       Left cervical: No superficial cervical, no deep cervical and no posterior cervical adenopathy present.     He has no axillary adenopathy.        Right: No supraclavicular adenopathy present.        Left: No supraclavicular adenopathy present.   Neurological: He is alert and oriented to person, place, and time. He has normal strength. No cranial nerve deficit. Coordination normal.   Skin: Skin is dry. No rash noted. He is not diaphoretic. No cyanosis or erythema. Nails show no clubbing.   Psychiatric: He has a normal mood and affect. His behavior is normal. Judgment and thought content normal. Cognition and memory are normal.   Vitals reviewed.          Assessment:       1. Bronchiolo-alveolar adenocarcinoma of left lung    2. Bilateral lung cancer    3. Malignant neoplasm metastatic to lung, unspecified laterality    4. Bronchorrhea    5. Chronic midline low back pain without sciatica            Plan:         proceed with cycle 1 week 3 carboplatin Taxol return in 2 weeks repeat CBC CMP and chest x-ray for response am very optimistic with decreasing cough bronchorrhea that patient has having response to therapy

## 2017-05-04 NOTE — PLAN OF CARE
Problem: Chemotherapy Effects (Adult)  Goal: Signs and Symptoms of Listed Potential Problems Will be Absent, Minimized or Managed (Chemotherapy Effects)  Signs and symptoms of listed potential problems will be absent, minimized or managed by discharge/transition of care (reference Chemotherapy Effects (Adult) CPG).   Outcome: Ongoing (interventions implemented as appropriate)  States fatigue occurs about 2-3 days after treatment.

## 2017-05-04 NOTE — PATIENT INSTRUCTIONS
[facility]  [address] [address]  [address] [address]  [phone] phone     [phone] fax  Hours of Operation: Monday- Friday 8:00am- 5:00pm  After hours phone  [phone]  Hematology / Oncology Physicians on call      Dr. Edward Holloway    Please call with any concerns regarding your appointment today.      Oncology: Managing Fatigue   Fatigue is a common side effect of chemotherapy and radiation therapy. It can be caused by worry, lack of sleep, and poor appetite. Fatigue can also be a sign of anemia (a shortage of red blood cells). This could require medical treatment. The tips below can help you feel better.      Family members can help with meals and chores around the house.      Conserving Energy   Note the times of day when you are most tired and plan around them. For instance, if you are more tired in the afternoon, try to get tasks done in the morning.   Decide which tasks are most important. Do those first.   Pass tasks along to others when you need to. Ask for help.   Accept help when its offered. Tell people what they can do to help. For instance, you may need someone to fix a meal, fold clothes, or put gas in your car.   Plan rest times. You may want to take a nap each day. Just sitting quietly for a few minutes can make you feel more rested.  What You Can Do to Feel Better   Relax before you try to sleep. Take a bath or read for a while.   Form a sleep pattern. Go to bed at the same time each night and get up at the same time each morning.   Eat well. Choose foods from all of the food groups each day.   Exercise. Take a brisk walk to help increase your energy.   Avoid caffeine and alcohol. Drink plenty of water or fruit juices instead.  Treating Anemia   If you begin to feel more tired than normal, tell your doctor. Fatigue could be a sign of anemia. This problem is fairly common during chemotherapy and radiation  treatments. If your red blood cell count is too low, you are likely to receive a blood transfusion. Most people feel better shortly after the transfusion. In some cases, medication may be prescribed to increase red blood cell production.   Call Your Doctor If You Have:   Shortness of breath or chest pain   A dizzy feeling when you get up from lying or sitting down   Paler skin than normal   Extreme tiredness that is not helped by sleep    © 5539-1098 [facility], [address] [place] [address], [place] [address]. All rights reserved. This information is not intended as a substitute for professional medical care. Always follow your healthcare professional's instructions.

## 2017-05-04 NOTE — MR AVS SNAPSHOT
Patient Information     Patient Name Sex     Doron Domínguez Male 1940      Visit Information        Provider Department Dept Phone Center    2017 1:30 PM [facility] [phone] [facility]      Patient Instructions      [facility]  [address]  [address] [address]  [phone] phone     [phone] fax  Hours of Operation: Monday- Friday 8:00am- 5:00pm  After hours phone  [phone]  Hematology / Oncology Physicians on call      Dr. Edward Holloway    Please call with any concerns regarding your appointment today.      Oncology: Managing Fatigue   Fatigue is a common side effect of chemotherapy and radiation therapy. It can be caused by worry, lack of sleep, and poor appetite. Fatigue can also be a sign of anemia (a shortage of red blood cells). This could require medical treatment. The tips below can help you feel better.      Family members can help with meals and chores around the house.      Conserving Energy   Note the times of day when you are most tired and plan around them. For instance, if you are more tired in the afternoon, try to get tasks done in the morning.   Decide which tasks are most important. Do those first.   Pass tasks along to others when you need to. Ask for help.   Accept help when its offered. Tell people what they can do to help. For instance, you may need someone to fix a meal, fold clothes, or put gas in your car.   Plan rest times. You may want to take a nap each day. Just sitting quietly for a few minutes can make you feel more rested.  What You Can Do to Feel Better   Relax before you try to sleep. Take a bath or read for a while.   Form a sleep pattern. Go to bed at the same time each night and get up at the same time each morning.   Eat well. Choose foods from all of the food groups each day.   Exercise. Take a brisk walk to help increase your  energy.   Avoid caffeine and alcohol. Drink plenty of water or fruit juices instead.  Treating Anemia   If you begin to feel more tired than normal, tell your doctor. Fatigue could be a sign of anemia. This problem is fairly common during chemotherapy and radiation treatments. If your red blood cell count is too low, you are likely to receive a blood transfusion. Most people feel better shortly after the transfusion. In some cases, medication may be prescribed to increase red blood cell production.   Call Your Doctor If You Have:   Shortness of breath or chest pain   A dizzy feeling when you get up from lying or sitting down   Paler skin than normal   Extreme tiredness that is not helped by sleep    © 7408-3647 [facility], [address] [place] [address], [place] [address]. All rights reserved. This information is not intended as a substitute for professional medical care. Always follow your healthcare professional's instructions.         Your Current Medications Are     albuterol 90 mcg/actuation inhaler    albuterol-ipratropium 2.5mg-0.5mg/3mL (DUO-NEB) 0.5 mg-3 mg(2.5 mg base)/3 mL nebulizer solution    aspirin 81 MG Chew    atorvastatin (LIPITOR) 20 MG tablet    benzonatate (TESSALON) 200 MG capsule    cetirizine (ZYRTEC) 10 MG tablet    dexamethasone (DECADRON) 4 MG Tab    econazole nitrate 1 % cream    fish oil-omega-3 fatty acids 300-1,000 mg capsule    glucosamine-chondroitin 500-400 mg tablet    guaifenesin (MUCINEX) 600 mg 12 hr tablet    inhalation device (AEROCHAMBER PLUS FLOW-VU)    levoFLOXacin (LEVAQUIN) 500 MG tablet    metoprolol succinate (TOPROL-XL) 50 MG 24 hr tablet    multivitamin with minerals tablet    ondansetron (ZOFRAN) 4 MG tablet    polyethylene glycol (GLYCOLAX) 17 gram PwPk    potassium gluconate 595 mg (99 mg) Tab    prochlorperazine (COMPAZINE) 5 MG tablet    senna-docusate 8.6-50 mg (SENNA WITH DOCUSATE SODIUM) 8.6-50 mg per tablet    tamsulosin (FLOMAX) 0.4 mg Cp24    vitamin D 1000  units Tab    hydrocodone-acetaminophen 5-325mg (NORCO) 5-325 mg per tablet (Discontinued)      Facility-Administered Medications     carboplatin (PARAPLATIN) 195 mg in sodium chloride 0.9% 269.5 mL chemo infusion    diphenhydrAMINE (BENADRYL) 50 mg in sodium chloride 0.9% 100 mL IVPB    famotidine IVPB 20 mg    heparin, porcine (PF) 100 unit/mL injection flush 500 Units    ondansetron (ZOFRAN) 8 mg, dexamethasone (DECADRON) 20 mg in sodium chloride 0.9% 100 mL IVPB    paclitaxel (TAXOL) 45 mg/m2 = 84 mg in sodium chloride 0.9% 264 mL chemo infusion    sodium chloride 0.9% 250 mL flush bag    sodium chloride 0.9% flush 10 mL      Appointments for Next Year     5/18/2017  8:45 AM DIAGNOSTIC XRAY (15 min.) [facility]    Arrive at check-in approximately 15 minutes before your scheduled appointment time. Bring all outside medical records and imaging, along with a list of your current medications and insurance card.    (off [employer]) 2nd Floor    5/18/2017  9:20 AM NON FASTING LAB (10 min.) [facility], [facility]    Arrive at check-in approximately 15 minutes before your scheduled appointment time. Bring all outside medical records and imaging, along with a list of your current medications and insurance card.    5/18/2017  9:40 AM ESTABLISHED PATIENT (20 min.) [facility] Shant Leon MD    Arrive at check-in approximately 15 minutes before your scheduled appointment time. Bring all outside medical records and imaging, along with a list of your current medications and insurance card.    (off [employer]) 3rd Floor    5/18/2017 10:00 AM INFUSION 180 MIN (180 min.) [facility]    Arrive at check-in approximately 15 minutes before your scheduled appointment time. Bring all outside medical records and imaging, along with a list of your current medications and insurance card.    7/24/2017  2:30 PM COMPLETE  "PFT (40 min.) [facility] PULMONARY [facility], [facility]    Please do not use any inhalers or respiratory medications on the day of your appointment, however if you feel like you need to use your rescue inhaler for urgent relief prior to your appointment please do so.    (off [facility]) 3rd floor    7/24/2017  3:20 PM ESTABLISHED PATIENT (20 min.) [facility] Dante Massey MD    Arrive at check-in approximately 15 minutes before your scheduled appointment time. Bring all outside medical records and imaging, along with a list of your current medications and insurance card.    (off [facility]) 3th floor    10/26/2017  7:40 AM FASTING LAB (5 min.) [facility]    1. Do not eat or drink anything for TEN HOURS (10) PRIOR TO TEST. Do not chew gum or eat candy mints, even those claiming to be sugar free. Water is allowed but do not drink any other fluids 2. Take your regular daily medicines as your doctor has ordered. If you are diabetic, do not take your insulin or other diabetic medication until your blood is drawn and you are ready to eat. Your physician may have special instructions for diabetics. Check with your doctor if you have any questions.3. Alcoholic beverages are not allowed starting at 6:00pm the evening before your appointment.    (off [facility]) 2nd floor    11/2/2017  9:20 AM ESTABLISHED ADULT PATIENT (20 min.) [facility] - Internal Medicine SHANEKA. Mihir Art Jr., MD    Arrive at check-in approximately 15 minutes before your scheduled appointment time. Bring all outside medical records and imaging, along with a list of your current medications and insurance card.    (off [facility]) 1st floor         Default Flowsheet Data (last 24 hours)      Amb Complex Vitals John        05/04/17 1335 05/04/17 1318             Measurements    Weight  77 kg (169 lb 12.1 oz)       Height  5' 5.51" (1.664 m)       BSA (Calculated - sq m)  1.89 sq " meters       BMI (Calculated)  27.9       BP  122/60       Temp  96.7 °F (35.9 °C)       Pulse  78       SpO2  96 %       Pain Assessment    Pain Score Zero                Allergies     Bactrim [Sulfamethoxazole-trimethoprim] Other (See Comments)    Severe leg cramps, dizziness    Dilaudid [Hydromorphone] Other (See Comments)    Confusion, paranoia    Sulfa (Sulfonamide Antibiotics)     Severe leg cramps, dizziness.      Medications You Received from 05/03/2017 1716 to 05/04/2017 1716        Date/Time Order Dose Route Action     05/04/2017 1644 carboplatin (PARAPLATIN) 195 mg in sodium chloride 0.9% 269.5 mL chemo infusion 195 mg Intravenous New Bag     05/04/2017 1500 diphenhydrAMINE (BENADRYL) 50 mg in sodium chloride 0.9% 100 mL IVPB 50 mg Intravenous New Bag     05/04/2017 1401 famotidine IVPB 20 mg 20 mg Intravenous New Bag     05/04/2017 1435 ondansetron (ZOFRAN) 8 mg, dexamethasone (DECADRON) 20 mg in sodium chloride 0.9% 100 mL IVPB   Intravenous New Bag     05/04/2017 1535 paclitaxel (TAXOL) 45 mg/m2 = 84 mg in sodium chloride 0.9% 264 mL chemo infusion 84 mg Intravenous New Bag      Current Discharge Medication List     Cannot display discharge medications since this is not an admission.

## 2017-05-18 ENCOUNTER — INFUSION (OUTPATIENT)
Dept: INFUSION THERAPY | Facility: HOSPITAL | Age: 77
End: 2017-05-18
Attending: INTERNAL MEDICINE
Payer: MEDICARE

## 2017-05-18 ENCOUNTER — OFFICE VISIT (OUTPATIENT)
Dept: HEMATOLOGY/ONCOLOGY | Facility: CLINIC | Age: 77
End: 2017-05-18
Payer: MEDICARE

## 2017-05-18 ENCOUNTER — CLINICAL SUPPORT (OUTPATIENT)
Dept: CARDIOLOGY | Facility: CLINIC | Age: 77
End: 2017-05-18
Payer: MEDICARE

## 2017-05-18 ENCOUNTER — HOSPITAL ENCOUNTER (OUTPATIENT)
Dept: RADIOLOGY | Facility: HOSPITAL | Age: 77
Discharge: HOME | End: 2017-05-18
Attending: INTERNAL MEDICINE
Payer: MEDICARE

## 2017-05-18 VITALS
DIASTOLIC BLOOD PRESSURE: 60 MMHG | HEART RATE: 76 BPM | TEMPERATURE: 98 F | OXYGEN SATURATION: 95 % | BODY MASS INDEX: 27.1 KG/M2 | RESPIRATION RATE: 18 BRPM | WEIGHT: 168.63 LBS | HEIGHT: 66 IN | SYSTOLIC BLOOD PRESSURE: 100 MMHG

## 2017-05-18 VITALS
DIASTOLIC BLOOD PRESSURE: 56 MMHG | HEART RATE: 64 BPM | HEIGHT: 66 IN | SYSTOLIC BLOOD PRESSURE: 97 MMHG | BODY MASS INDEX: 27 KG/M2 | WEIGHT: 168 LBS

## 2017-05-18 DIAGNOSIS — C34.92 BRONCHIOLO-ALVEOLAR ADENOCARCINOMA OF LEFT LUNG: ICD-10-CM

## 2017-05-18 DIAGNOSIS — R07.1 CHEST PAIN ON BREATHING: ICD-10-CM

## 2017-05-18 DIAGNOSIS — C34.91 BILATERAL LUNG CANCER: ICD-10-CM

## 2017-05-18 DIAGNOSIS — J98.09 BRONCHORRHEA: ICD-10-CM

## 2017-05-18 DIAGNOSIS — C78.00 MALIGNANT NEOPLASM METASTATIC TO LUNG, UNSPECIFIED LATERALITY: Primary | ICD-10-CM

## 2017-05-18 DIAGNOSIS — C78.00 MALIGNANT NEOPLASM METASTATIC TO LUNG, UNSPECIFIED LATERALITY: ICD-10-CM

## 2017-05-18 DIAGNOSIS — C34.92 BILATERAL LUNG CANCER: ICD-10-CM

## 2017-05-18 PROBLEM — R73.09 ELEVATED GLUCOSE: Status: RESOLVED | Noted: 2017-05-02 | Resolved: 2017-05-18

## 2017-05-18 PROCEDURE — 96368 THER/DIAG CONCURRENT INF: CPT | Mod: PO

## 2017-05-18 PROCEDURE — 93010 ELECTROCARDIOGRAM REPORT: CPT | Mod: S$PBB,,, | Performed by: INTERNAL MEDICINE

## 2017-05-18 PROCEDURE — 99215 OFFICE O/P EST HI 40 MIN: CPT | Mod: 25,S$PBB,, | Performed by: INTERNAL MEDICINE

## 2017-05-18 PROCEDURE — 99999 PR PBB SHADOW E&M-EST. PATIENT-LVL III: CPT | Mod: PBBFAC,,, | Performed by: INTERNAL MEDICINE

## 2017-05-18 PROCEDURE — 96367 TX/PROPH/DG ADDL SEQ IV INF: CPT | Mod: PO

## 2017-05-18 PROCEDURE — 25000003 PHARM REV CODE 250: Mod: PO | Performed by: INTERNAL MEDICINE

## 2017-05-18 PROCEDURE — 63600175 PHARM REV CODE 636 W HCPCS: Mod: PO | Performed by: INTERNAL MEDICINE

## 2017-05-18 PROCEDURE — 96417 CHEMO IV INFUS EACH ADDL SEQ: CPT | Mod: PO

## 2017-05-18 PROCEDURE — 71020 XR CHEST PA AND LATERAL: CPT | Mod: 26,,, | Performed by: RADIOLOGY

## 2017-05-18 PROCEDURE — 96413 CHEMO IV INFUSION 1 HR: CPT | Mod: PO

## 2017-05-18 RX ORDER — EPINEPHRINE 0.3 MG/.3ML
0.3 INJECTION SUBCUTANEOUS ONCE AS NEEDED
Status: CANCELLED | OUTPATIENT
Start: 2017-05-18 | End: 2017-05-18

## 2017-05-18 RX ORDER — HEPARIN 100 UNIT/ML
500 SYRINGE INTRAVENOUS
Status: CANCELLED | OUTPATIENT
Start: 2017-05-18

## 2017-05-18 RX ORDER — FAMOTIDINE 20 MG/50ML
20 INJECTION, SOLUTION INTRAVENOUS
Status: CANCELLED
Start: 2017-05-18

## 2017-05-18 RX ORDER — SODIUM CHLORIDE 0.9 % (FLUSH) 0.9 %
10 SYRINGE (ML) INJECTION
Status: CANCELLED | OUTPATIENT
Start: 2017-05-18

## 2017-05-18 RX ORDER — DIPHENHYDRAMINE HYDROCHLORIDE 50 MG/ML
50 INJECTION INTRAMUSCULAR; INTRAVENOUS ONCE AS NEEDED
Status: CANCELLED | OUTPATIENT
Start: 2017-05-18 | End: 2017-05-18

## 2017-05-18 RX ORDER — FAMOTIDINE 20 MG/50ML
20 INJECTION, SOLUTION INTRAVENOUS
Status: COMPLETED | OUTPATIENT
Start: 2017-05-18 | End: 2017-05-18

## 2017-05-18 RX ORDER — HEPARIN 100 UNIT/ML
500 SYRINGE INTRAVENOUS
Status: DISCONTINUED | OUTPATIENT
Start: 2017-05-18 | End: 2017-05-18 | Stop reason: HOSPADM

## 2017-05-18 RX ADMIN — HEPARIN 500 UNITS: 100 SYRINGE at 12:05

## 2017-05-18 RX ADMIN — FAMOTIDINE 20 MG: 20 INJECTION, SOLUTION INTRAVENOUS at 10:05

## 2017-05-18 RX ADMIN — CARBOPLATIN 180 MG: 10 INJECTION, SOLUTION INTRAVENOUS at 11:05

## 2017-05-18 RX ADMIN — DIPHENHYDRAMINE HYDROCHLORIDE 50 MG: 50 INJECTION INTRAMUSCULAR; INTRAVENOUS at 10:05

## 2017-05-18 RX ADMIN — PACLITAXEL 84 MG: 6 INJECTION, SOLUTION, CONCENTRATE INTRAVENOUS at 10:05

## 2017-05-18 RX ADMIN — DEXAMETHASONE SODIUM PHOSPHATE: 4 INJECTION, SOLUTION INTRA-ARTICULAR; INTRALESIONAL; INTRAMUSCULAR; INTRAVENOUS; SOFT TISSUE at 10:05

## 2017-05-18 NOTE — PLAN OF CARE
Problem: Chemotherapy Effects (Adult)  Intervention: Promote Energy Recovery  Reviewed with pt importance of rest and side effects of chemo, understanding verbalized.

## 2017-05-18 NOTE — PLAN OF CARE
"Problem: Patient Care Overview  Goal: Plan of Care Review  Outcome: Ongoing (interventions implemented as appropriate)  Pt states, " I feel tired and weak every morning when I wake up"      "

## 2017-05-18 NOTE — PATIENT INSTRUCTIONS
.  [facility]  [address]  [address] [address]  [phone] phone     [phone] fax  Hours of Operation: Monday- Friday 8:00am- 5:00pm  After hours phone  [phone]  Hematology / Oncology Physicians on call      Dr. Edward Holloway    Please call with any concerns regarding your appointment today.    .FALL PREVENTION   Falls often occur due to slipping, tripping or losing your balance. Here are ways to reduce your risk of falling again.   Was there anything that caused your fall that can be fixed, removed or replaced?   Make your home safe by keeping walkways clear of objects you may trip over.   Use non-slip pads under rugs.   Do not walk in poorly lit areas.   Do not stand on chairs or wobbly ladders.   Use caution when reaching overhead or looking upward. This position can cause a loss of balance.   Be sure your shoes fit properly, have non-slip bottoms and are in good condition.   Be cautious when going up and down stairs, curbs, and when walking on uneven sidewalks.   If your balance is poor, consider using a cane or walker.   If your fall was related to alcohol use, stop or limit alcohol intake.   If your fall was related to use of sleeping medicines, talk to your doctor about this. You may need to reduce your dosage at bedtime if you awaken during the night to go to the bathroom.   To reduce the need for nighttime bathroom trips:   Avoid drinking fluids for several hours before going to bed   Empty your bladder before going to bed   Men can keep a urinal at the bedside   © 6530-5501 [employer], [address] [place] [address], [place] [address]. All rights reserved. This information is not intended as a substitute for professional medical care. Always follow your healthcare professional's instructions.    .WAYS TO HELP PREVENT INFECTION         WASH YOUR HANDS OFTEN DURING THE DAY, ESPECIALLY BEFORE YOU EAT, AFTER USING THE  BATHROOM, AND AFTER TOUCHING ANIMALS     STAY AWAY FROM PEOPLE WHO HAVE ILLNESSES YOU CAN CATCH; SUCH AS COLDS, FLU, CHICKEN POX     TRY TO AVOID CROWDS     STAY AWAY FROM CHILDREN WHO RECENTLY HAVE RECEIVED LIVE VIRUS VACCINES     MAINTAIN GOOD MOUTH CARE     DO NOT SQUEEZE OR SCRATCH PIMPLES     CLEAN CUTS & SCRAPES RIGHT AWAY AND DAILY UNTIL HEALED WITH WARM WATER, SOAP & AN ANTISEPTIC     AVOID CONTACT WITH LITTER BOXES, BIRD CAGES, & FISH TANKS     AVOID STANDING WATER, IE., BIRD BATHS, FLOWER POTS/VASES, OR HUMIDIFIERS     WEAR GLOVES WHEN GARDENING OR CLEANING UP AFTER OTHERS, ESPECIALLY BABIES & SMALL CHILDREN     DO NOT EAT RAW FISH, SEAFOOD, MEAT, OR EGGS    .HOME CARE AFTER CHEMOTHERAPY   Meals   Many patients feel sick and lose their appetites during treatment. Eat small meals several times a day. Choose bland foods with little taste or smell if you have problems with nausea. Be sure to cook all food thoroughly. This kills bacteria and helps you avoid intestinal infection. Soft foods are easier to swallow and digest.   Activity   Exercise keeps you strong and keeps your heart and lungs active. Talk to your doctor about an appropriate exercise program for you.   Skin Care   To prevent a skin infection, bathe or shower once a day. Use a moisturizing soap and wash with warm water. Avoid very hot or cold water. Chemotherapy can make your skin dry . Apply moisturizing lotion to help relieve dry skin. Some drugs used in high doses can cause slight burns to appear (like sunburn). Ask for a special cream to help relieve the burn and protect your skin.   Prevent Mouth Sores   During chemotherapy, many people get mouth sores. Do the following to help prevent mouth sores or to ease discomfort.   Brush your teeth with a soft-bristle toothbrush after every meal.  Don't use dental floss if your platelet count is below 50,000. Your doctor or nurse will tell you if this is the case.  Use an oral swab or  special soft toothbrush if your gums bleed during regular brushing.  Use mouthwash as directed. If you can't tolerate commercial mouthwash, use salt and baking soda to clean your mouth. Mix 1 teaspoon of salt and 1 teaspoon of baking soda into a glass of water. Swish and spit.  Call your doctor or return to this facility if you develop any of the following:   Sore throat   White patches in the mouth or throat   Fever of 100.4ºF (38ºC) or higher, or as directed by your healthcare provider  © 3022-5969 [employer], [address] [place] [address], [place] [address]. All rights reserved. This information is not intended as a substitute for professional medical care. Always follow your healthcare professional's

## 2017-05-18 NOTE — MR AVS SNAPSHOT
Patient Information     Patient Name Sex     Doron Domínguez Male 1940      Visit Information        Provider Department Dept Phone Center    2017 10:00 AM [facility] [phone] [facility]      Patient Instructions    .  [facility]  [address] [facility]  [address] [address]  [phone] phone     [phone] fax  Hours of Operation: Monday- Friday 8:00am- 5:00pm  After hours phone  [phone]  Hematology / Oncology Physicians on call      Dr. Edward Holloway    Please call with any concerns regarding your appointment today.    .FALL PREVENTION   Falls often occur due to slipping, tripping or losing your balance. Here are ways to reduce your risk of falling again.   Was there anything that caused your fall that can be fixed, removed or replaced?   Make your home safe by keeping walkways clear of objects you may trip over.   Use non-slip pads under rugs.   Do not walk in poorly lit areas.   Do not stand on chairs or wobbly ladders.   Use caution when reaching overhead or looking upward. This position can cause a loss of balance.   Be sure your shoes fit properly, have non-slip bottoms and are in good condition.   Be cautious when going up and down stairs, curbs, and when walking on uneven sidewalks.   If your balance is poor, consider using a cane or walker.   If your fall was related to alcohol use, stop or limit alcohol intake.   If your fall was related to use of sleeping medicines, talk to your doctor about this. You may need to reduce your dosage at bedtime if you awaken during the night to go to the bathroom.   To reduce the need for nighttime bathroom trips:   Avoid drinking fluids for several hours before going to bed   Empty your bladder before going to bed   Men can keep a urinal at the bedside   © 0879-6914 Dariela Owusu, [address] [place] [address], [place] [address]. All rights  reserved. This information is not intended as a substitute for professional medical care. Always follow your healthcare professional's instructions.    .WAYS TO HELP PREVENT INFECTION         WASH YOUR HANDS OFTEN DURING THE DAY, ESPECIALLY BEFORE YOU EAT, AFTER USING THE BATHROOM, AND AFTER TOUCHING ANIMALS     STAY AWAY FROM PEOPLE WHO HAVE ILLNESSES YOU CAN CATCH; SUCH AS COLDS, FLU, CHICKEN POX     TRY TO AVOID CROWDS     STAY AWAY FROM CHILDREN WHO RECENTLY HAVE RECEIVED LIVE VIRUS VACCINES     MAINTAIN GOOD MOUTH CARE     DO NOT SQUEEZE OR SCRATCH PIMPLES     CLEAN CUTS & SCRAPES RIGHT AWAY AND DAILY UNTIL HEALED WITH WARM WATER, SOAP & AN ANTISEPTIC     AVOID CONTACT WITH LITTER BOXES, BIRD CAGES, & FISH TANKS     AVOID STANDING WATER, IE., BIRD BATHS, FLOWER POTS/VASES, OR HUMIDIFIERS     WEAR GLOVES WHEN GARDENING OR CLEANING UP AFTER OTHERS, ESPECIALLY BABIES & SMALL CHILDREN     DO NOT EAT RAW FISH, SEAFOOD, MEAT, OR EGGS    .HOME CARE AFTER CHEMOTHERAPY   Meals   Many patients feel sick and lose their appetites during treatment. Eat small meals several times a day. Choose bland foods with little taste or smell if you have problems with nausea. Be sure to cook all food thoroughly. This kills bacteria and helps you avoid intestinal infection. Soft foods are easier to swallow and digest.   Activity   Exercise keeps you strong and keeps your heart and lungs active. Talk to your doctor about an appropriate exercise program for you.   Skin Care   To prevent a skin infection, bathe or shower once a day. Use a moisturizing soap and wash with warm water. Avoid very hot or cold water. Chemotherapy can make your skin dry . Apply moisturizing lotion to help relieve dry skin. Some drugs used in high doses can cause slight burns to appear (like sunburn). Ask for a special cream to help relieve the burn and protect your skin.   Prevent Mouth Sores   During chemotherapy, many people get mouth sores. Do the  following to help prevent mouth sores or to ease discomfort.   Brush your teeth with a soft-bristle toothbrush after every meal.  Don't use dental floss if your platelet count is below 50,000. Your doctor or nurse will tell you if this is the case.  Use an oral swab or special soft toothbrush if your gums bleed during regular brushing.  Use mouthwash as directed. If you can't tolerate commercial mouthwash, use salt and baking soda to clean your mouth. Mix 1 teaspoon of salt and 1 teaspoon of baking soda into a glass of water. Swish and spit.  Call your doctor or return to this facility if you develop any of the following:   Sore throat   White patches in the mouth or throat   Fever of 100.4ºF (38ºC) or higher, or as directed by your healthcare provider  © 4275-6890 [facility], [address] [place] [address]. All rights reserved. This information is not intended as a substitute for professional medical care. Always follow your healthcare professional's            Your Current Medications Are     albuterol 90 mcg/actuation inhaler    albuterol-ipratropium 2.5mg-0.5mg/3mL (DUO-NEB) 0.5 mg-3 mg(2.5 mg base)/3 mL nebulizer solution    aspirin 81 MG Chew    atorvastatin (LIPITOR) 20 MG tablet    benzonatate (TESSALON) 200 MG capsule    cetirizine (ZYRTEC) 10 MG tablet    dexamethasone (DECADRON) 4 MG Tab    diphenhydramine-acetaminophen (TYLENOL PM)  mg Tab    econazole nitrate 1 % cream    fish oil-omega-3 fatty acids 300-1,000 mg capsule    glucosamine-chondroitin 500-400 mg tablet    guaifenesin (MUCINEX) 600 mg 12 hr tablet    inhalation device (AEROCHAMBER PLUS FLOW-VU)    levoFLOXacin (LEVAQUIN) 500 MG tablet    metoprolol succinate (TOPROL-XL) 50 MG 24 hr tablet    multivitamin with minerals tablet    ondansetron (ZOFRAN) 4 MG tablet    polyethylene glycol (GLYCOLAX) 17 gram PwPk    potassium gluconate 595 mg (99 mg) Tab    prochlorperazine (COMPAZINE) 5 MG tablet    senna-docusate 8.6-50 mg  (SENNA WITH DOCUSATE SODIUM) 8.6-50 mg per tablet    tamsulosin (FLOMAX) 0.4 mg Cp24    vitamin D 1000 units Tab      Facility-Administered Medications     carboplatin (PARAPLATIN) 180 mg in sodium chloride 0.9% 268 mL chemo infusion    diphenhydrAMINE (BENADRYL) 50 mg in sodium chloride 0.9% 100 mL IVPB    famotidine IVPB 20 mg    heparin, porcine (PF) 100 unit/mL injection flush 500 Units    ondansetron (ZOFRAN) 8 mg, dexamethasone (DECADRON) 20 mg in sodium chloride 0.9% 100 mL IVPB    paclitaxel (TAXOL) 45 mg/m2 = 84 mg in sodium chloride 0.9% 264 mL chemo infusion    sodium chloride 0.9% 250 mL flush bag      Appointments for Next Year     5/25/2017  8:00 AM NON FASTING LAB (10 min.) [facility]    Arrive at check-in approximately 15 minutes before your scheduled appointment time. Bring all outside medical records and imaging, along with a list of your current medications and insurance card.    5/25/2017  8:40 AM ESTABLISHED PATIENT (20 min.) [facility] - Hematology Oncology Shant Leon MD    Arrive at check-in approximately 15 minutes before your scheduled appointment time. Bring all outside medical records and imaging, along with a list of your current medications and insurance card.    (off [facility]) 1st Floor Appointments with Luly Hernandeztt - 2nd Floor    5/25/2017  9:00 AM INFUSION 180 MIN (180 min.) [facility] CHAIR 05 ONLH    Arrive at check-in approximately 15 minutes before your scheduled appointment time. Bring all outside medical records and imaging, along with a list of your current medications and insurance card.    (off [facility]) 1st floor    7/24/2017  2:30 PM COMPLETE PFT (40 min.) [facility], [facility]    Please do not use any inhalers or respiratory medications on the day of your appointment, however if you feel like you need to use your rescue inhaler for urgent relief prior to your appointment please do  "so.    (off [facility]) 3rd floor    7/24/2017  3:20 PM ESTABLISHED PATIENT (20 min.) [facility] - Pulmonary Services Dante Massey MD    Arrive at check-in approximately 15 minutes before your scheduled appointment time. Bring all outside medical records and imaging, along with a list of your current medications and insurance card.    (off [facility]) 3th floor    10/26/2017  7:40 AM FASTING LAB (5 min.) [facility] LABOR[facility], [facility]    1. Do not eat or drink anything for TEN HOURS (10) PRIOR TO TEST. Do not chew gum or eat candy mints, even those claiming to be sugar free. Water is allowed but do not drink any other fluids 2. Take your regular daily medicines as your doctor has ordered. If you are diabetic, do not take your insulin or other diabetic medication until your blood is drawn and you are ready to eat. Your physician may have special instructions for diabetics. Check with your doctor if you have any questions.3. Alcoholic beverages are not allowed starting at 6:00pm the evening before your appointment.    (off [facility]) 2nd floor    11/2/2017  9:20 AM ESTABLISHED ADULT PATIENT (20 min.) [facility] - Internal Medicine SHANEKA. Mihir Art Jr., MD    Arrive at check-in approximately 15 minutes before your scheduled appointment time. Bring all outside medical records and imaging, along with a list of your current medications and insurance card.    (off [facility]) 1st floor         Default Flowsheet Data (last 24 hours)      Amb Complex Vitals John        05/18/17 1153 05/18/17 1104 05/18/17 1046 05/18/17 0946 05/18/17 0920    Measurements    Weight    76.2 kg (168 lb) 76.5 kg (168 lb 10.4 oz)    Height    5' 5.5" (1.664 m) 5' 5.51" (1.664 m)    BSA (Calculated - sq m)    1.88 sq meters 1.88 sq meters    BMI (Calculated)    27.6 27.7    BP (!)  97/56 101/65 111/65  100/60    Temp     98.2 °F (36.8 °C)    Pulse 64 63 67  76    Resp     18    SpO2     95 %    Pain Assessment    Pain " Score    Two Zero    Pain Loc    CHEST             Allergies     Bactrim [Sulfamethoxazole-trimethoprim] Other (See Comments)    Severe leg cramps, dizziness    Dilaudid [Hydromorphone] Other (See Comments)    Confusion, paranoia    Sulfa (Sulfonamide Antibiotics)     Severe leg cramps, dizziness.      Medications You Received from 05/17/2017 1158 to 05/18/2017 1158        Date/Time Order Dose Route Action     05/18/2017 1155 carboplatin (PARAPLATIN) 180 mg in sodium chloride 0.9% 268 mL chemo infusion 180 mg Intravenous New Bag     05/18/2017 1005 diphenhydrAMINE (BENADRYL) 50 mg in sodium chloride 0.9% 100 mL IVPB 50 mg Intravenous New Bag     05/18/2017 1005 famotidine IVPB 20 mg 20 mg Intravenous New Bag     05/18/2017 1028 ondansetron (ZOFRAN) 8 mg, dexamethasone (DECADRON) 20 mg in sodium chloride 0.9% 100 mL IVPB   Intravenous New Bag     05/18/2017 1049 paclitaxel (TAXOL) 45 mg/m2 = 84 mg in sodium chloride 0.9% 264 mL chemo infusion 84 mg Intravenous New Bag      Current Discharge Medication List     Cannot display discharge medications since this is not an admission.

## 2017-05-18 NOTE — PROGRESS NOTES
Subjective:       Patient ID: Doron Domínguez is a 76 y.o. male.    Chief Complaint: Follow-up; Results; Chemotherapy; and Lung Cancer    HPI 76-year-old male history of metastatic bronchogenic carcinoma to lung presents for cycle 2 day 1 of weekly carbotaxol patient had chest x-ray performed today compared to chest x-ray at start of initiation of cycle with possible worsening of infiltrates ECOG status 1 continues actively work    Past Medical History:   Diagnosis Date    Aortic stenosis 12/29/2016    Arthritis     Asthma     Back pain     due to calcium deposits in back    BPH (benign prostatic hyperplasia)     CAD (coronary artery disease)     CAD, multiple vessel 12/29/2016    Cancer     lung    High cholesterol     Hx of CABG 12/29/2016    Hypertension     Lung cancer 01/2017    T3 N0 M0 bronchoalveolar     Family History   Problem Relation Age of Onset    Diabetes Mother     Diabetes Sister     Diabetes Brother      Social History     Social History    Marital status:      Spouse name: N/A    Number of children: N/A    Years of education: 3     Occupational History    Part-time employed      Social History Main Topics    Smoking status: Never Smoker    Smokeless tobacco: Never Used    Alcohol use No    Drug use: No    Sexual activity: No     Other Topics Concern    Not on file     Social History Narrative    Part-time employed, , 3 children.     Past Surgical History:   Procedure Laterality Date    APPENDECTOMY      CARDIAC SURGERY      CORONARY ARTERY BYPASS GRAFT  2012    CABG x 3 at [facility]    LUNG LOBECTOMY Left 01/10/2017    Left lower lobe       Labs:  Lab Results   Component Value Date    WBC 5.36 05/18/2017    HGB 14.1 05/18/2017    HCT 42.9 05/18/2017    MCV 94 05/18/2017     (L) 05/18/2017     BMP  Lab Results   Component Value Date     05/18/2017    K 4.2 05/18/2017     05/18/2017    CO2 27 05/18/2017    BUN 14 05/18/2017    CREATININE 1.0  05/18/2017    CALCIUM 9.6 05/18/2017    ANIONGAP 10 05/18/2017    ESTGFRAFRICA >60 05/18/2017    EGFRNONAA >60 05/18/2017     Lab Results   Component Value Date    ALT 25 05/18/2017    AST 28 05/18/2017    ALKPHOS 98 05/18/2017    BILITOT 0.6 05/18/2017       No results found for: IRON, TIBC, FERRITIN, SATURATEDIRO  No results found for: [id number]  No results found for: FOLATE  No results found for: TSH      Review of Systems   Constitutional: Positive for fatigue. Negative for activity change, appetite change, chills, diaphoresis, fever and unexpected weight change.   HENT: Negative for congestion, dental problem, drooling, ear discharge, ear pain, facial swelling, hearing loss, mouth sores, nosebleeds, postnasal drip, rhinorrhea, sinus pressure, sneezing, sore throat, tinnitus, trouble swallowing and voice change.    Eyes: Negative for photophobia, pain, discharge, redness, itching and visual disturbance.   Respiratory: Positive for cough and shortness of breath. Negative for apnea, choking, chest tightness, wheezing and stridor.    Cardiovascular: Positive for chest pain. Negative for palpitations and leg swelling.   Gastrointestinal: Negative for abdominal distention, abdominal pain, anal bleeding, blood in stool, constipation, diarrhea, nausea, rectal pain and vomiting.   Endocrine: Negative for cold intolerance, heat intolerance, polydipsia, polyphagia and polyuria.   Genitourinary: Negative for decreased urine volume, difficulty urinating, discharge, dysuria, enuresis, flank pain, frequency, genital sores, hematuria, penile pain, penile swelling, scrotal swelling, testicular pain and urgency.   Musculoskeletal: Positive for back pain. Negative for arthralgias, gait problem, joint swelling, myalgias, neck pain and neck stiffness.   Skin: Negative for color change, pallor, rash and wound.   Allergic/Immunologic: Negative for environmental allergies, food allergies and immunocompromised state.   Neurological:  Negative for dizziness, tremors, seizures, syncope, facial asymmetry, speech difficulty, weakness, light-headedness, numbness and headaches.   Hematological: Negative for adenopathy. Does not bruise/bleed easily.   Psychiatric/Behavioral: Positive for dysphoric mood. Negative for agitation, behavioral problems, confusion, decreased concentration, hallucinations, self-injury, sleep disturbance and suicidal ideas. The patient is nervous/anxious. The patient is not hyperactive.        Objective:      Physical Exam   Constitutional: He is oriented to person, place, and time. He appears well-developed. He appears distressed.   HENT:   Head: Normocephalic.   Right Ear: External ear normal.   Left Ear: External ear normal.   Nose: Nose normal. Right sinus exhibits no maxillary sinus tenderness and no frontal sinus tenderness. Left sinus exhibits no maxillary sinus tenderness and no frontal sinus tenderness.   Mouth/Throat: Oropharynx is clear and moist. No oropharyngeal exudate.   Eyes: EOM and lids are normal. Pupils are equal, round, and reactive to light. Right eye exhibits no discharge. Left eye exhibits no discharge. Right conjunctiva is not injected. Right conjunctiva has no hemorrhage. Left conjunctiva is not injected. Left conjunctiva has no hemorrhage. No scleral icterus. Right eye exhibits normal extraocular motion. Left eye exhibits normal extraocular motion.   Neck: Normal range of motion. Neck supple. No JVD present. No tracheal deviation present. No thyromegaly present.   Cardiovascular: Normal rate, regular rhythm and normal heart sounds.    Pulmonary/Chest: Effort normal and breath sounds normal. No stridor. No respiratory distress.   Abdominal: Soft. Bowel sounds are normal. He exhibits no mass. There is no hepatosplenomegaly, splenomegaly or hepatomegaly. There is no tenderness.   Musculoskeletal: Normal range of motion. He exhibits no edema or tenderness.   Lymphadenopathy:        Head (right side): No  posterior auricular and no occipital adenopathy present.        Head (left side): No posterior auricular and no occipital adenopathy present.     He has no cervical adenopathy.        Right cervical: No superficial cervical, no deep cervical and no posterior cervical adenopathy present.       Left cervical: No superficial cervical, no deep cervical and no posterior cervical adenopathy present.     He has no axillary adenopathy.        Right: No supraclavicular adenopathy present.        Left: No supraclavicular adenopathy present.   Neurological: He is alert and oriented to person, place, and time. He has normal strength. No cranial nerve deficit. Coordination normal.   Skin: Skin is dry. No rash noted. He is not diaphoretic. No cyanosis or erythema. Nails show no clubbing.   Psychiatric: His behavior is normal. Judgment and thought content normal. His mood appears anxious. Cognition and memory are normal. He exhibits a depressed mood.   Vitals reviewed.          Assessment:       1. Malignant neoplasm metastatic to lung, unspecified laterality    2. Chest pain on breathing    3. Bronchiolo-alveolar adenocarcinoma of left lung    4. Bilateral lung cancer    5. Bronchorrhea            Plan:     extensive conversation with patient reviewed x-rays with him personally at this point I can see where there has been slum worsening his x-ray was done a week prior to initiation of treatment and comparison my recommendations that we try to additional weeks of therapy will repeat imaging to see whether not this been any evidence of decrease in infiltrates if not will abandon carbotaxol and consider moving to PD1 inhibitor ID low which we did not use in the first treatment because of PDL l  negativity.  Otherwise will continue to treat with systemic therapy such as Taxotere.  EKG ordered today proceed with chemotherapy today orders written and signed

## 2017-05-25 ENCOUNTER — INFUSION (OUTPATIENT)
Dept: INFUSION THERAPY | Facility: HOSPITAL | Age: 77
End: 2017-05-25
Attending: INTERNAL MEDICINE
Payer: MEDICARE

## 2017-05-25 ENCOUNTER — OFFICE VISIT (OUTPATIENT)
Dept: HEMATOLOGY/ONCOLOGY | Facility: CLINIC | Age: 77
End: 2017-05-25
Payer: MEDICARE

## 2017-05-25 VITALS — DIASTOLIC BLOOD PRESSURE: 53 MMHG | SYSTOLIC BLOOD PRESSURE: 108 MMHG | HEART RATE: 72 BPM

## 2017-05-25 VITALS
HEART RATE: 86 BPM | HEIGHT: 66 IN | OXYGEN SATURATION: 96 % | BODY MASS INDEX: 26.96 KG/M2 | SYSTOLIC BLOOD PRESSURE: 108 MMHG | TEMPERATURE: 97 F | WEIGHT: 167.75 LBS | DIASTOLIC BLOOD PRESSURE: 50 MMHG

## 2017-05-25 DIAGNOSIS — C78.00 MALIGNANT NEOPLASM METASTATIC TO LUNG, UNSPECIFIED LATERALITY: ICD-10-CM

## 2017-05-25 DIAGNOSIS — J98.09 BRONCHORRHEA: ICD-10-CM

## 2017-05-25 DIAGNOSIS — C34.92 BILATERAL LUNG CANCER: ICD-10-CM

## 2017-05-25 DIAGNOSIS — C34.92 BRONCHIOLO-ALVEOLAR ADENOCARCINOMA OF LEFT LUNG: Primary | ICD-10-CM

## 2017-05-25 DIAGNOSIS — C34.92 BRONCHIOLO-ALVEOLAR ADENOCARCINOMA OF LEFT LUNG: ICD-10-CM

## 2017-05-25 DIAGNOSIS — C34.91 BILATERAL LUNG CANCER: ICD-10-CM

## 2017-05-25 DIAGNOSIS — C78.00 MALIGNANT NEOPLASM METASTATIC TO LUNG, UNSPECIFIED LATERALITY: Primary | ICD-10-CM

## 2017-05-25 PROCEDURE — 96417 CHEMO IV INFUS EACH ADDL SEQ: CPT

## 2017-05-25 PROCEDURE — 99215 OFFICE O/P EST HI 40 MIN: CPT | Mod: 25,S$PBB,, | Performed by: INTERNAL MEDICINE

## 2017-05-25 PROCEDURE — 99999 PR PBB SHADOW E&M-EST. PATIENT-LVL III: CPT | Mod: PBBFAC,,, | Performed by: INTERNAL MEDICINE

## 2017-05-25 PROCEDURE — 96413 CHEMO IV INFUSION 1 HR: CPT

## 2017-05-25 PROCEDURE — 96367 TX/PROPH/DG ADDL SEQ IV INF: CPT

## 2017-05-25 PROCEDURE — 25000003 PHARM REV CODE 250: Performed by: INTERNAL MEDICINE

## 2017-05-25 PROCEDURE — 63600175 PHARM REV CODE 636 W HCPCS: Performed by: INTERNAL MEDICINE

## 2017-05-25 RX ORDER — HEPARIN 100 UNIT/ML
500 SYRINGE INTRAVENOUS
Status: DISCONTINUED | OUTPATIENT
Start: 2017-05-25 | End: 2017-05-25 | Stop reason: HOSPADM

## 2017-05-25 RX ORDER — DIPHENHYDRAMINE HYDROCHLORIDE 50 MG/ML
50 INJECTION INTRAMUSCULAR; INTRAVENOUS ONCE AS NEEDED
Status: CANCELLED | OUTPATIENT
Start: 2017-05-25 | End: 2017-05-25

## 2017-05-25 RX ORDER — EPINEPHRINE 0.3 MG/.3ML
0.3 INJECTION SUBCUTANEOUS ONCE AS NEEDED
Status: CANCELLED | OUTPATIENT
Start: 2017-05-25 | End: 2017-05-25

## 2017-05-25 RX ORDER — FAMOTIDINE 20 MG/50ML
20 INJECTION, SOLUTION INTRAVENOUS
Status: CANCELLED
Start: 2017-05-25

## 2017-05-25 RX ORDER — SODIUM CHLORIDE 0.9 % (FLUSH) 0.9 %
10 SYRINGE (ML) INJECTION
Status: CANCELLED | OUTPATIENT
Start: 2017-05-25

## 2017-05-25 RX ORDER — HEPARIN 100 UNIT/ML
500 SYRINGE INTRAVENOUS
Status: CANCELLED | OUTPATIENT
Start: 2017-05-25

## 2017-05-25 RX ORDER — FAMOTIDINE 20 MG/50ML
20 INJECTION, SOLUTION INTRAVENOUS
Status: COMPLETED | OUTPATIENT
Start: 2017-05-25 | End: 2017-05-25

## 2017-05-25 RX ADMIN — HEPARIN 500 UNITS: 100 SYRINGE at 12:05

## 2017-05-25 RX ADMIN — DIPHENHYDRAMINE HYDROCHLORIDE 50 MG: 50 INJECTION, SOLUTION INTRAMUSCULAR; INTRAVENOUS at 10:05

## 2017-05-25 RX ADMIN — DEXAMETHASONE SODIUM PHOSPHATE: 4 INJECTION, SOLUTION INTRAMUSCULAR; INTRAVENOUS at 09:05

## 2017-05-25 RX ADMIN — PACLITAXEL 84 MG: 6 INJECTION, SOLUTION INTRAVENOUS at 11:05

## 2017-05-25 RX ADMIN — FAMOTIDINE 20 MG: 20 INJECTION, SOLUTION INTRAVENOUS at 09:05

## 2017-05-25 RX ADMIN — CARBOPLATIN 180 MG: 10 INJECTION, SOLUTION INTRAVENOUS at 12:05

## 2017-05-25 NOTE — PATIENT INSTRUCTIONS
[facility]  [address]  [address] [address]  [phone] phone     [phone] fax  Hours of Operation: Monday- Friday 8:00am- 5:00pm  After hours phone  [phone]  Hematology / Oncology Physicians on call      Dr. Edward Holloway    Please call with any concerns regarding your appointment today.FALL PREVENTION   Falls often occur due to slipping, tripping or losing your balance. Here are ways to reduce your risk of falling again.   Was there anything that caused your fall that can be fixed, removed or replaced?   Make your home safe by keeping walkways clear of objects you may trip over.   Use non-slip pads under rugs.   Do not walk in poorly lit areas.   Do not stand on chairs or wobbly ladders.   Use caution when reaching overhead or looking upward. This position can cause a loss of balance.   Be sure your shoes fit properly, have non-slip bottoms and are in good condition.   Be cautious when going up and down stairs, curbs, and when walking on uneven sidewalks.   If your balance is poor, consider using a cane or walker.   If your fall was related to alcohol use, stop or limit alcohol intake.   If your fall was related to use of sleeping medicines, talk to your doctor about this. You may need to reduce your dosage at bedtime if you awaken during the night to go to the bathroom.   To reduce the need for nighttime bathroom trips:   Avoid drinking fluids for several hours before going to bed   Empty your bladder before going to bed   Men can keep a urinal at the bedside   © 6289-6560 Dariela Owusu, [address] [place] [address], [place] [address]. All rights reserved. This information is not intended as a substitute for professional medical care. Always follow your healthcare professional's instructions.  HOME CARE AFTER CHEMOTHERAPY   Meals   Many patients feel sick and lose their appetites during treatment. Eat small meals several  times a day. Choose bland foods with little taste or smell if you have problems with nausea. Be sure to cook all food thoroughly. This kills bacteria and helps you avoid intestinal infection. Soft foods are easier to swallow and digest.   Activity   Exercise keeps you strong and keeps your heart and lungs active. Talk to your doctor about an appropriate exercise program for you.   Skin Care   To prevent a skin infection, bathe or shower once a day. Use a moisturizing soap and wash with warm water. Avoid very hot or cold water. Chemotherapy can make your skin dry . Apply moisturizing lotion to help relieve dry skin. Some drugs used in high doses can cause slight burns to appear (like sunburn). Ask for a special cream to help relieve the burn and protect your skin.   Prevent Mouth Sores   During chemotherapy, many people get mouth sores. Do the following to help prevent mouth sores or to ease discomfort.   Brush your teeth with a soft-bristle toothbrush after every meal.  Don't use dental floss if your platelet count is below 50,000. Your doctor or nurse will tell you if this is the case.  Use an oral swab or special soft toothbrush if your gums bleed during regular brushing.  Use mouthwash as directed. If you can't tolerate commercial mouthwash, use salt and baking soda to clean your mouth. Mix 1 teaspoon of salt and 1 teaspoon of baking soda into a glass of water. Swish and spit.  Call your doctor or return to this facility if you develop any of the following:   Sore throat   White patches in the mouth or throat   Fever of 100.4ºF (38ºC) or higher, or as directed by your healthcare provider  © 9167-1157 Dariela Owusu, [address] [place] [address], [place] [address]. All rights reserved. This information is not intended as a substitute for professional medical care. Always follow your healthcare professional's   WAYS TO HELP PREVENT INFECTION         WASH YOUR HANDS OFTEN DURING THE DAY, ESPECIALLY BEFORE YOU EAT, AFTER  USING THE BATHROOM, AND AFTER TOUCHING ANIMALS     STAY AWAY FROM PEOPLE WHO HAVE ILLNESSES YOU CAN CATCH; SUCH AS COLDS, FLU, CHICKEN POX     TRY TO AVOID CROWDS     STAY AWAY FROM CHILDREN WHO RECENTLY HAVE RECEIVED LIVE VIRUS VACCINES     MAINTAIN GOOD MOUTH CARE     DO NOT SQUEEZE OR SCRATCH PIMPLES     CLEAN CUTS & SCRAPES RIGHT AWAY AND DAILY UNTIL HEALED WITH WARM WATER, SOAP & AN ANTISEPTIC     AVOID CONTACT WITH LITTER BOXES, BIRD CAGES, & FISH TANKS     AVOID STANDING WATER, IE., BIRD BATHS, FLOWER POTS/VASES, OR HUMIDIFIERS     WEAR GLOVES WHEN GARDENING OR CLEANING UP AFTER OTHERS, ESPECIALLY BABIES & SMALL CHILDREN     DO NOT EAT RAW FISH, SEAFOOD, MEAT, OR EGGS

## 2017-05-25 NOTE — PLAN OF CARE
Problem: Patient Care Overview  Goal: Plan of Care Review  Outcome: Ongoing (interventions implemented as appropriate)  im feeling pretty good just tired

## 2017-05-25 NOTE — PROGRESS NOTES
Subjective:       Patient ID: Doron Domínguez is a 76 y.o. male.    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  Patient tolerating therapy reasonably well no nausea vomiting fever chills or night sweats ECOG status 2    Past Medical History:   Diagnosis Date    Aortic stenosis 12/29/2016    Arthritis     Asthma     Back pain     due to calcium deposits in back    BPH (benign prostatic hyperplasia)     CAD (coronary artery disease)     CAD, multiple vessel 12/29/2016    Cancer     lung    High cholesterol     Hx of CABG 12/29/2016    Hypertension     Lung cancer 01/2017    T3 N0 M0 bronchoalveolar     Family History   Problem Relation Age of Onset    Diabetes Mother     Diabetes Sister     Diabetes Brother      Social History     Social History    Marital status:      Spouse name: N/A    Number of children: N/A    Years of education: 3     Occupational History    Part-time employed      Social History Main Topics    Smoking status: Never Smoker    Smokeless tobacco: Never Used    Alcohol use No    Drug use: No    Sexual activity: No     Other Topics Concern    Not on file     Social History Narrative    Part-time employed, , 3 children.     Past Surgical History:   Procedure Laterality Date    APPENDECTOMY      CARDIAC SURGERY      CORONARY ARTERY BYPASS GRAFT  2012    CABG x 3 at [facility]    LUNG LOBECTOMY Left 01/10/2017    Left lower lobe       Labs:  Lab Results   Component Value Date    WBC 7.30 05/25/2017    HGB 13.7 (L) 05/25/2017    HCT 41.1 05/25/2017    MCV 93 05/25/2017     05/25/2017     BMP  Lab Results   Component Value Date     05/25/2017    K 4.2 05/25/2017     05/25/2017    CO2 26 05/25/2017    BUN 16 05/25/2017    CREATININE 1.0 05/25/2017    CALCIUM 9.3 05/25/2017     ANIONGAP 9 05/25/2017    ESTGFRAFRICA >60 05/25/2017    EGFRNONAA >60 05/25/2017     Lab Results   Component Value Date    ALT 23 05/25/2017    AST 27 05/25/2017    ALKPHOS 90 05/25/2017    BILITOT 0.7 05/25/2017       No results found for: IRON, TIBC, FERRITIN, SATURATEDIRO  No results found for: [id number]  No results found for: FOLATE  No results found for: TSH      Review of Systems   Constitutional: Positive for activity change, fatigue and unexpected weight change. Negative for chills, diaphoresis and fever.   HENT: Negative for congestion, dental problem, drooling, ear discharge, ear pain, facial swelling, hearing loss, mouth sores, nosebleeds, postnasal drip, rhinorrhea, sinus pressure, sneezing, sore throat, tinnitus, trouble swallowing and voice change.    Eyes: Negative for photophobia, pain, discharge, redness, itching and visual disturbance.   Respiratory: Positive for cough. Negative for apnea, choking, chest tightness, shortness of breath, wheezing and stridor.    Cardiovascular: Negative for chest pain, palpitations and leg swelling.   Gastrointestinal: Negative for abdominal distention, abdominal pain, anal bleeding, blood in stool, constipation, diarrhea, nausea, rectal pain and vomiting.   Endocrine: Negative for cold intolerance, heat intolerance, polydipsia, polyphagia and polyuria.   Genitourinary: Negative for decreased urine volume, difficulty urinating, discharge, dysuria, enuresis, flank pain, frequency, genital sores, hematuria, penile pain, penile swelling, scrotal swelling, testicular pain and urgency.   Musculoskeletal: Negative for arthralgias, back pain, gait problem, joint swelling, myalgias, neck pain and neck stiffness.   Skin: Negative for color change, pallor, rash and wound.   Allergic/Immunologic: Negative for environmental allergies, food allergies and immunocompromised state.   Neurological: Positive for weakness. Negative for dizziness, tremors, seizures, syncope, facial  asymmetry, speech difficulty, light-headedness, numbness and headaches.   Hematological: Negative for adenopathy. Does not bruise/bleed easily.   Psychiatric/Behavioral: Positive for dysphoric mood. Negative for agitation, behavioral problems, confusion, decreased concentration, hallucinations, self-injury, sleep disturbance and suicidal ideas. The patient is nervous/anxious. The patient is not hyperactive.        Objective:      Physical Exam   Constitutional: He is oriented to person, place, and time. He appears well-developed. He appears cachectic. He has a sickly appearance. He appears ill. He appears distressed.   HENT:   Head: Normocephalic.   Right Ear: External ear normal.   Left Ear: External ear normal.   Nose: Nose normal. Right sinus exhibits no maxillary sinus tenderness and no frontal sinus tenderness. Left sinus exhibits no maxillary sinus tenderness and no frontal sinus tenderness.   Mouth/Throat: Oropharynx is clear and moist. No oropharyngeal exudate.   Eyes: EOM and lids are normal. Pupils are equal, round, and reactive to light. Right eye exhibits no discharge. Left eye exhibits no discharge. Right conjunctiva is not injected. Right conjunctiva has no hemorrhage. Left conjunctiva is not injected. Left conjunctiva has no hemorrhage. No scleral icterus. Right eye exhibits normal extraocular motion. Left eye exhibits normal extraocular motion.   Neck: Normal range of motion. Neck supple. No JVD present. No tracheal deviation present. No thyromegaly present.   Cardiovascular: Normal rate, regular rhythm and normal heart sounds.    Pulmonary/Chest: Effort normal and breath sounds normal. No stridor. No respiratory distress.   Abdominal: Soft. Bowel sounds are normal. He exhibits no mass. There is no hepatosplenomegaly, splenomegaly or hepatomegaly. There is no tenderness.   Musculoskeletal: Normal range of motion. He exhibits no edema or tenderness.   Lymphadenopathy:        Head (right side): No  posterior auricular and no occipital adenopathy present.        Head (left side): No posterior auricular and no occipital adenopathy present.     He has no cervical adenopathy.        Right cervical: No superficial cervical, no deep cervical and no posterior cervical adenopathy present.       Left cervical: No superficial cervical, no deep cervical and no posterior cervical adenopathy present.     He has no axillary adenopathy.        Right: No supraclavicular adenopathy present.        Left: No supraclavicular adenopathy present.   Neurological: He is oriented to person, place, and time. He has normal strength. No cranial nerve deficit. Coordination normal.   Skin: Skin is dry. No rash noted. He is not diaphoretic. No cyanosis or erythema. Nails show no clubbing.   Psychiatric: His behavior is normal. Judgment and thought content normal. His mood appears anxious. Cognition and memory are normal. He exhibits a depressed mood.   Vitals reviewed.          Assessment:      1. Bronchiolo-alveolar adenocarcinoma of left lung    2. Bilateral lung cancer    3. Bronchorrhea    4. Malignant neoplasm metastatic to lung, unspecified laterality           Plan:       Proceed with cycle 2 day 8 carboplatinum and Taxol CBC except overtreatment will return to clinic to see Dr. Leon in one week.  After completion of 2 cycles of carbo platinum and Taxol will repeat CT scan for response to therapy

## 2017-06-01 ENCOUNTER — OFFICE VISIT (OUTPATIENT)
Dept: HEMATOLOGY/ONCOLOGY | Facility: CLINIC | Age: 77
End: 2017-06-01
Payer: MEDICARE

## 2017-06-01 ENCOUNTER — LAB VISIT (OUTPATIENT)
Dept: LAB | Facility: HOSPITAL | Age: 77
End: 2017-06-01
Attending: INTERNAL MEDICINE
Payer: MEDICARE

## 2017-06-01 ENCOUNTER — INFUSION (OUTPATIENT)
Dept: INFUSION THERAPY | Facility: HOSPITAL | Age: 77
End: 2017-06-01
Attending: INTERNAL MEDICINE
Payer: MEDICARE

## 2017-06-01 VITALS
SYSTOLIC BLOOD PRESSURE: 118 MMHG | DIASTOLIC BLOOD PRESSURE: 68 MMHG | WEIGHT: 168 LBS | HEIGHT: 66 IN | HEART RATE: 71 BPM | BODY MASS INDEX: 27 KG/M2

## 2017-06-01 VITALS
WEIGHT: 168.88 LBS | HEART RATE: 78 BPM | TEMPERATURE: 98 F | RESPIRATION RATE: 18 BRPM | SYSTOLIC BLOOD PRESSURE: 120 MMHG | HEIGHT: 66 IN | BODY MASS INDEX: 27.14 KG/M2 | OXYGEN SATURATION: 95 % | DIASTOLIC BLOOD PRESSURE: 80 MMHG

## 2017-06-01 DIAGNOSIS — C34.92 BRONCHIOLO-ALVEOLAR ADENOCARCINOMA OF LEFT LUNG: ICD-10-CM

## 2017-06-01 DIAGNOSIS — C78.00 MALIGNANT NEOPLASM METASTATIC TO LUNG, UNSPECIFIED LATERALITY: Primary | ICD-10-CM

## 2017-06-01 PROBLEM — G89.29 CHRONIC MIDLINE LOW BACK PAIN WITHOUT SCIATICA: Status: RESOLVED | Noted: 2017-02-02 | Resolved: 2017-06-01

## 2017-06-01 PROBLEM — M54.50 CHRONIC MIDLINE LOW BACK PAIN WITHOUT SCIATICA: Status: RESOLVED | Noted: 2017-02-02 | Resolved: 2017-06-01

## 2017-06-01 LAB
ALBUMIN SERPL BCP-MCNC: 3.5 G/DL
ALP SERPL-CCNC: 87 U/L
ALT SERPL W/O P-5'-P-CCNC: 27 U/L
ANION GAP SERPL CALC-SCNC: 7 MMOL/L
AST SERPL-CCNC: 25 U/L
BASOPHILS # BLD AUTO: 0.04 K/UL
BASOPHILS NFR BLD: 0.7 %
BILIRUB SERPL-MCNC: 0.7 MG/DL
BUN SERPL-MCNC: 13 MG/DL
CALCIUM SERPL-MCNC: 9.4 MG/DL
CHLORIDE SERPL-SCNC: 105 MMOL/L
CO2 SERPL-SCNC: 27 MMOL/L
CREAT SERPL-MCNC: 1 MG/DL
DIFFERENTIAL METHOD: ABNORMAL
EOSINOPHIL # BLD AUTO: 0.1 K/UL
EOSINOPHIL NFR BLD: 1.9 %
ERYTHROCYTE [DISTWIDTH] IN BLOOD BY AUTOMATED COUNT: 15.3 %
EST. GFR  (AFRICAN AMERICAN): >60 ML/MIN/1.73 M^2
EST. GFR  (NON AFRICAN AMERICAN): >60 ML/MIN/1.73 M^2
GLUCOSE SERPL-MCNC: 101 MG/DL
HCT VFR BLD AUTO: 41.8 %
HGB BLD-MCNC: 13.6 G/DL
LYMPHOCYTES # BLD AUTO: 1.2 K/UL
LYMPHOCYTES NFR BLD: 19.6 %
MCH RBC QN AUTO: 30.6 PG
MCHC RBC AUTO-ENTMCNC: 32.5 %
MCV RBC AUTO: 94 FL
MONOCYTES # BLD AUTO: 0.5 K/UL
MONOCYTES NFR BLD: 9.1 %
NEUTROPHILS # BLD AUTO: 4.1 K/UL
NEUTROPHILS NFR BLD: 68.7 %
PLATELET # BLD AUTO: 144 K/UL
PMV BLD AUTO: 9.4 FL
POTASSIUM SERPL-SCNC: 3.9 MMOL/L
PROT SERPL-MCNC: 6.9 G/DL
RBC # BLD AUTO: 4.45 M/UL
SODIUM SERPL-SCNC: 139 MMOL/L
WBC # BLD AUTO: 5.93 K/UL

## 2017-06-01 PROCEDURE — 63600175 PHARM REV CODE 636 W HCPCS: Mod: PO | Performed by: INTERNAL MEDICINE

## 2017-06-01 PROCEDURE — 96413 CHEMO IV INFUSION 1 HR: CPT | Mod: PO

## 2017-06-01 PROCEDURE — 99215 OFFICE O/P EST HI 40 MIN: CPT | Mod: 25,S$PBB,, | Performed by: INTERNAL MEDICINE

## 2017-06-01 PROCEDURE — 96367 TX/PROPH/DG ADDL SEQ IV INF: CPT | Mod: PO

## 2017-06-01 PROCEDURE — 85025 COMPLETE CBC W/AUTO DIFF WBC: CPT | Mod: PO

## 2017-06-01 PROCEDURE — 80053 COMPREHEN METABOLIC PANEL: CPT | Mod: PO

## 2017-06-01 PROCEDURE — 96368 THER/DIAG CONCURRENT INF: CPT | Mod: PO

## 2017-06-01 PROCEDURE — 99999 PR PBB SHADOW E&M-EST. PATIENT-LVL III: CPT | Mod: PBBFAC,,, | Performed by: INTERNAL MEDICINE

## 2017-06-01 PROCEDURE — 25000003 PHARM REV CODE 250: Mod: PO | Performed by: INTERNAL MEDICINE

## 2017-06-01 PROCEDURE — 36415 COLL VENOUS BLD VENIPUNCTURE: CPT | Mod: PO

## 2017-06-01 PROCEDURE — 96417 CHEMO IV INFUS EACH ADDL SEQ: CPT | Mod: PO

## 2017-06-01 RX ORDER — HEPARIN 100 UNIT/ML
500 SYRINGE INTRAVENOUS
Status: CANCELLED | OUTPATIENT
Start: 2017-06-01

## 2017-06-01 RX ORDER — HEPARIN 100 UNIT/ML
500 SYRINGE INTRAVENOUS
Status: DISCONTINUED | OUTPATIENT
Start: 2017-06-01 | End: 2017-06-01 | Stop reason: HOSPADM

## 2017-06-01 RX ORDER — DIPHENHYDRAMINE HYDROCHLORIDE 50 MG/ML
50 INJECTION INTRAMUSCULAR; INTRAVENOUS ONCE AS NEEDED
Status: CANCELLED | OUTPATIENT
Start: 2017-06-01 | End: 2017-06-01

## 2017-06-01 RX ORDER — FAMOTIDINE 20 MG/50ML
20 INJECTION, SOLUTION INTRAVENOUS
Status: COMPLETED | OUTPATIENT
Start: 2017-06-01 | End: 2017-06-01

## 2017-06-01 RX ORDER — FAMOTIDINE 20 MG/50ML
20 INJECTION, SOLUTION INTRAVENOUS
Status: CANCELLED
Start: 2017-06-01

## 2017-06-01 RX ORDER — SODIUM CHLORIDE 0.9 % (FLUSH) 0.9 %
10 SYRINGE (ML) INJECTION
Status: CANCELLED | OUTPATIENT
Start: 2017-06-01

## 2017-06-01 RX ORDER — EPINEPHRINE 0.3 MG/.3ML
0.3 INJECTION SUBCUTANEOUS ONCE AS NEEDED
Status: CANCELLED | OUTPATIENT
Start: 2017-06-01 | End: 2017-06-01

## 2017-06-01 RX ADMIN — DIPHENHYDRAMINE HYDROCHLORIDE 50 MG: 50 INJECTION INTRAMUSCULAR; INTRAVENOUS at 10:06

## 2017-06-01 RX ADMIN — FAMOTIDINE 20 MG: 20 INJECTION, SOLUTION INTRAVENOUS at 10:06

## 2017-06-01 RX ADMIN — CARBOPLATIN 180 MG: 10 INJECTION, SOLUTION INTRAVENOUS at 12:06

## 2017-06-01 RX ADMIN — PACLITAXEL 84 MG: 6 INJECTION, SOLUTION, CONCENTRATE INTRAVENOUS at 11:06

## 2017-06-01 RX ADMIN — HEPARIN 500 UNITS: 100 SYRINGE at 01:06

## 2017-06-01 RX ADMIN — DEXAMETHASONE SODIUM PHOSPHATE: 4 INJECTION, SOLUTION INTRA-ARTICULAR; INTRALESIONAL; INTRAMUSCULAR; INTRAVENOUS; SOFT TISSUE at 11:06

## 2017-06-01 NOTE — PROGRESS NOTES
Subjective:       Patient ID: Doron Domínguez is a 77 y.o. male.    Chief Complaint: Follow-up; Results; Chemotherapy; and Lung Cancer    HPI 77-year-old male presents for cycle 6 day 1 of carbo Taxol for metastatic lobular carcinoma patients tolerating therapy well no nausea vomiting fever chills to her with productive cough bronchial rhinorrhea present    Past Medical History:   Diagnosis Date    Aortic stenosis 12/29/2016    Arthritis     Asthma     Back pain     due to calcium deposits in back    BPH (benign prostatic hyperplasia)     CAD (coronary artery disease)     CAD, multiple vessel 12/29/2016    Cancer     lung    High cholesterol     Hx of CABG 12/29/2016    Hypertension     Lung cancer 01/2017    T3 N0 M0 bronchoalveolar     Family History   Problem Relation Age of Onset    Diabetes Mother     Diabetes Sister     Diabetes Brother      Social History     Social History    Marital status:      Spouse name: N/A    Number of children: N/A    Years of education: 3     Occupational History    Part-time employed      Social History Main Topics    Smoking status: Never Smoker    Smokeless tobacco: Never Used    Alcohol use No    Drug use: No    Sexual activity: No     Other Topics Concern    Not on file     Social History Narrative    Part-time employed, , 3 children.     Past Surgical History:   Procedure Laterality Date    APPENDECTOMY      CARDIAC SURGERY      CORONARY ARTERY BYPASS GRAFT  2012    CABG x 3 at [facility]    LUNG LOBECTOMY Left 01/10/2017    Left lower lobe       Labs:  Lab Results   Component Value Date    WBC 5.93 06/01/2017    HGB 13.6 (L) 06/01/2017    HCT 41.8 06/01/2017    MCV 94 06/01/2017     (L) 06/01/2017     BMP  Lab Results   Component Value Date     06/01/2017    K 3.9 06/01/2017     06/01/2017    CO2 27 06/01/2017    BUN 13 06/01/2017    CREATININE 1.0 06/01/2017    CALCIUM 9.4 06/01/2017    ANIONGAP 7 (L) 06/01/2017     ESTGFRAFRICA >60 06/01/2017    EGFRNONAA >60 06/01/2017     Lab Results   Component Value Date    ALT 27 06/01/2017    AST 25 06/01/2017    ALKPHOS 87 06/01/2017    BILITOT 0.7 06/01/2017       No results found for: IRON, TIBC, FERRITIN, SATURATEDIRO  No results found for: [id number]  No results found for: FOLATE  No results found for: TSH      Review of Systems   Constitutional: Positive for fever. Negative for activity change, appetite change, chills, diaphoresis, fatigue and unexpected weight change.   HENT: Negative for congestion, dental problem, drooling, ear discharge, ear pain, facial swelling, hearing loss, mouth sores, nosebleeds, postnasal drip, rhinorrhea, sinus pressure, sneezing, sore throat, tinnitus, trouble swallowing and voice change.    Eyes: Positive for visual disturbance. Negative for photophobia, pain, discharge, redness and itching.   Respiratory: Positive for cough and shortness of breath. Negative for apnea, choking, chest tightness, wheezing and stridor.    Cardiovascular: Negative for chest pain, palpitations and leg swelling.   Gastrointestinal: Positive for diarrhea. Negative for abdominal distention, abdominal pain, anal bleeding, blood in stool, constipation, nausea, rectal pain and vomiting.   Endocrine: Negative for cold intolerance, heat intolerance, polydipsia, polyphagia and polyuria.   Genitourinary: Negative for decreased urine volume, difficulty urinating, discharge, dysuria, enuresis, flank pain, frequency, genital sores, hematuria, penile pain, penile swelling, scrotal swelling, testicular pain and urgency.   Musculoskeletal: Negative for arthralgias, back pain, gait problem, joint swelling, myalgias, neck pain and neck stiffness.   Skin: Negative for color change, pallor, rash and wound.   Allergic/Immunologic: Negative for environmental allergies, food allergies and immunocompromised state.   Neurological: Positive for weakness and headaches. Negative for dizziness,  tremors, seizures, syncope, facial asymmetry, speech difficulty, light-headedness and numbness.   Hematological: Negative for adenopathy. Does not bruise/bleed easily.   Psychiatric/Behavioral: Negative for agitation, behavioral problems, confusion, decreased concentration, dysphoric mood, hallucinations, self-injury, sleep disturbance and suicidal ideas. The patient is nervous/anxious. The patient is not hyperactive.        Objective:      Physical Exam   Constitutional: He is oriented to person, place, and time. He appears well-developed and well-nourished. He has a sickly appearance. He appears ill. He appears distressed.   HENT:   Head: Normocephalic.   Right Ear: External ear normal.   Left Ear: External ear normal.   Nose: Nose normal. Right sinus exhibits no maxillary sinus tenderness and no frontal sinus tenderness. Left sinus exhibits no maxillary sinus tenderness and no frontal sinus tenderness.   Mouth/Throat: Oropharynx is clear and moist. No oropharyngeal exudate.   Eyes: EOM and lids are normal. Pupils are equal, round, and reactive to light. Right eye exhibits no discharge. Left eye exhibits no discharge. Right conjunctiva is not injected. Right conjunctiva has no hemorrhage. Left conjunctiva is not injected. Left conjunctiva has no hemorrhage. No scleral icterus. Right eye exhibits normal extraocular motion. Left eye exhibits normal extraocular motion.   Neck: Normal range of motion. Neck supple. No JVD present. No tracheal deviation present. No thyromegaly present.   Cardiovascular: Normal rate and regular rhythm.    Pulmonary/Chest: Effort normal. No stridor. No respiratory distress.   Abdominal: Soft. He exhibits no mass. There is no hepatosplenomegaly, splenomegaly or hepatomegaly. There is no tenderness.   Musculoskeletal: Normal range of motion. He exhibits no edema or tenderness.   Lymphadenopathy:        Head (right side): No posterior auricular and no occipital adenopathy present.         Head (left side): No posterior auricular and no occipital adenopathy present.     He has no cervical adenopathy.        Right cervical: No superficial cervical, no deep cervical and no posterior cervical adenopathy present.       Left cervical: No superficial cervical, no deep cervical and no posterior cervical adenopathy present.     He has no axillary adenopathy.        Right: No supraclavicular adenopathy present.        Left: No supraclavicular adenopathy present.   Neurological: He is alert and oriented to person, place, and time. He has normal strength. No cranial nerve deficit. Coordination normal.   Skin: Skin is dry. No rash noted. He is not diaphoretic. No cyanosis or erythema. Nails show no clubbing.   Psychiatric: His behavior is normal. Judgment and thought content normal. His mood appears anxious. Cognition and memory are normal. He exhibits a depressed mood.   Vitals reviewed.          Assessment:      1. Malignant neoplasm metastatic to lung, unspecified laterality    2. Bronchiolo-alveolar adenocarcinoma of left lung           Plan:     Extensive conversation with the patient and his wife will proceed with chemotherapy today.  Counts are except overtreatment return to clinic in one week with CBC CMP and CT chest 7 pelvis for response to therapy if treatment is not working we'll probably move to immunotherapy with Nivolamab

## 2017-06-01 NOTE — PLAN OF CARE
"Problem: Patient Care Overview  Goal: Plan of Care Review  Outcome: Ongoing (interventions implemented as appropriate)  Pt states, " I feel so run down, been feeling tired and weak"      "

## 2017-06-01 NOTE — PLAN OF CARE
Problem: Chemotherapy Effects (Adult)  Intervention: Prevent/Manage Biopsychosocial Complications of Cancer/Treatment  Discussed with pt ways to help alleviate stress and importance of rest, understanding verbalized.

## 2017-06-01 NOTE — PATIENT INSTRUCTIONS
.  [facility]  [address]  [address]  [phone] phone     [phone] fax  Hours of Operation: Monday- Friday 8:00am- 5:00pm  After hours phone  [phone]  Hematology / Oncology Physicians on call      Dr. Edward Holloway    Please call with any concerns regarding your appointment today.    .WAYS TO HELP PREVENT INFECTION         WASH YOUR HANDS OFTEN DURING THE DAY, ESPECIALLY BEFORE YOU EAT, AFTER USING THE BATHROOM, AND AFTER TOUCHING ANIMALS     STAY AWAY FROM PEOPLE WHO HAVE ILLNESSES YOU CAN CATCH; SUCH AS COLDS, FLU, CHICKEN POX     TRY TO AVOID CROWDS     STAY AWAY FROM CHILDREN WHO RECENTLY HAVE RECEIVED LIVE VIRUS VACCINES     MAINTAIN GOOD MOUTH CARE     DO NOT SQUEEZE OR SCRATCH PIMPLES     CLEAN CUTS & SCRAPES RIGHT AWAY AND DAILY UNTIL HEALED WITH WARM WATER, SOAP & AN ANTISEPTIC     AVOID CONTACT WITH LITTER BOXES, BIRD CAGES, & FISH TANKS     AVOID STANDING WATER, IE., BIRD BATHS, FLOWER POTS/VASES, OR HUMIDIFIERS     WEAR GLOVES WHEN GARDENING OR CLEANING UP AFTER OTHERS, ESPECIALLY BABIES & SMALL CHILDREN     DO NOT EAT RAW FISH, SEAFOOD, MEAT, OR EGGS    .HOME CARE AFTER CHEMOTHERAPY   Meals   Many patients feel sick and lose their appetites during treatment. Eat small meals several times a day. Choose bland foods with little taste or smell if you have problems with nausea. Be sure to cook all food thoroughly. This kills bacteria and helps you avoid intestinal infection. Soft foods are easier to swallow and digest.   Activity   Exercise keeps you strong and keeps your heart and lungs active. Talk to your doctor about an appropriate exercise program for you.   Skin Care   To prevent a skin infection, bathe or shower once a day. Use a moisturizing soap and wash with warm water. Avoid very hot or cold water. Chemotherapy can make your skin dry . Apply moisturizing lotion to help relieve  dry skin. Some drugs used in high doses can cause slight burns to appear (like sunburn). Ask for a special cream to help relieve the burn and protect your skin.   Prevent Mouth Sores   During chemotherapy, many people get mouth sores. Do the following to help prevent mouth sores or to ease discomfort.   Brush your teeth with a soft-bristle toothbrush after every meal.  Don't use dental floss if your platelet count is below 50,000. Your doctor or nurse will tell you if this is the case.  Use an oral swab or special soft toothbrush if your gums bleed during regular brushing.  Use mouthwash as directed. If you can't tolerate commercial mouthwash, use salt and baking soda to clean your mouth. Mix 1 teaspoon of salt and 1 teaspoon of baking soda into a glass of water. Swish and spit.  Call your doctor or return to this facility if you develop any of the following:   Sore throat   White patches in the mouth or throat   Fever of 100.4ºF (38ºC) or higher, or as directed by your healthcare provider  © 0415-8160 [facility], [address] [place] [address]. All rights reserved. This information is not intended as a substitute for professional medical care. Always follow your healthcare professional's       .FALL PREVENTION   Falls often occur due to slipping, tripping or losing your balance. Here are ways to reduce your risk of falling again.   Was there anything that caused your fall that can be fixed, removed or replaced?   Make your home safe by keeping walkways clear of objects you may trip over.   Use non-slip pads under rugs.   Do not walk in poorly lit areas.   Do not stand on chairs or wobbly ladders.   Use caution when reaching overhead or looking upward. This position can cause a loss of balance.   Be sure your shoes fit properly, have non-slip bottoms and are in good condition.   Be cautious when going up and down stairs, curbs, and when walking on uneven sidewalks.   If your balance is poor, consider  using a cane or walker.   If your fall was related to alcohol use, stop or limit alcohol intake.   If your fall was related to use of sleeping medicines, talk to your doctor about this. You may need to reduce your dosage at bedtime if you awaken during the night to go to the bathroom.   To reduce the need for nighttime bathroom trips:   Avoid drinking fluids for several hours before going to bed   Empty your bladder before going to bed   Men can keep a urinal at the bedside   © 2827-7298 [facility], [address] [place] [address], [place] [address]. All rights reserved. This information is not intended as a substitute for professional medical care. Always follow your healthcare professional's instructions.

## 2017-06-06 ENCOUNTER — TELEPHONE (OUTPATIENT)
Dept: HEMATOLOGY/ONCOLOGY | Facility: CLINIC | Age: 77
End: 2017-06-06

## 2017-06-08 ENCOUNTER — HOSPITAL ENCOUNTER (OUTPATIENT)
Dept: RADIOLOGY | Facility: HOSPITAL | Age: 77
Discharge: HOME | End: 2017-06-08
Attending: INTERNAL MEDICINE
Payer: MEDICARE

## 2017-06-08 ENCOUNTER — OFFICE VISIT (OUTPATIENT)
Dept: HEMATOLOGY/ONCOLOGY | Facility: CLINIC | Age: 77
End: 2017-06-08
Payer: MEDICARE

## 2017-06-08 VITALS
TEMPERATURE: 98 F | HEIGHT: 66 IN | BODY MASS INDEX: 27.46 KG/M2 | WEIGHT: 170.88 LBS | DIASTOLIC BLOOD PRESSURE: 55 MMHG | HEART RATE: 73 BPM | SYSTOLIC BLOOD PRESSURE: 100 MMHG | OXYGEN SATURATION: 96 %

## 2017-06-08 DIAGNOSIS — J98.09 BRONCHORRHEA: ICD-10-CM

## 2017-06-08 DIAGNOSIS — C34.91 BILATERAL LUNG CANCER: ICD-10-CM

## 2017-06-08 DIAGNOSIS — C34.92 BRONCHIOLO-ALVEOLAR ADENOCARCINOMA OF LEFT LUNG: Primary | ICD-10-CM

## 2017-06-08 DIAGNOSIS — C34.92 BRONCHIOLO-ALVEOLAR ADENOCARCINOMA OF LEFT LUNG: ICD-10-CM

## 2017-06-08 DIAGNOSIS — C34.92 BILATERAL LUNG CANCER: ICD-10-CM

## 2017-06-08 DIAGNOSIS — C78.00 MALIGNANT NEOPLASM METASTATIC TO LUNG, UNSPECIFIED LATERALITY: ICD-10-CM

## 2017-06-08 PROCEDURE — 1125F AMNT PAIN NOTED PAIN PRSNT: CPT | Mod: ,,, | Performed by: INTERNAL MEDICINE

## 2017-06-08 PROCEDURE — 1159F MED LIST DOCD IN RCRD: CPT | Mod: ,,, | Performed by: INTERNAL MEDICINE

## 2017-06-08 PROCEDURE — 99999 PR PBB SHADOW E&M-EST. PATIENT-LVL III: CPT | Mod: PBBFAC,,, | Performed by: INTERNAL MEDICINE

## 2017-06-08 PROCEDURE — 1157F ADVNC CARE PLAN IN RCRD: CPT | Mod: ,,, | Performed by: INTERNAL MEDICINE

## 2017-06-08 PROCEDURE — 99214 OFFICE O/P EST MOD 30 MIN: CPT | Mod: S$PBB,,, | Performed by: INTERNAL MEDICINE

## 2017-06-08 PROCEDURE — 99213 OFFICE O/P EST LOW 20 MIN: CPT | Mod: PBBFAC,25,PO | Performed by: INTERNAL MEDICINE

## 2017-06-08 RX ADMIN — IOHEXOL 30 ML: 350 INJECTION, SOLUTION INTRAVENOUS at 11:06

## 2017-06-08 RX ADMIN — IOHEXOL 75 ML: 350 INJECTION, SOLUTION INTRAVENOUS at 01:06

## 2017-06-15 ENCOUNTER — INFUSION (OUTPATIENT)
Dept: INFUSION THERAPY | Facility: HOSPITAL | Age: 77
End: 2017-06-15
Attending: INTERNAL MEDICINE
Payer: MEDICARE

## 2017-06-15 ENCOUNTER — TELEPHONE (OUTPATIENT)
Dept: INFUSION THERAPY | Facility: HOSPITAL | Age: 77
End: 2017-06-15

## 2017-06-15 ENCOUNTER — OFFICE VISIT (OUTPATIENT)
Dept: HEMATOLOGY/ONCOLOGY | Facility: CLINIC | Age: 77
End: 2017-06-15
Payer: MEDICARE

## 2017-06-15 VITALS
HEART RATE: 89 BPM | OXYGEN SATURATION: 95 % | BODY MASS INDEX: 27.42 KG/M2 | WEIGHT: 170.63 LBS | HEIGHT: 66 IN | SYSTOLIC BLOOD PRESSURE: 142 MMHG | DIASTOLIC BLOOD PRESSURE: 79 MMHG | TEMPERATURE: 98 F

## 2017-06-15 VITALS — BODY MASS INDEX: 27.42 KG/M2 | HEIGHT: 66 IN | WEIGHT: 170.63 LBS

## 2017-06-15 DIAGNOSIS — C34.92 BRONCHIOLO-ALVEOLAR ADENOCARCINOMA OF LEFT LUNG: Primary | ICD-10-CM

## 2017-06-15 DIAGNOSIS — C78.00 MALIGNANT NEOPLASM METASTATIC TO LUNG, UNSPECIFIED LATERALITY: ICD-10-CM

## 2017-06-15 DIAGNOSIS — C78.00 MALIGNANT NEOPLASM METASTATIC TO LUNG, UNSPECIFIED LATERALITY: Primary | ICD-10-CM

## 2017-06-15 DIAGNOSIS — J98.09 BRONCHORRHEA: ICD-10-CM

## 2017-06-15 DIAGNOSIS — C34.92 BRONCHIOLO-ALVEOLAR ADENOCARCINOMA OF LEFT LUNG: ICD-10-CM

## 2017-06-15 DIAGNOSIS — M54.50 LEFT-SIDED LOW BACK PAIN WITHOUT SCIATICA, UNSPECIFIED CHRONICITY: ICD-10-CM

## 2017-06-15 PROCEDURE — 96417 CHEMO IV INFUS EACH ADDL SEQ: CPT | Mod: PO

## 2017-06-15 PROCEDURE — 99215 OFFICE O/P EST HI 40 MIN: CPT | Mod: 25,S$PBB,, | Performed by: INTERNAL MEDICINE

## 2017-06-15 PROCEDURE — 1159F MED LIST DOCD IN RCRD: CPT | Mod: ,,, | Performed by: INTERNAL MEDICINE

## 2017-06-15 PROCEDURE — 96368 THER/DIAG CONCURRENT INF: CPT | Mod: PO

## 2017-06-15 PROCEDURE — 1157F ADVNC CARE PLAN IN RCRD: CPT | Mod: ,,, | Performed by: INTERNAL MEDICINE

## 2017-06-15 PROCEDURE — 96367 TX/PROPH/DG ADDL SEQ IV INF: CPT | Mod: PO

## 2017-06-15 PROCEDURE — 63600175 PHARM REV CODE 636 W HCPCS: Mod: PO | Performed by: INTERNAL MEDICINE

## 2017-06-15 PROCEDURE — 25000003 PHARM REV CODE 250: Mod: PO | Performed by: INTERNAL MEDICINE

## 2017-06-15 PROCEDURE — 99999 PR PBB SHADOW E&M-EST. PATIENT-LVL III: CPT | Mod: PBBFAC,,, | Performed by: INTERNAL MEDICINE

## 2017-06-15 PROCEDURE — 96413 CHEMO IV INFUSION 1 HR: CPT | Mod: PO

## 2017-06-15 PROCEDURE — 1125F AMNT PAIN NOTED PAIN PRSNT: CPT | Mod: ,,, | Performed by: INTERNAL MEDICINE

## 2017-06-15 RX ORDER — HEPARIN 100 UNIT/ML
500 SYRINGE INTRAVENOUS
Status: DISCONTINUED | OUTPATIENT
Start: 2017-06-15 | End: 2017-06-15 | Stop reason: HOSPADM

## 2017-06-15 RX ORDER — HEPARIN 100 UNIT/ML
500 SYRINGE INTRAVENOUS
Status: CANCELLED | OUTPATIENT
Start: 2017-06-15

## 2017-06-15 RX ORDER — DIPHENHYDRAMINE HYDROCHLORIDE 50 MG/ML
50 INJECTION INTRAMUSCULAR; INTRAVENOUS ONCE AS NEEDED
Status: CANCELLED | OUTPATIENT
Start: 2017-06-15 | End: 2017-06-15

## 2017-06-15 RX ORDER — SODIUM CHLORIDE 0.9 % (FLUSH) 0.9 %
10 SYRINGE (ML) INJECTION
Status: CANCELLED | OUTPATIENT
Start: 2017-06-15

## 2017-06-15 RX ORDER — EPINEPHRINE 0.3 MG/.3ML
0.3 INJECTION SUBCUTANEOUS ONCE AS NEEDED
Status: CANCELLED | OUTPATIENT
Start: 2017-06-15 | End: 2017-06-15

## 2017-06-15 RX ORDER — FAMOTIDINE 20 MG/50ML
20 INJECTION, SOLUTION INTRAVENOUS
Status: COMPLETED | OUTPATIENT
Start: 2017-06-15 | End: 2017-06-15

## 2017-06-15 RX ORDER — FAMOTIDINE 20 MG/50ML
20 INJECTION, SOLUTION INTRAVENOUS
Status: CANCELLED
Start: 2017-06-15

## 2017-06-15 RX ADMIN — CARBOPLATIN 180 MG: 10 INJECTION, SOLUTION INTRAVENOUS at 11:06

## 2017-06-15 RX ADMIN — DEXAMETHASONE SODIUM PHOSPHATE: 4 INJECTION, SOLUTION INTRA-ARTICULAR; INTRALESIONAL; INTRAMUSCULAR; INTRAVENOUS; SOFT TISSUE at 10:06

## 2017-06-15 RX ADMIN — SODIUM CHLORIDE: 9 INJECTION, SOLUTION INTRAVENOUS at 09:06

## 2017-06-15 RX ADMIN — DIPHENHYDRAMINE HYDROCHLORIDE 50 MG: 50 INJECTION INTRAMUSCULAR; INTRAVENOUS at 09:06

## 2017-06-15 RX ADMIN — HEPARIN 500 UNITS: 100 SYRINGE at 12:06

## 2017-06-15 RX ADMIN — PACLITAXEL 84 MG: 6 INJECTION, SOLUTION, CONCENTRATE INTRAVENOUS at 10:06

## 2017-06-15 RX ADMIN — FAMOTIDINE 20 MG: 20 INJECTION, SOLUTION INTRAVENOUS at 09:06

## 2017-06-15 NOTE — PROGRESS NOTES
Subjective:       Patient ID: Doron Domínguez is a 77 y.o. male.    Chief Complaint: Results; Chemotherapy; and Lung Cancer    HPI 77-year-old male history of metastatic probability carcinoma patient received cycle 2 day 1 of carboplatinum Taxol ECOG status 1 patient complains of low back pain and lower lumbar and thoracic predates diagnosis of cancer but has intensified over the last month    Past Medical History:   Diagnosis Date    Aortic stenosis 12/29/2016    Arthritis     Asthma     Back pain     due to calcium deposits in back    BPH (benign prostatic hyperplasia)     CAD (coronary artery disease)     CAD, multiple vessel 12/29/2016    Cancer     lung    High cholesterol     Hx of CABG 12/29/2016    Hypertension     Lung cancer 01/2017    T3 N0 M0 bronchoalveolar     Family History   Problem Relation Age of Onset    Diabetes Mother     Diabetes Sister     Diabetes Brother      Social History     Social History    Marital status:      Spouse name: N/A    Number of children: N/A    Years of education: 3     Occupational History    Part-time employed      Social History Main Topics    Smoking status: Never Smoker    Smokeless tobacco: Never Used    Alcohol use No    Drug use: No    Sexual activity: No     Other Topics Concern    Not on file     Social History Narrative    Part-time employed, , 3 children.     Past Surgical History:   Procedure Laterality Date    APPENDECTOMY      CARDIAC SURGERY      CORONARY ARTERY BYPASS GRAFT  2012    CABG x 3 at [facility]    LUNG LOBECTOMY Left 01/10/2017    Left lower lobe       Labs:  Lab Results   Component Value Date    WBC 5.22 06/15/2017    HGB 13.6 (L) 06/15/2017    HCT 41.4 06/15/2017    MCV 95 06/15/2017     06/15/2017     BMP  Lab Results   Component Value Date     06/15/2017    K 3.8 06/15/2017     06/15/2017    CO2 25 06/15/2017    BUN 13 06/15/2017    CREATININE 1.0 06/15/2017    CALCIUM 9.4 06/15/2017     ANIONGAP 9 06/15/2017    ESTGFRAFRICA >60 06/15/2017    EGFRNONAA >60 06/15/2017     Lab Results   Component Value Date    ALT 23 06/15/2017    AST 25 06/15/2017    ALKPHOS 84 06/15/2017    BILITOT 0.7 06/15/2017       No results found for: IRON, TIBC, FERRITIN, SATURATEDIRO  No results found for: [id number]  No results found for: FOLATE  No results found for: TSH      Review of Systems   Constitutional: Positive for fatigue. Negative for activity change, appetite change, chills, diaphoresis, fever and unexpected weight change.   HENT: Negative for congestion, dental problem, drooling, ear discharge, ear pain, facial swelling, hearing loss, mouth sores, nosebleeds, postnasal drip, rhinorrhea, sinus pressure, sneezing, sore throat, tinnitus, trouble swallowing and voice change.    Eyes: Negative for photophobia, pain, discharge, redness, itching and visual disturbance.   Respiratory: Positive for cough and shortness of breath. Negative for apnea, choking, chest tightness, wheezing and stridor.    Cardiovascular: Negative for chest pain, palpitations and leg swelling.   Gastrointestinal: Negative for abdominal distention, abdominal pain, anal bleeding, blood in stool, constipation, diarrhea, nausea, rectal pain and vomiting.   Endocrine: Negative for cold intolerance, heat intolerance, polydipsia, polyphagia and polyuria.   Genitourinary: Negative for decreased urine volume, difficulty urinating, discharge, dysuria, enuresis, flank pain, frequency, genital sores, hematuria, penile pain, penile swelling, scrotal swelling, testicular pain and urgency.   Musculoskeletal: Positive for back pain and myalgias. Negative for arthralgias, gait problem, joint swelling, neck pain and neck stiffness.   Skin: Negative for color change, pallor, rash and wound.   Allergic/Immunologic: Negative for environmental allergies, food allergies and immunocompromised state.   Neurological: Positive for weakness. Negative for dizziness,  tremors, seizures, syncope, facial asymmetry, speech difficulty, light-headedness, numbness and headaches.   Hematological: Negative for adenopathy. Does not bruise/bleed easily.   Psychiatric/Behavioral: Positive for dysphoric mood. Negative for agitation, behavioral problems, confusion, decreased concentration, hallucinations, self-injury, sleep disturbance and suicidal ideas. The patient is nervous/anxious. The patient is not hyperactive.        Objective:      Physical Exam   Constitutional: He is oriented to person, place, and time. He appears well-developed and well-nourished. He has a sickly appearance. He appears ill. He appears distressed.   HENT:   Head: Normocephalic.   Right Ear: External ear normal.   Left Ear: External ear normal.   Nose: Nose normal. Right sinus exhibits no maxillary sinus tenderness and no frontal sinus tenderness. Left sinus exhibits no maxillary sinus tenderness and no frontal sinus tenderness.   Mouth/Throat: Oropharynx is clear and moist. No oropharyngeal exudate.   Eyes: EOM and lids are normal. Pupils are equal, round, and reactive to light. Right eye exhibits no discharge. Left eye exhibits no discharge. Right conjunctiva is not injected. Right conjunctiva has no hemorrhage. Left conjunctiva is not injected. Left conjunctiva has no hemorrhage. No scleral icterus. Right eye exhibits normal extraocular motion. Left eye exhibits normal extraocular motion.   Neck: Normal range of motion. Neck supple. No JVD present. No tracheal deviation present. No thyromegaly present.   Cardiovascular: Normal rate, regular rhythm and normal heart sounds.    Pulmonary/Chest: Effort normal and breath sounds normal. No stridor. No respiratory distress.   Abdominal: Soft. Bowel sounds are normal. He exhibits no mass. There is no hepatosplenomegaly, splenomegaly or hepatomegaly. There is no tenderness.   Musculoskeletal: Normal range of motion. He exhibits no edema or tenderness.   Lymphadenopathy:         Head (right side): No posterior auricular and no occipital adenopathy present.        Head (left side): No posterior auricular and no occipital adenopathy present.     He has no cervical adenopathy.        Right cervical: No superficial cervical, no deep cervical and no posterior cervical adenopathy present.       Left cervical: No superficial cervical, no deep cervical and no posterior cervical adenopathy present.     He has no axillary adenopathy.        Right: No supraclavicular adenopathy present.        Left: No supraclavicular adenopathy present.   Neurological: He is alert and oriented to person, place, and time. He has normal strength. No cranial nerve deficit. Coordination normal.   Skin: Skin is dry. No rash noted. He is not diaphoretic. No cyanosis or erythema. Nails show no clubbing.   Psychiatric: His behavior is normal. Judgment and thought content normal. His mood appears anxious. Cognition and memory are normal. He exhibits a depressed mood.   Vitals reviewed.          Assessment:      1. Bronchiolo-alveolar adenocarcinoma of left lung    2. Malignant neoplasm metastatic to lung, unspecified laterality    3. Bronchorrhea    4. Left-sided low back pain without sciatica, unspecified chronicity           Plan:   CBC except overtreatment cycles 3 day 1 carboplatinum and Taxol metastatic non-small cell lung carcinoma with stable findings on CT chest abdomen pelvis persistent low back pain will proceed with MRI of lumbar and thoracic spine to make sure no evidence of metastatic disease or of nonmetastatic rib treatable finding found communicate through electronic patient portal proceed with chemotherapy as outlined today

## 2017-06-16 ENCOUNTER — HOSPITAL ENCOUNTER (OUTPATIENT)
Dept: RADIOLOGY | Facility: HOSPITAL | Age: 77
Discharge: HOME | End: 2017-06-16
Attending: INTERNAL MEDICINE
Payer: MEDICARE

## 2017-06-16 DIAGNOSIS — M54.50 LEFT-SIDED LOW BACK PAIN WITHOUT SCIATICA, UNSPECIFIED CHRONICITY: ICD-10-CM

## 2017-06-16 DIAGNOSIS — C78.00 MALIGNANT NEOPLASM METASTATIC TO LUNG, UNSPECIFIED LATERALITY: ICD-10-CM

## 2017-06-16 PROCEDURE — 72158 MRI LUMBAR SPINE W/O & W/DYE: CPT | Mod: TC

## 2017-06-16 PROCEDURE — A9585 GADOBUTROL INJECTION: HCPCS | Performed by: INTERNAL MEDICINE

## 2017-06-16 PROCEDURE — 72157 MRI CHEST SPINE W/O & W/DYE: CPT | Mod: TC

## 2017-06-16 PROCEDURE — 25500020 PHARM REV CODE 255: Performed by: INTERNAL MEDICINE

## 2017-06-16 RX ORDER — GADOBUTROL 604.72 MG/ML
7 INJECTION INTRAVENOUS
Status: COMPLETED | OUTPATIENT
Start: 2017-06-16 | End: 2017-06-16

## 2017-06-16 RX ADMIN — GADOBUTROL 7 ML: 604.72 INJECTION INTRAVENOUS at 04:06

## 2017-06-22 ENCOUNTER — OFFICE VISIT (OUTPATIENT)
Dept: HEMATOLOGY/ONCOLOGY | Facility: CLINIC | Age: 77
End: 2017-06-22
Payer: MEDICARE

## 2017-06-22 ENCOUNTER — INFUSION (OUTPATIENT)
Dept: INFUSION THERAPY | Facility: HOSPITAL | Age: 77
End: 2017-06-22
Attending: INTERNAL MEDICINE
Payer: MEDICARE

## 2017-06-22 VITALS
WEIGHT: 170.44 LBS | OXYGEN SATURATION: 97 % | HEART RATE: 68 BPM | BODY MASS INDEX: 27.39 KG/M2 | DIASTOLIC BLOOD PRESSURE: 66 MMHG | SYSTOLIC BLOOD PRESSURE: 123 MMHG | TEMPERATURE: 98 F | HEIGHT: 66 IN

## 2017-06-22 VITALS
HEIGHT: 66 IN | SYSTOLIC BLOOD PRESSURE: 98 MMHG | WEIGHT: 170.44 LBS | BODY MASS INDEX: 27.39 KG/M2 | DIASTOLIC BLOOD PRESSURE: 56 MMHG | HEART RATE: 69 BPM

## 2017-06-22 DIAGNOSIS — C34.92 BRONCHIOLO-ALVEOLAR ADENOCARCINOMA OF LEFT LUNG: ICD-10-CM

## 2017-06-22 DIAGNOSIS — C34.91 BILATERAL LUNG CANCER: ICD-10-CM

## 2017-06-22 DIAGNOSIS — C78.00 MALIGNANT NEOPLASM METASTATIC TO LUNG, UNSPECIFIED LATERALITY: Primary | ICD-10-CM

## 2017-06-22 DIAGNOSIS — J98.09 BRONCHORRHEA: ICD-10-CM

## 2017-06-22 DIAGNOSIS — C34.92 BILATERAL LUNG CANCER: ICD-10-CM

## 2017-06-22 DIAGNOSIS — C78.00 MALIGNANT NEOPLASM METASTATIC TO LUNG, UNSPECIFIED LATERALITY: ICD-10-CM

## 2017-06-22 PROCEDURE — 1157F ADVNC CARE PLAN IN RCRD: CPT | Mod: ,,, | Performed by: INTERNAL MEDICINE

## 2017-06-22 PROCEDURE — 1125F AMNT PAIN NOTED PAIN PRSNT: CPT | Mod: ,,, | Performed by: INTERNAL MEDICINE

## 2017-06-22 PROCEDURE — 1159F MED LIST DOCD IN RCRD: CPT | Mod: ,,, | Performed by: INTERNAL MEDICINE

## 2017-06-22 PROCEDURE — 96413 CHEMO IV INFUSION 1 HR: CPT | Mod: PO

## 2017-06-22 PROCEDURE — 25000003 PHARM REV CODE 250: Mod: PO | Performed by: INTERNAL MEDICINE

## 2017-06-22 PROCEDURE — 96367 TX/PROPH/DG ADDL SEQ IV INF: CPT | Mod: PO

## 2017-06-22 PROCEDURE — 96368 THER/DIAG CONCURRENT INF: CPT | Mod: PO

## 2017-06-22 PROCEDURE — 96417 CHEMO IV INFUS EACH ADDL SEQ: CPT | Mod: PO

## 2017-06-22 PROCEDURE — 99215 OFFICE O/P EST HI 40 MIN: CPT | Mod: 25,S$PBB,, | Performed by: INTERNAL MEDICINE

## 2017-06-22 PROCEDURE — 63600175 PHARM REV CODE 636 W HCPCS: Mod: PO | Performed by: INTERNAL MEDICINE

## 2017-06-22 PROCEDURE — 99999 PR PBB SHADOW E&M-EST. PATIENT-LVL III: CPT | Mod: PBBFAC,,, | Performed by: INTERNAL MEDICINE

## 2017-06-22 RX ORDER — SODIUM CHLORIDE 0.9 % (FLUSH) 0.9 %
10 SYRINGE (ML) INJECTION
Status: CANCELLED | OUTPATIENT
Start: 2017-06-22

## 2017-06-22 RX ORDER — FAMOTIDINE 20 MG/50ML
20 INJECTION, SOLUTION INTRAVENOUS
Status: COMPLETED | OUTPATIENT
Start: 2017-06-22 | End: 2017-06-22

## 2017-06-22 RX ORDER — DIPHENHYDRAMINE HYDROCHLORIDE 50 MG/ML
50 INJECTION INTRAMUSCULAR; INTRAVENOUS ONCE AS NEEDED
Status: CANCELLED | OUTPATIENT
Start: 2017-06-22 | End: 2017-06-22

## 2017-06-22 RX ORDER — FAMOTIDINE 20 MG/50ML
20 INJECTION, SOLUTION INTRAVENOUS
Status: CANCELLED
Start: 2017-06-22

## 2017-06-22 RX ORDER — HEPARIN 100 UNIT/ML
500 SYRINGE INTRAVENOUS
Status: DISCONTINUED | OUTPATIENT
Start: 2017-06-22 | End: 2017-06-22 | Stop reason: HOSPADM

## 2017-06-22 RX ORDER — HEPARIN 100 UNIT/ML
500 SYRINGE INTRAVENOUS
Status: CANCELLED | OUTPATIENT
Start: 2017-06-22

## 2017-06-22 RX ORDER — ONDANSETRON 4 MG/1
4 TABLET, FILM COATED ORAL EVERY 8 HOURS PRN
Qty: 20 TABLET | Refills: 6 | Status: SHIPPED | OUTPATIENT
Start: 2017-06-22

## 2017-06-22 RX ORDER — EPINEPHRINE 0.3 MG/.3ML
0.3 INJECTION SUBCUTANEOUS ONCE AS NEEDED
Status: CANCELLED | OUTPATIENT
Start: 2017-06-22 | End: 2017-06-22

## 2017-06-22 RX ADMIN — CARBOPLATIN 170 MG: 10 INJECTION, SOLUTION INTRAVENOUS at 01:06

## 2017-06-22 RX ADMIN — HEPARIN 500 UNITS: 100 SYRINGE at 01:06

## 2017-06-22 RX ADMIN — DIPHENHYDRAMINE HYDROCHLORIDE 50 MG: 50 INJECTION INTRAMUSCULAR; INTRAVENOUS at 11:06

## 2017-06-22 RX ADMIN — SODIUM CHLORIDE: 9 INJECTION, SOLUTION INTRAVENOUS at 11:06

## 2017-06-22 RX ADMIN — PACLITAXEL 84 MG: 6 INJECTION, SOLUTION, CONCENTRATE INTRAVENOUS at 12:06

## 2017-06-22 RX ADMIN — FAMOTIDINE 20 MG: 20 INJECTION, SOLUTION INTRAVENOUS at 11:06

## 2017-06-22 RX ADMIN — DEXAMETHASONE SODIUM PHOSPHATE: 4 INJECTION, SOLUTION INTRA-ARTICULAR; INTRALESIONAL; INTRAMUSCULAR; INTRAVENOUS; SOFT TISSUE at 11:06

## 2017-06-22 NOTE — PLAN OF CARE
Problem: Chemotherapy Effects (Adult)  Intervention: Prevent Infection/Maximize Resistance  Infection precautions reviewed.  Pt states full understanding to call with any sign of infection, including temp >100.4.

## 2017-06-22 NOTE — PLAN OF CARE
Problem: Chemotherapy Effects (Adult)  Intervention: Monitor/Manage Fluid Balance/pH  Oral fluids encouraged.

## 2017-06-22 NOTE — PROGRESS NOTES
Subjective:       Patient ID: Doron Domínguze is a 77 y.o. male.    Chief Complaint: Results; Lung Cancer; and Chemotherapy    HPI 77-year-old male history of metastatic non-small cell lung carcinoma bronchial subtype patient returns for follow-up cycle 3 day 8 of therapy patient still persistent cough at night ECOG status 1    Past Medical History:   Diagnosis Date    Aortic stenosis 12/29/2016    Arthritis     Asthma     Back pain     due to calcium deposits in back    BPH (benign prostatic hyperplasia)     CAD (coronary artery disease)     CAD, multiple vessel 12/29/2016    Cancer     lung    High cholesterol     Hx of CABG 12/29/2016    Hypertension     Lung cancer 01/2017    T3 N0 M0 bronchoalveolar     Family History   Problem Relation Age of Onset    Diabetes Mother     Diabetes Sister     Diabetes Brother      Social History     Social History    Marital status:      Spouse name: N/A    Number of children: N/A    Years of education: 3     Occupational History    Part-time employed      Social History Main Topics    Smoking status: Never Smoker    Smokeless tobacco: Never Used    Alcohol use No    Drug use: No    Sexual activity: No     Other Topics Concern    Not on file     Social History Narrative    Part-time employed, , 3 children.     Past Surgical History:   Procedure Laterality Date    APPENDECTOMY      CARDIAC SURGERY      CORONARY ARTERY BYPASS GRAFT  2012    CABG x 3 at [facility]    LUNG LOBECTOMY Left 01/10/2017    Left lower lobe       Labs:  Lab Results   Component Value Date    WBC 6.46 06/22/2017    HGB 13.4 (L) 06/22/2017    HCT 40.3 06/22/2017    MCV 95 06/22/2017     06/22/2017     BMP  Lab Results   Component Value Date     06/22/2017    K 4.5 06/22/2017     06/22/2017    CO2 27 06/22/2017    BUN 16 06/22/2017    CREATININE 1.1 06/22/2017    CALCIUM 9.5 06/22/2017    ANIONGAP 7 (L) 06/22/2017    ESTGFRAFRICA >60 06/22/2017     EGFRNONAA >60 06/22/2017     Lab Results   Component Value Date    ALT 26 06/22/2017    AST 27 06/22/2017    ALKPHOS 92 06/22/2017    BILITOT 0.7 06/22/2017       No results found for: IRON, TIBC, FERRITIN, SATURATEDIRO  No results found for: [id number]  No results found for: FOLATE  No results found for: TSH      Review of Systems   Constitutional: Positive for fatigue. Negative for activity change, appetite change, chills, diaphoresis, fever and unexpected weight change.   HENT: Negative for congestion, dental problem, drooling, ear discharge, ear pain, facial swelling, hearing loss, mouth sores, nosebleeds, postnasal drip, rhinorrhea, sinus pressure, sneezing, sore throat, tinnitus, trouble swallowing and voice change.    Eyes: Negative for photophobia, pain, discharge, redness, itching and visual disturbance.   Respiratory: Positive for cough. Negative for apnea, choking, chest tightness, shortness of breath, wheezing and stridor.    Cardiovascular: Negative for chest pain, palpitations and leg swelling.   Gastrointestinal: Negative for abdominal distention, abdominal pain, anal bleeding, blood in stool, constipation, diarrhea, nausea, rectal pain and vomiting.   Endocrine: Negative for cold intolerance, heat intolerance, polydipsia, polyphagia and polyuria.   Genitourinary: Negative for decreased urine volume, difficulty urinating, discharge, dysuria, enuresis, flank pain, frequency, genital sores, hematuria, penile pain, penile swelling, scrotal swelling, testicular pain and urgency.   Musculoskeletal: Negative for arthralgias, back pain, gait problem, joint swelling, myalgias, neck pain and neck stiffness.   Skin: Negative for color change, pallor, rash and wound.   Allergic/Immunologic: Negative for environmental allergies, food allergies and immunocompromised state.   Neurological: Positive for weakness. Negative for dizziness, tremors, seizures, syncope, facial asymmetry, speech difficulty,  light-headedness, numbness and headaches.   Hematological: Negative for adenopathy. Does not bruise/bleed easily.   Psychiatric/Behavioral: Positive for dysphoric mood. Negative for agitation, behavioral problems, confusion, decreased concentration, hallucinations, self-injury, sleep disturbance and suicidal ideas. The patient is nervous/anxious. The patient is not hyperactive.        Objective:      Physical Exam   Constitutional: He is oriented to person, place, and time. He appears well-developed and well-nourished. He has a sickly appearance. He appears ill. He appears distressed.   HENT:   Head: Normocephalic.   Right Ear: External ear normal.   Left Ear: External ear normal.   Nose: Nose normal. Right sinus exhibits no maxillary sinus tenderness and no frontal sinus tenderness. Left sinus exhibits no maxillary sinus tenderness and no frontal sinus tenderness.   Mouth/Throat: Oropharynx is clear and moist. No oropharyngeal exudate.   Eyes: EOM and lids are normal. Pupils are equal, round, and reactive to light. Right eye exhibits no discharge. Left eye exhibits no discharge. Right conjunctiva is not injected. Right conjunctiva has no hemorrhage. Left conjunctiva is not injected. Left conjunctiva has no hemorrhage. No scleral icterus. Right eye exhibits normal extraocular motion. Left eye exhibits normal extraocular motion.   Neck: Normal range of motion. Neck supple. No JVD present. No tracheal deviation present. No thyromegaly present.   Cardiovascular: Normal rate, regular rhythm and normal heart sounds.    Pulmonary/Chest: Effort normal and breath sounds normal. No stridor. No respiratory distress.   Abdominal: Soft. Bowel sounds are normal. He exhibits no mass. There is no hepatosplenomegaly, splenomegaly or hepatomegaly. There is no tenderness.   Musculoskeletal: Normal range of motion. He exhibits no edema or tenderness.   Lymphadenopathy:        Head (right side): No posterior auricular and no occipital  adenopathy present.        Head (left side): No posterior auricular and no occipital adenopathy present.     He has no cervical adenopathy.        Right cervical: No superficial cervical, no deep cervical and no posterior cervical adenopathy present.       Left cervical: No superficial cervical, no deep cervical and no posterior cervical adenopathy present.     He has no axillary adenopathy.        Right: No supraclavicular adenopathy present.        Left: No supraclavicular adenopathy present.   Neurological: He is alert and oriented to person, place, and time. He has normal strength. No cranial nerve deficit. Coordination normal.   Skin: Skin is dry. No rash noted. He is not diaphoretic. No cyanosis or erythema. Nails show no clubbing.   Psychiatric: His behavior is normal. Judgment and thought content normal. His mood appears anxious. Cognition and memory are normal. He exhibits a depressed mood.   Vitals reviewed.          Assessment:      1. Malignant neoplasm metastatic to lung, unspecified laterality    2. Bronchorrhea    3. Bronchiolo-alveolar adenocarcinoma of left lung    4. Bilateral lung cancer           Plan:   CBC except for treatment proceed with chemotherapy today return in one week with CBC BMP for next cycle of therapy.  Discussed bronchorrhea at night to take inhalation treatments prior to sleep.  Also reviewed results of MRI of thoracic and lumbar spine which reveals no evidence of metastatic disease

## 2017-06-22 NOTE — PLAN OF CARE
Problem: Patient Care Overview  Goal: Plan of Care Review  Outcome: Ongoing (interventions implemented as appropriate)  Pt states he feels ok today.  Started with nausea a few days ago.  He took Zofran this morning and it is helping.

## 2017-06-22 NOTE — PATIENT INSTRUCTIONS
[facility]  [address] [facility]  [id number] [place] [address] Drive  [phone] phone     [phone] fax  Hours of Operation: Monday- Friday 8:00am- 5:00pm  After hours phone  [phone]  Hematology / Oncology Physicians on call      Dr. Edward Holloway                      Please call with any concerns regarding your appointment today.    IF YOU EXPERIENCE ANY OF THE FOLLOWING PROBLEMS, CALL THE OFFICE IMMEDIATELY.    *FEVER .0 OR GREATER    *CHILLS, ESPECIALLY SHAKING CHILLS, OR SWEATING    *A SEVERE COUGH OR SORE THROAT, OR SINUS PAIN/     PRESSURE    *REDNESS, SWELLING, OR TENDERNESS AROUND A WOUND,     SORE, PIMPLE, RECTAL AREA, OR IV SITE    *SORES OR ULCERS IN THE MOUTH    *BLISTERS ON THE LIPS OR SKIN    *FREQUENT URGENCY TO URINATE OR A BURNING FEELING   WHEN YOU URINATE    *BLOOD IN THE URINE OR STOOL    *ANY UNEXPLAINED BRUISING OR PROLONGED BLEEDING,     (NOSEBLEEDS OR BLEEDING GUMS)    *LOOSE BOWEL MOVEMENTS THAT DO NOT RESPOND TO     IMODIUM OR MORE THAN THREE TIMES A DAY    *VOMITING UNRESPONSIVE TO ANTINAUSEA MEDICINE    *ANY UNUSUAL PHYSICAL SYMPTOMS THAT BEGAN AFTER     CHEMOTHERAPY    DURING WEEKDAYS, CALL AND ASK TO SPEAK DIRECTLY TO A NURSE.  AT OTHER TIMES, CALL THE OFFICE PHONE NUMBER; THE ANSWERING SERVICE WILL CONTACT THE ON-CALL PHYSICIAN.  SOMEONE IS AVAILABLE 24 HOURS A DAY, SEVEN DAYS A WEEK.

## 2017-06-29 ENCOUNTER — OFFICE VISIT (OUTPATIENT)
Dept: HEMATOLOGY/ONCOLOGY | Facility: CLINIC | Age: 77
End: 2017-06-29
Payer: MEDICARE

## 2017-06-29 ENCOUNTER — INFUSION (OUTPATIENT)
Dept: INFUSION THERAPY | Facility: HOSPITAL | Age: 77
End: 2017-06-29
Attending: INTERNAL MEDICINE
Payer: MEDICARE

## 2017-06-29 VITALS
DIASTOLIC BLOOD PRESSURE: 69 MMHG | HEIGHT: 66 IN | OXYGEN SATURATION: 96 % | BODY MASS INDEX: 27.23 KG/M2 | SYSTOLIC BLOOD PRESSURE: 123 MMHG | HEART RATE: 88 BPM | TEMPERATURE: 98 F | WEIGHT: 169.44 LBS

## 2017-06-29 VITALS — DIASTOLIC BLOOD PRESSURE: 63 MMHG | HEART RATE: 77 BPM | SYSTOLIC BLOOD PRESSURE: 118 MMHG

## 2017-06-29 DIAGNOSIS — C34.92 BILATERAL LUNG CANCER: ICD-10-CM

## 2017-06-29 DIAGNOSIS — C34.92 BRONCHIOLO-ALVEOLAR ADENOCARCINOMA OF LEFT LUNG: ICD-10-CM

## 2017-06-29 DIAGNOSIS — C34.91 BILATERAL LUNG CANCER: ICD-10-CM

## 2017-06-29 DIAGNOSIS — C78.00 MALIGNANT NEOPLASM METASTATIC TO LUNG, UNSPECIFIED LATERALITY: Primary | ICD-10-CM

## 2017-06-29 DIAGNOSIS — C78.00 MALIGNANT NEOPLASM METASTATIC TO LUNG, UNSPECIFIED LATERALITY: ICD-10-CM

## 2017-06-29 DIAGNOSIS — C34.92 BRONCHIOLO-ALVEOLAR ADENOCARCINOMA OF LEFT LUNG: Primary | ICD-10-CM

## 2017-06-29 PROCEDURE — 96367 TX/PROPH/DG ADDL SEQ IV INF: CPT

## 2017-06-29 PROCEDURE — 96417 CHEMO IV INFUS EACH ADDL SEQ: CPT

## 2017-06-29 PROCEDURE — 63600175 PHARM REV CODE 636 W HCPCS: Performed by: INTERNAL MEDICINE

## 2017-06-29 PROCEDURE — 25000003 PHARM REV CODE 250: Performed by: INTERNAL MEDICINE

## 2017-06-29 PROCEDURE — 99215 OFFICE O/P EST HI 40 MIN: CPT | Mod: 25,S$PBB,, | Performed by: INTERNAL MEDICINE

## 2017-06-29 PROCEDURE — 1157F ADVNC CARE PLAN IN RCRD: CPT | Mod: ,,, | Performed by: INTERNAL MEDICINE

## 2017-06-29 PROCEDURE — 99999 PR PBB SHADOW E&M-EST. PATIENT-LVL III: CPT | Mod: PBBFAC,,, | Performed by: INTERNAL MEDICINE

## 2017-06-29 PROCEDURE — 96413 CHEMO IV INFUSION 1 HR: CPT

## 2017-06-29 PROCEDURE — 1126F AMNT PAIN NOTED NONE PRSNT: CPT | Mod: ,,, | Performed by: INTERNAL MEDICINE

## 2017-06-29 PROCEDURE — 1159F MED LIST DOCD IN RCRD: CPT | Mod: ,,, | Performed by: INTERNAL MEDICINE

## 2017-06-29 RX ORDER — HEPARIN 100 UNIT/ML
500 SYRINGE INTRAVENOUS
Status: CANCELLED | OUTPATIENT
Start: 2017-06-29

## 2017-06-29 RX ORDER — DIPHENHYDRAMINE HYDROCHLORIDE 50 MG/ML
50 INJECTION INTRAMUSCULAR; INTRAVENOUS ONCE AS NEEDED
Status: CANCELLED | OUTPATIENT
Start: 2017-06-29 | End: 2017-06-29

## 2017-06-29 RX ORDER — FAMOTIDINE 20 MG/50ML
20 INJECTION, SOLUTION INTRAVENOUS
Status: COMPLETED | OUTPATIENT
Start: 2017-06-29 | End: 2017-06-29

## 2017-06-29 RX ORDER — SODIUM CHLORIDE 9 MG/ML
10 INJECTION, SOLUTION INTRAMUSCULAR; INTRAVENOUS; SUBCUTANEOUS
Status: DISCONTINUED | OUTPATIENT
Start: 2017-06-29 | End: 2017-06-29 | Stop reason: HOSPADM

## 2017-06-29 RX ORDER — SODIUM CHLORIDE 0.9 % (FLUSH) 0.9 %
10 SYRINGE (ML) INJECTION
Status: CANCELLED | OUTPATIENT
Start: 2017-06-29

## 2017-06-29 RX ORDER — EPINEPHRINE 0.3 MG/.3ML
0.3 INJECTION SUBCUTANEOUS ONCE AS NEEDED
Status: CANCELLED | OUTPATIENT
Start: 2017-06-29 | End: 2017-06-29

## 2017-06-29 RX ORDER — HYDROCODONE BITARTRATE AND ACETAMINOPHEN 5; 325 MG/1; MG/1
1 TABLET ORAL EVERY 4 HOURS PRN
Qty: 60 TABLET | Refills: 0 | Status: ON HOLD | OUTPATIENT
Start: 2017-06-29 | End: 2017-08-02 | Stop reason: ALTCHOICE

## 2017-06-29 RX ORDER — HEPARIN 100 UNIT/ML
500 SYRINGE INTRAVENOUS
Status: DISCONTINUED | OUTPATIENT
Start: 2017-06-29 | End: 2017-06-29 | Stop reason: HOSPADM

## 2017-06-29 RX ORDER — FAMOTIDINE 20 MG/50ML
20 INJECTION, SOLUTION INTRAVENOUS
Status: CANCELLED
Start: 2017-06-29

## 2017-06-29 RX ADMIN — ONDANSETRON HYDROCHLORIDE: 2 INJECTION, SOLUTION INTRAMUSCULAR; INTRAVENOUS at 11:06

## 2017-06-29 RX ADMIN — FAMOTIDINE 20 MG: 20 INJECTION, SOLUTION INTRAVENOUS at 11:06

## 2017-06-29 RX ADMIN — DIPHENHYDRAMINE HYDROCHLORIDE 50 MG: 50 INJECTION INTRAMUSCULAR; INTRAVENOUS at 12:06

## 2017-06-29 RX ADMIN — SODIUM CHLORIDE, PRESERVATIVE FREE 10 ML: 5 INJECTION INTRAVENOUS at 02:06

## 2017-06-29 RX ADMIN — HEPARIN 500 UNITS: 100 SYRINGE at 02:06

## 2017-06-29 RX ADMIN — CARBOPLATIN 180 MG: 10 INJECTION, SOLUTION INTRAVENOUS at 01:06

## 2017-06-29 RX ADMIN — PACLITAXEL 84 MG: 6 INJECTION, SOLUTION, CONCENTRATE INTRAVENOUS at 12:06

## 2017-06-29 NOTE — PROGRESS NOTES
Subjective:       Patient ID: Doron Domínguez is a 77 y.o. male.    Chief Complaint: Results; Lung Cancer; and Chemotherapy    HPI 77-year-old female cycle 3 day 15  carboplatin Taxol bilateral bronchial alveolar carcinoma.  Patient presents for evaluation for treatment ECOG status 1    Past Medical History:   Diagnosis Date    Aortic stenosis 12/29/2016    Arthritis     Asthma     Back pain     due to calcium deposits in back    BPH (benign prostatic hyperplasia)     CAD (coronary artery disease)     CAD, multiple vessel 12/29/2016    Cancer     lung    High cholesterol     Hx of CABG 12/29/2016    Hypertension     Lung cancer 01/2017    T3 N0 M0 bronchoalveolar     Family History   Problem Relation Age of Onset    Diabetes Mother     Diabetes Sister     Diabetes Brother      Social History     Social History    Marital status:      Spouse name: N/A    Number of children: N/A    Years of education: 3     Occupational History    Part-time employed      Social History Main Topics    Smoking status: Never Smoker    Smokeless tobacco: Never Used    Alcohol use No    Drug use: No    Sexual activity: No     Other Topics Concern    Not on file     Social History Narrative    Part-time employed, , 3 children.     Past Surgical History:   Procedure Laterality Date    APPENDECTOMY      CARDIAC SURGERY      CORONARY ARTERY BYPASS GRAFT  2012    CABG x 3 at [facility]    LUNG LOBECTOMY Left 01/10/2017    Left lower lobe       Labs:  Lab Results   Component Value Date    WBC 5.84 06/29/2017    HGB 13.0 (L) 06/29/2017    HCT 39.1 (L) 06/29/2017    MCV 94 06/29/2017     06/29/2017     BMP  Lab Results   Component Value Date     06/22/2017    K 4.5 06/22/2017     06/22/2017    CO2 27 06/22/2017    BUN 16 06/22/2017    CREATININE 1.1 06/22/2017    CALCIUM 9.5 06/22/2017    ANIONGAP 7 (L) 06/22/2017    ESTGFRAFRICA >60 06/22/2017    EGFRNONAA >60 06/22/2017     Lab Results    Component Value Date    ALT 26 06/22/2017    AST 27 06/22/2017    ALKPHOS 92 06/22/2017    BILITOT 0.7 06/22/2017       No results found for: IRON, TIBC, FERRITIN, SATURATEDIRO  No results found for: [id number]  No results found for: FOLATE  No results found for: TSH      Review of Systems   Constitutional: Positive for fatigue. Negative for activity change, appetite change, chills, diaphoresis, fever and unexpected weight change.   HENT: Negative for congestion, dental problem, drooling, ear discharge, ear pain, facial swelling, hearing loss, mouth sores, nosebleeds, postnasal drip, rhinorrhea, sinus pressure, sneezing, sore throat, tinnitus, trouble swallowing and voice change.    Eyes: Negative for photophobia, pain, discharge, redness, itching and visual disturbance.   Respiratory: Positive for cough. Negative for apnea, choking, chest tightness, shortness of breath, wheezing and stridor.    Cardiovascular: Negative for chest pain, palpitations and leg swelling.   Gastrointestinal: Negative for abdominal distention, abdominal pain, anal bleeding, blood in stool, constipation, diarrhea, nausea, rectal pain and vomiting.   Endocrine: Negative for cold intolerance, heat intolerance, polydipsia, polyphagia and polyuria.   Genitourinary: Negative for decreased urine volume, difficulty urinating, discharge, dysuria, enuresis, flank pain, frequency, genital sores, hematuria, penile pain, penile swelling, scrotal swelling, testicular pain and urgency.   Musculoskeletal: Negative for arthralgias, back pain, gait problem, joint swelling, myalgias, neck pain and neck stiffness.   Skin: Negative for color change, pallor, rash and wound.   Allergic/Immunologic: Negative for environmental allergies, food allergies and immunocompromised state.   Neurological: Positive for weakness. Negative for dizziness, tremors, seizures, syncope, facial asymmetry, speech difficulty, light-headedness, numbness and headaches.    Hematological: Negative for adenopathy. Does not bruise/bleed easily.   Psychiatric/Behavioral: Negative for agitation, behavioral problems, confusion, decreased concentration, dysphoric mood, hallucinations, self-injury, sleep disturbance and suicidal ideas. The patient is not nervous/anxious and is not hyperactive.        Objective:      Physical Exam   Constitutional: He is oriented to person, place, and time. He appears well-developed and well-nourished. He appears distressed.   HENT:   Head: Normocephalic.   Right Ear: External ear normal.   Left Ear: External ear normal.   Nose: Nose normal. Right sinus exhibits no maxillary sinus tenderness and no frontal sinus tenderness. Left sinus exhibits no maxillary sinus tenderness and no frontal sinus tenderness.   Mouth/Throat: Oropharynx is clear and moist. No oropharyngeal exudate.   Eyes: EOM and lids are normal. Pupils are equal, round, and reactive to light. Right eye exhibits no discharge. Left eye exhibits no discharge. Right conjunctiva is not injected. Right conjunctiva has no hemorrhage. Left conjunctiva is not injected. Left conjunctiva has no hemorrhage. No scleral icterus. Right eye exhibits normal extraocular motion. Left eye exhibits normal extraocular motion.   Neck: Normal range of motion. Neck supple. No JVD present. No tracheal deviation present. No thyromegaly present.   Cardiovascular: Normal rate, regular rhythm and normal heart sounds.    Pulmonary/Chest: Effort normal and breath sounds normal. No stridor. No respiratory distress.   Abdominal: Soft. Bowel sounds are normal. He exhibits no mass. There is no hepatosplenomegaly, splenomegaly or hepatomegaly. There is no tenderness.   Musculoskeletal: Normal range of motion. He exhibits no edema or tenderness.   Lymphadenopathy:        Head (right side): No posterior auricular and no occipital adenopathy present.        Head (left side): No posterior auricular and no occipital adenopathy present.      He has no cervical adenopathy.        Right cervical: No superficial cervical, no deep cervical and no posterior cervical adenopathy present.       Left cervical: No superficial cervical, no deep cervical and no posterior cervical adenopathy present.     He has no axillary adenopathy.        Right: No supraclavicular adenopathy present.        Left: No supraclavicular adenopathy present.   Neurological: He is alert and oriented to person, place, and time. He has normal strength. No cranial nerve deficit. Coordination normal.   Skin: Skin is dry. No rash noted. He is not diaphoretic. No cyanosis or erythema. Nails show no clubbing.   Psychiatric: His behavior is normal. Judgment and thought content normal. His mood appears anxious. Cognition and memory are normal. He exhibits a depressed mood.   Vitals reviewed.          Assessment:      1. Bronchiolo-alveolar adenocarcinoma of left lung    2. Malignant neoplasm metastatic to lung, unspecified laterality    3. Bilateral lung cancer           Plan:   CBC except for treatment proceed with chemotherapy as outlined with carboplatinum and Taxol cycle 3 day 15 of therapy return in 2 weeks CBC CMP we'll restage the end of 4 cycles of therapy

## 2017-06-29 NOTE — PLAN OF CARE
Problem: Patient Care Overview  Goal: Plan of Care Review  Outcome: Ongoing (interventions implemented as appropriate)  States he has been very tired and has had some back pain.

## 2017-06-29 NOTE — PATIENT INSTRUCTIONS
[facility]  [address]  [address]  [phone] phone     [phone] fax  Hours of Operation: Monday- Friday 8:00am- 5:00pm  After hours phone  [phone]  Hematology / Oncology Physicians on call      Dr. Edward Holloway    Please call with any concerns regarding your appointment today.

## 2017-07-13 ENCOUNTER — OFFICE VISIT (OUTPATIENT)
Dept: HEMATOLOGY/ONCOLOGY | Facility: CLINIC | Age: 77
End: 2017-07-13
Payer: MEDICARE

## 2017-07-13 ENCOUNTER — INFUSION (OUTPATIENT)
Dept: INFUSION THERAPY | Facility: HOSPITAL | Age: 77
End: 2017-07-13
Attending: INTERNAL MEDICINE
Payer: MEDICARE

## 2017-07-13 VITALS
SYSTOLIC BLOOD PRESSURE: 118 MMHG | BODY MASS INDEX: 27.1 KG/M2 | OXYGEN SATURATION: 96 % | DIASTOLIC BLOOD PRESSURE: 68 MMHG | HEART RATE: 93 BPM | WEIGHT: 168.63 LBS | HEIGHT: 66 IN | TEMPERATURE: 98 F

## 2017-07-13 VITALS — DIASTOLIC BLOOD PRESSURE: 56 MMHG | HEART RATE: 81 BPM | SYSTOLIC BLOOD PRESSURE: 106 MMHG

## 2017-07-13 DIAGNOSIS — C78.00 MALIGNANT NEOPLASM METASTATIC TO LUNG, UNSPECIFIED LATERALITY: Primary | ICD-10-CM

## 2017-07-13 DIAGNOSIS — C34.92 BRONCHIOLO-ALVEOLAR ADENOCARCINOMA OF LEFT LUNG: ICD-10-CM

## 2017-07-13 DIAGNOSIS — C34.91 BILATERAL LUNG CANCER: ICD-10-CM

## 2017-07-13 DIAGNOSIS — C34.92 BILATERAL LUNG CANCER: ICD-10-CM

## 2017-07-13 PROCEDURE — 96413 CHEMO IV INFUSION 1 HR: CPT

## 2017-07-13 PROCEDURE — 1157F ADVNC CARE PLAN IN RCRD: CPT | Mod: ,,, | Performed by: INTERNAL MEDICINE

## 2017-07-13 PROCEDURE — 1159F MED LIST DOCD IN RCRD: CPT | Mod: ,,, | Performed by: INTERNAL MEDICINE

## 2017-07-13 PROCEDURE — 99214 OFFICE O/P EST MOD 30 MIN: CPT | Mod: 25,S$PBB,, | Performed by: INTERNAL MEDICINE

## 2017-07-13 PROCEDURE — 96367 TX/PROPH/DG ADDL SEQ IV INF: CPT

## 2017-07-13 PROCEDURE — 63600175 PHARM REV CODE 636 W HCPCS: Performed by: INTERNAL MEDICINE

## 2017-07-13 PROCEDURE — 1126F AMNT PAIN NOTED NONE PRSNT: CPT | Mod: ,,, | Performed by: INTERNAL MEDICINE

## 2017-07-13 PROCEDURE — 99999 PR PBB SHADOW E&M-EST. PATIENT-LVL III: CPT | Mod: PBBFAC,,, | Performed by: INTERNAL MEDICINE

## 2017-07-13 PROCEDURE — 96417 CHEMO IV INFUS EACH ADDL SEQ: CPT

## 2017-07-13 PROCEDURE — 25000003 PHARM REV CODE 250: Performed by: INTERNAL MEDICINE

## 2017-07-13 RX ORDER — DIPHENHYDRAMINE HYDROCHLORIDE 50 MG/ML
50 INJECTION INTRAMUSCULAR; INTRAVENOUS ONCE AS NEEDED
Status: CANCELLED | OUTPATIENT
Start: 2017-07-13 | End: 2017-07-13

## 2017-07-13 RX ORDER — HEPARIN 100 UNIT/ML
500 SYRINGE INTRAVENOUS
Status: DISCONTINUED | OUTPATIENT
Start: 2017-07-13 | End: 2017-07-13 | Stop reason: HOSPADM

## 2017-07-13 RX ORDER — FAMOTIDINE 20 MG/50ML
20 INJECTION, SOLUTION INTRAVENOUS
Status: CANCELLED
Start: 2017-07-13

## 2017-07-13 RX ORDER — EPINEPHRINE 0.3 MG/.3ML
0.3 INJECTION SUBCUTANEOUS ONCE AS NEEDED
Status: CANCELLED | OUTPATIENT
Start: 2017-07-13 | End: 2017-07-13

## 2017-07-13 RX ORDER — FAMOTIDINE 20 MG/50ML
20 INJECTION, SOLUTION INTRAVENOUS
Status: COMPLETED | OUTPATIENT
Start: 2017-07-13 | End: 2017-07-13

## 2017-07-13 RX ORDER — HEPARIN 100 UNIT/ML
500 SYRINGE INTRAVENOUS
Status: CANCELLED | OUTPATIENT
Start: 2017-07-13

## 2017-07-13 RX ORDER — SODIUM CHLORIDE 9 MG/ML
10 INJECTION, SOLUTION INTRAMUSCULAR; INTRAVENOUS; SUBCUTANEOUS
Status: DISCONTINUED | OUTPATIENT
Start: 2017-07-13 | End: 2017-07-13 | Stop reason: HOSPADM

## 2017-07-13 RX ORDER — SODIUM CHLORIDE 0.9 % (FLUSH) 0.9 %
10 SYRINGE (ML) INJECTION
Status: CANCELLED | OUTPATIENT
Start: 2017-07-13

## 2017-07-13 RX ADMIN — DIPHENHYDRAMINE HYDROCHLORIDE 50 MG: 50 INJECTION INTRAMUSCULAR; INTRAVENOUS at 11:07

## 2017-07-13 RX ADMIN — PACLITAXEL 84 MG: 6 INJECTION, SOLUTION, CONCENTRATE INTRAVENOUS at 12:07

## 2017-07-13 RX ADMIN — CARBOPLATIN 170 MG: 10 INJECTION, SOLUTION INTRAVENOUS at 01:07

## 2017-07-13 RX ADMIN — FAMOTIDINE 20 MG: 20 INJECTION, SOLUTION INTRAVENOUS at 10:07

## 2017-07-13 RX ADMIN — ONDANSETRON HYDROCHLORIDE: 2 INJECTION, SOLUTION INTRAMUSCULAR; INTRAVENOUS at 11:07

## 2017-07-13 RX ADMIN — HEPARIN 500 UNITS: 100 SYRINGE at 02:07

## 2017-07-13 NOTE — PROGRESS NOTES
Subjective:       Patient ID: Doron Domínguez is a 77 y.o. male.    Chief Complaint: Results; Chemotherapy; and Lung Cancer    HPI 77-year-old male presents for cycle 4 day 1 of carboplatinum Taxol for metastatic non-small cell lung carcinoma patient states that he is doing recently well cough is stable low-grade her bronchodilators prior to visit today ECOG status 1 plan to retire from his current occupation is a     Past Medical History:   Diagnosis Date    Aortic stenosis 12/29/2016    Arthritis     Asthma     Back pain     due to calcium deposits in back    BPH (benign prostatic hyperplasia)     CAD (coronary artery disease)     CAD, multiple vessel 12/29/2016    Cancer     lung    High cholesterol     Hx of CABG 12/29/2016    Hypertension     Lung cancer 01/2017    T3 N0 M0 bronchoalveolar     Family History   Problem Relation Age of Onset    Diabetes Mother     Diabetes Sister     Diabetes Brother      Social History     Social History    Marital status:      Spouse name: N/A    Number of children: N/A    Years of education: 3     Occupational History    Part-time employed      Social History Main Topics    Smoking status: Never Smoker    Smokeless tobacco: Never Used    Alcohol use No    Drug use: No    Sexual activity: No     Other Topics Concern    Not on file     Social History Narrative    Part-time employed, , 3 children.     Past Surgical History:   Procedure Laterality Date    APPENDECTOMY      CARDIAC SURGERY      CORONARY ARTERY BYPASS GRAFT  2012    CABG x 3 at [facility]    LUNG LOBECTOMY Left 01/10/2017    Left lower lobe       Labs:  Lab Results   Component Value Date    WBC 5.11 07/13/2017    HGB 13.1 (L) 07/13/2017    HCT 39.3 (L) 07/13/2017    MCV 96 07/13/2017     07/13/2017     BMP  Lab Results   Component Value Date     07/13/2017    K 4.1 07/13/2017     07/13/2017    CO2 24 07/13/2017    BUN 14 07/13/2017    CREATININE 1.1  07/13/2017    CALCIUM 9.5 07/13/2017    ANIONGAP 11 07/13/2017    ESTGFRAFRICA >60 07/13/2017    EGFRNONAA >60 07/13/2017     Lab Results   Component Value Date    ALT 27 07/13/2017    AST 33 07/13/2017    ALKPHOS 97 07/13/2017    BILITOT 1.1 (H) 07/13/2017       No results found for: IRON, TIBC, FERRITIN, SATURATEDIRO  No results found for: [id number]  No results found for: FOLATE  No results found for: TSH      Review of Systems   Constitutional: Negative for activity change, appetite change, chills, diaphoresis, fatigue, fever and unexpected weight change.   HENT: Negative for congestion, dental problem, drooling, ear discharge, ear pain, facial swelling, hearing loss, mouth sores, nosebleeds, postnasal drip, rhinorrhea, sinus pressure, sneezing, sore throat, tinnitus, trouble swallowing and voice change.    Eyes: Negative for photophobia, pain, discharge, redness, itching and visual disturbance.   Respiratory: Positive for cough. Negative for apnea, choking, chest tightness, shortness of breath, wheezing and stridor.    Cardiovascular: Negative for chest pain, palpitations and leg swelling.   Gastrointestinal: Negative for abdominal distention, abdominal pain, anal bleeding, blood in stool, constipation, diarrhea, nausea, rectal pain and vomiting.   Endocrine: Negative for cold intolerance, heat intolerance, polydipsia, polyphagia and polyuria.   Genitourinary: Negative for decreased urine volume, difficulty urinating, discharge, dysuria, enuresis, flank pain, frequency, genital sores, hematuria, penile pain, penile swelling, scrotal swelling, testicular pain and urgency.   Musculoskeletal: Negative for arthralgias, back pain, gait problem, joint swelling, myalgias, neck pain and neck stiffness.   Skin: Negative for color change, pallor, rash and wound.   Allergic/Immunologic: Negative for environmental allergies, food allergies and immunocompromised state.   Neurological: Positive for weakness. Negative for  dizziness, tremors, seizures, syncope, facial asymmetry, speech difficulty, light-headedness, numbness and headaches.   Hematological: Negative for adenopathy. Does not bruise/bleed easily.   Psychiatric/Behavioral: Positive for dysphoric mood. Negative for agitation, behavioral problems, confusion, decreased concentration, hallucinations, self-injury, sleep disturbance and suicidal ideas. The patient is nervous/anxious. The patient is not hyperactive.        Objective:      Physical Exam   Constitutional: He is oriented to person, place, and time. He appears well-developed and well-nourished. He appears cachectic. He has a sickly appearance. He appears ill. He appears distressed.   HENT:   Head: Normocephalic.   Right Ear: External ear normal.   Left Ear: External ear normal.   Nose: Nose normal. Right sinus exhibits no maxillary sinus tenderness and no frontal sinus tenderness. Left sinus exhibits no maxillary sinus tenderness and no frontal sinus tenderness.   Mouth/Throat: Oropharynx is clear and moist. No oropharyngeal exudate.   Eyes: EOM and lids are normal. Pupils are equal, round, and reactive to light. Right eye exhibits no discharge. Left eye exhibits no discharge. Right conjunctiva is not injected. Right conjunctiva has no hemorrhage. Left conjunctiva is not injected. Left conjunctiva has no hemorrhage. No scleral icterus. Right eye exhibits normal extraocular motion. Left eye exhibits normal extraocular motion.   Neck: Normal range of motion. Neck supple. No JVD present. No tracheal deviation present. No thyromegaly present.   Cardiovascular: Normal rate, regular rhythm and normal heart sounds.    Pulmonary/Chest: Effort normal and breath sounds normal. No stridor. No respiratory distress.   Abdominal: Soft. Bowel sounds are normal. He exhibits no mass. There is no hepatosplenomegaly, splenomegaly or hepatomegaly. There is no tenderness.   Musculoskeletal: Normal range of motion. He exhibits no edema or  tenderness.   Lymphadenopathy:        Head (right side): No posterior auricular and no occipital adenopathy present.        Head (left side): No posterior auricular and no occipital adenopathy present.     He has no cervical adenopathy.        Right cervical: No superficial cervical, no deep cervical and no posterior cervical adenopathy present.       Left cervical: No superficial cervical, no deep cervical and no posterior cervical adenopathy present.     He has no axillary adenopathy.        Right: No supraclavicular adenopathy present.        Left: No supraclavicular adenopathy present.   Neurological: He is alert and oriented to person, place, and time. He has normal strength. No cranial nerve deficit. Coordination normal.   Skin: Skin is dry. No rash noted. He is not diaphoretic. No cyanosis or erythema. Nails show no clubbing.   Psychiatric: His behavior is normal. Judgment and thought content normal. His mood appears anxious. Cognition and memory are normal. He exhibits a depressed mood.   Vitals reviewed.          Assessment:      1. Malignant neoplasm metastatic to lung, unspecified laterality    2. Bronchiolo-alveolar adenocarcinoma of left lung    3. Bilateral lung cancer           Plan:   Proceed with chemotherapy as outlined return to clinic to see Dr. Leon in 1 week labs reviewed order signed discussed the fact that his hair is tender is a sign that it may be beginning to fall out discussed applications with him

## 2017-07-13 NOTE — PATIENT INSTRUCTIONS
[facility]  [address]  [address] [address]  [phone] phone     [phone] fax  Hours of Operation: Monday- Friday 8:00am- 5:00pm  After hours phone  [phone]  Hematology / Oncology Physicians on call      Dr. Edward Holloway    Please call with any concerns regarding your appointment today.      Oncology: Managing Fatigue   Fatigue is a common side effect of chemotherapy and radiation therapy. It can be caused by worry, lack of sleep, and poor appetite. Fatigue can also be a sign of anemia (a shortage of red blood cells). This could require medical treatment. The tips below can help you feel better.      Family members can help with meals and chores around the house.      Conserving Energy   Note the times of day when you are most tired and plan around them. For instance, if you are more tired in the afternoon, try to get tasks done in the morning.   Decide which tasks are most important. Do those first.   Pass tasks along to others when you need to. Ask for help.   Accept help when its offered. Tell people what they can do to help. For instance, you may need someone to fix a meal, fold clothes, or put gas in your car.   Plan rest times. You may want to take a nap each day. Just sitting quietly for a few minutes can make you feel more rested.  What You Can Do to Feel Better   Relax before you try to sleep. Take a bath or read for a while.   Form a sleep pattern. Go to bed at the same time each night and get up at the same time each morning.   Eat well. Choose foods from all of the food groups each day.   Exercise. Take a brisk walk to help increase your energy.   Avoid caffeine and alcohol. Drink plenty of water or fruit juices instead.  Treating Anemia   If you begin to feel more tired than normal, tell your doctor. Fatigue could be a sign of anemia. This problem is fairly common during chemotherapy and radiation  treatments. If your red blood cell count is too low, you are likely to receive a blood transfusion. Most people feel better shortly after the transfusion. In some cases, medication may be prescribed to increase red blood cell production.   Call Your Doctor If You Have:   Shortness of breath or chest pain   A dizzy feeling when you get up from lying or sitting down   Paler skin than normal   Extreme tiredness that is not helped by sleep    © 7922-6371 [facility], [address] [place] [address], [place] [address]. All rights reserved. This information is not intended as a substitute for professional medical care. Always follow your healthcare professional's instructions.

## 2017-07-20 ENCOUNTER — OFFICE VISIT (OUTPATIENT)
Dept: HEMATOLOGY/ONCOLOGY | Facility: CLINIC | Age: 77
End: 2017-07-20
Payer: MEDICARE

## 2017-07-20 ENCOUNTER — INFUSION (OUTPATIENT)
Dept: INFUSION THERAPY | Facility: HOSPITAL | Age: 77
End: 2017-07-20
Attending: INTERNAL MEDICINE
Payer: MEDICARE

## 2017-07-20 VITALS
SYSTOLIC BLOOD PRESSURE: 97 MMHG | DIASTOLIC BLOOD PRESSURE: 56 MMHG | WEIGHT: 166.88 LBS | HEIGHT: 66 IN | HEART RATE: 82 BPM | BODY MASS INDEX: 26.82 KG/M2

## 2017-07-20 VITALS
SYSTOLIC BLOOD PRESSURE: 103 MMHG | OXYGEN SATURATION: 95 % | BODY MASS INDEX: 26.82 KG/M2 | HEART RATE: 70 BPM | TEMPERATURE: 98 F | WEIGHT: 166.88 LBS | DIASTOLIC BLOOD PRESSURE: 53 MMHG | HEIGHT: 66 IN

## 2017-07-20 DIAGNOSIS — C34.91 BILATERAL LUNG CANCER: ICD-10-CM

## 2017-07-20 DIAGNOSIS — C34.92 BRONCHIOLO-ALVEOLAR ADENOCARCINOMA OF LEFT LUNG: Primary | ICD-10-CM

## 2017-07-20 DIAGNOSIS — C34.92 BILATERAL LUNG CANCER: ICD-10-CM

## 2017-07-20 DIAGNOSIS — C78.00 MALIGNANT NEOPLASM METASTATIC TO LUNG, UNSPECIFIED LATERALITY: ICD-10-CM

## 2017-07-20 DIAGNOSIS — C34.92 BRONCHIOLO-ALVEOLAR ADENOCARCINOMA OF LEFT LUNG: ICD-10-CM

## 2017-07-20 DIAGNOSIS — C78.00 MALIGNANT NEOPLASM METASTATIC TO LUNG, UNSPECIFIED LATERALITY: Primary | ICD-10-CM

## 2017-07-20 DIAGNOSIS — J98.09 BRONCHORRHEA: ICD-10-CM

## 2017-07-20 PROCEDURE — 96368 THER/DIAG CONCURRENT INF: CPT | Mod: PO

## 2017-07-20 PROCEDURE — 96417 CHEMO IV INFUS EACH ADDL SEQ: CPT | Mod: PO

## 2017-07-20 PROCEDURE — 1126F AMNT PAIN NOTED NONE PRSNT: CPT | Mod: ,,, | Performed by: INTERNAL MEDICINE

## 2017-07-20 PROCEDURE — 1157F ADVNC CARE PLAN IN RCRD: CPT | Mod: ,,, | Performed by: INTERNAL MEDICINE

## 2017-07-20 PROCEDURE — 63600175 PHARM REV CODE 636 W HCPCS: Mod: PO | Performed by: INTERNAL MEDICINE

## 2017-07-20 PROCEDURE — 25000003 PHARM REV CODE 250: Mod: PO | Performed by: INTERNAL MEDICINE

## 2017-07-20 PROCEDURE — 96413 CHEMO IV INFUSION 1 HR: CPT | Mod: PO

## 2017-07-20 PROCEDURE — 99999 PR PBB SHADOW E&M-EST. PATIENT-LVL III: CPT | Mod: PBBFAC,,, | Performed by: INTERNAL MEDICINE

## 2017-07-20 PROCEDURE — 1159F MED LIST DOCD IN RCRD: CPT | Mod: ,,, | Performed by: INTERNAL MEDICINE

## 2017-07-20 PROCEDURE — 96367 TX/PROPH/DG ADDL SEQ IV INF: CPT | Mod: PO

## 2017-07-20 PROCEDURE — 99215 OFFICE O/P EST HI 40 MIN: CPT | Mod: 25,S$PBB,, | Performed by: INTERNAL MEDICINE

## 2017-07-20 RX ORDER — DIPHENHYDRAMINE HYDROCHLORIDE 50 MG/ML
50 INJECTION INTRAMUSCULAR; INTRAVENOUS ONCE AS NEEDED
Status: CANCELLED | OUTPATIENT
Start: 2017-07-27 | End: 2017-07-27

## 2017-07-20 RX ORDER — EPINEPHRINE 0.3 MG/.3ML
0.3 INJECTION SUBCUTANEOUS ONCE AS NEEDED
Status: CANCELLED | OUTPATIENT
Start: 2017-07-27 | End: 2017-07-27

## 2017-07-20 RX ORDER — HEPARIN 100 UNIT/ML
500 SYRINGE INTRAVENOUS
Status: CANCELLED | OUTPATIENT
Start: 2017-07-27

## 2017-07-20 RX ORDER — FAMOTIDINE 20 MG/50ML
20 INJECTION, SOLUTION INTRAVENOUS
Status: CANCELLED | OUTPATIENT
Start: 2017-07-27

## 2017-07-20 RX ORDER — FAMOTIDINE 20 MG/50ML
20 INJECTION, SOLUTION INTRAVENOUS
Status: COMPLETED | OUTPATIENT
Start: 2017-07-20 | End: 2017-07-20

## 2017-07-20 RX ORDER — HEPARIN 100 UNIT/ML
500 SYRINGE INTRAVENOUS
Status: DISCONTINUED | OUTPATIENT
Start: 2017-07-20 | End: 2017-07-20 | Stop reason: HOSPADM

## 2017-07-20 RX ORDER — SODIUM CHLORIDE 9 MG/ML
10 INJECTION, SOLUTION INTRAMUSCULAR; INTRAVENOUS; SUBCUTANEOUS
Status: CANCELLED | OUTPATIENT
Start: 2017-07-27

## 2017-07-20 RX ADMIN — HEPARIN 500 UNITS: 100 SYRINGE at 02:07

## 2017-07-20 RX ADMIN — CARBOPLATIN 165 MG: 10 INJECTION, SOLUTION INTRAVENOUS at 01:07

## 2017-07-20 RX ADMIN — DEXAMETHASONE SODIUM PHOSPHATE: 4 INJECTION, SOLUTION INTRA-ARTICULAR; INTRALESIONAL; INTRAMUSCULAR; INTRAVENOUS; SOFT TISSUE at 12:07

## 2017-07-20 RX ADMIN — DIPHENHYDRAMINE HYDROCHLORIDE 50 MG: 50 INJECTION INTRAMUSCULAR; INTRAVENOUS at 11:07

## 2017-07-20 RX ADMIN — SODIUM CHLORIDE: 9 INJECTION, SOLUTION INTRAVENOUS at 11:07

## 2017-07-20 RX ADMIN — FAMOTIDINE 20 MG: 20 INJECTION, SOLUTION INTRAVENOUS at 11:07

## 2017-07-20 RX ADMIN — PACLITAXEL 84 MG: 6 INJECTION, SOLUTION, CONCENTRATE INTRAVENOUS at 12:07

## 2017-07-20 NOTE — PLAN OF CARE
"Problem: Patient Care Overview  Goal: Plan of Care Review  Outcome: Ongoing (interventions implemented as appropriate)  Pt states he feels "ok" today.  He has felt tired and weak this week.      "

## 2017-07-20 NOTE — PATIENT INSTRUCTIONS
[facility]  [address] [facility]  [id number] [place] [address] Drive  [phone] phone     [phone] fax  Hours of Operation: Monday- Friday 8:00am- 5:00pm  After hours phone  [phone]  Hematology / Oncology Physicians on call      Dr. Edward Holloway                      Please call with any concerns regarding your appointment today.    IF YOU EXPERIENCE ANY OF THE FOLLOWING PROBLEMS, CALL THE OFFICE IMMEDIATELY.    *FEVER .0 OR GREATER    *CHILLS, ESPECIALLY SHAKING CHILLS, OR SWEATING    *A SEVERE COUGH OR SORE THROAT, OR SINUS PAIN/     PRESSURE    *REDNESS, SWELLING, OR TENDERNESS AROUND A WOUND,     SORE, PIMPLE, RECTAL AREA, OR IV SITE    *SORES OR ULCERS IN THE MOUTH    *BLISTERS ON THE LIPS OR SKIN    *FREQUENT URGENCY TO URINATE OR A BURNING FEELING   WHEN YOU URINATE    *BLOOD IN THE URINE OR STOOL    *ANY UNEXPLAINED BRUISING OR PROLONGED BLEEDING,     (NOSEBLEEDS OR BLEEDING GUMS)    *LOOSE BOWEL MOVEMENTS THAT DO NOT RESPOND TO     IMODIUM OR MORE THAN THREE TIMES A DAY    *VOMITING UNRESPONSIVE TO ANTINAUSEA MEDICINE    *ANY UNUSUAL PHYSICAL SYMPTOMS THAT BEGAN AFTER     CHEMOTHERAPY    DURING WEEKDAYS, CALL AND ASK TO SPEAK DIRECTLY TO A NURSE.  AT OTHER TIMES, CALL THE OFFICE PHONE NUMBER; THE ANSWERING SERVICE WILL CONTACT THE ON-CALL PHYSICIAN.  SOMEONE IS AVAILABLE 24 HOURS A DAY, SEVEN DAYS A WEEK.

## 2017-07-20 NOTE — PROGRESS NOTES
Subjective:       Patient ID: Doron Domínguez is a 77 y.o. male.    Chief Complaint: Results; Chemotherapy; and Lung Cancer    HPI 77-year-old male history of metastatic bronchorrhea carcinoma presents for cycle 4 day 8 of carboplatinum Taxol.  The patient still has persistent cough denies nausea vomiting patient is continuing to considered resigning from work ECOG status 1    Past Medical History:   Diagnosis Date    Aortic stenosis 12/29/2016    Arthritis     Asthma     Back pain     due to calcium deposits in back    BPH (benign prostatic hyperplasia)     CAD (coronary artery disease)     CAD, multiple vessel 12/29/2016    Cancer     lung    High cholesterol     Hx of CABG 12/29/2016    Hypertension     Lung cancer 01/2017    T3 N0 M0 bronchoalveolar     Family History   Problem Relation Age of Onset    Diabetes Mother     Diabetes Sister     Diabetes Brother      Social History     Social History    Marital status:      Spouse name: N/A    Number of children: N/A    Years of education: 3     Occupational History    Part-time employed      Social History Main Topics    Smoking status: Never Smoker    Smokeless tobacco: Never Used    Alcohol use No    Drug use: No    Sexual activity: No     Other Topics Concern    Not on file     Social History Narrative    Part-time employed, , 3 children.     Past Surgical History:   Procedure Laterality Date    APPENDECTOMY      CARDIAC SURGERY      CORONARY ARTERY BYPASS GRAFT  2012    CABG x 3 at [facility]    LUNG LOBECTOMY Left 01/10/2017    Left lower lobe       Labs:  Lab Results   Component Value Date    WBC 6.23 07/20/2017    HGB 13.0 (L) 07/20/2017    HCT 39.7 (L) 07/20/2017    MCV 97 07/20/2017     (L) 07/20/2017     BMP  Lab Results   Component Value Date     07/20/2017    K 4.3 07/20/2017     07/20/2017    CO2 27 07/20/2017    BUN 17 07/20/2017    CREATININE 1.1 07/20/2017    CALCIUM 9.5 07/20/2017    ANIONGAP 8  07/20/2017    ESTGFRAFRICA >60 07/20/2017    EGFRNONAA >60 07/20/2017     Lab Results   Component Value Date    ALT 25 07/20/2017    AST 30 07/20/2017    ALKPHOS 88 07/20/2017    BILITOT 0.8 07/20/2017       No results found for: IRON, TIBC, FERRITIN, SATURATEDIRO  No results found for: [id number]  No results found for: FOLATE  No results found for: TSH      Review of Systems   Constitutional: Negative for activity change, appetite change, chills, diaphoresis, fatigue, fever and unexpected weight change.   HENT: Positive for congestion. Negative for dental problem, drooling, ear discharge, ear pain, facial swelling, hearing loss, mouth sores, nosebleeds, postnasal drip, rhinorrhea, sinus pressure, sneezing, sore throat, tinnitus, trouble swallowing and voice change.    Eyes: Negative for photophobia, pain, discharge, redness, itching and visual disturbance.   Respiratory: Positive for cough and shortness of breath. Negative for apnea, choking, chest tightness, wheezing and stridor.    Cardiovascular: Negative for chest pain, palpitations and leg swelling.   Gastrointestinal: Negative for abdominal distention, abdominal pain, anal bleeding, blood in stool, constipation, diarrhea, nausea, rectal pain and vomiting.   Endocrine: Negative for cold intolerance, heat intolerance, polydipsia, polyphagia and polyuria.   Genitourinary: Negative for decreased urine volume, difficulty urinating, discharge, dysuria, enuresis, flank pain, frequency, genital sores, hematuria, penile pain, penile swelling, scrotal swelling, testicular pain and urgency.   Musculoskeletal: Negative for arthralgias, back pain, gait problem, joint swelling, myalgias, neck pain and neck stiffness.   Skin: Negative for color change, pallor, rash and wound.   Allergic/Immunologic: Negative for environmental allergies, food allergies and immunocompromised state.   Neurological: Negative for dizziness, tremors, seizures, syncope, facial asymmetry, speech  difficulty, weakness, light-headedness, numbness and headaches.   Hematological: Negative for adenopathy. Does not bruise/bleed easily.   Psychiatric/Behavioral: Positive for dysphoric mood. Negative for agitation, behavioral problems, confusion, decreased concentration, hallucinations, self-injury, sleep disturbance and suicidal ideas. The patient is nervous/anxious. The patient is not hyperactive.        Objective:      Physical Exam   Constitutional: He is oriented to person, place, and time. He appears well-developed and well-nourished. He appears distressed.   HENT:   Head: Normocephalic.   Right Ear: External ear normal.   Left Ear: External ear normal.   Nose: Nose normal. Right sinus exhibits no maxillary sinus tenderness and no frontal sinus tenderness. Left sinus exhibits no maxillary sinus tenderness and no frontal sinus tenderness.   Mouth/Throat: Oropharynx is clear and moist. No oropharyngeal exudate.   Eyes: EOM and lids are normal. Pupils are equal, round, and reactive to light. Right eye exhibits no discharge. Left eye exhibits no discharge. Right conjunctiva is not injected. Right conjunctiva has no hemorrhage. Left conjunctiva is not injected. Left conjunctiva has no hemorrhage. No scleral icterus. Right eye exhibits normal extraocular motion. Left eye exhibits normal extraocular motion.   Neck: Normal range of motion. Neck supple. No JVD present. No tracheal deviation present. No thyromegaly present.   Cardiovascular: Normal rate, regular rhythm and normal heart sounds.    Pulmonary/Chest: Effort normal and breath sounds normal. No stridor. No respiratory distress.   Abdominal: Soft. Bowel sounds are normal. He exhibits no mass. There is no hepatosplenomegaly, splenomegaly or hepatomegaly. There is no tenderness.   Musculoskeletal: Normal range of motion. He exhibits no edema or tenderness.   Lymphadenopathy:        Head (right side): No posterior auricular and no occipital adenopathy present.         Head (left side): No posterior auricular and no occipital adenopathy present.     He has no cervical adenopathy.        Right cervical: No superficial cervical, no deep cervical and no posterior cervical adenopathy present.       Left cervical: No superficial cervical, no deep cervical and no posterior cervical adenopathy present.     He has no axillary adenopathy.        Right: No supraclavicular adenopathy present.        Left: No supraclavicular adenopathy present.   Neurological: He is alert and oriented to person, place, and time. He has normal strength. No cranial nerve deficit. Coordination normal.   Skin: Skin is dry. No rash noted. He is not diaphoretic. No cyanosis or erythema. Nails show no clubbing.   Psychiatric: His behavior is normal. Judgment and thought content normal. His mood appears anxious. Cognition and memory are normal. He exhibits a depressed mood.   Vitals reviewed.          Assessment:      1. Bronchiolo-alveolar adenocarcinoma of left lung    2. Bilateral lung cancer    3. Bronchorrhea    4. Malignant neoplasm metastatic to lung, unspecified laterality           Plan:   Proceed with chemotherapy as outlined today patient is to return to clinic to see me in one week for soft final cycle of therapy we'll reimage with repeat CT chest abdomen pelvis for response to therapy and consideration of further therapeutic options

## 2017-07-27 ENCOUNTER — OFFICE VISIT (OUTPATIENT)
Dept: HEMATOLOGY/ONCOLOGY | Facility: CLINIC | Age: 77
End: 2017-07-27
Payer: MEDICARE

## 2017-07-27 VITALS
BODY MASS INDEX: 26.82 KG/M2 | SYSTOLIC BLOOD PRESSURE: 120 MMHG | HEART RATE: 104 BPM | WEIGHT: 166.88 LBS | HEIGHT: 66 IN | OXYGEN SATURATION: 96 % | TEMPERATURE: 99 F | DIASTOLIC BLOOD PRESSURE: 66 MMHG | RESPIRATION RATE: 18 BRPM

## 2017-07-27 DIAGNOSIS — C78.00 MALIGNANT NEOPLASM METASTATIC TO LUNG, UNSPECIFIED LATERALITY: ICD-10-CM

## 2017-07-27 DIAGNOSIS — C34.92 BILATERAL LUNG CANCER: ICD-10-CM

## 2017-07-27 DIAGNOSIS — C34.91 BILATERAL LUNG CANCER: ICD-10-CM

## 2017-07-27 DIAGNOSIS — C34.92 BRONCHIOLO-ALVEOLAR ADENOCARCINOMA OF LEFT LUNG: Primary | ICD-10-CM

## 2017-07-27 DIAGNOSIS — J98.09 BRONCHORRHEA: ICD-10-CM

## 2017-07-27 PROCEDURE — 1159F MED LIST DOCD IN RCRD: CPT | Mod: ,,, | Performed by: INTERNAL MEDICINE

## 2017-07-27 PROCEDURE — 1157F ADVNC CARE PLAN IN RCRD: CPT | Mod: ,,, | Performed by: INTERNAL MEDICINE

## 2017-07-27 PROCEDURE — 1126F AMNT PAIN NOTED NONE PRSNT: CPT | Mod: ,,, | Performed by: INTERNAL MEDICINE

## 2017-07-27 PROCEDURE — 99999 PR PBB SHADOW E&M-EST. PATIENT-LVL III: CPT | Mod: PBBFAC,,, | Performed by: INTERNAL MEDICINE

## 2017-07-27 PROCEDURE — 99214 OFFICE O/P EST MOD 30 MIN: CPT | Mod: S$PBB,,, | Performed by: INTERNAL MEDICINE

## 2017-07-27 NOTE — PROGRESS NOTES
Subjective:       Patient ID: Doron Domínguez is a 77 y.o. male.    Chief Complaint: Follow-up; Results; and Lung Cancer    HPI 77-year-old male with probability carcinoma patient reports increasing weakness fatigue cough was scheduled for cycle 4 week 3 of carboplatinum Taxol patient presents today for further evaluation    Past Medical History:   Diagnosis Date    Aortic stenosis 12/29/2016    Arthritis     Asthma     Back pain     due to calcium deposits in back    BPH (benign prostatic hyperplasia)     CAD (coronary artery disease)     CAD, multiple vessel 12/29/2016    Cancer     lung    High cholesterol     Hx of CABG 12/29/2016    Hypertension     Lung cancer 01/2017    T3 N0 M0 bronchoalveolar     Family History   Problem Relation Age of Onset    Diabetes Mother     Diabetes Sister     Diabetes Brother      Social History     Social History    Marital status:      Spouse name: N/A    Number of children: N/A    Years of education: 3     Occupational History    Part-time employed      Social History Main Topics    Smoking status: Never Smoker    Smokeless tobacco: Never Used    Alcohol use No    Drug use: No    Sexual activity: No     Other Topics Concern    Not on file     Social History Narrative    Part-time employed, , 3 children.     Past Surgical History:   Procedure Laterality Date    APPENDECTOMY      CARDIAC SURGERY      CORONARY ARTERY BYPASS GRAFT  2012    CABG x 3 at [facility]    LUNG LOBECTOMY Left 01/10/2017    Left lower lobe       Labs:  Lab Results   Component Value Date    WBC 5.80 07/27/2017    HGB 12.3 (L) 07/27/2017    HCT 37.2 (L) 07/27/2017    MCV 97 07/27/2017     (L) 07/27/2017     BMP  Lab Results   Component Value Date     07/27/2017    K 4.0 07/27/2017     07/27/2017    CO2 24 07/27/2017    BUN 15 07/27/2017    CREATININE 1.1 07/27/2017    CALCIUM 9.1 07/27/2017    ANIONGAP 10 07/27/2017    ESTGFRAFRICA >60 07/27/2017     EGFRNONAA >60 07/27/2017     Lab Results   Component Value Date    ALT 25 07/27/2017    AST 29 07/27/2017    ALKPHOS 83 07/27/2017    BILITOT 0.8 07/27/2017       No results found for: IRON, TIBC, FERRITIN, SATURATEDIRO  No results found for: [id number]  No results found for: FOLATE  No results found for: TSH      Review of Systems   Constitutional: Positive for appetite change and unexpected weight change. Negative for activity change, chills, diaphoresis, fatigue and fever.   HENT: Negative for congestion, dental problem, drooling, ear discharge, ear pain, facial swelling, hearing loss, mouth sores, nosebleeds, postnasal drip, rhinorrhea, sinus pressure, sneezing, sore throat, tinnitus, trouble swallowing and voice change.    Eyes: Negative for photophobia, pain, discharge, redness, itching and visual disturbance.   Respiratory: Positive for cough and shortness of breath. Negative for apnea, choking, chest tightness, wheezing and stridor.    Cardiovascular: Negative for chest pain, palpitations and leg swelling.   Gastrointestinal: Negative for abdominal distention, abdominal pain, anal bleeding, blood in stool, constipation, diarrhea, nausea, rectal pain and vomiting.   Endocrine: Negative for cold intolerance, heat intolerance, polydipsia, polyphagia and polyuria.   Genitourinary: Negative for decreased urine volume, difficulty urinating, discharge, dysuria, enuresis, flank pain, frequency, genital sores, hematuria, penile pain, penile swelling, scrotal swelling, testicular pain and urgency.   Musculoskeletal: Negative for arthralgias, back pain, gait problem, joint swelling, myalgias, neck pain and neck stiffness.   Skin: Negative for color change, pallor, rash and wound.   Allergic/Immunologic: Negative for environmental allergies, food allergies and immunocompromised state.   Neurological: Positive for headaches. Negative for dizziness, tremors, seizures, syncope, facial asymmetry, speech difficulty,  weakness, light-headedness and numbness.   Hematological: Negative for adenopathy. Does not bruise/bleed easily.   Psychiatric/Behavioral: Negative for agitation, behavioral problems, confusion, decreased concentration, dysphoric mood, hallucinations, self-injury, sleep disturbance and suicidal ideas. The patient is nervous/anxious. The patient is not hyperactive.        Objective:      Physical Exam   Constitutional: He is oriented to person, place, and time. He appears well-developed and well-nourished. He appears cachectic. He has a sickly appearance. He appears ill. He appears distressed.   HENT:   Head: Normocephalic.   Right Ear: External ear normal.   Left Ear: External ear normal.   Nose: Nose normal. Right sinus exhibits no maxillary sinus tenderness and no frontal sinus tenderness. Left sinus exhibits no maxillary sinus tenderness and no frontal sinus tenderness.   Mouth/Throat: Oropharynx is clear and moist. No oropharyngeal exudate.   Eyes: EOM and lids are normal. Pupils are equal, round, and reactive to light. Right eye exhibits no discharge. Left eye exhibits no discharge. Right conjunctiva is not injected. Right conjunctiva has no hemorrhage. Left conjunctiva is not injected. Left conjunctiva has no hemorrhage. No scleral icterus. Right eye exhibits normal extraocular motion. Left eye exhibits normal extraocular motion.   Neck: Normal range of motion. Neck supple. No JVD present. No tracheal deviation present. No thyromegaly present.   Cardiovascular: Normal rate, regular rhythm and normal heart sounds.    Pulmonary/Chest: Effort normal and breath sounds normal. No stridor. No respiratory distress.   Abdominal: Soft. Bowel sounds are normal. He exhibits no mass. There is no hepatosplenomegaly, splenomegaly or hepatomegaly. There is no tenderness.   Musculoskeletal: Normal range of motion. He exhibits no edema or tenderness.   Lymphadenopathy:        Head (right side): No posterior auricular and no  occipital adenopathy present.        Head (left side): No posterior auricular and no occipital adenopathy present.     He has no cervical adenopathy.        Right cervical: No superficial cervical, no deep cervical and no posterior cervical adenopathy present.       Left cervical: No superficial cervical, no deep cervical and no posterior cervical adenopathy present.     He has no axillary adenopathy.        Right: No supraclavicular adenopathy present.        Left: No supraclavicular adenopathy present.   Neurological: He is alert and oriented to person, place, and time. He has normal strength. No cranial nerve deficit. Coordination normal.   Skin: Skin is dry. No rash noted. He is not diaphoretic. No cyanosis or erythema. Nails show no clubbing.   Psychiatric: His behavior is normal. Judgment and thought content normal. His mood appears anxious. Cognition and memory are normal. He exhibits a depressed mood.   Vitals reviewed.          Assessment:      1. Bronchiolo-alveolar adenocarcinoma of left lung    2. Malignant neoplasm metastatic to lung, unspecified laterality    3. Bronchorrhea    4. Bilateral lung cancer           Plan:   Sensitive conversation between wife myself the patient we decided to postpone his cycle of chemotherapy that was scheduled today I would like to proceed with a CT chest abdomen pelvis for response to therapy obviously we can continue treatment if he has responsive disease but I'm concerned with his progressive symptoms that he does not have a response of tumor at this point which was stable after 2 cycles of carboplatin and Taxol will proceed with CT chest 7 pelvis and return after review

## 2017-08-02 ENCOUNTER — HOSPITAL ENCOUNTER (INPATIENT)
Facility: HOSPITAL | Age: 77
LOS: 7 days | Discharge: HOME HEALTH | DRG: 871 | End: 2017-08-09
Attending: EMERGENCY MEDICINE | Admitting: INTERNAL MEDICINE
Payer: MEDICARE

## 2017-08-02 ENCOUNTER — TELEPHONE (OUTPATIENT)
Dept: INFUSION THERAPY | Facility: HOSPITAL | Age: 77
End: 2017-08-02

## 2017-08-02 DIAGNOSIS — Z95.1 HX OF CABG: ICD-10-CM

## 2017-08-02 DIAGNOSIS — N40.1 BENIGN NON-NODULAR PROSTATIC HYPERPLASIA WITH LOWER URINARY TRACT SYMPTOMS: ICD-10-CM

## 2017-08-02 DIAGNOSIS — C34.92 BILATERAL LUNG CANCER: ICD-10-CM

## 2017-08-02 DIAGNOSIS — J96.01 ACUTE RESPIRATORY FAILURE WITH HYPOXIA: Primary | ICD-10-CM

## 2017-08-02 DIAGNOSIS — J18.9 PNEUMONIA OF BOTH LOWER LOBES DUE TO INFECTIOUS ORGANISM: ICD-10-CM

## 2017-08-02 DIAGNOSIS — C78.00 MALIGNANT NEOPLASM METASTATIC TO LUNG, UNSPECIFIED LATERALITY: ICD-10-CM

## 2017-08-02 DIAGNOSIS — C34.91 BILATERAL LUNG CANCER: ICD-10-CM

## 2017-08-02 DIAGNOSIS — E78.00 PURE HYPERCHOLESTEROLEMIA: ICD-10-CM

## 2017-08-02 DIAGNOSIS — R60.0 LOWER LEG EDEMA: ICD-10-CM

## 2017-08-02 DIAGNOSIS — C34.92 BRONCHIOLO-ALVEOLAR ADENOCARCINOMA OF LEFT LUNG: ICD-10-CM

## 2017-08-02 DIAGNOSIS — E87.1 HYPONATREMIA: ICD-10-CM

## 2017-08-02 DIAGNOSIS — C34.90 PRIMARY LUNG CANCER WITH METASTASIS FROM LUNG TO OTHER SITE, UNSPECIFIED LATERALITY: ICD-10-CM

## 2017-08-02 DIAGNOSIS — R09.02 HYPOXIA: ICD-10-CM

## 2017-08-02 DIAGNOSIS — I10 ESSENTIAL HYPERTENSION: ICD-10-CM

## 2017-08-02 DIAGNOSIS — C34.92 BRONCHIOLO-ALVEOLAR ADENOCARCINOMA OF LEFT LUNG: Primary | ICD-10-CM

## 2017-08-02 DIAGNOSIS — E83.39 HYPOPHOSPHATEMIA: ICD-10-CM

## 2017-08-02 DIAGNOSIS — R74.01 TRANSAMINITIS: ICD-10-CM

## 2017-08-02 DIAGNOSIS — I35.0 NONRHEUMATIC AORTIC VALVE STENOSIS: Chronic | ICD-10-CM

## 2017-08-02 DIAGNOSIS — R06.09 DOE (DYSPNEA ON EXERTION): ICD-10-CM

## 2017-08-02 DIAGNOSIS — N30.01 ACUTE CYSTITIS WITH HEMATURIA: ICD-10-CM

## 2017-08-02 LAB
ABO + RH BLD: NORMAL
ALBUMIN SERPL BCP-MCNC: 2.8 G/DL
ALP SERPL-CCNC: 79 U/L
ALT SERPL W/O P-5'-P-CCNC: 53 U/L
ANION GAP SERPL CALC-SCNC: 8 MMOL/L
APTT BLDCRRT: 33.4 SEC
AST SERPL-CCNC: 54 U/L
BACTERIA #/AREA URNS HPF: ABNORMAL /HPF
BASOPHILS # BLD AUTO: 0.06 K/UL
BASOPHILS NFR BLD: 1 %
BILIRUB SERPL-MCNC: 0.6 MG/DL
BILIRUB UR QL STRIP: NEGATIVE
BLD GP AB SCN CELLS X3 SERPL QL: NORMAL
BNP SERPL-MCNC: 75 PG/ML
BUN SERPL-MCNC: 10 MG/DL
CALCIUM SERPL-MCNC: 9.2 MG/DL
CHLORIDE SERPL-SCNC: 101 MMOL/L
CLARITY UR: CLEAR
CO2 SERPL-SCNC: 25 MMOL/L
COLOR UR: YELLOW
CREAT SERPL-MCNC: 0.9 MG/DL
DIFFERENTIAL METHOD: ABNORMAL
EOSINOPHIL # BLD AUTO: 0.1 K/UL
EOSINOPHIL NFR BLD: 2.2 %
ERYTHROCYTE [DISTWIDTH] IN BLOOD BY AUTOMATED COUNT: 18.7 %
EST. GFR  (AFRICAN AMERICAN): >60 ML/MIN/1.73 M^2
EST. GFR  (NON AFRICAN AMERICAN): >60 ML/MIN/1.73 M^2
GLUCOSE SERPL-MCNC: 98 MG/DL
GLUCOSE UR QL STRIP: NEGATIVE
HCT VFR BLD AUTO: 36.3 %
HGB BLD-MCNC: 12.1 G/DL
HGB UR QL STRIP: ABNORMAL
INR PPP: 1.1
KETONES UR QL STRIP: NEGATIVE
LACTATE SERPL-SCNC: 1.3 MMOL/L
LACTATE SERPL-SCNC: 1.4 MMOL/L
LEUKOCYTE ESTERASE UR QL STRIP: ABNORMAL
LIPASE SERPL-CCNC: 39 U/L
LYMPHOCYTES # BLD AUTO: 0.8 K/UL
LYMPHOCYTES NFR BLD: 13 %
MAGNESIUM SERPL-MCNC: 2.1 MG/DL
MCH RBC QN AUTO: 31.7 PG
MCHC RBC AUTO-ENTMCNC: 33.3 G/DL
MCV RBC AUTO: 95 FL
MICROSCOPIC COMMENT: ABNORMAL
MONOCYTES # BLD AUTO: 1.5 K/UL
MONOCYTES NFR BLD: 24.1 %
NEUTROPHILS # BLD AUTO: 3.6 K/UL
NEUTROPHILS NFR BLD: 60.2 %
NITRITE UR QL STRIP: NEGATIVE
PH UR STRIP: 6 [PH] (ref 5–8)
PHOSPHATE SERPL-MCNC: 2.3 MG/DL
PLATELET # BLD AUTO: 221 K/UL
PLATELET BLD QL SMEAR: ABNORMAL
PMV BLD AUTO: 8.8 FL
POTASSIUM SERPL-SCNC: 3.5 MMOL/L
PROT SERPL-MCNC: 6.9 G/DL
PROT UR QL STRIP: NEGATIVE
PROTHROMBIN TIME: 11.2 SEC
RBC # BLD AUTO: 3.82 M/UL
RBC #/AREA URNS HPF: 2 /HPF (ref 0–4)
SODIUM SERPL-SCNC: 134 MMOL/L
SP GR UR STRIP: 1.01 (ref 1–1.03)
SQUAMOUS #/AREA URNS HPF: 1 /HPF
TROPONIN I SERPL DL<=0.01 NG/ML-MCNC: <0.006 NG/ML
TSH SERPL DL<=0.005 MIU/L-ACNC: 0.79 UIU/ML
URN SPEC COLLECT METH UR: ABNORMAL
UROBILINOGEN UR STRIP-ACNC: NEGATIVE EU/DL
VANCOMYCIN TROUGH SERPL-MCNC: 6.8 UG/ML
WBC # BLD AUTO: 6.01 K/UL
WBC #/AREA URNS HPF: 12 /HPF (ref 0–5)

## 2017-08-02 PROCEDURE — 83880 ASSAY OF NATRIURETIC PEPTIDE: CPT

## 2017-08-02 PROCEDURE — 83690 ASSAY OF LIPASE: CPT

## 2017-08-02 PROCEDURE — 25000242 PHARM REV CODE 250 ALT 637 W/ HCPCS: Performed by: NURSE PRACTITIONER

## 2017-08-02 PROCEDURE — 25000003 PHARM REV CODE 250: Performed by: NURSE PRACTITIONER

## 2017-08-02 PROCEDURE — 84443 ASSAY THYROID STIM HORMONE: CPT

## 2017-08-02 PROCEDURE — 83605 ASSAY OF LACTIC ACID: CPT | Mod: 91

## 2017-08-02 PROCEDURE — 27000221 HC OXYGEN, UP TO 24 HOURS

## 2017-08-02 PROCEDURE — 80053 COMPREHEN METABOLIC PANEL: CPT

## 2017-08-02 PROCEDURE — 63600175 PHARM REV CODE 636 W HCPCS: Performed by: INTERNAL MEDICINE

## 2017-08-02 PROCEDURE — 87205 SMEAR GRAM STAIN: CPT

## 2017-08-02 PROCEDURE — 83735 ASSAY OF MAGNESIUM: CPT

## 2017-08-02 PROCEDURE — 87086 URINE CULTURE/COLONY COUNT: CPT

## 2017-08-02 PROCEDURE — 99285 EMERGENCY DEPT VISIT HI MDM: CPT | Mod: 25

## 2017-08-02 PROCEDURE — 85025 COMPLETE CBC W/AUTO DIFF WBC: CPT

## 2017-08-02 PROCEDURE — 96365 THER/PROPH/DIAG IV INF INIT: CPT

## 2017-08-02 PROCEDURE — 25000003 PHARM REV CODE 250: Performed by: EMERGENCY MEDICINE

## 2017-08-02 PROCEDURE — 21400001 HC TELEMETRY ROOM

## 2017-08-02 PROCEDURE — 81000 URINALYSIS NONAUTO W/SCOPE: CPT

## 2017-08-02 PROCEDURE — 99223 1ST HOSP IP/OBS HIGH 75: CPT | Mod: ,,, | Performed by: INTERNAL MEDICINE

## 2017-08-02 PROCEDURE — 85610 PROTHROMBIN TIME: CPT

## 2017-08-02 PROCEDURE — 93010 ELECTROCARDIOGRAM REPORT: CPT | Mod: ,,, | Performed by: INTERNAL MEDICINE

## 2017-08-02 PROCEDURE — 86900 BLOOD TYPING SEROLOGIC ABO: CPT

## 2017-08-02 PROCEDURE — 96361 HYDRATE IV INFUSION ADD-ON: CPT

## 2017-08-02 PROCEDURE — 80202 ASSAY OF VANCOMYCIN: CPT

## 2017-08-02 PROCEDURE — 84484 ASSAY OF TROPONIN QUANT: CPT

## 2017-08-02 PROCEDURE — 99900035 HC TECH TIME PER 15 MIN (STAT)

## 2017-08-02 PROCEDURE — 86850 RBC ANTIBODY SCREEN: CPT

## 2017-08-02 PROCEDURE — 36415 COLL VENOUS BLD VENIPUNCTURE: CPT

## 2017-08-02 PROCEDURE — 63600175 PHARM REV CODE 636 W HCPCS: Performed by: EMERGENCY MEDICINE

## 2017-08-02 PROCEDURE — 87070 CULTURE OTHR SPECIMN AEROBIC: CPT

## 2017-08-02 PROCEDURE — 87040 BLOOD CULTURE FOR BACTERIA: CPT | Mod: 59

## 2017-08-02 PROCEDURE — 84100 ASSAY OF PHOSPHORUS: CPT

## 2017-08-02 PROCEDURE — 85730 THROMBOPLASTIN TIME PARTIAL: CPT

## 2017-08-02 PROCEDURE — 94640 AIRWAY INHALATION TREATMENT: CPT

## 2017-08-02 PROCEDURE — 83605 ASSAY OF LACTIC ACID: CPT

## 2017-08-02 PROCEDURE — 93005 ELECTROCARDIOGRAM TRACING: CPT

## 2017-08-02 RX ORDER — CETIRIZINE HYDROCHLORIDE 10 MG/1
10 TABLET ORAL DAILY PRN
Status: DISCONTINUED | OUTPATIENT
Start: 2017-08-02 | End: 2017-08-09 | Stop reason: HOSPADM

## 2017-08-02 RX ORDER — POLYETHYLENE GLYCOL 3350 17 G/17G
17 POWDER, FOR SOLUTION ORAL DAILY PRN
Status: DISCONTINUED | OUTPATIENT
Start: 2017-08-02 | End: 2017-08-09 | Stop reason: HOSPADM

## 2017-08-02 RX ORDER — POLYETHYLENE GLYCOL 3350 17 G/17G
17 POWDER, FOR SOLUTION ORAL DAILY
Status: DISCONTINUED | OUTPATIENT
Start: 2017-08-03 | End: 2017-08-02

## 2017-08-02 RX ORDER — ONDANSETRON 2 MG/ML
4 INJECTION INTRAMUSCULAR; INTRAVENOUS EVERY 12 HOURS PRN
Status: DISCONTINUED | OUTPATIENT
Start: 2017-08-02 | End: 2017-08-02

## 2017-08-02 RX ORDER — ACETAMINOPHEN 325 MG/1
650 TABLET ORAL EVERY 6 HOURS PRN
Status: DISCONTINUED | OUTPATIENT
Start: 2017-08-02 | End: 2017-08-02

## 2017-08-02 RX ORDER — ENOXAPARIN SODIUM 100 MG/ML
40 INJECTION SUBCUTANEOUS EVERY 24 HOURS
Status: DISCONTINUED | OUTPATIENT
Start: 2017-08-02 | End: 2017-08-06

## 2017-08-02 RX ORDER — DEXTROMETHORPHAN POLISTIREX 30 MG/5 ML
1 SUSPENSION, EXTENDED RELEASE 12 HR ORAL ONCE
Status: DISCONTINUED | OUTPATIENT
Start: 2017-08-02 | End: 2017-08-02

## 2017-08-02 RX ORDER — DOCUSATE SODIUM 100 MG/1
100 CAPSULE, LIQUID FILLED ORAL DAILY PRN
Status: DISCONTINUED | OUTPATIENT
Start: 2017-08-02 | End: 2017-08-02

## 2017-08-02 RX ORDER — CIPROFLOXACIN 2 MG/ML
400 INJECTION, SOLUTION INTRAVENOUS
Status: DISCONTINUED | OUTPATIENT
Start: 2017-08-02 | End: 2017-08-08

## 2017-08-02 RX ORDER — METOPROLOL SUCCINATE 50 MG/1
50 TABLET, EXTENDED RELEASE ORAL DAILY
Status: DISCONTINUED | OUTPATIENT
Start: 2017-08-03 | End: 2017-08-09 | Stop reason: HOSPADM

## 2017-08-02 RX ORDER — BENZONATATE 100 MG/1
200 CAPSULE ORAL 3 TIMES DAILY PRN
Status: DISCONTINUED | OUTPATIENT
Start: 2017-08-02 | End: 2017-08-09 | Stop reason: HOSPADM

## 2017-08-02 RX ORDER — BISACODYL 10 MG
10 SUPPOSITORY, RECTAL RECTAL DAILY
Status: DISCONTINUED | OUTPATIENT
Start: 2017-08-03 | End: 2017-08-02

## 2017-08-02 RX ORDER — PANTOPRAZOLE SODIUM 40 MG/1
40 TABLET, DELAYED RELEASE ORAL DAILY
Status: DISCONTINUED | OUTPATIENT
Start: 2017-08-03 | End: 2017-08-09 | Stop reason: HOSPADM

## 2017-08-02 RX ORDER — ONDANSETRON 2 MG/ML
4 INJECTION INTRAMUSCULAR; INTRAVENOUS EVERY 12 HOURS PRN
Status: DISCONTINUED | OUTPATIENT
Start: 2017-08-02 | End: 2017-08-09 | Stop reason: HOSPADM

## 2017-08-02 RX ORDER — SODIUM CHLORIDE 9 MG/ML
INJECTION, SOLUTION INTRAVENOUS CONTINUOUS
Status: DISCONTINUED | OUTPATIENT
Start: 2017-08-02 | End: 2017-08-02

## 2017-08-02 RX ORDER — IPRATROPIUM BROMIDE AND ALBUTEROL SULFATE 2.5; .5 MG/3ML; MG/3ML
3 SOLUTION RESPIRATORY (INHALATION) EVERY 6 HOURS
Status: DISCONTINUED | OUTPATIENT
Start: 2017-08-02 | End: 2017-08-09 | Stop reason: HOSPADM

## 2017-08-02 RX ORDER — TAMSULOSIN HYDROCHLORIDE 0.4 MG/1
0.4 CAPSULE ORAL NIGHTLY
Status: DISCONTINUED | OUTPATIENT
Start: 2017-08-02 | End: 2017-08-07

## 2017-08-02 RX ORDER — ATORVASTATIN CALCIUM 10 MG/1
20 TABLET, FILM COATED ORAL NIGHTLY
Status: DISCONTINUED | OUTPATIENT
Start: 2017-08-02 | End: 2017-08-09 | Stop reason: HOSPADM

## 2017-08-02 RX ORDER — GUAIFENESIN 600 MG/1
600 TABLET, EXTENDED RELEASE ORAL 2 TIMES DAILY
Status: DISCONTINUED | OUTPATIENT
Start: 2017-08-02 | End: 2017-08-09 | Stop reason: HOSPADM

## 2017-08-02 RX ORDER — ONDANSETRON 4 MG/1
4 TABLET, FILM COATED ORAL EVERY 8 HOURS PRN
Status: DISCONTINUED | OUTPATIENT
Start: 2017-08-02 | End: 2017-08-09 | Stop reason: HOSPADM

## 2017-08-02 RX ORDER — ACETAMINOPHEN 325 MG/1
650 TABLET ORAL EVERY 8 HOURS PRN
Status: DISCONTINUED | OUTPATIENT
Start: 2017-08-02 | End: 2017-08-09 | Stop reason: HOSPADM

## 2017-08-02 RX ORDER — SODIUM CHLORIDE 9 MG/ML
INJECTION, SOLUTION INTRAVENOUS CONTINUOUS
Status: DISCONTINUED | OUTPATIENT
Start: 2017-08-02 | End: 2017-08-07

## 2017-08-02 RX ORDER — ENOXAPARIN SODIUM 100 MG/ML
40 INJECTION SUBCUTANEOUS EVERY 24 HOURS
Status: DISCONTINUED | OUTPATIENT
Start: 2017-08-02 | End: 2017-08-02

## 2017-08-02 RX ORDER — NAPROXEN SODIUM 220 MG/1
81 TABLET, FILM COATED ORAL DAILY
Status: DISCONTINUED | OUTPATIENT
Start: 2017-08-03 | End: 2017-08-09 | Stop reason: HOSPADM

## 2017-08-02 RX ADMIN — SODIUM CHLORIDE: 0.9 INJECTION, SOLUTION INTRAVENOUS at 10:08

## 2017-08-02 RX ADMIN — ATORVASTATIN CALCIUM 20 MG: 10 TABLET, FILM COATED ORAL at 08:08

## 2017-08-02 RX ADMIN — PIPERACILLIN SODIUM AND TAZOBACTAM SODIUM 4.5 G: 4; .5 INJECTION, POWDER, LYOPHILIZED, FOR SOLUTION INTRAVENOUS at 03:08

## 2017-08-02 RX ADMIN — CIPROFLOXACIN 400 MG: 2 INJECTION, SOLUTION INTRAVENOUS at 04:08

## 2017-08-02 RX ADMIN — IPRATROPIUM BROMIDE AND ALBUTEROL SULFATE 3 ML: .5; 3 SOLUTION RESPIRATORY (INHALATION) at 08:08

## 2017-08-02 RX ADMIN — SODIUM PHOSPHATE, MONOBASIC, MONOHYDRATE 30 MMOL: 276; 142 INJECTION, SOLUTION INTRAVENOUS at 09:08

## 2017-08-02 RX ADMIN — TAMSULOSIN HYDROCHLORIDE 0.4 MG: 0.4 CAPSULE ORAL at 08:08

## 2017-08-02 RX ADMIN — SODIUM CHLORIDE 2151 ML: 0.9 INJECTION, SOLUTION INTRAVENOUS at 02:08

## 2017-08-02 RX ADMIN — GUAIFENESIN 600 MG: 600 TABLET, EXTENDED RELEASE ORAL at 08:08

## 2017-08-02 RX ADMIN — ENOXAPARIN SODIUM 40 MG: 100 INJECTION SUBCUTANEOUS at 08:08

## 2017-08-02 RX ADMIN — SODIUM CHLORIDE: 0.9 INJECTION, SOLUTION INTRAVENOUS at 07:08

## 2017-08-02 RX ADMIN — Medication 1000 MG: at 07:08

## 2017-08-02 RX ADMIN — PIPERACILLIN SODIUM AND TAZOBACTAM SODIUM 4.5 G: 4; .5 INJECTION, POWDER, LYOPHILIZED, FOR SOLUTION INTRAVENOUS at 10:08

## 2017-08-02 NOTE — PLAN OF CARE
Problem: Patient Care Overview  Goal: Plan of Care Review  Outcome: Ongoing (interventions implemented as appropriate)  Patient admitted today. Free from falls. On IV ABX. Verbalizes decrease in appetite. 12 hour chart check complete.

## 2017-08-02 NOTE — ED PROVIDER NOTES
SCRIBE #1 NOTE: I, Enoch Cain, am scribing for, and in the presence of, David Padilla MD. I have scribed the entire note.      History      Chief Complaint   Patient presents with    Fever     patient was had chemotherapy last july 20. Patient tem at home 101.4        Review of patient's allergies indicates:   Allergen Reactions    Bactrim [sulfamethoxazole-trimethoprim] Other (See Comments)     Severe leg cramps, dizziness    Dilaudid [hydromorphone] Other (See Comments)     Confusion, paranoia    Sulfa (sulfonamide antibiotics)      Severe leg cramps, dizziness.        HPI   HPI    8/2/2017, 1:50 PM   History obtained from the patient      History of Present Illness: Doron Domínguez is a 77 y.o. male patient who presents to the Emergency Department for fever (Tmax 101.4) which onset gradually 2 days ago. Symptoms are constant and moderate in severity. Spouse reports pt last received chemo tx on 7/20/17 for hx of lung cancer. States Dr. Leon is his oncologist. No mitigating or exacerbating factors reported. Associated sxs include chills. Patient denies any CP, SOB, N/V/D, abdominal pain, dysuria, weakness/numbness, dizziness, and all other sxs at this time. No further complaints or concerns at this time.       Arrival mode: Personal vehicle    PCP: SHANEKA Art Jr, MD       Past Medical History:  Past Medical History:   Diagnosis Date    Aortic stenosis 12/29/2016    Arthritis     Asthma     Back pain     due to calcium deposits in back    BPH (benign prostatic hyperplasia)     CAD (coronary artery disease)     CAD, multiple vessel 12/29/2016    Cancer     lung    High cholesterol     Hx of CABG 12/29/2016    Hypertension     Lung cancer 01/2017    T3 N0 M0 bronchoalveolar       Past Surgical History:  Past Surgical History:   Procedure Laterality Date    APPENDECTOMY      CARDIAC SURGERY      CORONARY ARTERY BYPASS GRAFT  2012    CABG x 3 at [facility]    LUNG LOBECTOMY Left 01/10/2017     Left lower lobe         Family History:  Family History   Problem Relation Age of Onset    Diabetes Mother     Diabetes Sister     Diabetes Brother        Social History:  Social History     Social History Main Topics    Smoking status: Never Smoker    Smokeless tobacco: Never Used    Alcohol use No    Drug use: No    Sexual activity: No       ROS   Review of Systems   Constitutional: Positive for chills and fever (Tmax 101.4).   HENT: Negative for sore throat.    Respiratory: Negative for shortness of breath.    Cardiovascular: Negative for chest pain.   Gastrointestinal: Negative for abdominal pain, diarrhea, nausea and vomiting.   Genitourinary: Negative for dysuria.   Musculoskeletal: Negative for back pain.   Skin: Negative for rash.   Neurological: Negative for dizziness, weakness and numbness.   Hematological: Does not bruise/bleed easily.   All other systems reviewed and are negative.    Physical Exam      Initial Vitals [08/02/17 1347]   BP Pulse Resp Temp SpO2   112/62 108 20 99.1 °F (37.3 °C) (!) 93 %      MAP       78.67          Physical Exam  Nursing Notes and Vital Signs Reviewed.  Constitutional: Patient is in no acute distress. Well-developed and well-nourished.  Head: Atraumatic. Normocephalic.  Eyes: PERRL. EOM intact. Conjunctivae are not pale. No scleral icterus.  ENT: Mucous membranes are moist. Oropharynx is clear and symmetric.    Neck: Supple. Full ROM. No lymphadenopathy.  Cardiovascular: Slightly tachycardic. Regular rhythm. No murmurs, rubs, or gallops. Distal pulses are 2+ and symmetric.  Pulmonary/Chest: No respiratory distress. Coarse breath sounds bilaterally. No wheezing, rales, or rhonchi.  Abdominal: Soft and non-distended.  There is no tenderness.  No rebound, guarding, or rigidity. Good bowel sounds.  Genitourinary: No CVA tenderness  Musculoskeletal: Moves all extremities. No obvious deformities. No edema. No calf tenderness.  Skin: Warm and dry.  Neurological:  Alert,  "awake, and appropriate.  Normal speech.  No acute focal neurological deficits are appreciated.  Psychiatric: Normal affect. Good eye contact. Appropriate in content.    ED Course    Procedures  ED Vital Signs:  Vitals:    08/02/17 1347 08/02/17 1442 08/02/17 1517 08/02/17 1532   BP: 112/62  (!) 93/58 (!) 99/53   Pulse: 108  94 94   Resp: 20  (!) 22 (!) 25   Temp: 99.1 °F (37.3 °C)      TempSrc: Oral      SpO2: (!) 93% 96% 100% 98%   Weight: 71.7 kg (158 lb)      Height: 5' 5" (1.651 m)          Abnormal Lab Results:  Labs Reviewed   APTT - Abnormal; Notable for the following:        Result Value    aPTT 33.4 (*)     All other components within normal limits   CBC W/ AUTO DIFFERENTIAL - Abnormal; Notable for the following:     RBC 3.82 (*)     Hemoglobin 12.1 (*)     Hematocrit 36.3 (*)     MCH 31.7 (*)     RDW 18.7 (*)     MPV 8.8 (*)     Lymph # 0.8 (*)     Mono # 1.5 (*)     Lymph% 13.0 (*)     Mono% 24.1 (*)     All other components within normal limits   COMPREHENSIVE METABOLIC PANEL - Abnormal; Notable for the following:     Sodium 134 (*)     Albumin 2.8 (*)     AST 54 (*)     ALT 53 (*)     All other components within normal limits   PHOSPHORUS - Abnormal; Notable for the following:     Phosphorus 2.3 (*)     All other components within normal limits   URINALYSIS - Abnormal; Notable for the following:     Occult Blood UA Trace (*)     Leukocytes, UA Trace (*)     All other components within normal limits   URINALYSIS MICROSCOPIC - Abnormal; Notable for the following:     WBC, UA 12 (*)     All other components within normal limits   CULTURE, BLOOD   CULTURE, BLOOD   CULTURE, URINE   B-TYPE NATRIURETIC PEPTIDE   LACTIC ACID, PLASMA   LIPASE   MAGNESIUM   PROTIME-INR   TROPONIN I   TSH   LACTIC ACID, PLASMA   LACTIC ACID, PLASMA   TYPE & SCREEN        All Lab Results:  Results for orders placed or performed during the hospital encounter of 08/02/17   APTT   Result Value Ref Range    aPTT 33.4 (H) 21.0 - 32.0 " sec   Brain natriuretic peptide   Result Value Ref Range    BNP 75 0 - 99 pg/mL   CBC auto differential   Result Value Ref Range    WBC 6.01 3.90 - 12.70 K/uL    RBC 3.82 (L) 4.60 - 6.20 M/uL    Hemoglobin 12.1 (L) 14.0 - 18.0 g/dL    Hematocrit 36.3 (L) 40.0 - 54.0 %    MCV 95 82 - 98 fL    MCH 31.7 (H) 27.0 - 31.0 pg    MCHC 33.3 32.0 - 36.0 g/dL    RDW 18.7 (H) 11.5 - 14.5 %    Platelets 221 150 - 350 K/uL    MPV 8.8 (L) 9.2 - 12.9 fL    Gran # 3.6 1.8 - 7.7 K/uL    Lymph # 0.8 (L) 1.0 - 4.8 K/uL    Mono # 1.5 (H) 0.3 - 1.0 K/uL    Eos # 0.1 0.0 - 0.5 K/uL    Baso # 0.06 0.00 - 0.20 K/uL    Gran% 60.2 38.0 - 73.0 %    Lymph% 13.0 (L) 18.0 - 48.0 %    Mono% 24.1 (H) 4.0 - 15.0 %    Eosinophil% 2.2 0.0 - 8.0 %    Basophil% 1.0 0.0 - 1.9 %    Platelet Estimate Appears normal     Differential Method Automated    Comprehensive metabolic panel   Result Value Ref Range    Sodium 134 (L) 136 - 145 mmol/L    Potassium 3.5 3.5 - 5.1 mmol/L    Chloride 101 95 - 110 mmol/L    CO2 25 23 - 29 mmol/L    Glucose 98 70 - 110 mg/dL    BUN, Bld 10 8 - 23 mg/dL    Creatinine 0.9 0.5 - 1.4 mg/dL    Calcium 9.2 8.7 - 10.5 mg/dL    Total Protein 6.9 6.0 - 8.4 g/dL    Albumin 2.8 (L) 3.5 - 5.2 g/dL    Total Bilirubin 0.6 0.1 - 1.0 mg/dL    Alkaline Phosphatase 79 55 - 135 U/L    AST 54 (H) 10 - 40 U/L    ALT 53 (H) 10 - 44 U/L    Anion Gap 8 8 - 16 mmol/L    eGFR if African American >60 >60 mL/min/1.73 m^2    eGFR if non African American >60 >60 mL/min/1.73 m^2   Lactic acid, plasma #1   Result Value Ref Range    Lactate (Lactic Acid) 1.4 0.5 - 2.2 mmol/L   Lipase   Result Value Ref Range    Lipase 39 4 - 60 U/L   Magnesium   Result Value Ref Range    Magnesium 2.1 1.6 - 2.6 mg/dL   Phosphorus   Result Value Ref Range    Phosphorus 2.3 (L) 2.7 - 4.5 mg/dL   Protime-INR   Result Value Ref Range    Prothrombin Time 11.2 9.0 - 12.5 sec    INR 1.1 0.8 - 1.2   Troponin I   Result Value Ref Range    Troponin I <0.006 0.000 - 0.026 ng/mL   TSH    Result Value Ref Range    TSH 0.786 0.400 - 4.000 uIU/mL   Urinalysis   Result Value Ref Range    Specimen UA Urine, Clean Catch     Color, UA Yellow Yellow, Straw, Lucita    Appearance, UA Clear Clear    pH, UA 6.0 5.0 - 8.0    Specific Gravity, UA 1.010 1.005 - 1.030    Protein, UA Negative Negative    Glucose, UA Negative Negative    Ketones, UA Negative Negative    Bilirubin (UA) Negative Negative    Occult Blood UA Trace (A) Negative    Nitrite, UA Negative Negative    Urobilinogen, UA Negative <2.0 EU/dL    Leukocytes, UA Trace (A) Negative   Urinalysis Microscopic   Result Value Ref Range    RBC, UA 2 0 - 4 /hpf    WBC, UA 12 (H) 0 - 5 /hpf    Bacteria, UA Occasional None-Occ /hpf    Squam Epithel, UA 1 /hpf    Microscopic Comment SEE COMMENT    Type & Screen   Result Value Ref Range    Group & Rh A POS     Indirect Shayan NEG          Imaging Results:  Imaging Results          X-Ray Chest AP Portable (Final result)  Result time 08/02/17 14:26:06    Final result by GARETH Dukes Sr., MD (08/02/17 14:26:06)                 Impression:        1. Interval worsening of the appearance of the lungs. There is a moderate amount of alveolar consolidation in the lower halves of both lungs. This is consistent with the patient's history and characteristic of pneumonia.  2. There is persistent opacification of the base of the left hemithorax. This is characteristic of a small left pleural effusion.  3. A left internal jugular venous line remains in place.  4. Surgical changes      Electronically signed by: GARETH DUKES MD  Date:     08/02/17  Time:    14:26              Narrative:    One-view chest x-ray    Clinical History: Sepsis    Finding: Comparison was made to prior examination performed on 5/18/2017. The borders of the heart are not well seen. There is a moderate amount of alveolar consolidation in the lower halves of both lungs. There is persistent opacification of the base of the left hemithorax. The right  costophrenic angle is sharp. There is no pneumothorax. A left internal jugular venous line remains in place. There are multiple sternotomy wires in place. There are multiple surgical clips projected over the mediastinum.                             The EKG was ordered, reviewed, and independently interpreted by the ED provider.  Interpretation time: 1439  Rate: 100 BPM  Rhythm: normal sinus rhythm  Interpretation: No acute ST changes. No STEMI.         The Emergency Provider reviewed the vital signs and test results, which are outlined above.    ED Discussion     3:20 PM: Discussed case with SULTANA Armenta ([facility]). Dr. Wheatley agrees with current care and management of pt and accepts admission.   Admitting Service: [facility] medicine   Admitting Physician: Dr. Wheatley  Admit to: [facility]    3:24 PM: Re-evaluated pt. I have discussed test results, shared treatment plan, and the need for admission with patient and family at bedside. Pt and family express understanding at this time and agree with all information. All questions answered. Pt and family have no further questions or concerns at this time. Pt is ready for admit.      ED Medication(s):  Medications   piperacillin-tazobactam 4.5 g in dextrose 5 % 100 mL IVPB (ready to mix system) (4.5 g Intravenous New Bag 8/2/17 1538)   ciprofloxacin (CIPRO)400mg/200ml D5W IVPB 400 mg (not administered)   vancomycin (VANCOCIN) 1,250 mg in dextrose 5 % 250 mL IVPB (not administered)   sodium chloride 0.9% bolus 2,151 mL (2,151 mLs Intravenous New Bag 8/2/17 1443)       New Prescriptions    No medications on file             Medical Decision Making    Medical Decision Making:   Clinical Tests:   Lab Tests: Ordered and Reviewed  Radiological Study: Ordered and Reviewed  Medical Tests: Ordered and Reviewed           Scribe Attestation:   Scribe #1: I performed the above scribed service and the documentation accurately describes the services I performed. I attest to the  accuracy of the note.    Attending:   Physician Attestation Statement for Scribe #1: I, David Padilla MD, personally performed the services described in this documentation, as scribed by Enoch Cain, in my presence, and it is both accurate and complete.          Clinical Impression       ICD-10-CM ICD-9-CM   1. Pneumonia of both lower lobes due to infectious organism J18.9 483.8   2. Hypoxia R09.02 799.02       Disposition:   Disposition: Admitted  Condition: Fair         David Padilla MD  08/02/17 1602

## 2017-08-02 NOTE — PROGRESS NOTES
Spoke with the wife at home spoke with Dr. Sal patients on active chemotherapy 101 fever with chills told by the emergency room [facility] for potential sepsis

## 2017-08-02 NOTE — SUBJECTIVE & OBJECTIVE
Oncology Treatment Plan:   OP NSCLC PACLITAXEL + CARBOPLATIN (AUC) (WEEKLY)    Medications:  Continuous Infusions:   Scheduled Meds:   ciprofloxacin (CIPRO)400mg/200ml D5W IVPB  400 mg Intravenous Q12H    piperacillin-tazobactam (ZOSYN) IVPB  4.5 g Intravenous Q8H    vancomycin (VANCOCIN) IVPB  1,000 mg Intravenous Q12H     PRN Meds:acetaminophen, ondansetron, promethazine (PHENERGAN) IVPB     Review of patient's allergies indicates:   Allergen Reactions    Bactrim [sulfamethoxazole-trimethoprim] Other (See Comments)     Severe leg cramps, dizziness    Dilaudid [hydromorphone] Other (See Comments)     Confusion, paranoia    Sulfa (sulfonamide antibiotics)      Severe leg cramps, dizziness.        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 Negative for diaphoresis, fatigue and unexpected weight change.   HENT: Negative for congestion, dental problem, drooling, ear discharge, ear pain, facial swelling, hearing loss, mouth sores, nosebleeds, postnasal drip, rhinorrhea, sinus pressure, sneezing, sore throat,  tinnitus, trouble swallowing and voice change.    Eyes: Negative for photophobia, pain, discharge, redness, itching and visual disturbance.   Respiratory: Positive for cough and shortness of breath. Negative for apnea, choking, chest tightness, wheezing and stridor.    Cardiovascular: Negative for chest pain, palpitations and leg swelling.   Gastrointestinal: Negative for abdominal distention, abdominal pain, anal bleeding, blood in stool, constipation, diarrhea, nausea, rectal pain and vomiting.   Endocrine: Negative for cold intolerance, heat intolerance, polydipsia, polyphagia and polyuria.   Genitourinary: Negative for decreased urine volume, difficulty urinating, discharge, dysuria, enuresis, flank pain, frequency, genital sores, hematuria, penile pain, penile swelling, scrotal swelling, testicular pain and urgency.   Musculoskeletal: Negative for arthralgias, back pain, gait problem, joint swelling, myalgias, neck pain and neck stiffness.   Skin: Negative for color change, pallor, rash and wound.   Allergic/Immunologic: Negative for environmental allergies, food allergies and immunocompromised state.   Neurological: Positive for weakness. Negative for dizziness, tremors, seizures, syncope, facial asymmetry, speech difficulty, light-headedness, numbness and headaches.   Hematological: Negative for adenopathy. Does not bruise/bleed easily.   Psychiatric/Behavioral: Positive for dysphoric mood. Negative for agitation, behavioral problems, confusion, decreased concentration, hallucinations, self-injury, sleep disturbance and suicidal ideas. The patient is nervous/anxious. The patient is not hyperactive.      Objective:     Vital Signs (Most Recent):  Temp: 98.3 °F (36.8 °C) (08/02/17 1630)  Pulse: 84 (08/02/17 1630)  Resp: 20 (08/02/17 1630)  BP: (!) 105/57 (08/02/17 1630)  SpO2: 95 % (08/02/17 1630) Vital Signs (24h Range):  Temp:  [98.3 °F (36.8 °C)-99.1 °F (37.3 °C)] 98.3 °F (36.8 °C)  Pulse:  []  84  Resp:  [20-25] 20  SpO2:  [93 %-100 %] 95 %  BP: ()/(53-62) 105/57     Weight: 71.7 kg (158 lb)  Body mass index is 26.29 kg/m².  Body surface area is 1.81 meters squared.    No intake or output data in the 24 hours ending 08/02/17 1655    Physical Exam   Constitutional: He is oriented to person, place, and time. He has a sickly appearance. He appears ill. He appears distressed.   HENT:   Head: Normocephalic.   Right Ear: External ear normal.   Left Ear: External ear normal.   Nose: Nose normal. Right sinus exhibits no maxillary sinus tenderness and no frontal sinus tenderness. Left sinus exhibits no maxillary sinus tenderness and no frontal sinus tenderness.   Mouth/Throat: Oropharynx is clear and moist. No oropharyngeal exudate.   Eyes: EOM and lids are normal. Pupils are equal, round, and reactive to light. Right eye exhibits no discharge. Left eye exhibits no discharge. Right conjunctiva is not injected. Right conjunctiva has no hemorrhage. Left conjunctiva is not injected. Left conjunctiva has no hemorrhage. No scleral icterus. Right eye exhibits normal extraocular motion. Left eye exhibits normal extraocular motion.   Neck: Normal range of motion. Neck supple. No JVD present. No tracheal deviation present. No thyromegaly present.   Cardiovascular: Normal rate, regular rhythm and normal heart sounds.    Pulmonary/Chest: No stridor. He has wheezes.   Abdominal: Soft. Bowel sounds are normal. He exhibits no mass. There is no hepatosplenomegaly, splenomegaly or hepatomegaly. There is no tenderness.   Musculoskeletal: Normal range of motion. He exhibits no edema or tenderness.   Lymphadenopathy:        Head (right side): No posterior auricular and no occipital adenopathy present.        Head (left side): No posterior auricular and no occipital adenopathy present.     He has no cervical adenopathy.        Right cervical: No superficial cervical, no deep cervical and no posterior cervical adenopathy  present.       Left cervical: No superficial cervical, no deep cervical and no posterior cervical adenopathy present.     He has no axillary adenopathy.        Right: No supraclavicular adenopathy present.        Left: No supraclavicular adenopathy present.   Neurological: He is alert and oriented to person, place, and time. He has normal strength. No cranial nerve deficit. Coordination normal.   Skin: Skin is dry. No rash noted. He is not diaphoretic. No cyanosis or erythema. Nails show no clubbing.   Psychiatric: His behavior is normal. Judgment and thought content normal. His mood appears anxious. Cognition and memory are normal. He exhibits a depressed mood.   Vitals reviewed.      Significant Labs:   BMP:   Recent Labs  Lab 08/02/17  1400   GLU 98   *   K 3.5      CO2 25   BUN 10   CREATININE 0.9   CALCIUM 9.2   MG 2.1   , CBC:   Recent Labs  Lab 08/02/17  1400   WBC 6.01   HGB 12.1*   HCT 36.3*       and CMP:   Recent Labs  Lab 08/02/17  1400   *   K 3.5      CO2 25   GLU 98   BUN 10   CREATININE 0.9   CALCIUM 9.2   PROT 6.9   ALBUMIN 2.8*   BILITOT 0.6   ALKPHOS 79   AST 54*   ALT 53*   ANIONGAP 8   EGFRNONAA >60       Diagnostic Results:  I have reviewed all pertinent imaging results/findings within the past 24 hours.

## 2017-08-02 NOTE — ED NOTES
In bed resting,aaox3,skin warm dry to touch,equal bilateral clear lung sounds,side rails up x 2,bed locked and in low position,instructed to call with any needs.

## 2017-08-02 NOTE — CONSULTS
Clinical Pharmacy Progress Note    Pharmacy consulted to dose vancomycin for Dr SANDRO Padilla.     77 y.o. male with history of lung cancer, CABG  admitted for pneumonia .     The patient has the following labs:     Date Creatinine (mg/dl)    BUN WBC Count   8/2/2017 Estimated Creatinine Clearance: 57.9 mL/min (based on Cr of 0.9). Lab Results   Component Value Date    BUN 10 08/02/2017     Lab Results   Component Value Date    WBC 6.01 08/02/2017        Ideal BW: 61.5  Actual BW: 71.7  Adjusted (Dosing) BW: 65.5    Tmax/24h 99.1  Cultures:   pend    Vancomycin (day 1 of therapy)     Other anti-infectives: cipro, zosyn (day 1 of therapy)    Based on Estimated Creatinine Clearance: 57.9 mL/min (based on Cr of 0.9). and weight of 65.5, pharmacy will initiate vancomycin 1 gm every 12 hours and draw a trough prior to 4th dose. Trough due 8/4 at 05:00. Pharmacy will continue to follow patients clinical status, renal function, C&S, and adjust dose as necessary to maintain trough levels between 15 and 20.     Thank you for allowing us to participate in this patient's care.     Ene Nowak   8/2/2017 4:50 PM

## 2017-08-02 NOTE — PROGRESS NOTES
Subjective:       Patient ID: Doron Domínguez is a 77 y.o. male.    Chief Complaint: No chief complaint on file.    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     Past Surgical History:   Procedure Laterality Date    APPENDECTOMY      CARDIAC SURGERY      CORONARY ARTERY BYPASS GRAFT  2012    CABG x 3 at [facility]    LUNG LOBECTOMY Left 01/10/2017    Left lower lobe       Labs:  Lab Results   Component Value Date    WBC 5.80 07/27/2017    HGB 12.3 (L) 07/27/2017    HCT 37.2 (L) 07/27/2017    MCV 97 07/27/2017     (L) 07/27/2017     BMP  Lab Results   Component Value Date     07/27/2017    K 4.0 07/27/2017     07/27/2017    CO2 24 07/27/2017    BUN 15 07/27/2017    CREATININE 1.1 07/27/2017    CALCIUM 9.1 07/27/2017    ANIONGAP 10 07/27/2017    ESTGFRAFRICA >60 07/27/2017    EGFRNONAA >60 07/27/2017     Lab Results   Component Value Date    ALT 25 07/27/2017    AST 29 07/27/2017    ALKPHOS 83 07/27/2017    BILITOT 0.8 07/27/2017       No results found for: IRON, TIBC, FERRITIN,  SATURATEDIRO  No results found for: [id number]  No results found for: FOLATE  No results found for: TSH      Review of Systems    Objective:      Physical Exam        Assessment:      No diagnosis found.       Plan:

## 2017-08-02 NOTE — SUBJECTIVE & OBJECTIVE
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       No current facility-administered medications on file prior to encounter.      Current Outpatient Prescriptions on File Prior to Encounter   Medication Sig    albuterol 90 mcg/actuation inhaler Inhale 2 puffs into the lungs every 4 (four) hours as needed for Wheezing.    albuterol-ipratropium 2.5mg-0.5mg/3mL (DUO-NEB) 0.5 mg-3 mg(2.5 mg base)/3 mL nebulizer solution Take 3 mLs by nebulization every 6 (six) hours.    aspirin 81 MG Chew Take 81 mg by mouth once daily.    atorvastatin (LIPITOR) 20 MG tablet Take 1 tablet (20 mg total) by mouth every evening.    cetirizine (ZYRTEC) 10 MG tablet Take 10 mg by mouth as needed for Allergies.    glucosamine-chondroitin 500-400 mg tablet Take 1 tablet by mouth 2 (two) times daily.     guaifenesin (MUCINEX) 600 mg 12 hr tablet Take 600 mg by mouth 2 (two) times daily.     inhalation device (AEROCHAMBER PLUS FLOW-VU) Use as directed for inhalation.    metoprolol succinate (TOPROL-XL) 50 MG 24 hr tablet Take 1 tablet (50 mg total) by  mouth once daily.    multivitamin with minerals tablet Take 1 tablet by mouth once daily.    ondansetron (ZOFRAN) 4 MG tablet Take 1 tablet (4 mg total) by mouth every 8 (eight) hours as needed for Nausea.    potassium gluconate 595 mg (99 mg) Tab Take 1 tablet by mouth once daily.     senna-docusate 8.6-50 mg (SENNA WITH DOCUSATE SODIUM) 8.6-50 mg per tablet Take 1 tablet by mouth once daily.    tamsulosin (FLOMAX) 0.4 mg Cp24 Take 1 capsule (0.4 mg total) by mouth every evening.    benzonatate (TESSALON) 200 MG capsule Take 200 mg by mouth 3 (three) times daily as needed for Cough.    econazole nitrate 1 % cream Apply 1 application topically as needed.    [DISCONTINUED] diphenhydramine-acetaminophen (TYLENOL PM)  mg Tab Take 1 tablet by mouth nightly as needed.    [DISCONTINUED] hydrocodone-acetaminophen 5-325mg (NORCO) 5-325 mg per tablet Take 1 tablet by mouth every 4 (four) hours as needed for Pain (With food or milk in stomach).    [DISCONTINUED] prochlorperazine (COMPAZINE) 5 MG tablet Take 1 tablet (5 mg total) by mouth every 6 (six) hours as needed for Nausea.     Family History     Problem Relation (Age of Onset)    Diabetes Mother, Sister, Brother        Social History Main Topics    Smoking status: Never Smoker    Smokeless tobacco: Never Used    Alcohol use No    Drug use: No    Sexual activity: No     Review of Systems   Constitutional: Positive for activity change ( increasing general weakness over the past 1.5 months), chills, fatigue and fever ( max 101.4 at home). Negative for appetite change.   HENT: Negative for sore throat and trouble swallowing.    Eyes: Negative for visual disturbance.   Respiratory: Positive for cough ( productive white thick sputum. moderate amount) and shortness of breath ( worse with activity). Negative for chest tightness and wheezing.    Cardiovascular: Positive for leg swelling ( mild noticed recently to lower ext, worse to left). Negative for chest  pain.   Gastrointestinal: Positive for nausea ( at times, typically related to chemo ). Negative for abdominal distention, abdominal pain, constipation and diarrhea.   Endocrine: Negative for polydipsia, polyphagia and polyuria.   Genitourinary: Positive for decreased urine volume. Negative for difficulty urinating, dysuria and frequency.   Musculoskeletal: Positive for back pain ( chronic-lumbar). Negative for neck pain and neck stiffness.   Skin: Positive for rash ( red rash to back, itchy at times). Negative for color change and wound.   Allergic/Immunologic: Positive for immunocompromised state.   Neurological: Negative for dizziness, weakness, numbness and headaches.        Chronic tremors to shoulders, neck and head   Hematological: Does not bruise/bleed easily.   Psychiatric/Behavioral: Negative for behavioral problems and confusion.     Objective:     Vital Signs (Most Recent):  Temp: 98.3 °F (36.8 °C) (08/02/17 1630)  Pulse: 84 (08/02/17 1630)  Resp: 20 (08/02/17 1630)  BP: (!) 105/57 (08/02/17 1630)  SpO2: 95 % (08/02/17 1630) Vital Signs (24h Range):  Temp:  [98.3 °F (36.8 °C)-99.1 °F (37.3 °C)] 98.3 °F (36.8 °C)  Pulse:  [] 84  Resp:  [20-25] 20  SpO2:  [93 %-100 %] 95 %  BP: ()/(53-62) 105/57     Weight: 71.7 kg (158 lb)  Body mass index is 26.29 kg/m².    Physical Exam   Constitutional: He is oriented to person, place, and time. He appears well-developed. He has a sickly appearance. He appears ill. No distress.   Frail     HENT:   Head: Normocephalic and atraumatic.   Nose: Nose normal.   Mouth/Throat: Mucous membranes are normal.   Eyes: Conjunctivae and EOM are normal. Pupils are equal, round, and reactive to light. No scleral icterus.   Neck: Trachea normal, normal range of motion and full passive range of motion without pain. Neck supple. No JVD present.   Cardiovascular: Normal rate, regular rhythm and normal heart sounds.    No murmur heard.  Pulses:       Dorsalis pedis pulses are 1+  on the right side, and 1+ on the left side.   Trace edema to left lower ankle   +1 edema noted to right lower ankle.      Pulmonary/Chest: Effort normal. No accessory muscle usage or stridor. Tachypnea noted. No respiratory distress. He has no wheezes ( diffuse). He has rhonchi in the right middle field, the right lower field and the left middle field. He exhibits no tenderness.   Barrel chest     Abdominal: Soft. Bowel sounds are normal. He exhibits no distension. There is no tenderness.   Genitourinary:   Genitourinary Comments: No complaints   Musculoskeletal: Normal range of motion. He exhibits no tenderness or deformity.   involuntary tremors noted to head and neck   Neurological: He is alert and oriented to person, place, and time. No sensory deficit. GCS eye subscore is 4. GCS verbal subscore is 5. GCS motor subscore is 6.   Skin: Skin is warm and dry. Capillary refill takes 2 to 3 seconds. Rash ( red micropapular rash generalized to back) noted.   Psychiatric: He has a normal mood and affect. His speech is normal and behavior is normal. Judgment and thought content normal. Cognition and memory are normal.   Nursing note and vitals reviewed.       Significant Labs: I have reviewed the last 24 hours of lab results.   Results for orders placed or performed during the hospital encounter of 08/02/17   APTT   Result Value Ref Range    aPTT 33.4 (H) 21.0 - 32.0 sec   Brain natriuretic peptide   Result Value Ref Range    BNP 75 0 - 99 pg/mL   CBC auto differential   Result Value Ref Range    WBC 6.01 3.90 - 12.70 K/uL    RBC 3.82 (L) 4.60 - 6.20 M/uL    Hemoglobin 12.1 (L) 14.0 - 18.0 g/dL    Hematocrit 36.3 (L) 40.0 - 54.0 %    MCV 95 82 - 98 fL    MCH 31.7 (H) 27.0 - 31.0 pg    MCHC 33.3 32.0 - 36.0 g/dL    RDW 18.7 (H) 11.5 - 14.5 %    Platelets 221 150 - 350 K/uL    MPV 8.8 (L) 9.2 - 12.9 fL    Gran # 3.6 1.8 - 7.7 K/uL    Lymph # 0.8 (L) 1.0 - 4.8 K/uL    Mono # 1.5 (H) 0.3 - 1.0 K/uL    Eos # 0.1 0.0 - 0.5  K/uL    Baso # 0.06 0.00 - 0.20 K/uL    Gran% 60.2 38.0 - 73.0 %    Lymph% 13.0 (L) 18.0 - 48.0 %    Mono% 24.1 (H) 4.0 - 15.0 %    Eosinophil% 2.2 0.0 - 8.0 %    Basophil% 1.0 0.0 - 1.9 %    Platelet Estimate Appears normal     Differential Method Automated    Comprehensive metabolic panel   Result Value Ref Range    Sodium 134 (L) 136 - 145 mmol/L    Potassium 3.5 3.5 - 5.1 mmol/L    Chloride 101 95 - 110 mmol/L    CO2 25 23 - 29 mmol/L    Glucose 98 70 - 110 mg/dL    BUN, Bld 10 8 - 23 mg/dL    Creatinine 0.9 0.5 - 1.4 mg/dL    Calcium 9.2 8.7 - 10.5 mg/dL    Total Protein 6.9 6.0 - 8.4 g/dL    Albumin 2.8 (L) 3.5 - 5.2 g/dL    Total Bilirubin 0.6 0.1 - 1.0 mg/dL    Alkaline Phosphatase 79 55 - 135 U/L    AST 54 (H) 10 - 40 U/L    ALT 53 (H) 10 - 44 U/L    Anion Gap 8 8 - 16 mmol/L    eGFR if African American >60 >60 mL/min/1.73 m^2    eGFR if non African American >60 >60 mL/min/1.73 m^2   Lactic acid, plasma #1   Result Value Ref Range    Lactate (Lactic Acid) 1.4 0.5 - 2.2 mmol/L   Lipase   Result Value Ref Range    Lipase 39 4 - 60 U/L   Magnesium   Result Value Ref Range    Magnesium 2.1 1.6 - 2.6 mg/dL   Phosphorus   Result Value Ref Range    Phosphorus 2.3 (L) 2.7 - 4.5 mg/dL   Protime-INR   Result Value Ref Range    Prothrombin Time 11.2 9.0 - 12.5 sec    INR 1.1 0.8 - 1.2   Troponin I   Result Value Ref Range    Troponin I <0.006 0.000 - 0.026 ng/mL   TSH   Result Value Ref Range    TSH 0.786 0.400 - 4.000 uIU/mL   Urinalysis   Result Value Ref Range    Specimen UA Urine, Clean Catch     Color, UA Yellow Yellow, Straw, Lucita    Appearance, UA Clear Clear    pH, UA 6.0 5.0 - 8.0    Specific Gravity, UA 1.010 1.005 - 1.030    Protein, UA Negative Negative    Glucose, UA Negative Negative    Ketones, UA Negative Negative    Bilirubin (UA) Negative Negative    Occult Blood UA Trace (A) Negative    Nitrite, UA Negative Negative    Urobilinogen, UA Negative <2.0 EU/dL    Leukocytes, UA Trace (A) Negative    Urinalysis Microscopic   Result Value Ref Range    RBC, UA 2 0 - 4 /hpf    WBC, UA 12 (H) 0 - 5 /hpf    Bacteria, UA Occasional None-Occ /hpf    Squam Epithel, UA 1 /hpf    Microscopic Comment SEE COMMENT    Type & Screen   Result Value Ref Range    Group & Rh A POS     Indirect Shayan NEG        Significant Imaging: I have reviewed the last 24 hours of imaging results.    Imaging Results          X-Ray Chest AP Portable (Final result)  Result time 08/02/17 14:26:06    Final result by GARETH Dukes Sr., MD (08/02/17 14:26:06)                 Impression:        1. Interval worsening of the appearance of the lungs. There is a moderate amount of alveolar consolidation in the lower halves of both lungs. This is consistent with the patient's history and characteristic of pneumonia.  2. There is persistent opacification of the base of the left hemithorax. This is characteristic of a small left pleural effusion.  3. A left internal jugular venous line remains in place.  4. Surgical changes      Electronically signed by: GARETH DUKES MD  Date:     08/02/17  Time:    14:26              Narrative:    One-view chest x-ray    Clinical History: Sepsis    Finding: Comparison was made to prior examination performed on 5/18/2017. The borders of the heart are not well seen. There is a moderate amount of alveolar consolidation in the lower halves of both lungs. There is persistent opacification of the base of the left hemithorax. The right costophrenic angle is sharp. There is no pneumothorax. A left internal jugular venous line remains in place. There are multiple sternotomy wires in place. There are multiple surgical clips projected over the mediastinum.

## 2017-08-02 NOTE — HOSPITAL COURSE
was admitted to the floor and was managed for pneumonia. He was started on broad spectrum abx. Pt had diarrhea which started prior to coming to the hospital. He was tested for c.diff which was negative. He was started on Lomotil prn. He also had ANGELITO during admission which was likely sec to sepsis,UTI and obstructive uropathy sec to BPH. Pt culture results were negative for growth. Pt was started on Levaquin to cover both pneumonia and UTI to be continued at home. Pt would benefit with urology f/u as out pt due to hx of BPH and obstructive symptoms. Pt was examined and found to be stable for discharge today.

## 2017-08-02 NOTE — CONSULTS
[facility] -   Hematology/Oncology  Consult Note    Patient Name: Doron Domínguez  MRN: [id number]  Admission Date: 8/2/2017  Hospital Length of Stay: 0 days  Code Status: DNR   Attending Provider: Shiraz Wheatley MD  Consulting Provider: Shant Leon MD  Primary Care Physician: SHANEKA Art Jr, MD  Principal Problem:<principal problem not specified>    Consults  Subjective:     HPI:  77-year-old male history of bronchial alveolar carcinoma patient has completed 4 cycles of carboplatinum and Taxol stable disease at the end of 2 cycles call this morning the temperature 101 with chills told her the emergency room for further evaluation was asked see the patient for further evaluation in terms of response to therapy    Oncology Treatment Plan:   OP NSCLC PACLITAXEL + CARBOPLATIN (AUC) (WEEKLY)    Medications:  Continuous Infusions:   Scheduled Meds:   ciprofloxacin (CIPRO)400mg/200ml D5W IVPB  400 mg Intravenous Q12H    piperacillin-tazobactam (ZOSYN) IVPB  4.5 g Intravenous Q8H    vancomycin (VANCOCIN) IVPB  1,000 mg Intravenous Q12H     PRN Meds:acetaminophen, ondansetron, promethazine (PHENERGAN) IVPB     Review of patient's allergies indicates:   Allergen Reactions    Bactrim [sulfamethoxazole-trimethoprim] Other (See Comments)     Severe leg cramps, dizziness    Dilaudid [hydromorphone] Other (See Comments)     Confusion, paranoia    Sulfa (sulfonamide antibiotics)      Severe leg cramps, dizziness.        Past Medical History:   Diagnosis Date    Aortic stenosis 12/29/2016    Arthritis     Asthma     Back pain     due to calcium deposits in back    BPH (benign prostatic hyperplasia)     CAD (coronary artery disease)     CAD, multiple vessel 12/29/2016    Cancer     lung    High cholesterol     Hx of CABG 12/29/2016    Hypertension     Lung cancer 01/2017    T3 N0 M0 bronchoalveolar     Past Surgical History:   Procedure Laterality Date    APPENDECTOMY      CARDIAC SURGERY       CORONARY ARTERY BYPASS GRAFT  2012    [facility]    LUNG LOBECTOMY Left 01/10/2017    Left lower lobe     Family History     Problem Relation (Age of Onset)    Diabetes Mother, Sister, Brother        Social History Main Topics    Smoking status: Never Smoker    Smokeless tobacco: Never Used    Alcohol use No    Drug use: No    Sexual activity: No       Review of Systems   Constitutional: Positive for activity change, appetite change, chills and fever. Negative for diaphoresis, fatigue and unexpected weight change.   HENT: Negative for congestion, dental problem, drooling, ear discharge, ear pain, facial swelling, hearing loss, mouth sores, nosebleeds, postnasal drip, rhinorrhea, sinus pressure, sneezing, sore throat, tinnitus, trouble swallowing and voice change.    Eyes: Negative for photophobia, pain, discharge, redness, itching and visual disturbance.   Respiratory: Positive for cough and shortness of breath. Negative for apnea, choking, chest tightness, wheezing and stridor.    Cardiovascular: Negative for chest pain, palpitations and leg swelling.   Gastrointestinal: Negative for abdominal distention, abdominal pain, anal bleeding, blood in stool, constipation, diarrhea, nausea, rectal pain and vomiting.   Endocrine: Negative for cold intolerance, heat intolerance, polydipsia, polyphagia and polyuria.   Genitourinary: Negative for decreased urine volume, difficulty urinating, discharge, dysuria, enuresis, flank pain, frequency, genital sores, hematuria, penile pain, penile swelling, scrotal swelling, testicular pain and urgency.   Musculoskeletal: Negative for arthralgias, back pain, gait problem, joint swelling, myalgias, neck pain and neck stiffness.   Skin: Negative for color change, pallor, rash and wound.   Allergic/Immunologic: Negative for environmental allergies, food allergies and immunocompromised state.   Neurological: Positive for weakness. Negative for dizziness, tremors, seizures,  syncope, facial asymmetry, speech difficulty, light-headedness, numbness and headaches.   Hematological: Negative for adenopathy. Does not bruise/bleed easily.   Psychiatric/Behavioral: Positive for dysphoric mood. Negative for agitation, behavioral problems, confusion, decreased concentration, hallucinations, self-injury, sleep disturbance and suicidal ideas. The patient is nervous/anxious. The patient is not hyperactive.      Objective:     Vital Signs (Most Recent):  Temp: 98.3 °F (36.8 °C) (08/02/17 1630)  Pulse: 84 (08/02/17 1630)  Resp: 20 (08/02/17 1630)  BP: (!) 105/57 (08/02/17 1630)  SpO2: 95 % (08/02/17 1630) Vital Signs (24h Range):  Temp:  [98.3 °F (36.8 °C)-99.1 °F (37.3 °C)] 98.3 °F (36.8 °C)  Pulse:  [] 84  Resp:  [20-25] 20  SpO2:  [93 %-100 %] 95 %  BP: ()/(53-62) 105/57     Weight: 71.7 kg (158 lb)  Body mass index is 26.29 kg/m².  Body surface area is 1.81 meters squared.    No intake or output data in the 24 hours ending 08/02/17 1655    Physical Exam   Constitutional: He is oriented to person, place, and time. He has a sickly appearance. He appears ill. He appears distressed.   HENT:   Head: Normocephalic.   Right Ear: External ear normal.   Left Ear: External ear normal.   Nose: Nose normal. Right sinus exhibits no maxillary sinus tenderness and no frontal sinus tenderness. Left sinus exhibits no maxillary sinus tenderness and no frontal sinus tenderness.   Mouth/Throat: Oropharynx is clear and moist. No oropharyngeal exudate.   Eyes: EOM and lids are normal. Pupils are equal, round, and reactive to light. Right eye exhibits no discharge. Left eye exhibits no discharge. Right conjunctiva is not injected. Right conjunctiva has no hemorrhage. Left conjunctiva is not injected. Left conjunctiva has no hemorrhage. No scleral icterus. Right eye exhibits normal extraocular motion. Left eye exhibits normal extraocular motion.   Neck: Normal range of motion. Neck supple. No JVD present. No  tracheal deviation present. No thyromegaly present.   Cardiovascular: Normal rate, regular rhythm and normal heart sounds.    Pulmonary/Chest: No stridor. He has wheezes.   Abdominal: Soft. Bowel sounds are normal. He exhibits no mass. There is no hepatosplenomegaly, splenomegaly or hepatomegaly. There is no tenderness.   Musculoskeletal: Normal range of motion. He exhibits no edema or tenderness.   Lymphadenopathy:        Head (right side): No posterior auricular and no occipital adenopathy present.        Head (left side): No posterior auricular and no occipital adenopathy present.     He has no cervical adenopathy.        Right cervical: No superficial cervical, no deep cervical and no posterior cervical adenopathy present.       Left cervical: No superficial cervical, no deep cervical and no posterior cervical adenopathy present.     He has no axillary adenopathy.        Right: No supraclavicular adenopathy present.        Left: No supraclavicular adenopathy present.   Neurological: He is alert and oriented to person, place, and time. He has normal strength. No cranial nerve deficit. Coordination normal.   Skin: Skin is dry. No rash noted. He is not diaphoretic. No cyanosis or erythema. Nails show no clubbing.   Psychiatric: His behavior is normal. Judgment and thought content normal. His mood appears anxious. Cognition and memory are normal. He exhibits a depressed mood.   Vitals reviewed.      Significant Labs:   BMP:   Recent Labs  Lab 08/02/17  1400   GLU 98   *   K 3.5      CO2 25   BUN 10   CREATININE 0.9   CALCIUM 9.2   MG 2.1   , CBC:   Recent Labs  Lab 08/02/17  1400   WBC 6.01   HGB 12.1*   HCT 36.3*       and CMP:   Recent Labs  Lab 08/02/17  1400   *   K 3.5      CO2 25   GLU 98   BUN 10   CREATININE 0.9   CALCIUM 9.2   PROT 6.9   ALBUMIN 2.8*   BILITOT 0.6   ALKPHOS 79   AST 54*   ALT 53*   ANIONGAP 8   EGFRNONAA >60       Diagnostic Results:  I have reviewed all  pertinent imaging results/findings within the past 24 hours.    Assessment/Plan:     Primary lung cancer with metastasis from lung to other site    The patient has metastatic bronchoalveolar carcinoma having completed 4 cycles of carboplatinum and Taxol at this time my recommendations are that she be treated with broad-spectrum antibiotics to see whether or not there is any abnormalities on his imaging studies that will respond.  Once the patient is medically stable the patient would have a CT scan chest abdomen pelvis to see whether not he is had progressive disease.  The patient's options at this time of relapse would be drugs such as Nivolamab although his PD1 level has been undetectable.  The other options would be further systemic chemotherapy in a non curative setting.  In the presence of nursing personnel as spoke to both he and his wife this afternoon they have agreed to a no code order we have talked about this extensively in the office and I concur we will try to treat the treatable but understanding that he has a noncurable malignancy            Thank you for your consult. I will sign off. Please contact us if you have any additional questions.    Shant Leon MD  Hematology/Oncology  [facility] -

## 2017-08-02 NOTE — H&P
[facility] Hospital Medicine  History & Physical    Patient Name: Doron Domínguez  MRN: [id number]  Admission Date: 8/2/2017  Attending Physician: Shiraz Wheatley MD   Primary Care Provider: SHANEKA Art Jr, MD         Patient information was obtained from patient, spouse/SO and ER records.     Subjective:     Principal Problem:Pneumonia of both lower lobes due to infectious organism    Chief Complaint:   Chief Complaint   Patient presents with    Fever     patient was had chemotherapy last july 20. Patient tem at home 101.4         HPI: Mr. Domínguez 78yo male presented to the ED today for fever (Tmax 101.4) which onset gradually 2 days ago. Symptoms are constant and moderate in severity. Associated sxs include chills, fatigue, sob with activity, productive cough (moderate white thick sputum). No mitigating or exacerbating factors reported. Patient also noted increasing weakness over the past 1.5months with some lower ext. Edema.  Patient denies any CP, N/V/D, abdominal pain, dysuria, weakness/numbness, dizziness, and all other sxs at this time. No further complaints or concerns at this time. Per wife, pt last received chemo tx on 7/20/17 for hx of lung cancer. Dr. Leon is his oncologist.    Past Medical History:   Diagnosis Date    Aortic stenosis 12/29/2016    Arthritis     Asthma     Back pain     due to calcium deposits in back    BPH (benign prostatic hyperplasia)     CAD (coronary artery disease)     CAD, multiple vessel 12/29/2016    Cancer     lung    Chronic low back pain     High cholesterol     Hx of CABG 12/29/2016    Hypertension     Lung cancer 01/2017    T3 N0 M0 bronchoalveolar       Past Surgical History:   Procedure Laterality Date    APPENDECTOMY      CARDIAC SURGERY      CORONARY ARTERY BYPASS GRAFT  2012    CABG x 3 at [facility]    LUNG LOBECTOMY Left 01/10/2017    Left lower lobe       Review of patient's allergies indicates:   Allergen Reactions    Bactrim  [sulfamethoxazole-trimethoprim] Other (See Comments)     Severe leg cramps, dizziness    Dilaudid [hydromorphone] Other (See Comments)     Confusion, paranoia    Sulfa (sulfonamide antibiotics)      Severe leg cramps, dizziness.       No current facility-administered medications on file prior to encounter.      Current Outpatient Prescriptions on File Prior to Encounter   Medication Sig    albuterol 90 mcg/actuation inhaler Inhale 2 puffs into the lungs every 4 (four) hours as needed for Wheezing.    albuterol-ipratropium 2.5mg-0.5mg/3mL (DUO-NEB) 0.5 mg-3 mg(2.5 mg base)/3 mL nebulizer solution Take 3 mLs by nebulization every 6 (six) hours.    aspirin 81 MG Chew Take 81 mg by mouth once daily.    atorvastatin (LIPITOR) 20 MG tablet Take 1 tablet (20 mg total) by mouth every evening.    cetirizine (ZYRTEC) 10 MG tablet Take 10 mg by mouth as needed for Allergies.    glucosamine-chondroitin 500-400 mg tablet Take 1 tablet by mouth 2 (two) times daily.     guaifenesin (MUCINEX) 600 mg 12 hr tablet Take 600 mg by mouth 2 (two) times daily.     inhalation device (AEROCHAMBER PLUS FLOW-VU) Use as directed for inhalation.    metoprolol succinate (TOPROL-XL) 50 MG 24 hr tablet Take 1 tablet (50 mg total) by mouth once daily.    multivitamin with minerals tablet Take 1 tablet by mouth once daily.    ondansetron (ZOFRAN) 4 MG tablet Take 1 tablet (4 mg total) by mouth every 8 (eight) hours as needed for Nausea.    potassium gluconate 595 mg (99 mg) Tab Take 1 tablet by mouth once daily.     senna-docusate 8.6-50 mg (SENNA WITH DOCUSATE SODIUM) 8.6-50 mg per tablet Take 1 tablet by mouth once daily.    tamsulosin (FLOMAX) 0.4 mg Cp24 Take 1 capsule (0.4 mg total) by mouth every evening.    benzonatate (TESSALON) 200 MG capsule Take 200 mg by mouth 3 (three) times daily as needed for Cough.    econazole nitrate 1 % cream Apply 1 application topically as needed.    [DISCONTINUED]  diphenhydramine-acetaminophen (TYLENOL PM)  mg Tab Take 1 tablet by mouth nightly as needed.    [DISCONTINUED] hydrocodone-acetaminophen 5-325mg (NORCO) 5-325 mg per tablet Take 1 tablet by mouth every 4 (four) hours as needed for Pain (With food or milk in stomach).    [DISCONTINUED] prochlorperazine (COMPAZINE) 5 MG tablet Take 1 tablet (5 mg total) by mouth every 6 (six) hours as needed for Nausea.     Family History     Problem Relation (Age of Onset)    Diabetes Mother, Sister, Brother        Social History Main Topics    Smoking status: Never Smoker    Smokeless tobacco: Never Used    Alcohol use No    Drug use: No    Sexual activity: No     Review of Systems   Constitutional: Positive for activity change ( increasing general weakness over the past 1.5 months), chills, fatigue and fever ( max 101.4 at home). Negative for appetite change.   HENT: Negative for sore throat and trouble swallowing.    Eyes: Negative for visual disturbance.   Respiratory: Positive for cough ( productive white thick sputum. moderate amount) and shortness of breath ( worse with activity). Negative for chest tightness and wheezing.    Cardiovascular: Positive for leg swelling ( mild noticed recently to lower ext, worse to left). Negative for chest pain.   Gastrointestinal: Positive for nausea ( at times, typically related to chemo ). Negative for abdominal distention, abdominal pain, constipation and diarrhea.   Endocrine: Negative for polydipsia, polyphagia and polyuria.   Genitourinary: Positive for decreased urine volume. Negative for difficulty urinating, dysuria and frequency.   Musculoskeletal: Positive for back pain ( chronic-lumbar). Negative for neck pain and neck stiffness.   Skin: Positive for rash ( red rash to back, itchy at times). Negative for color change and wound.   Allergic/Immunologic: Positive for immunocompromised state.   Neurological: Negative for dizziness, weakness, numbness and headaches.         Chronic tremors to shoulders, neck and head   Hematological: Does not bruise/bleed easily.   Psychiatric/Behavioral: Negative for behavioral problems and confusion.     Objective:     Vital Signs (Most Recent):  Temp: 98.3 °F (36.8 °C) (08/02/17 1630)  Pulse: 84 (08/02/17 1630)  Resp: 20 (08/02/17 1630)  BP: (!) 105/57 (08/02/17 1630)  SpO2: 95 % (08/02/17 1630) Vital Signs (24h Range):  Temp:  [98.3 °F (36.8 °C)-99.1 °F (37.3 °C)] 98.3 °F (36.8 °C)  Pulse:  [] 84  Resp:  [20-25] 20  SpO2:  [93 %-100 %] 95 %  BP: ()/(53-62) 105/57     Weight: 71.7 kg (158 lb)  Body mass index is 26.29 kg/m².    Physical Exam   Constitutional: He is oriented to person, place, and time. He appears well-developed. He has a sickly appearance. He appears ill. No distress.   Frail     HENT:   Head: Normocephalic and atraumatic.   Nose: Nose normal.   Mouth/Throat: Mucous membranes are normal.   Eyes: Conjunctivae and EOM are normal. Pupils are equal, round, and reactive to light. No scleral icterus.   Neck: Trachea normal, normal range of motion and full passive range of motion without pain. Neck supple. No JVD present.   Cardiovascular: Normal rate, regular rhythm and normal heart sounds.    No murmur heard.  Pulses:       Dorsalis pedis pulses are 1+ on the right side, and 1+ on the left side.   Trace edema to left lower ankle   +1 edema noted to right lower ankle.      Pulmonary/Chest: Effort normal. No accessory muscle usage or stridor. Tachypnea noted. No respiratory distress. He has no wheezes ( diffuse). He has rhonchi in the right middle field, the right lower field and the left middle field. He exhibits no tenderness.   Barrel chest     Abdominal: Soft. Bowel sounds are normal. He exhibits no distension. There is no tenderness.   Genitourinary:   Genitourinary Comments: No complaints   Musculoskeletal: Normal range of motion. He exhibits no tenderness or deformity.   involuntary tremors noted to head and neck    Neurological: He is alert and oriented to person, place, and time. No sensory deficit. GCS eye subscore is 4. GCS verbal subscore is 5. GCS motor subscore is 6.   Skin: Skin is warm and dry. Capillary refill takes 2 to 3 seconds. Rash ( red micropapular rash generalized to back) noted.   Psychiatric: He has a normal mood and affect. His speech is normal and behavior is normal. Judgment and thought content normal. Cognition and memory are normal.   Nursing note and vitals reviewed.       Significant Labs: I have reviewed the last 24 hours of lab results.   Results for orders placed or performed during the hospital encounter of 08/02/17   APTT   Result Value Ref Range    aPTT 33.4 (H) 21.0 - 32.0 sec   Brain natriuretic peptide   Result Value Ref Range    BNP 75 0 - 99 pg/mL   CBC auto differential   Result Value Ref Range    WBC 6.01 3.90 - 12.70 K/uL    RBC 3.82 (L) 4.60 - 6.20 M/uL    Hemoglobin 12.1 (L) 14.0 - 18.0 g/dL    Hematocrit 36.3 (L) 40.0 - 54.0 %    MCV 95 82 - 98 fL    MCH 31.7 (H) 27.0 - 31.0 pg    MCHC 33.3 32.0 - 36.0 g/dL    RDW 18.7 (H) 11.5 - 14.5 %    Platelets 221 150 - 350 K/uL    MPV 8.8 (L) 9.2 - 12.9 fL    Gran # 3.6 1.8 - 7.7 K/uL    Lymph # 0.8 (L) 1.0 - 4.8 K/uL    Mono # 1.5 (H) 0.3 - 1.0 K/uL    Eos # 0.1 0.0 - 0.5 K/uL    Baso # 0.06 0.00 - 0.20 K/uL    Gran% 60.2 38.0 - 73.0 %    Lymph% 13.0 (L) 18.0 - 48.0 %    Mono% 24.1 (H) 4.0 - 15.0 %    Eosinophil% 2.2 0.0 - 8.0 %    Basophil% 1.0 0.0 - 1.9 %    Platelet Estimate Appears normal     Differential Method Automated    Comprehensive metabolic panel   Result Value Ref Range    Sodium 134 (L) 136 - 145 mmol/L    Potassium 3.5 3.5 - 5.1 mmol/L    Chloride 101 95 - 110 mmol/L    CO2 25 23 - 29 mmol/L    Glucose 98 70 - 110 mg/dL    BUN, Bld 10 8 - 23 mg/dL    Creatinine 0.9 0.5 - 1.4 mg/dL    Calcium 9.2 8.7 - 10.5 mg/dL    Total Protein 6.9 6.0 - 8.4 g/dL    Albumin 2.8 (L) 3.5 - 5.2 g/dL    Total Bilirubin 0.6 0.1 - 1.0 mg/dL     Alkaline Phosphatase 79 55 - 135 U/L    AST 54 (H) 10 - 40 U/L    ALT 53 (H) 10 - 44 U/L    Anion Gap 8 8 - 16 mmol/L    eGFR if African American >60 >60 mL/min/1.73 m^2    eGFR if non African American >60 >60 mL/min/1.73 m^2   Lactic acid, plasma #1   Result Value Ref Range    Lactate (Lactic Acid) 1.4 0.5 - 2.2 mmol/L   Lipase   Result Value Ref Range    Lipase 39 4 - 60 U/L   Magnesium   Result Value Ref Range    Magnesium 2.1 1.6 - 2.6 mg/dL   Phosphorus   Result Value Ref Range    Phosphorus 2.3 (L) 2.7 - 4.5 mg/dL   Protime-INR   Result Value Ref Range    Prothrombin Time 11.2 9.0 - 12.5 sec    INR 1.1 0.8 - 1.2   Troponin I   Result Value Ref Range    Troponin I <0.006 0.000 - 0.026 ng/mL   TSH   Result Value Ref Range    TSH 0.786 0.400 - 4.000 uIU/mL   Urinalysis   Result Value Ref Range    Specimen UA Urine, Clean Catch     Color, UA Yellow Yellow, Straw, Lucita    Appearance, UA Clear Clear    pH, UA 6.0 5.0 - 8.0    Specific Gravity, UA 1.010 1.005 - 1.030    Protein, UA Negative Negative    Glucose, UA Negative Negative    Ketones, UA Negative Negative    Bilirubin (UA) Negative Negative    Occult Blood UA Trace (A) Negative    Nitrite, UA Negative Negative    Urobilinogen, UA Negative <2.0 EU/dL    Leukocytes, UA Trace (A) Negative   Urinalysis Microscopic   Result Value Ref Range    RBC, UA 2 0 - 4 /hpf    WBC, UA 12 (H) 0 - 5 /hpf    Bacteria, UA Occasional None-Occ /hpf    Squam Epithel, UA 1 /hpf    Microscopic Comment SEE COMMENT    Type & Screen   Result Value Ref Range    Group & Rh A POS     Indirect Shayan NEG        Significant Imaging: I have reviewed the last 24 hours of imaging results.    Imaging Results          X-Ray Chest AP Portable (Final result)  Result time 08/02/17 14:26:06    Final result by GARETH Dukes Sr., MD (08/02/17 14:26:06)                 Impression:        1. Interval worsening of the appearance of the lungs. There is a moderate amount of alveolar consolidation in  the lower halves of both lungs. This is consistent with the patient's history and characteristic of pneumonia.  2. There is persistent opacification of the base of the left hemithorax. This is characteristic of a small left pleural effusion.  3. A left internal jugular venous line remains in place.  4. Surgical changes      Electronically signed by: AGRETH MURRY MD  Date:     08/02/17  Time:    14:26              Narrative:    One-view chest x-ray    Clinical History: Sepsis    Finding: Comparison was made to prior examination performed on 5/18/2017. The borders of the heart are not well seen. There is a moderate amount of alveolar consolidation in the lower halves of both lungs. There is persistent opacification of the base of the left hemithorax. The right costophrenic angle is sharp. There is no pneumothorax. A left internal jugular venous line remains in place. There are multiple sternotomy wires in place. There are multiple surgical clips projected over the mediastinum.                                Assessment/Plan:     * Sepsis related to Pneumonia of both lower lobes due to infectious organism    -Admitted   -Blood Cultures and Sputum Cultures Pending  -Broad Spectum antx (vanc, zoysn, cipro) for presumed pseudo. PNE.   -Inpatient Resp. Therapy Consult   -CBC in AM          Acute cystitis with hematuria    -Urine Culture pending  -Triple abx coverage  -Monitor.           Transaminitis    -LFTs slightly elevated, Monitor LFTs          Hypophosphatemia    -Replete Phos IVPB.   -Monitor. Phos in AM          Hyponatremia    -NS IV fluids  -CMP in AM  -monitor.           Essential hypertension    -- Continue BB, BP meds with parameters.           Primary lung cancer with metastasis from lung to other site    -Care discussed with Oncology.  --Currently on Chemo, with Left Chest Mediport        LUX (dyspnea on exertion)  -Echo in AM and consider cardiology consult pending results  -Current BNP less than 100  -Last  echo in 2016, patient with history of Cardiac Bypass and current Chemo Patient      VTE Risk Mitigation         Ordered     enoxaparin injection 40 mg  Daily     Route:  Subcutaneous        08/02/17 1756     Medium Risk of VTE  Once      08/02/17 1638     Place sequential compression device  Until discontinued      08/02/17 1638        Mihir Ledezma NP  Department of Hospital Medicine   [facility] -

## 2017-08-02 NOTE — HPI
Mr. Domínguez 78yo male presented to the ED today for fever (Tmax 101.4) which onset gradually 2 days ago. Symptoms are constant and moderate in severity. Associated sxs include chills, fatigue, sob with activity, productive cough (moderate white thick sputum). No mitigating or exacerbating factors reported. Patient also noted increasing weakness over the past 1.5months with some lower ext. Edema.  Patient denies any CP, N/V/D, abdominal pain, dysuria, weakness/numbness, dizziness, and all other sxs at this time. No further complaints or concerns at this time. Per wife, pt last received chemo tx on 7/20/17 for hx of lung cancer. Dr. Leon is his oncologist.

## 2017-08-03 LAB
ALBUMIN SERPL BCP-MCNC: 2.3 G/DL
ALP SERPL-CCNC: 63 U/L
ALT SERPL W/O P-5'-P-CCNC: 39 U/L
ANION GAP SERPL CALC-SCNC: 7 MMOL/L
AORTIC VALVE STENOSIS: ABNORMAL
AST SERPL-CCNC: 37 U/L
BACTERIA UR CULT: NORMAL
BASOPHILS # BLD AUTO: 0.07 K/UL
BASOPHILS NFR BLD: 1.2 %
BILIRUB SERPL-MCNC: 0.9 MG/DL
BUN SERPL-MCNC: 8 MG/DL
CALCIUM SERPL-MCNC: 8.6 MG/DL
CHLORIDE SERPL-SCNC: 104 MMOL/L
CO2 SERPL-SCNC: 26 MMOL/L
CREAT SERPL-MCNC: 0.9 MG/DL
DIFFERENTIAL METHOD: ABNORMAL
EOSINOPHIL # BLD AUTO: 0.2 K/UL
EOSINOPHIL NFR BLD: 2.9 %
ERYTHROCYTE [DISTWIDTH] IN BLOOD BY AUTOMATED COUNT: 19.1 %
EST. GFR  (AFRICAN AMERICAN): >60 ML/MIN/1.73 M^2
EST. GFR  (NON AFRICAN AMERICAN): >60 ML/MIN/1.73 M^2
ESTIMATED PA SYSTOLIC PRESSURE: 20.79
GLUCOSE SERPL-MCNC: 99 MG/DL
HCT VFR BLD AUTO: 32.5 %
HGB BLD-MCNC: 10.6 G/DL
LACTATE SERPL-SCNC: 0.9 MMOL/L
LYMPHOCYTES # BLD AUTO: 0.7 K/UL
LYMPHOCYTES NFR BLD: 11.8 %
MAGNESIUM SERPL-MCNC: 1.9 MG/DL
MCH RBC QN AUTO: 31.4 PG
MCHC RBC AUTO-ENTMCNC: 32.6 G/DL
MCV RBC AUTO: 96 FL
MONOCYTES # BLD AUTO: 1.5 K/UL
MONOCYTES NFR BLD: 25 %
NEUTROPHILS # BLD AUTO: 3.4 K/UL
NEUTROPHILS NFR BLD: 59.1 %
PHOSPHATE SERPL-MCNC: 4.6 MG/DL
PLATELET # BLD AUTO: 202 K/UL
PMV BLD AUTO: 8.9 FL
POTASSIUM SERPL-SCNC: 4 MMOL/L
PROT SERPL-MCNC: 5.6 G/DL
RBC # BLD AUTO: 3.38 M/UL
RETIRED EF AND QEF - SEE NOTES: 60 (ref 55–65)
SODIUM SERPL-SCNC: 137 MMOL/L
TRICUSPID VALVE REGURGITATION: ABNORMAL
WBC # BLD AUTO: 5.83 K/UL

## 2017-08-03 PROCEDURE — 21400001 HC TELEMETRY ROOM

## 2017-08-03 PROCEDURE — 96372 THER/PROPH/DIAG INJ SC/IM: CPT

## 2017-08-03 PROCEDURE — 25000003 PHARM REV CODE 250: Performed by: EMERGENCY MEDICINE

## 2017-08-03 PROCEDURE — 80053 COMPREHEN METABOLIC PANEL: CPT

## 2017-08-03 PROCEDURE — 85025 COMPLETE CBC W/AUTO DIFF WBC: CPT

## 2017-08-03 PROCEDURE — 63600175 PHARM REV CODE 636 W HCPCS: Performed by: INTERNAL MEDICINE

## 2017-08-03 PROCEDURE — 36415 COLL VENOUS BLD VENIPUNCTURE: CPT

## 2017-08-03 PROCEDURE — 84100 ASSAY OF PHOSPHORUS: CPT

## 2017-08-03 PROCEDURE — 83735 ASSAY OF MAGNESIUM: CPT

## 2017-08-03 PROCEDURE — 63600175 PHARM REV CODE 636 W HCPCS: Performed by: EMERGENCY MEDICINE

## 2017-08-03 PROCEDURE — 25000242 PHARM REV CODE 250 ALT 637 W/ HCPCS: Performed by: NURSE PRACTITIONER

## 2017-08-03 PROCEDURE — 93306 TTE W/DOPPLER COMPLETE: CPT

## 2017-08-03 PROCEDURE — 94640 AIRWAY INHALATION TREATMENT: CPT

## 2017-08-03 PROCEDURE — 27000221 HC OXYGEN, UP TO 24 HOURS

## 2017-08-03 PROCEDURE — 25000003 PHARM REV CODE 250: Performed by: NURSE PRACTITIONER

## 2017-08-03 PROCEDURE — 93306 TTE W/DOPPLER COMPLETE: CPT | Mod: 26,,, | Performed by: INTERNAL MEDICINE

## 2017-08-03 PROCEDURE — 99233 SBSQ HOSP IP/OBS HIGH 50: CPT | Mod: ,,, | Performed by: INTERNAL MEDICINE

## 2017-08-03 PROCEDURE — 94799 UNLISTED PULMONARY SVC/PX: CPT

## 2017-08-03 PROCEDURE — 99900035 HC TECH TIME PER 15 MIN (STAT)

## 2017-08-03 RX ADMIN — PANTOPRAZOLE SODIUM 40 MG: 40 TABLET, DELAYED RELEASE ORAL at 09:08

## 2017-08-03 RX ADMIN — IPRATROPIUM BROMIDE AND ALBUTEROL SULFATE 3 ML: .5; 3 SOLUTION RESPIRATORY (INHALATION) at 07:08

## 2017-08-03 RX ADMIN — ATORVASTATIN CALCIUM 20 MG: 10 TABLET, FILM COATED ORAL at 09:08

## 2017-08-03 RX ADMIN — IPRATROPIUM BROMIDE AND ALBUTEROL SULFATE 3 ML: .5; 3 SOLUTION RESPIRATORY (INHALATION) at 12:08

## 2017-08-03 RX ADMIN — CIPROFLOXACIN 400 MG: 2 INJECTION, SOLUTION INTRAVENOUS at 02:08

## 2017-08-03 RX ADMIN — GUAIFENESIN 600 MG: 600 TABLET, EXTENDED RELEASE ORAL at 09:08

## 2017-08-03 RX ADMIN — ASPIRIN 81 MG 81 MG: 81 TABLET ORAL at 09:08

## 2017-08-03 RX ADMIN — METOPROLOL SUCCINATE 50 MG: 50 TABLET, EXTENDED RELEASE ORAL at 09:08

## 2017-08-03 RX ADMIN — Medication 1000 MG: at 06:08

## 2017-08-03 RX ADMIN — CIPROFLOXACIN 400 MG: 2 INJECTION, SOLUTION INTRAVENOUS at 03:08

## 2017-08-03 RX ADMIN — MULTIPLE VITAMINS W/ MINERALS TAB 1 TABLET: TAB at 09:08

## 2017-08-03 RX ADMIN — PIPERACILLIN SODIUM AND TAZOBACTAM SODIUM 4.5 G: 4; .5 INJECTION, POWDER, LYOPHILIZED, FOR SOLUTION INTRAVENOUS at 07:08

## 2017-08-03 RX ADMIN — ENOXAPARIN SODIUM 40 MG: 100 INJECTION SUBCUTANEOUS at 08:08

## 2017-08-03 RX ADMIN — PIPERACILLIN SODIUM AND TAZOBACTAM SODIUM 4.5 G: 4; .5 INJECTION, POWDER, LYOPHILIZED, FOR SOLUTION INTRAVENOUS at 10:08

## 2017-08-03 RX ADMIN — PIPERACILLIN SODIUM AND TAZOBACTAM SODIUM 4.5 G: 4; .5 INJECTION, POWDER, LYOPHILIZED, FOR SOLUTION INTRAVENOUS at 03:08

## 2017-08-03 RX ADMIN — TAMSULOSIN HYDROCHLORIDE 0.4 MG: 0.4 CAPSULE ORAL at 09:08

## 2017-08-03 NOTE — PLAN OF CARE
Problem: Patient Care Overview  Goal: Plan of Care Review  Outcome: Ongoing (interventions implemented as appropriate)  Plan of care reviewed and discussed with patient.  No acute distress noted. Safety and fall precautions reviewed and discussed with patient.  Patient free from falls.  IV hydration maintained.  Oral hydration and ambulation promoted.  Patient denies pain.  O2 at 2LPM nasal cannula maintained.  Call bell and personal items in reach.  Bed in low position and locked.  Side rails up x2.  Encouraged to call if any assistance needed.  Will continue to monitor.

## 2017-08-03 NOTE — SUBJECTIVE & OBJECTIVE
Interval History:   [facility] [place] improving    Oncology Treatment Plan:   OP NSCLC PACLITAXEL + CARBOPLATIN (AUC) (WEEKLY)    Medications:  Continuous Infusions:   sodium chloride 0.9% 50 mL/hr at 08/02/17 2214     Scheduled Meds:   albuterol-ipratropium 2.5mg-0.5mg/3mL  3 mL Nebulization Q6H    aspirin  81 mg Oral Daily    atorvastatin  20 mg Oral QHS    ciprofloxacin (CIPRO)400mg/200ml D5W IVPB  400 mg Intravenous Q12H    enoxaparin  40 mg Subcutaneous Daily    guaifenesin  600 mg Oral BID    metoprolol succinate  50 mg Oral Daily    multivit-iron-FA-calcium-mins  1 tablet Oral Daily    pantoprazole  40 mg Oral Daily    piperacillin-tazobactam (ZOSYN) IVPB  4.5 g Intravenous Q8H    tamsulosin  0.4 mg Oral QHS    vancomycin (VANCOCIN) IVPB  1,000 mg Intravenous Q12H     PRN Meds:acetaminophen, benzonatate, cetirizine, ondansetron, ondansetron, polyethylene glycol, promethazine (PHENERGAN) IVPB     Review of Systems   Constitutional: Positive for activity change, fatigue and fever. Negative for appetite change and unexpected weight change.   HENT: Negative for congestion, ear discharge, facial swelling, nosebleeds, rhinorrhea, sinus pressure, sneezing, sore throat, trouble swallowing and voice change.    Eyes: Negative for discharge.   Respiratory: Positive for shortness of breath and wheezing. Negative for apnea, cough, choking and chest tightness.    Cardiovascular: Negative for chest pain, palpitations and leg swelling.   Gastrointestinal: Negative for abdominal distention, abdominal pain, anal bleeding, blood in stool, constipation, diarrhea, nausea and vomiting.   Endocrine: Negative.  Negative for polyuria.   Genitourinary: Negative for decreased urine volume, difficulty urinating, frequency, genital sores, hematuria, testicular pain and urgency.   Musculoskeletal: Negative for arthralgias, back pain, gait problem, joint swelling, myalgias, neck pain and neck stiffness.   Skin: Negative for color change,  pallor, rash and wound.   Allergic/Immunologic: Negative.    Neurological: Negative for dizziness, tremors, syncope, speech difficulty, weakness, light-headedness, numbness and headaches.   Hematological: Negative for adenopathy. Does not bruise/bleed easily.   Psychiatric/Behavioral: Negative for agitation, confusion and hallucinations. The patient is not nervous/anxious and is not hyperactive.      Objective:     Vital Signs (Most Recent):  Temp: 98.7 °F (37.1 °C) (08/03/17 1225)  Pulse: 93 (08/03/17 1246)  Resp: 16 (08/03/17 1246)  BP: (!) 104/96 (08/03/17 1225)  SpO2: (!) 92 % (08/03/17 1246) Vital Signs (24h Range):  Temp:  [97.9 °F (36.6 °C)-98.7 °F (37.1 °C)] 98.7 °F (37.1 °C)  Pulse:  [81-97] 93  Resp:  [16-20] 16  SpO2:  [92 %-96 %] 92 %  BP: ()/(57-96) 104/96     Weight: 71.7 kg (158 lb)  Body mass index is 26.29 kg/m².  Body surface area is 1.81 meters squared.      Intake/Output Summary (Last 24 hours) at 08/03/17 1604  Last data filed at 08/03/17 1225   Gross per 24 hour   Intake          2154.16 ml   Output             3300 ml   Net         -1145.84 ml       Physical Exam   Constitutional: He is oriented to person, place, and time. He appears well-developed and well-nourished.  Non-toxic appearance. He does not appear ill. No distress.   HENT:   Head: Normocephalic and atraumatic.   Right Ear: Tympanic membrane and ear canal normal.   Left Ear: Tympanic membrane and ear canal normal.   Nose: Nose normal. Right sinus exhibits no maxillary sinus tenderness and no frontal sinus tenderness. Left sinus exhibits no maxillary sinus tenderness and no frontal sinus tenderness.   Mouth/Throat: Oropharynx is clear and moist and mucous membranes are normal. No oral lesions. Normal dentition.   Eyes: Conjunctivae, EOM and lids are normal. Pupils are equal, round, and reactive to light. Right eye exhibits no discharge. Left eye exhibits no discharge. Right conjunctiva is not injected. Right conjunctiva has no  hemorrhage. Left conjunctiva is not injected. Left conjunctiva has no hemorrhage. No scleral icterus.   Neck: Normal range of motion. Neck supple. No JVD present. No spinous process tenderness and no muscular tenderness present. No tracheal deviation present. No thyromegaly present.   Cardiovascular: Normal rate, regular rhythm, normal heart sounds and intact distal pulses.  Exam reveals no gallop and no friction rub.    No murmur heard.  Pulmonary/Chest: Effort normal. No respiratory distress. He has decreased breath sounds in the right lower field and the left lower field. He has wheezes. He has no rhonchi. He has no rales. He exhibits no tenderness.   Abdominal: Soft. Bowel sounds are normal. He exhibits no distension and no mass. There is no hepatosplenomegaly. There is no tenderness. There is no rebound and no guarding.   Musculoskeletal: Normal range of motion. He exhibits no edema or tenderness.   Lymphadenopathy:        Head (right side): No submental and no submandibular adenopathy present.        Head (left side): No submental and no submandibular adenopathy present.     He has no cervical adenopathy.     He has no axillary adenopathy.   Neurological: He is alert and oriented to person, place, and time. No cranial nerve deficit. He exhibits normal muscle tone. Coordination normal.   Skin: Skin is warm, dry and intact. Capillary refill takes less than 2 seconds. No abrasion, no bruising and no rash noted. He is not diaphoretic. No cyanosis. No pallor. Nails show no clubbing.   Psychiatric: He has a normal mood and affect. His speech is normal and behavior is normal. Judgment and thought content normal.       Significant Labs:   CBC:   Recent Labs  Lab 08/02/17  1400 08/03/17  0438   WBC 6.01 5.83   HGB 12.1* 10.6*   HCT 36.3* 32.5*    202   , CMP:   Recent Labs  Lab 08/02/17  1400 08/03/17  0437   * 137   K 3.5 4.0    104   CO2 25 26   GLU 98 99   BUN 10 8   CREATININE 0.9 0.9   CALCIUM  9.2 8.6*   PROT 6.9 5.6*   ALBUMIN 2.8* 2.3*   BILITOT 0.6 0.9   ALKPHOS 79 63   AST 54* 37   ALT 53* 39   ANIONGAP 8 7*   EGFRNONAA >60 >60   , Coagulation:   Recent Labs  Lab 08/02/17  1400   INR 1.1   APTT 33.4*   , Haptoglobin: No results for input(s): HAPTOGLOBIN in the last 48 hours., Immunology: No results for input(s): SPEP, KASH, QUANG, FREELAMBDALI in the last 48 hours., LFTs:   Recent Labs  Lab 08/02/17  1400 08/03/17  0437   ALT 53* 39   AST 54* 37   ALKPHOS 79 63   BILITOT 0.6 0.9   PROT 6.9 5.6*   ALBUMIN 2.8* 2.3*    and Reticulocytes: No results for input(s): RETIC in the last 48 hours.    Diagnostic Results:  I have reviewed all pertinent imaging results/findings within the past 24 hours.

## 2017-08-03 NOTE — SUBJECTIVE & OBJECTIVE
Interval History: Pt reports improvement in symptoms. Pt was afebrile overnight. Hem/onc consulted. Continue current management.     Review of Systems   Constitutional: Positive for activity change ( increasing general weakness over the past 1.5 months), chills, fatigue and fever ( max 101.4 at home). Negative for appetite change and unexpected weight change.   HENT: Negative for congestion, facial swelling, rhinorrhea, sinus pressure, sneezing, sore throat and trouble swallowing.    Eyes: Negative for discharge, redness and visual disturbance.   Respiratory: Positive for cough ( productive white thick sputum. moderate amount) and shortness of breath ( worse with activity). Negative for apnea, chest tightness, wheezing and stridor.    Cardiovascular: Positive for leg swelling ( mild noticed recently to lower ext, worse to left). Negative for chest pain and palpitations.   Gastrointestinal: Positive for nausea ( at times, typically related to chemo ). Negative for abdominal distention, abdominal pain, anal bleeding, blood in stool, constipation, diarrhea and vomiting.   Endocrine: Negative for polydipsia, polyphagia and polyuria.   Genitourinary: Positive for decreased urine volume. Negative for difficulty urinating, discharge, dysuria, frequency and hematuria.   Musculoskeletal: Positive for back pain ( chronic-lumbar). Negative for arthralgias, gait problem, joint swelling, myalgias, neck pain and neck stiffness.   Skin: Positive for rash ( red rash to back, itchy at times). Negative for color change, pallor and wound.   Allergic/Immunologic: Positive for immunocompromised state.   Neurological: Negative for dizziness, tremors, seizures, syncope, facial asymmetry, speech difficulty, weakness, light-headedness, numbness and headaches.        Chronic tremors to shoulders, neck and head   Hematological: Does not bruise/bleed easily.   Psychiatric/Behavioral: Negative for behavioral problems, confusion, hallucinations  and suicidal ideas. The patient is not nervous/anxious.    All other systems reviewed and are negative.    Objective:     Vital Signs (Most Recent):  Temp: 98.6 °F (37 °C) (08/03/17 0720)  Pulse: 91 (08/03/17 0720)  Resp: 20 (08/03/17 0720)  BP: (!) 100/57 (08/03/17 0720)  SpO2: (!) 94 % (08/03/17 0720) Vital Signs (24h Range):  Temp:  [97.9 °F (36.6 °C)-99.1 °F (37.3 °C)] 98.6 °F (37 °C)  Pulse:  [] 91  Resp:  [18-25] 20  SpO2:  [93 %-100 %] 94 %  BP: ()/(53-65) 100/57     Weight: 71.7 kg (158 lb)  Body mass index is 26.29 kg/m².    Intake/Output Summary (Last 24 hours) at 08/03/17 1026  Last data filed at 08/03/17 0751   Gross per 24 hour   Intake          1814.16 ml   Output             2700 ml   Net          -885.84 ml      Physical Exam   Constitutional: He is oriented to person, place, and time. He appears well-developed and well-nourished. He has a sickly appearance. He appears ill. No distress.   Frail     HENT:   Head: Normocephalic and atraumatic.   Nose: Nose normal.   Mouth/Throat: Mucous membranes are normal.   Eyes: Conjunctivae and EOM are normal. Pupils are equal, round, and reactive to light. No scleral icterus.   Neck: Trachea normal, normal range of motion and full passive range of motion without pain. Neck supple. No JVD present.   Cardiovascular: Normal rate, regular rhythm, normal heart sounds and intact distal pulses.    No murmur heard.  Pulses:       Dorsalis pedis pulses are 1+ on the right side, and 1+ on the left side.   Trace edema to left lower ankle   +1 edema noted to right lower ankle.      Pulmonary/Chest: Effort normal. No accessory muscle usage or stridor. Tachypnea noted. No respiratory distress. He has no wheezes ( diffuse). He has rhonchi in the right middle field, the right lower field and the left middle field. He exhibits no tenderness.   Barrel chest     Abdominal: Soft. Bowel sounds are normal. He exhibits no distension. There is no tenderness.   Genitourinary:    Genitourinary Comments: No complaints   Musculoskeletal: Normal range of motion. He exhibits no edema, tenderness or deformity.   involuntary tremors noted to head and neck   Neurological: He is alert and oriented to person, place, and time. No sensory deficit. GCS eye subscore is 4. GCS verbal subscore is 5. GCS motor subscore is 6.   Skin: Skin is warm and dry. Capillary refill takes 2 to 3 seconds. Rash ( red micropapular rash generalized to back) noted. No erythema.   Psychiatric: He has a normal mood and affect. His speech is normal and behavior is normal. Judgment and thought content normal. Cognition and memory are normal.   Nursing note and vitals reviewed.      Significant Labs: All pertinent labs within the past 24 hours have been reviewed.    Significant Imaging:   Imaging Results          X-Ray Chest AP Portable (Final result)  Result time 08/02/17 14:26:06    Final result by GARETH Dukes Sr., MD (08/02/17 14:26:06)                 Impression:        1. Interval worsening of the appearance of the lungs. There is a moderate amount of alveolar consolidation in the lower halves of both lungs. This is consistent with the patient's history and characteristic of pneumonia.  2. There is persistent opacification of the base of the left hemithorax. This is characteristic of a small left pleural effusion.  3. A left internal jugular venous line remains in place.  4. Surgical changes      Electronically signed by: GARETH DUKES MD  Date:     08/02/17  Time:    14:26              Narrative:    One-view chest x-ray    Clinical History: Sepsis    Finding: Comparison was made to prior examination performed on 5/18/2017. The borders of the heart are not well seen. There is a moderate amount of alveolar consolidation in the lower halves of both lungs. There is persistent opacification of the base of the left hemithorax. The right costophrenic angle is sharp. There is no pneumothorax. A left internal jugular venous  line remains in place. There are multiple sternotomy wires in place. There are multiple surgical clips projected over the mediastinum.

## 2017-08-03 NOTE — PLAN OF CARE
Problem: Patient Care Overview  Goal: Plan of Care Review  Outcome: Ongoing (interventions implemented as appropriate)  Blanchable redness noted to bilat buttocks. Encouraged pt to reposition during shift. Pt repositioned independently and refused repositioning when encouraged to help prevent skin breakdown. Pt needs reinforcement. Pt appeared frustrated when rounding and administering IV ABX's. Wife at  Bedside. Sputum sample sent to lab during shift. Lung sounds coarse and diminished, absent in LLL. NSR on monitor. Educated pt and wife on medications administered during shift. VSS. Fall precautions in place. Call light and personal items within reach. Will continue to monitor.

## 2017-08-03 NOTE — PROGRESS NOTES
[facility] Hospital Medicine  Progress Note    Patient Name: Doron Domínguez  MRN: [id number]  Patient Class: IP- Inpatient   Admission Date: 8/2/2017  Length of Stay: 1 days  Attending Physician: Shiraz Wheatley MD  Primary Care Provider: SHANEKA Art Jr, MD        Subjective:     Principal Problem:Pneumonia of both lower lobes due to infectious organism    HPI:  Mr. Domínguez 78yo male presented to the ED today for fever (Tmax 101.4) which onset gradually 2 days ago. Symptoms are constant and moderate in severity. Associated sxs include chills, fatigue, sob with activity, productive cough (moderate white thick sputum). No mitigating or exacerbating factors reported. Patient also noted increasing weakness over the past 1.5months with some lower ext. Edema.  Patient denies any CP, N/V/D, abdominal pain, dysuria, weakness/numbness, dizziness, and all other sxs at this time. No further complaints or concerns at this time. Per wife, pt last received chemo tx on 7/20/17 for hx of lung cancer. Dr. Leon is his oncologist.    Hospital Course:  8/2/17: patient admitted for sepsis related to PNE, broad spectrum therapy started. Blood and Sputum Cultures Pending 8/3 Pt reports improvement in symptoms. Pt was afebrile overnight. Hem/onc consulted. Continue current management.     Interval History: Pt reports improvement in symptoms. Pt was afebrile overnight. Hem/onc consulted. Continue current management.     Review of Systems   Constitutional: Positive for activity change ( increasing general weakness over the past 1.5 months), chills, fatigue and fever ( max 101.4 at home). Negative for appetite change and unexpected weight change.   HENT: Negative for congestion, facial swelling, rhinorrhea, sinus pressure, sneezing, sore throat and trouble swallowing.    Eyes: Negative for discharge, redness and visual disturbance.   Respiratory: Positive for cough ( productive white thick sputum. moderate amount) and  shortness of breath ( worse with activity). Negative for apnea, chest tightness, wheezing and stridor.    Cardiovascular: Positive for leg swelling ( mild noticed recently to lower ext, worse to left). Negative for chest pain and palpitations.   Gastrointestinal: Positive for nausea ( at times, typically related to chemo ). Negative for abdominal distention, abdominal pain, anal bleeding, blood in stool, constipation, diarrhea and vomiting.   Endocrine: Negative for polydipsia, polyphagia and polyuria.   Genitourinary: Positive for decreased urine volume. Negative for difficulty urinating, discharge, dysuria, frequency and hematuria.   Musculoskeletal: Positive for back pain ( chronic-lumbar). Negative for arthralgias, gait problem, joint swelling, myalgias, neck pain and neck stiffness.   Skin: Positive for rash ( red rash to back, itchy at times). Negative for color change, pallor and wound.   Allergic/Immunologic: Positive for immunocompromised state.   Neurological: Negative for dizziness, tremors, seizures, syncope, facial asymmetry, speech difficulty, weakness, light-headedness, numbness and headaches.        Chronic tremors to shoulders, neck and head   Hematological: Does not bruise/bleed easily.   Psychiatric/Behavioral: Negative for behavioral problems, confusion, hallucinations and suicidal ideas. The patient is not nervous/anxious.    All other systems reviewed and are negative.    Objective:     Vital Signs (Most Recent):  Temp: 98.6 °F (37 °C) (08/03/17 0720)  Pulse: 91 (08/03/17 0720)  Resp: 20 (08/03/17 0720)  BP: (!) 100/57 (08/03/17 0720)  SpO2: (!) 94 % (08/03/17 0720) Vital Signs (24h Range):  Temp:  [97.9 °F (36.6 °C)-99.1 °F (37.3 °C)] 98.6 °F (37 °C)  Pulse:  [] 91  Resp:  [18-25] 20  SpO2:  [93 %-100 %] 94 %  BP: ()/(53-65) 100/57     Weight: 71.7 kg (158 lb)  Body mass index is 26.29 kg/m².    Intake/Output Summary (Last 24 hours) at 08/03/17 1026  Last data filed at 08/03/17  0751   Gross per 24 hour   Intake          1814.16 ml   Output             2700 ml   Net          -885.84 ml      Physical Exam   Constitutional: He is oriented to person, place, and time. He appears well-developed and well-nourished. He has a sickly appearance. He appears ill. No distress.   Frail     HENT:   Head: Normocephalic and atraumatic.   Nose: Nose normal.   Mouth/Throat: Mucous membranes are normal.   Eyes: Conjunctivae and EOM are normal. Pupils are equal, round, and reactive to light. No scleral icterus.   Neck: Trachea normal, normal range of motion and full passive range of motion without pain. Neck supple. No JVD present.   Cardiovascular: Normal rate, regular rhythm, normal heart sounds and intact distal pulses.    No murmur heard.  Pulses:       Dorsalis pedis pulses are 1+ on the right side, and 1+ on the left side.   Trace edema to left lower ankle   +1 edema noted to right lower ankle.      Pulmonary/Chest: Effort normal. No accessory muscle usage or stridor. Tachypnea noted. No respiratory distress. He has no wheezes ( diffuse). He has rhonchi in the right middle field, the right lower field and the left middle field. He exhibits no tenderness.   Barrel chest     Abdominal: Soft. Bowel sounds are normal. He exhibits no distension. There is no tenderness.   Genitourinary:   Genitourinary Comments: No complaints   Musculoskeletal: Normal range of motion. He exhibits no edema, tenderness or deformity.   involuntary tremors noted to head and neck   Neurological: He is alert and oriented to person, place, and time. No sensory deficit. GCS eye subscore is 4. GCS verbal subscore is 5. GCS motor subscore is 6.   Skin: Skin is warm and dry. Capillary refill takes 2 to 3 seconds. Rash ( red micropapular rash generalized to back) noted. No erythema.   Psychiatric: He has a normal mood and affect. His speech is normal and behavior is normal. Judgment and thought content normal. Cognition and memory are  normal.   Nursing note and vitals reviewed.      Significant Labs: All pertinent labs within the past 24 hours have been reviewed.    Significant Imaging:   Imaging Results          X-Ray Chest AP Portable (Final result)  Result time 08/02/17 14:26:06    Final result by GARETH Dukes Sr., MD (08/02/17 14:26:06)                 Impression:        1. Interval worsening of the appearance of the lungs. There is a moderate amount of alveolar consolidation in the lower halves of both lungs. This is consistent with the patient's history and characteristic of pneumonia.  2. There is persistent opacification of the base of the left hemithorax. This is characteristic of a small left pleural effusion.  3. A left internal jugular venous line remains in place.  4. Surgical changes      Electronically signed by: GARETH DUKES MD  Date:     08/02/17  Time:    14:26              Narrative:    One-view chest x-ray    Clinical History: Sepsis    Finding: Comparison was made to prior examination performed on 5/18/2017. The borders of the heart are not well seen. There is a moderate amount of alveolar consolidation in the lower halves of both lungs. There is persistent opacification of the base of the left hemithorax. The right costophrenic angle is sharp. There is no pneumothorax. A left internal jugular venous line remains in place. There are multiple sternotomy wires in place. There are multiple surgical clips projected over the mediastinum.                                 Assessment/Plan:      * Sepsis related to Pneumonia of both lower lobes due to infectious organism    -Admitted   -Blood Cultures and Sputum Cultures Pending  -Broad Spectum antx (vanc, zoysn, cipro)  -Inpatient Resp. Therapy Consult   -CBC in AM          Bilateral Lower leg edema    -Monitor  -Echo pending   -CBC, CMP in AM          Acute cystitis with hematuria    -Urine Culture pending  -Triple abx coverage  -Monitor.           Transaminitis    - improved   -  monitor LFT's           Hypophosphatemia    - monitor   - replete as needed          Hyponatremia    - improved   -NS IV fluids  -CMP in AM  -monitor.           Essential hypertension    -- Continue BB, BP meds with parameters.           Primary lung cancer with metastasis from lung to other site    -Care discussed with Oncology.  --Currently on Chemo, with Left Chest Mediport           LUX (dyspnea on exertion)    -Echo in AM and consider cardiology consult pending results.  -Current BNP less than 100.  -Last echo in 2016, patient with history of Cardiac Bypass and current Chemo patient.               VTE Risk Mitigation         Ordered     enoxaparin injection 40 mg  Daily     Route:  Subcutaneous        08/02/17 1842     Medium Risk of VTE  Once      08/02/17 1638     Place sequential compression device  Until discontinued      08/02/17 1638              Omar Gross NP  Department of Hospital Medicine   [facility]

## 2017-08-03 NOTE — PROGRESS NOTES
[facility]  Hematology/Oncology  Progress Note    Patient Name: Doron Domínguez  Admission Date: 8/2/2017  Hospital Length of Stay: 1 days  Code Status: DNR     Subjective:     HPI:  77-year-old male history of bronchial alveolar carcinoma patient has completed 4 cycles of carboplatinum and Taxol stable disease at the end of 2 cycles call this morning the temperature 101 with chills told her the emergency room for further evaluation was asked see the patient for further evaluation in terms of response to therapy.  Admitted with PNA currently on zysyn/cipro/vanc.    Interval History:   Sob improving    Oncology Treatment Plan:   OP NSCLC PACLITAXEL + CARBOPLATIN (AUC) (WEEKLY)    Medications:  Continuous Infusions:   sodium chloride 0.9% 50 mL/hr at 08/02/17 2214     Scheduled Meds:   albuterol-ipratropium 2.5mg-0.5mg/3mL  3 mL Nebulization Q6H    aspirin  81 mg Oral Daily    atorvastatin  20 mg Oral QHS    ciprofloxacin (CIPRO)400mg/200ml D5W IVPB  400 mg Intravenous Q12H    enoxaparin  40 mg Subcutaneous Daily    guaifenesin  600 mg Oral BID    metoprolol succinate  50 mg Oral Daily    multivit-iron-FA-calcium-mins  1 tablet Oral Daily    pantoprazole  40 mg Oral Daily    piperacillin-tazobactam (ZOSYN) IVPB  4.5 g Intravenous Q8H    tamsulosin  0.4 mg Oral QHS    vancomycin (VANCOCIN) IVPB  1,000 mg Intravenous Q12H     PRN Meds:acetaminophen, benzonatate, cetirizine, ondansetron, ondansetron, polyethylene glycol, promethazine (PHENERGAN) IVPB     Review of Systems   Constitutional: Positive for activity change, fatigue and fever. Negative for appetite change and unexpected weight change.   HENT: Negative for congestion, ear discharge, facial swelling, nosebleeds, rhinorrhea, sinus pressure, sneezing, sore throat, trouble swallowing and voice change.    Eyes: Negative for discharge.   Respiratory: Positive for shortness of breath and wheezing. Negative for apnea, cough, choking and  chest tightness.    Cardiovascular: Negative for chest pain, palpitations and leg swelling.   Gastrointestinal: Negative for abdominal distention, abdominal pain, anal bleeding, blood in stool, constipation, diarrhea, nausea and vomiting.   Endocrine: Negative.  Negative for polyuria.   Genitourinary: Negative for decreased urine volume, difficulty urinating, frequency, genital sores, hematuria, testicular pain and urgency.   Musculoskeletal: Negative for arthralgias, back pain, gait problem, joint swelling, myalgias, neck pain and neck stiffness.   Skin: Negative for color change, pallor, rash and wound.   Allergic/Immunologic: Negative.    Neurological: Negative for dizziness, tremors, syncope, speech difficulty, weakness, light-headedness, numbness and headaches.   Hematological: Negative for adenopathy. Does not bruise/bleed easily.   Psychiatric/Behavioral: Negative for agitation, confusion and hallucinations. The patient is not nervous/anxious and is not hyperactive.      Objective:     Vital Signs (Most Recent):  Temp: 98.7 °F (37.1 °C) (08/03/17 1225)  Pulse: 93 (08/03/17 1246)  Resp: 16 (08/03/17 1246)  BP: (!) 104/96 (08/03/17 1225)  SpO2: (!) 92 % (08/03/17 1246) Vital Signs (24h Range):  Temp:  [97.9 °F (36.6 °C)-98.7 °F (37.1 °C)] 98.7 °F (37.1 °C)  Pulse:  [81-97] 93  Resp:  [16-20] 16  SpO2:  [92 %-96 %] 92 %  BP: ()/(57-96) 104/96     Weight: 71.7 kg (158 lb)  Body mass index is 26.29 kg/m².  Body surface area is 1.81 meters squared.      Intake/Output Summary (Last 24 hours) at 08/03/17 1604  Last data filed at 08/03/17 1225   Gross per 24 hour   Intake          2154.16 ml   Output             3300 ml   Net         -1145.84 ml       Physical Exam   Constitutional: He is oriented to person, place, and time. He appears well-developed and well-nourished.  Non-toxic appearance. He does not appear ill. No distress.   HENT:   Head: Normocephalic and atraumatic.   Right Ear: Tympanic membrane and ear  canal normal.   Left Ear: Tympanic membrane and ear canal normal.   Nose: Nose normal. Right sinus exhibits no maxillary sinus tenderness and no frontal sinus tenderness. Left sinus exhibits no maxillary sinus tenderness and no frontal sinus tenderness.   Mouth/Throat: Oropharynx is clear and moist and mucous membranes are normal. No oral lesions. Normal dentition.   Eyes: Conjunctivae, EOM and lids are normal. Pupils are equal, round, and reactive to light. Right eye exhibits no discharge. Left eye exhibits no discharge. Right conjunctiva is not injected. Right conjunctiva has no hemorrhage. Left conjunctiva is not injected. Left conjunctiva has no hemorrhage. No scleral icterus.   Neck: Normal range of motion. Neck supple. No JVD present. No spinous process tenderness and no muscular tenderness present. No tracheal deviation present. No thyromegaly present.   Cardiovascular: Normal rate, regular rhythm, normal heart sounds and intact distal pulses.  Exam reveals no gallop and no friction rub.    No murmur heard.  Pulmonary/Chest: Effort normal. No respiratory distress. He has decreased breath sounds in the right lower field and the left lower field. He has wheezes. He has no rhonchi. He has no rales. He exhibits no tenderness.   Abdominal: Soft. Bowel sounds are normal. He exhibits no distension and no mass. There is no hepatosplenomegaly. There is no tenderness. There is no rebound and no guarding.   Musculoskeletal: Normal range of motion. He exhibits no edema or tenderness.   Lymphadenopathy:        Head (right side): No submental and no submandibular adenopathy present.        Head (left side): No submental and no submandibular adenopathy present.     He has no cervical adenopathy.     He has no axillary adenopathy.   Neurological: He is alert and oriented to person, place, and time. No cranial nerve deficit. He exhibits normal muscle tone. Coordination normal.   Skin: Skin is warm, dry and intact. Capillary  refill takes less than 2 seconds. No abrasion, no bruising and no rash noted. He is not diaphoretic. No cyanosis. No pallor. Nails show no clubbing.   Psychiatric: He has a normal mood and affect. His speech is normal and behavior is normal. Judgment and thought content normal.       Significant Labs:   CBC:   Recent Labs  Lab 08/02/17  1400 08/03/17  0438   WBC 6.01 5.83   HGB 12.1* 10.6*   HCT 36.3* 32.5*    202   , CMP:   Recent Labs  Lab 08/02/17  1400 08/03/17  0437   * 137   K 3.5 4.0    104   CO2 25 26   GLU 98 99   BUN 10 8   CREATININE 0.9 0.9   CALCIUM 9.2 8.6*   PROT 6.9 5.6*   ALBUMIN 2.8* 2.3*   BILITOT 0.6 0.9   ALKPHOS 79 63   AST 54* 37   ALT 53* 39   ANIONGAP 8 7*   EGFRNONAA >60 >60   , Coagulation:   Recent Labs  Lab 08/02/17  1400   INR 1.1   APTT 33.4*   , Haptoglobin: No results for input(s): HAPTOGLOBIN in the last 48 hours., Immunology: No results for input(s): SPEP, KASH, QUANG, FREELAMBDALI in the last 48 hours., LFTs:   Recent Labs  Lab 08/02/17  1400 08/03/17  0437   ALT 53* 39   AST 54* 37   ALKPHOS 79 63   BILITOT 0.6 0.9   PROT 6.9 5.6*   ALBUMIN 2.8* 2.3*    and Reticulocytes: No results for input(s): RETIC in the last 48 hours.    Diagnostic Results:  I have reviewed all pertinent imaging results/findings within the past 24 hours.    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    Once the patient is medically stable the patient would have a CT scan chest abdomen pelvis to see whether not he is had progressive disease.  The patient's options at this time of relapse would be drugs such as Nivolamab although his PD1 level has been undetectable.  The other options would be further systemic chemotherapy in a non curative setting.         * Sepsis related to Pneumonia of both lower lobes due to infectious organism    Continue  broad spectrum abx, cipro/zosyn/vanc  Supportive care            Thank you for your consult. I will follow-up with patient. Please contact us if you have any additional questions.     Giovani Knight Jr, MD  Hematology/Oncology  [facility]

## 2017-08-04 PROBLEM — R19.7 DIARRHEA OF PRESUMED INFECTIOUS ORIGIN: Status: ACTIVE | Noted: 2017-08-04

## 2017-08-04 LAB
ALBUMIN SERPL BCP-MCNC: 2.3 G/DL
ALP SERPL-CCNC: 61 U/L
ALT SERPL W/O P-5'-P-CCNC: 38 U/L
ANION GAP SERPL CALC-SCNC: 7 MMOL/L
AST SERPL-CCNC: 34 U/L
BASOPHILS # BLD AUTO: 0.07 K/UL
BASOPHILS NFR BLD: 1 %
BILIRUB SERPL-MCNC: 0.9 MG/DL
BUN SERPL-MCNC: 7 MG/DL
C DIFF GDH STL QL: NEGATIVE
C DIFF TOX A+B STL QL IA: NEGATIVE
CALCIUM SERPL-MCNC: 8.6 MG/DL
CHLORIDE SERPL-SCNC: 100 MMOL/L
CO2 SERPL-SCNC: 25 MMOL/L
CREAT SERPL-MCNC: 0.9 MG/DL
DIFFERENTIAL METHOD: ABNORMAL
EOSINOPHIL # BLD AUTO: 0.2 K/UL
EOSINOPHIL NFR BLD: 2.8 %
ERYTHROCYTE [DISTWIDTH] IN BLOOD BY AUTOMATED COUNT: 19 %
EST. GFR  (AFRICAN AMERICAN): >60 ML/MIN/1.73 M^2
EST. GFR  (NON AFRICAN AMERICAN): >60 ML/MIN/1.73 M^2
GLUCOSE SERPL-MCNC: 133 MG/DL
HCT VFR BLD AUTO: 32.7 %
HGB BLD-MCNC: 10.5 G/DL
LYMPHOCYTES # BLD AUTO: 0.7 K/UL
LYMPHOCYTES NFR BLD: 10.9 %
MCH RBC QN AUTO: 31 PG
MCHC RBC AUTO-ENTMCNC: 32.1 G/DL
MCV RBC AUTO: 97 FL
MONOCYTES # BLD AUTO: 1.5 K/UL
MONOCYTES NFR BLD: 21.8 %
NEUTROPHILS # BLD AUTO: 4.3 K/UL
NEUTROPHILS NFR BLD: 63.5 %
PLATELET # BLD AUTO: 225 K/UL
PMV BLD AUTO: 9.3 FL
POTASSIUM SERPL-SCNC: 3.5 MMOL/L
PROT SERPL-MCNC: 5.8 G/DL
RBC # BLD AUTO: 3.39 M/UL
SODIUM SERPL-SCNC: 132 MMOL/L
VANCOMYCIN TROUGH SERPL-MCNC: 10.5 UG/ML
WBC # BLD AUTO: 6.78 K/UL

## 2017-08-04 PROCEDURE — 80053 COMPREHEN METABOLIC PANEL: CPT

## 2017-08-04 PROCEDURE — 94761 N-INVAS EAR/PLS OXIMETRY MLT: CPT

## 2017-08-04 PROCEDURE — 80202 ASSAY OF VANCOMYCIN: CPT

## 2017-08-04 PROCEDURE — 63600175 PHARM REV CODE 636 W HCPCS: Performed by: EMERGENCY MEDICINE

## 2017-08-04 PROCEDURE — 63600175 PHARM REV CODE 636 W HCPCS: Performed by: INTERNAL MEDICINE

## 2017-08-04 PROCEDURE — 87449 NOS EACH ORGANISM AG IA: CPT

## 2017-08-04 PROCEDURE — 96372 THER/PROPH/DIAG INJ SC/IM: CPT

## 2017-08-04 PROCEDURE — 36415 COLL VENOUS BLD VENIPUNCTURE: CPT

## 2017-08-04 PROCEDURE — 94640 AIRWAY INHALATION TREATMENT: CPT

## 2017-08-04 PROCEDURE — 94799 UNLISTED PULMONARY SVC/PX: CPT

## 2017-08-04 PROCEDURE — 21400001 HC TELEMETRY ROOM

## 2017-08-04 PROCEDURE — 27000221 HC OXYGEN, UP TO 24 HOURS

## 2017-08-04 PROCEDURE — 25000242 PHARM REV CODE 250 ALT 637 W/ HCPCS: Performed by: NURSE PRACTITIONER

## 2017-08-04 PROCEDURE — 25000003 PHARM REV CODE 250: Performed by: NURSE PRACTITIONER

## 2017-08-04 PROCEDURE — 25000003 PHARM REV CODE 250: Performed by: INTERNAL MEDICINE

## 2017-08-04 PROCEDURE — 99233 SBSQ HOSP IP/OBS HIGH 50: CPT | Mod: ,,, | Performed by: INTERNAL MEDICINE

## 2017-08-04 PROCEDURE — 25000003 PHARM REV CODE 250: Performed by: EMERGENCY MEDICINE

## 2017-08-04 PROCEDURE — 85025 COMPLETE CBC W/AUTO DIFF WBC: CPT

## 2017-08-04 RX ORDER — DIPHENOXYLATE HYDROCHLORIDE AND ATROPINE SULFATE 2.5; .025 MG/1; MG/1
1 TABLET ORAL 4 TIMES DAILY PRN
Status: DISCONTINUED | OUTPATIENT
Start: 2017-08-04 | End: 2017-08-09 | Stop reason: HOSPADM

## 2017-08-04 RX ADMIN — SODIUM CHLORIDE: 0.9 INJECTION, SOLUTION INTRAVENOUS at 09:08

## 2017-08-04 RX ADMIN — Medication 1000 MG: at 05:08

## 2017-08-04 RX ADMIN — METOPROLOL SUCCINATE 50 MG: 50 TABLET, EXTENDED RELEASE ORAL at 10:08

## 2017-08-04 RX ADMIN — GUAIFENESIN 600 MG: 600 TABLET, EXTENDED RELEASE ORAL at 09:08

## 2017-08-04 RX ADMIN — MULTIPLE VITAMINS W/ MINERALS TAB 1 TABLET: TAB at 10:08

## 2017-08-04 RX ADMIN — TAMSULOSIN HYDROCHLORIDE 0.4 MG: 0.4 CAPSULE ORAL at 09:08

## 2017-08-04 RX ADMIN — GUAIFENESIN 600 MG: 600 TABLET, EXTENDED RELEASE ORAL at 10:08

## 2017-08-04 RX ADMIN — IPRATROPIUM BROMIDE AND ALBUTEROL SULFATE 3 ML: .5; 3 SOLUTION RESPIRATORY (INHALATION) at 09:08

## 2017-08-04 RX ADMIN — ASPIRIN 81 MG 81 MG: 81 TABLET ORAL at 10:08

## 2017-08-04 RX ADMIN — SODIUM CHLORIDE: 0.9 INJECTION, SOLUTION INTRAVENOUS at 07:08

## 2017-08-04 RX ADMIN — ENOXAPARIN SODIUM 40 MG: 100 INJECTION SUBCUTANEOUS at 05:08

## 2017-08-04 RX ADMIN — ATORVASTATIN CALCIUM 20 MG: 10 TABLET, FILM COATED ORAL at 09:08

## 2017-08-04 RX ADMIN — CIPROFLOXACIN 400 MG: 2 INJECTION, SOLUTION INTRAVENOUS at 03:08

## 2017-08-04 RX ADMIN — IPRATROPIUM BROMIDE AND ALBUTEROL SULFATE 3 ML: .5; 3 SOLUTION RESPIRATORY (INHALATION) at 01:08

## 2017-08-04 RX ADMIN — DIPHENOXYLATE HYDROCHLORIDE AND ATROPINE SULFATE 1 TABLET: 2.5; .025 TABLET ORAL at 07:08

## 2017-08-04 RX ADMIN — VANCOMYCIN HYDROCHLORIDE 1250 MG: 100 INJECTION, POWDER, LYOPHILIZED, FOR SOLUTION INTRAVENOUS at 07:08

## 2017-08-04 RX ADMIN — PANTOPRAZOLE SODIUM 40 MG: 40 TABLET, DELAYED RELEASE ORAL at 10:08

## 2017-08-04 RX ADMIN — IPRATROPIUM BROMIDE AND ALBUTEROL SULFATE 3 ML: .5; 3 SOLUTION RESPIRATORY (INHALATION) at 12:08

## 2017-08-04 RX ADMIN — PIPERACILLIN SODIUM AND TAZOBACTAM SODIUM 4.5 G: 4; .5 INJECTION, POWDER, LYOPHILIZED, FOR SOLUTION INTRAVENOUS at 06:08

## 2017-08-04 RX ADMIN — PIPERACILLIN SODIUM AND TAZOBACTAM SODIUM 4.5 G: 4; .5 INJECTION, POWDER, LYOPHILIZED, FOR SOLUTION INTRAVENOUS at 03:08

## 2017-08-04 NOTE — SUBJECTIVE & OBJECTIVE
Interval History:   Sob improving; developed significant diarrhea overnight    Oncology Treatment Plan:   OP NSCLC PACLITAXEL + CARBOPLATIN (AUC) (WEEKLY)    Medications:  Continuous Infusions:   sodium chloride 0.9% Stopped (08/04/17 0654)     Scheduled Meds:   albuterol-ipratropium 2.5mg-0.5mg/3mL  3 mL Nebulization Q6H    aspirin  81 mg Oral Daily    atorvastatin  20 mg Oral QHS    ciprofloxacin (CIPRO)400mg/200ml D5W IVPB  400 mg Intravenous Q12H    enoxaparin  40 mg Subcutaneous Daily    guaifenesin  600 mg Oral BID    metoprolol succinate  50 mg Oral Daily    multivit-iron-FA-calcium-mins  1 tablet Oral Daily    pantoprazole  40 mg Oral Daily    piperacillin-tazobactam (ZOSYN) IVPB  4.5 g Intravenous Q8H    tamsulosin  0.4 mg Oral QHS    vancomycin (VANCOCIN) IVPB  1,250 mg Intravenous Q12H     PRN Meds:acetaminophen, benzonatate, cetirizine, ondansetron, ondansetron, polyethylene glycol, promethazine (PHENERGAN) IVPB     Review of Systems   Constitutional: Positive for activity change, fatigue and fever. Negative for appetite change and unexpected weight change.   HENT: Negative for congestion, dental problem, drooling, ear discharge, facial swelling, nosebleeds, rhinorrhea, sinus pressure, sneezing, sore throat, trouble swallowing and voice change.    Respiratory: Positive for shortness of breath and wheezing. Negative for apnea, cough, choking and chest tightness.    Cardiovascular: Negative for chest pain, palpitations and leg swelling.   Gastrointestinal: Positive for diarrhea. Negative for abdominal distention, abdominal pain, anal bleeding, blood in stool, constipation, nausea and vomiting.   Endocrine: Negative.    Genitourinary: Negative for decreased urine volume, difficulty urinating, frequency, genital sores, hematuria, testicular pain and urgency.   Musculoskeletal: Negative for arthralgias, back pain, gait problem, joint swelling, myalgias, neck pain and neck stiffness.   Skin:  Negative for pallor.   Allergic/Immunologic: Negative.    Neurological: Negative for dizziness, tremors, syncope, speech difficulty, weakness, light-headedness, numbness and headaches.   Hematological: Negative for adenopathy. Does not bruise/bleed easily.   Psychiatric/Behavioral: Negative for agitation, confusion and hallucinations. The patient is not nervous/anxious and is not hyperactive.      Objective:     Vital Signs (Most Recent):  Temp: 98.1 °F (36.7 °C) (08/04/17 0735)  Pulse: 95 (08/04/17 0735)  Resp: 20 (08/04/17 0735)  BP: (!) 100/57 (08/04/17 0735)  SpO2: 95 % (08/04/17 0314) Vital Signs (24h Range):  Temp:  [98.1 °F (36.7 °C)-98.9 °F (37.2 °C)] 98.1 °F (36.7 °C)  Pulse:  [] 95  Resp:  [16-20] 20  SpO2:  [92 %-96 %] 95 %  BP: (100-116)/(57-96) 100/57     Weight: 71.7 kg (158 lb)  Body mass index is 26.29 kg/m².  Body surface area is 1.81 meters squared.      Intake/Output Summary (Last 24 hours) at 08/04/17 0747  Last data filed at 08/04/17 0654   Gross per 24 hour   Intake           3507.5 ml   Output             3650 ml   Net           -142.5 ml       Physical Exam   Constitutional: He is oriented to person, place, and time. He appears well-developed and well-nourished.  Non-toxic appearance. He does not appear ill. No distress.   HENT:   Head: Normocephalic and atraumatic.   Right Ear: Tympanic membrane and ear canal normal.   Left Ear: Tympanic membrane and ear canal normal.   Nose: Nose normal. Right sinus exhibits no maxillary sinus tenderness and no frontal sinus tenderness. Left sinus exhibits no maxillary sinus tenderness and no frontal sinus tenderness.   Mouth/Throat: Oropharynx is clear and moist and mucous membranes are normal. Mucous membranes are not pale and not dry. Normal dentition. No oropharyngeal exudate.   Eyes: Conjunctivae, EOM and lids are normal. Pupils are equal, round, and reactive to light. Right eye exhibits no discharge. Left eye exhibits no discharge. Right  conjunctiva is not injected. Right conjunctiva has no hemorrhage. Left conjunctiva is not injected. Left conjunctiva has no hemorrhage. No scleral icterus.   Neck: Normal range of motion. Neck supple. No spinous process tenderness and no muscular tenderness present. No tracheal deviation present. No thyromegaly present.   Cardiovascular: Normal rate, regular rhythm, normal heart sounds and intact distal pulses.  Exam reveals no gallop and no friction rub.    No murmur heard.  Pulmonary/Chest: Effort normal. No respiratory distress. He has decreased breath sounds in the right lower field and the left lower field. He has wheezes. He has no rhonchi. He has no rales. He exhibits no tenderness.   Abdominal: Soft. Bowel sounds are normal. He exhibits no distension and no mass. There is no hepatosplenomegaly. There is no tenderness. There is no rebound and no guarding.   Musculoskeletal: Normal range of motion. He exhibits no edema, tenderness or deformity.   Lymphadenopathy:        Head (right side): No submental and no submandibular adenopathy present.        Head (left side): No submental and no submandibular adenopathy present.     He has no cervical adenopathy.     He has no axillary adenopathy.   Neurological: He is alert and oriented to person, place, and time. No cranial nerve deficit. He exhibits normal muscle tone. Coordination normal.   Skin: Skin is warm, dry and intact. Capillary refill takes less than 2 seconds. No abrasion, no bruising and no rash noted. He is not diaphoretic. No cyanosis. No pallor. Nails show no clubbing.   Psychiatric: He has a normal mood and affect. His speech is normal and behavior is normal. Judgment and thought content normal.       Significant Labs:   CBC:     Recent Labs  Lab 08/02/17  1400 08/03/17  0438 08/04/17  0429   WBC 6.01 5.83 6.78   HGB 12.1* 10.6* 10.5*   HCT 36.3* 32.5* 32.7*    202 225   , CMP:     Recent Labs  Lab 08/02/17  1400 08/03/17  0437 08/04/17  0429    * 137 132*   K 3.5 4.0 3.5    104 100   CO2 25 26 25   GLU 98 99 133*   BUN 10 8 7*   CREATININE 0.9 0.9 0.9   CALCIUM 9.2 8.6* 8.6*   PROT 6.9 5.6* 5.8*   ALBUMIN 2.8* 2.3* 2.3*   BILITOT 0.6 0.9 0.9   ALKPHOS 79 63 61   AST 54* 37 34   ALT 53* 39 38   ANIONGAP 8 7* 7*   EGFRNONAA >60 >60 >60   , Coagulation:     Recent Labs  Lab 08/02/17  1400   INR 1.1   APTT 33.4*   , Haptoglobin: No results for input(s): HAPTOGLOBIN in the last 48 hours., Immunology: No results for input(s): SPEP, KASH, QUANG, FREELAMBDALI in the last 48 hours., LFTs:     Recent Labs  Lab 08/02/17  1400 08/03/17  0437 08/04/17  0429   ALT 53* 39 38   AST 54* 37 34   ALKPHOS 79 63 61   BILITOT 0.6 0.9 0.9   PROT 6.9 5.6* 5.8*   ALBUMIN 2.8* 2.3* 2.3*    and Reticulocytes: No results for input(s): RETIC in the last 48 hours.    Diagnostic Results:  I have reviewed all pertinent imaging results/findings within the past 24 hours.

## 2017-08-04 NOTE — PLAN OF CARE
IMM signed.       08/04/17 0838   Medicare Message   Important Message from Medicare regarding Discharge Appeal Rights Given to patient/caregiver;Explained to patient/caregiver;Signed/date by patient/caregiver   Date IMM was signed 08/04/17   Time IMM was signed 0832

## 2017-08-04 NOTE — CONSULTS
Pharmacy Vancomycin Consult Note    WBC=6.78  Tmax=98.9  CrCl=57.9ml/min Scr=0.9    Cultures: NGTD  Dx. Sepsis/pneumonia  Goal trough=15-20mcg/ml    Vancomycin trough=10.5mcg/ml  Increased dose to Vancomycin 1250mg Q12  Trough due 8/5 @1700    Thank you for allowing us to participate in this patient's care.  Norma Macedo, Pharm D 8/4/2017 7:25 AM

## 2017-08-04 NOTE — PROGRESS NOTES
[facility] -   Hematology/Oncology  Progress Note    Patient Name: Doron Domínguez  Admission Date: 8/2/2017  Hospital Length of Stay: 2 days  Code Status: DNR     Subjective:     HPI:  77-year-old male history of bronchial alveolar carcinoma patient has completed 4 cycles of carboplatinum and Taxol stable disease at the end of 2 cycles call this morning the temperature 101 with chills told her the emergency room for further evaluation was asked see the patient for further evaluation in terms of response to therapy.  Admitted with PNA currently on zysyn/cipro/vanc.    Interval History:   Sob improving; developed significant diarrhea overnight    Oncology Treatment Plan:   OP NSCLC PACLITAXEL + CARBOPLATIN (AUC) (WEEKLY)    Medications:  Continuous Infusions:   sodium chloride 0.9% Stopped (08/04/17 0654)     Scheduled Meds:   albuterol-ipratropium 2.5mg-0.5mg/3mL  3 mL Nebulization Q6H    aspirin  81 mg Oral Daily    atorvastatin  20 mg Oral QHS    ciprofloxacin (CIPRO)400mg/200ml D5W IVPB  400 mg Intravenous Q12H    enoxaparin  40 mg Subcutaneous Daily    guaifenesin  600 mg Oral BID    metoprolol succinate  50 mg Oral Daily    multivit-iron-FA-calcium-mins  1 tablet Oral Daily    pantoprazole  40 mg Oral Daily    piperacillin-tazobactam (ZOSYN) IVPB  4.5 g Intravenous Q8H    tamsulosin  0.4 mg Oral QHS    vancomycin (VANCOCIN) IVPB  1,250 mg Intravenous Q12H     PRN Meds:acetaminophen, benzonatate, cetirizine, ondansetron, ondansetron, polyethylene glycol, promethazine (PHENERGAN) IVPB     Review of Systems   Constitutional: Positive for activity change, fatigue and fever. Negative for appetite change and unexpected weight change.   HENT: Negative for congestion, dental problem, drooling, ear discharge, facial swelling, nosebleeds, rhinorrhea, sinus pressure, sneezing, sore throat, trouble swallowing and voice change.    Respiratory: Positive for shortness of breath and wheezing. Negative  for apnea, cough, choking and chest tightness.    Cardiovascular: Negative for chest pain, palpitations and leg swelling.   Gastrointestinal: Positive for diarrhea. Negative for abdominal distention, abdominal pain, anal bleeding, blood in stool, constipation, nausea and vomiting.   Endocrine: Negative.    Genitourinary: Negative for decreased urine volume, difficulty urinating, frequency, genital sores, hematuria, testicular pain and urgency.   Musculoskeletal: Negative for arthralgias, back pain, gait problem, joint swelling, myalgias, neck pain and neck stiffness.   Skin: Negative for pallor.   Allergic/Immunologic: Negative.    Neurological: Negative for dizziness, tremors, syncope, speech difficulty, weakness, light-headedness, numbness and headaches.   Hematological: Negative for adenopathy. Does not bruise/bleed easily.   Psychiatric/Behavioral: Negative for agitation, confusion and hallucinations. The patient is not nervous/anxious and is not hyperactive.      Objective:     Vital Signs (Most Recent):  Temp: 98.1 °F (36.7 °C) (08/04/17 0735)  Pulse: 95 (08/04/17 0735)  Resp: 20 (08/04/17 0735)  BP: (!) 100/57 (08/04/17 0735)  SpO2: 95 % (08/04/17 0314) Vital Signs (24h Range):  Temp:  [98.1 °F (36.7 °C)-98.9 °F (37.2 °C)] 98.1 °F (36.7 °C)  Pulse:  [] 95  Resp:  [16-20] 20  SpO2:  [92 %-96 %] 95 %  BP: (100-116)/(57-96) 100/57     Weight: 71.7 kg (158 lb)  Body mass index is 26.29 kg/m².  Body surface area is 1.81 meters squared.      Intake/Output Summary (Last 24 hours) at 08/04/17 0747  Last data filed at 08/04/17 0654   Gross per 24 hour   Intake           3507.5 ml   Output             3650 ml   Net           -142.5 ml       Physical Exam   Constitutional: He is oriented to person, place, and time. He appears well-developed and well-nourished.  Non-toxic appearance. He does not appear ill. No distress.   HENT:   Head: Normocephalic and atraumatic.   Right Ear: Tympanic membrane and ear canal  normal.   Left Ear: Tympanic membrane and ear canal normal.   Nose: Nose normal. Right sinus exhibits no maxillary sinus tenderness and no frontal sinus tenderness. Left sinus exhibits no maxillary sinus tenderness and no frontal sinus tenderness.   Mouth/Throat: Oropharynx is clear and moist and mucous membranes are normal. Mucous membranes are not pale and not dry. Normal dentition. No oropharyngeal exudate.   Eyes: Conjunctivae, EOM and lids are normal. Pupils are equal, round, and reactive to light. Right eye exhibits no discharge. Left eye exhibits no discharge. Right conjunctiva is not injected. Right conjunctiva has no hemorrhage. Left conjunctiva is not injected. Left conjunctiva has no hemorrhage. No scleral icterus.   Neck: Normal range of motion. Neck supple. No spinous process tenderness and no muscular tenderness present. No tracheal deviation present. No thyromegaly present.   Cardiovascular: Normal rate, regular rhythm, normal heart sounds and intact distal pulses.  Exam reveals no gallop and no friction rub.    No murmur heard.  Pulmonary/Chest: Effort normal. No respiratory distress. He has decreased breath sounds in the right lower field and the left lower field. He has wheezes. He has no rhonchi. He has no rales. He exhibits no tenderness.   Abdominal: Soft. Bowel sounds are normal. He exhibits no distension and no mass. There is no hepatosplenomegaly. There is no tenderness. There is no rebound and no guarding.   Musculoskeletal: Normal range of motion. He exhibits no edema, tenderness or deformity.   Lymphadenopathy:        Head (right side): No submental and no submandibular adenopathy present.        Head (left side): No submental and no submandibular adenopathy present.     He has no cervical adenopathy.     He has no axillary adenopathy.   Neurological: He is alert and oriented to person, place, and time. No cranial nerve deficit. He exhibits normal muscle tone. Coordination normal.   Skin:  Skin is warm, dry and intact. Capillary refill takes less than 2 seconds. No abrasion, no bruising and no rash noted. He is not diaphoretic. No cyanosis. No pallor. Nails show no clubbing.   Psychiatric: He has a normal mood and affect. His speech is normal and behavior is normal. Judgment and thought content normal.       Significant Labs:   CBC:     Recent Labs  Lab 08/02/17  1400 08/03/17 0438 08/04/17 0429   WBC 6.01 5.83 6.78   HGB 12.1* 10.6* 10.5*   HCT 36.3* 32.5* 32.7*    202 225   , CMP:     Recent Labs  Lab 08/02/17  1400 08/03/17 0437 08/04/17 0429   * 137 132*   K 3.5 4.0 3.5    104 100   CO2 25 26 25   GLU 98 99 133*   BUN 10 8 7*   CREATININE 0.9 0.9 0.9   CALCIUM 9.2 8.6* 8.6*   PROT 6.9 5.6* 5.8*   ALBUMIN 2.8* 2.3* 2.3*   BILITOT 0.6 0.9 0.9   ALKPHOS 79 63 61   AST 54* 37 34   ALT 53* 39 38   ANIONGAP 8 7* 7*   EGFRNONAA >60 >60 >60   , Coagulation:     Recent Labs  Lab 08/02/17  1400   INR 1.1   APTT 33.4*   , Haptoglobin: No results for input(s): HAPTOGLOBIN in the last 48 hours., Immunology: No results for input(s): SPEP, KASH, QUANG, FREELAMBDALI in the last 48 hours., LFTs:     Recent Labs  Lab 08/02/17 1400 08/03/17 0437 08/04/17 0429   ALT 53* 39 38   AST 54* 37 34   ALKPHOS 79 63 61   BILITOT 0.6 0.9 0.9   PROT 6.9 5.6* 5.8*   ALBUMIN 2.8* 2.3* 2.3*    and Reticulocytes: No results for input(s): RETIC in the last 48 hours.    Diagnostic Results:  I have reviewed all pertinent imaging results/findings within the past 24 hours.    Assessment/Plan:     Diarrhea of presumed infectious origin    Patient developed significant diarrhea overnight which may be related to antibiotics or possibly C. difficile colitis    --We will order stool studies for C. 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    Once the patient is medically stable the patient would have a CT scan chest abdomen pelvis to see whether not he is had progressive disease.  The patient's options at this time of relapse would be drugs such as Nivolamab although his PD1 level has been undetectable.  The other options would be further systemic chemotherapy in a non curative setting.         * Sepsis related to Pneumonia of both lower lobes due to infectious organism    Respiratory status continues to improve however the patient has developed significant diarrhea overnight    Continue broad spectrum abx, cipro/zosyn/vanc              Thank you for your consult. I will follow-up with patient. Please contact us if you have any additional questions.     Giovani Knight Jr, MD  Hematology/Oncology  [facility]

## 2017-08-04 NOTE — PLAN OF CARE
Problem: Patient Care Overview  Goal: Plan of Care Review  Outcome: Ongoing (interventions implemented as appropriate)  Patient doing well this shift.  IVF and antibiotics infusing as ordered.  Vital signs stable.  Pain well controlled.  No acute distress noted.  Will continue to monitor. 24 hour chart check performed.

## 2017-08-04 NOTE — PROGRESS NOTES
"[facility] Hospital Medicine  Progress Note    Patient Name: Doron Domínguez  MRN: [id number]  Patient Class: IP- Inpatient   Admission Date: 8/2/2017  Length of Stay: 2 days  Attending Physician: Shiraz Wheatley MD  Primary Care Provider: SHANEKA Art Jr, MD        Subjective:     Principal Problem:Pneumonia of both lower lobes due to infectious organism    HPI:  Mr. Domínguez 76yo male presented to the ED today for fever (Tmax 101.4) which onset gradually 2 days ago. Symptoms are constant and moderate in severity. Associated sxs include chills, fatigue, sob with activity, productive cough (moderate white thick sputum). No mitigating or exacerbating factors reported. Patient also noted increasing weakness over the past 1.5months with some lower ext. Edema.  Patient denies any CP, N/V/D, abdominal pain, dysuria, weakness/numbness, dizziness, and all other sxs at this time. No further complaints or concerns at this time. Per wife, pt last received chemo tx on 7/20/17 for hx of lung cancer. Dr. Leon is his oncologist.    Hospital Course:  8/2/17: patient admitted for sepsis related to PNE, broad spectrum therapy started. Blood and Sputum Cultures Pending 8/3 Pt reports improvement in symptoms. Pt was afebrile overnight. Hem/onc consulted. Continue current management. 8/4/17 Pt reports that he feels "better and stronger" this morning. The patient does report having diarrhea that began overnight. Stool sent for C-diff. Currently on Vanc/zosyn/cipro for treatment of HCAP. Hem/onc following.     Interval History: Pt reports that he feels "better and stronger" this morning. The patient does report having diarrhea that began overnight. Stool sent for C-diff. Currently on Vanc/zosyn/cipro for treatment of HCAP. Hem/onc following.       Review of Systems   Constitutional: Positive for activity change ( increasing general weakness over the past 1.5 months), chills, fatigue and fever ( max 101.4 at home). " Negative for appetite change and unexpected weight change.   HENT: Negative for congestion, facial swelling, rhinorrhea, sinus pressure, sneezing, sore throat and trouble swallowing.    Eyes: Negative for discharge, redness and visual disturbance.   Respiratory: Positive for cough ( productive white thick sputum. moderate amount) and shortness of breath ( worse with activity). Negative for apnea, chest tightness, wheezing and stridor.    Cardiovascular: Positive for leg swelling ( mild noticed recently to lower ext, worse to left). Negative for chest pain and palpitations.   Gastrointestinal: Positive for nausea ( at times, typically related to chemo ). Negative for abdominal distention, abdominal pain, anal bleeding, blood in stool, constipation, diarrhea and vomiting.   Endocrine: Negative for polydipsia, polyphagia and polyuria.   Genitourinary: Positive for decreased urine volume. Negative for difficulty urinating, discharge, dysuria, frequency and hematuria.   Musculoskeletal: Positive for back pain ( chronic-lumbar). Negative for arthralgias, gait problem, joint swelling, myalgias, neck pain and neck stiffness.   Skin: Positive for rash ( red rash to back, itchy at times). Negative for color change, pallor and wound.   Allergic/Immunologic: Positive for immunocompromised state.   Neurological: Negative for dizziness, tremors, seizures, syncope, facial asymmetry, speech difficulty, weakness, light-headedness, numbness and headaches.        Chronic tremors to shoulders, neck and head   Hematological: Does not bruise/bleed easily.   Psychiatric/Behavioral: Negative for behavioral problems, confusion, hallucinations and suicidal ideas. The patient is not nervous/anxious.    All other systems reviewed and are negative.    Objective:     Vital Signs (Most Recent):  Temp: 98.1 °F (36.7 °C) (08/04/17 0735)  Pulse: 99 (08/04/17 0927)  Resp: 20 (08/04/17 0927)  BP: (!) 100/57 (08/04/17 0735)  SpO2: (!) 94 % (08/04/17  0927) Vital Signs (24h Range):  Temp:  [98.1 °F (36.7 °C)-98.9 °F (37.2 °C)] 98.1 °F (36.7 °C)  Pulse:  [] 99  Resp:  [16-20] 20  SpO2:  [92 %-96 %] 94 %  BP: (100-116)/(57-96) 100/57     Weight: 71.7 kg (158 lb)  Body mass index is 26.29 kg/m².    Intake/Output Summary (Last 24 hours) at 08/04/17 1135  Last data filed at 08/04/17 0654   Gross per 24 hour   Intake           2987.5 ml   Output             2750 ml   Net            237.5 ml      Physical Exam   Constitutional: He is oriented to person, place, and time. He appears well-developed and well-nourished. He has a sickly appearance. He appears ill. No distress.   Frail     HENT:   Head: Normocephalic and atraumatic.   Nose: Nose normal.   Mouth/Throat: Mucous membranes are normal.   Eyes: Conjunctivae and EOM are normal. Pupils are equal, round, and reactive to light. No scleral icterus.   Neck: Trachea normal, normal range of motion and full passive range of motion without pain. Neck supple. No JVD present.   Cardiovascular: Normal rate, regular rhythm, normal heart sounds and intact distal pulses.    No murmur heard.  Pulses:       Dorsalis pedis pulses are 1+ on the right side, and 1+ on the left side.   Trace edema to left lower ankle   +1 edema noted to right lower ankle.      Pulmonary/Chest: Effort normal. No accessory muscle usage or stridor. Tachypnea noted. No respiratory distress. He has no wheezes ( diffuse). He has rhonchi in the right middle field, the right lower field and the left middle field. He exhibits no tenderness.   Barrel chest     Abdominal: Soft. Bowel sounds are normal. He exhibits no distension. There is no tenderness.   Genitourinary:   Genitourinary Comments: No complaints   Musculoskeletal: Normal range of motion. He exhibits no edema, tenderness or deformity.   involuntary tremors noted to head and neck   Neurological: He is alert and oriented to person, place, and time. No sensory deficit. GCS eye subscore is 4. GCS  verbal subscore is 5. GCS motor subscore is 6.   Skin: Skin is warm and dry. Capillary refill takes 2 to 3 seconds. Rash ( red micropapular rash generalized to back) noted. No erythema.   Psychiatric: He has a normal mood and affect. His speech is normal and behavior is normal. Judgment and thought content normal. Cognition and memory are normal.   Nursing note and vitals reviewed.      Significant Labs: All pertinent labs within the past 24 hours have been reviewed.    Significant Imaging:   Imaging Results          X-Ray Chest AP Portable (Final result)  Result time 08/02/17 14:26:06    Final result by GARETH Dukes Sr., MD (08/02/17 14:26:06)                 Impression:        1. Interval worsening of the appearance of the lungs. There is a moderate amount of alveolar consolidation in the lower halves of both lungs. This is consistent with the patient's history and characteristic of pneumonia.  2. There is persistent opacification of the base of the left hemithorax. This is characteristic of a small left pleural effusion.  3. A left internal jugular venous line remains in place.  4. Surgical changes      Electronically signed by: GARETH DUKES MD  Date:     08/02/17  Time:    14:26              Narrative:    One-view chest x-ray    Clinical History: Sepsis    Finding: Comparison was made to prior examination performed on 5/18/2017. The borders of the heart are not well seen. There is a moderate amount of alveolar consolidation in the lower halves of both lungs. There is persistent opacification of the base of the left hemithorax. The right costophrenic angle is sharp. There is no pneumothorax. A left internal jugular venous line remains in place. There are multiple sternotomy wires in place. There are multiple surgical clips projected over the mediastinum.                                 Assessment/Plan:      * Sepsis related to Pneumonia of both lower lobes due to infectious organism    -Admitted   -Blood  Cultures and Sputum Cultures Pending  -Broad Spectum antx (vanc, zoysn, cipro)  -Inpatient Resp. Therapy Consult   -CBC in AM          Diarrhea of presumed infectious origin    - C diff pending           Bilateral Lower leg edema    -Monitor  -Echo showed normal EF   -CBC, CMP in AM          Acute cystitis with hematuria    -Urine Culture pending  -Triple abx coverage  -Monitor.           Transaminitis    - improved   - monitor LFT's           Hypophosphatemia    - monitor   - replete as needed          Hyponatremia    -NS IV fluids  -CMP in AM  -monitor.           Essential hypertension    -- Continue BB, BP meds with parameters.           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  --Currently on Chemo, with Left Chest Mediport           LUX (dyspnea on exertion)    -Echo showed normal EF  -Current BNP less than 100.  -Last echo in 2016, patient with history of Cardiac Bypass and current Chemo patient.               VTE Risk Mitigation         Ordered     enoxaparin injection 40 mg  Daily     Route:  Subcutaneous        08/02/17 1842     Medium Risk of VTE  Once      08/02/17 1638     Place sequential compression device  Until discontinued      08/02/17 1638              Omar Gross NP  Department of Hospital Medicine   [facility]

## 2017-08-04 NOTE — SUBJECTIVE & OBJECTIVE
"Interval History: Pt reports that he feels "better and stronger" this morning. The patient does report having diarrhea that began overnight. Stool sent for C-diff. Currently on Vanc/zosyn/cipro for treatment of HCAP. Hem/onc following.       Review of Systems   Constitutional: Positive for activity change ( increasing general weakness over the past 1.5 months), chills, fatigue and fever ( max 101.4 at home). Negative for appetite change and unexpected weight change.   HENT: Negative for congestion, facial swelling, rhinorrhea, sinus pressure, sneezing, sore throat and trouble swallowing.    Eyes: Negative for discharge, redness and visual disturbance.   Respiratory: Positive for cough ( productive white thick sputum. moderate amount) and shortness of breath ( worse with activity). Negative for apnea, chest tightness, wheezing and stridor.    Cardiovascular: Positive for leg swelling ( mild noticed recently to lower ext, worse to left). Negative for chest pain and palpitations.   Gastrointestinal: Positive for nausea ( at times, typically related to chemo ). Negative for abdominal distention, abdominal pain, anal bleeding, blood in stool, constipation, diarrhea and vomiting.   Endocrine: Negative for polydipsia, polyphagia and polyuria.   Genitourinary: Positive for decreased urine volume. Negative for difficulty urinating, discharge, dysuria, frequency and hematuria.   Musculoskeletal: Positive for back pain ( chronic-lumbar). Negative for arthralgias, gait problem, joint swelling, myalgias, neck pain and neck stiffness.   Skin: Positive for rash ( red rash to back, itchy at times). Negative for color change, pallor and wound.   Allergic/Immunologic: Positive for immunocompromised state.   Neurological: Negative for dizziness, tremors, seizures, syncope, facial asymmetry, speech difficulty, weakness, light-headedness, numbness and headaches.        Chronic tremors to shoulders, neck and head   Hematological: Does " not bruise/bleed easily.   Psychiatric/Behavioral: Negative for behavioral problems, confusion, hallucinations and suicidal ideas. The patient is not nervous/anxious.    All other systems reviewed and are negative.    Objective:     Vital Signs (Most Recent):  Temp: 98.1 °F (36.7 °C) (08/04/17 0735)  Pulse: 99 (08/04/17 0927)  Resp: 20 (08/04/17 0927)  BP: (!) 100/57 (08/04/17 0735)  SpO2: (!) 94 % (08/04/17 0927) Vital Signs (24h Range):  Temp:  [98.1 °F (36.7 °C)-98.9 °F (37.2 °C)] 98.1 °F (36.7 °C)  Pulse:  [] 99  Resp:  [16-20] 20  SpO2:  [92 %-96 %] 94 %  BP: (100-116)/(57-96) 100/57     Weight: 71.7 kg (158 lb)  Body mass index is 26.29 kg/m².    Intake/Output Summary (Last 24 hours) at 08/04/17 1135  Last data filed at 08/04/17 0654   Gross per 24 hour   Intake           2987.5 ml   Output             2750 ml   Net            237.5 ml      Physical Exam   Constitutional: He is oriented to person, place, and time. He appears well-developed and well-nourished. He has a sickly appearance. He appears ill. No distress.   Frail     HENT:   Head: Normocephalic and atraumatic.   Nose: Nose normal.   Mouth/Throat: Mucous membranes are normal.   Eyes: Conjunctivae and EOM are normal. Pupils are equal, round, and reactive to light. No scleral icterus.   Neck: Trachea normal, normal range of motion and full passive range of motion without pain. Neck supple. No JVD present.   Cardiovascular: Normal rate, regular rhythm, normal heart sounds and intact distal pulses.    No murmur heard.  Pulses:       Dorsalis pedis pulses are 1+ on the right side, and 1+ on the left side.   Trace edema to left lower ankle   +1 edema noted to right lower ankle.      Pulmonary/Chest: Effort normal. No accessory muscle usage or stridor. Tachypnea noted. No respiratory distress. He has no wheezes ( diffuse). He has rhonchi in the right middle field, the right lower field and the left middle field. He exhibits no tenderness.   Barrel  chest     Abdominal: Soft. Bowel sounds are normal. He exhibits no distension. There is no tenderness.   Genitourinary:   Genitourinary Comments: No complaints   Musculoskeletal: Normal range of motion. He exhibits no edema, tenderness or deformity.   involuntary tremors noted to head and neck   Neurological: He is alert and oriented to person, place, and time. No sensory deficit. GCS eye subscore is 4. GCS verbal subscore is 5. GCS motor subscore is 6.   Skin: Skin is warm and dry. Capillary refill takes 2 to 3 seconds. Rash ( red micropapular rash generalized to back) noted. No erythema.   Psychiatric: He has a normal mood and affect. His speech is normal and behavior is normal. Judgment and thought content normal. Cognition and memory are normal.   Nursing note and vitals reviewed.      Significant Labs: All pertinent labs within the past 24 hours have been reviewed.    Significant Imaging:   Imaging Results          X-Ray Chest AP Portable (Final result)  Result time 08/02/17 14:26:06    Final result by GARETH Dukes Sr., MD (08/02/17 14:26:06)                 Impression:        1. Interval worsening of the appearance of the lungs. There is a moderate amount of alveolar consolidation in the lower halves of both lungs. This is consistent with the patient's history and characteristic of pneumonia.  2. There is persistent opacification of the base of the left hemithorax. This is characteristic of a small left pleural effusion.  3. A left internal jugular venous line remains in place.  4. Surgical changes      Electronically signed by: GARETH DUKES MD  Date:     08/02/17  Time:    14:26              Narrative:    One-view chest x-ray    Clinical History: Sepsis    Finding: Comparison was made to prior examination performed on 5/18/2017. The borders of the heart are not well seen. There is a moderate amount of alveolar consolidation in the lower halves of both lungs. There is persistent opacification of the base of  the left hemithorax. The right costophrenic angle is sharp. There is no pneumothorax. A left internal jugular venous line remains in place. There are multiple sternotomy wires in place. There are multiple surgical clips projected over the mediastinum.

## 2017-08-04 NOTE — PLAN OF CARE
Pt remains free of injuries. No c/o pain. No diarrhea since this morning. C-diff negative. Lomotil ordered PRN. IVF and antibiotics per MD orders. Breath sounds coarse, diminished all lobes. Moderate amount of clear, frothy, thin, sputum expectorated. 12 hr chart check completed. Will continue to monitor.

## 2017-08-04 NOTE — PLAN OF CARE
Met with pt and spouse for assessment.  The pt is currently being treated for lung cancer by Dr. Leon.  Surgery was successfully done in January to remove the tumor but cancer returned two months later.  The pt is aware of cancer services and is utilizing resource.  He lives at home with his wife and only uses a cane for assistance.  He is not sure, and seems doubtful that he will need home health as he transitions home but has used [facility] in the past. The pt's main concern is whether or not he will need home oxygen.  He will be evaluated for oxygen prior to discharge.      D/C plan: likely home        08/04/17 0833   Discharge Assessment   Assessment Type Discharge Planning Assessment   Confirmed/corrected address and phone number on facesheet? Yes   Assessment information obtained from? Patient;Caregiver;Medical Record   Expected Length of Stay (days) (TBD)   Prior to hospitilization cognitive status: Alert/Oriented   Prior to hospitalization functional status: Assistive Equipment   Current cognitive status: Alert/Oriented   Current Functional Status: Assistive Equipment   Arrived From home or self-care   Lives With spouse   Able to Return to Prior Arrangements yes   Is patient able to care for self after discharge? Yes   How many people do you have in your home that can help with your care after discharge? 1   Who are your caregiver(s) and their phone number(s)? Anna [place], spouse: [phone] (home), [phone] (mobile)   Patient's perception of discharge disposition home or selfcare   Readmission Within The Last 30 Days no previous admission in last 30 days   Patient currently being followed by outpatient case management? No   Patient currently receives home health services? No   Does the patient currently use HME? Yes   Patient currently receives private duty nursing? No   Patient currently receives any other outside agency services? No   Equipment Currently Used at Home cane, straight   Do  you have any problems affording any of your prescribed medications? No   Is the patient taking medications as prescribed? yes   Do you have any financial concerns preventing you from receiving the healthcare you need? No   Does the patient have transportation to healthcare appointments? Yes   Transportation Available family or friend will provide   On Dialysis? No   Does the patient receive services at the [facility]? No   Are there any open cases? No   Discharge Plan A Home   Discharge Plan B Home   Patient/Family In Agreement With Plan yes

## 2017-08-05 LAB
ALBUMIN SERPL BCP-MCNC: 2.3 G/DL
ALP SERPL-CCNC: 59 U/L
ALT SERPL W/O P-5'-P-CCNC: 41 U/L
ANION GAP SERPL CALC-SCNC: 9 MMOL/L
AST SERPL-CCNC: 39 U/L
BACTERIA SPEC AEROBE CULT: NORMAL
BASOPHILS # BLD AUTO: 0.08 K/UL
BASOPHILS NFR BLD: 1 %
BILIRUB SERPL-MCNC: 0.9 MG/DL
BUN SERPL-MCNC: 7 MG/DL
CALCIUM SERPL-MCNC: 9 MG/DL
CHLORIDE SERPL-SCNC: 100 MMOL/L
CO2 SERPL-SCNC: 27 MMOL/L
CREAT SERPL-MCNC: 0.9 MG/DL
DIFFERENTIAL METHOD: ABNORMAL
EOSINOPHIL # BLD AUTO: 0.2 K/UL
EOSINOPHIL NFR BLD: 3 %
ERYTHROCYTE [DISTWIDTH] IN BLOOD BY AUTOMATED COUNT: 18.9 %
EST. GFR  (AFRICAN AMERICAN): >60 ML/MIN/1.73 M^2
EST. GFR  (NON AFRICAN AMERICAN): >60 ML/MIN/1.73 M^2
GLUCOSE SERPL-MCNC: 106 MG/DL
GRAM STN SPEC: NORMAL
HCT VFR BLD AUTO: 33.5 %
HGB BLD-MCNC: 10.7 G/DL
LYMPHOCYTES # BLD AUTO: 0.9 K/UL
LYMPHOCYTES NFR BLD: 11.7 %
MCH RBC QN AUTO: 30.9 PG
MCHC RBC AUTO-ENTMCNC: 31.9 G/DL
MCV RBC AUTO: 97 FL
MONOCYTES # BLD AUTO: 1.8 K/UL
MONOCYTES NFR BLD: 23.5 %
NEUTROPHILS # BLD AUTO: 4.7 K/UL
NEUTROPHILS NFR BLD: 60.8 %
PLATELET # BLD AUTO: 240 K/UL
PMV BLD AUTO: 9 FL
POTASSIUM SERPL-SCNC: 4 MMOL/L
PROT SERPL-MCNC: 6 G/DL
RBC # BLD AUTO: 3.46 M/UL
SODIUM SERPL-SCNC: 136 MMOL/L
WBC # BLD AUTO: 7.79 K/UL

## 2017-08-05 PROCEDURE — 25000242 PHARM REV CODE 250 ALT 637 W/ HCPCS: Performed by: NURSE PRACTITIONER

## 2017-08-05 PROCEDURE — 25000003 PHARM REV CODE 250: Performed by: INTERNAL MEDICINE

## 2017-08-05 PROCEDURE — 27000221 HC OXYGEN, UP TO 24 HOURS

## 2017-08-05 PROCEDURE — 94640 AIRWAY INHALATION TREATMENT: CPT

## 2017-08-05 PROCEDURE — 25000003 PHARM REV CODE 250: Performed by: EMERGENCY MEDICINE

## 2017-08-05 PROCEDURE — 21400001 HC TELEMETRY ROOM

## 2017-08-05 PROCEDURE — 63600175 PHARM REV CODE 636 W HCPCS: Performed by: EMERGENCY MEDICINE

## 2017-08-05 PROCEDURE — 85025 COMPLETE CBC W/AUTO DIFF WBC: CPT

## 2017-08-05 PROCEDURE — 99233 SBSQ HOSP IP/OBS HIGH 50: CPT | Mod: ,,, | Performed by: INTERNAL MEDICINE

## 2017-08-05 PROCEDURE — 25000003 PHARM REV CODE 250: Performed by: NURSE PRACTITIONER

## 2017-08-05 PROCEDURE — 94799 UNLISTED PULMONARY SVC/PX: CPT

## 2017-08-05 PROCEDURE — 63600175 PHARM REV CODE 636 W HCPCS: Performed by: INTERNAL MEDICINE

## 2017-08-05 PROCEDURE — 36415 COLL VENOUS BLD VENIPUNCTURE: CPT

## 2017-08-05 PROCEDURE — 80053 COMPREHEN METABOLIC PANEL: CPT

## 2017-08-05 RX ADMIN — IPRATROPIUM BROMIDE AND ALBUTEROL SULFATE 3 ML: .5; 3 SOLUTION RESPIRATORY (INHALATION) at 01:08

## 2017-08-05 RX ADMIN — ENOXAPARIN SODIUM 40 MG: 100 INJECTION SUBCUTANEOUS at 05:08

## 2017-08-05 RX ADMIN — TAMSULOSIN HYDROCHLORIDE 0.4 MG: 0.4 CAPSULE ORAL at 08:08

## 2017-08-05 RX ADMIN — METOPROLOL SUCCINATE 50 MG: 50 TABLET, EXTENDED RELEASE ORAL at 09:08

## 2017-08-05 RX ADMIN — CIPROFLOXACIN 400 MG: 2 INJECTION, SOLUTION INTRAVENOUS at 05:08

## 2017-08-05 RX ADMIN — MULTIPLE VITAMINS W/ MINERALS TAB 1 TABLET: TAB at 09:08

## 2017-08-05 RX ADMIN — VANCOMYCIN HYDROCHLORIDE 1250 MG: 100 INJECTION, POWDER, LYOPHILIZED, FOR SOLUTION INTRAVENOUS at 05:08

## 2017-08-05 RX ADMIN — CIPROFLOXACIN 400 MG: 2 INJECTION, SOLUTION INTRAVENOUS at 06:08

## 2017-08-05 RX ADMIN — GUAIFENESIN 600 MG: 600 TABLET, EXTENDED RELEASE ORAL at 08:08

## 2017-08-05 RX ADMIN — DIPHENOXYLATE HYDROCHLORIDE AND ATROPINE SULFATE 1 TABLET: 2.5; .025 TABLET ORAL at 06:08

## 2017-08-05 RX ADMIN — GUAIFENESIN 600 MG: 600 TABLET, EXTENDED RELEASE ORAL at 09:08

## 2017-08-05 RX ADMIN — PIPERACILLIN SODIUM AND TAZOBACTAM SODIUM 4.5 G: 4; .5 INJECTION, POWDER, LYOPHILIZED, FOR SOLUTION INTRAVENOUS at 09:08

## 2017-08-05 RX ADMIN — ATORVASTATIN CALCIUM 20 MG: 10 TABLET, FILM COATED ORAL at 08:08

## 2017-08-05 RX ADMIN — PANTOPRAZOLE SODIUM 40 MG: 40 TABLET, DELAYED RELEASE ORAL at 09:08

## 2017-08-05 RX ADMIN — PIPERACILLIN SODIUM AND TAZOBACTAM SODIUM 4.5 G: 4; .5 INJECTION, POWDER, LYOPHILIZED, FOR SOLUTION INTRAVENOUS at 12:08

## 2017-08-05 RX ADMIN — ASPIRIN 81 MG 81 MG: 81 TABLET ORAL at 09:08

## 2017-08-05 NOTE — SUBJECTIVE & OBJECTIVE
Interval History:   Sys diarrhea is better. Continues on antibiotics. He is no distress  Oncology Treatment Plan:   OP NSCLC PACLITAXEL + CARBOPLATIN (AUC) (WEEKLY)    Medications:  Continuous Infusions:   sodium chloride 0.9% 50 mL/hr at 08/04/17 2137     Scheduled Meds:   albuterol-ipratropium 2.5mg-0.5mg/3mL  3 mL Nebulization Q6H    aspirin  81 mg Oral Daily    atorvastatin  20 mg Oral QHS    ciprofloxacin (CIPRO)400mg/200ml D5W IVPB  400 mg Intravenous Q12H    enoxaparin  40 mg Subcutaneous Daily    guaifenesin  600 mg Oral BID    metoprolol succinate  50 mg Oral Daily    multivit-iron-FA-calcium-mins  1 tablet Oral Daily    pantoprazole  40 mg Oral Daily    piperacillin-tazobactam (ZOSYN) IVPB  4.5 g Intravenous Q8H    tamsulosin  0.4 mg Oral QHS    vancomycin (VANCOCIN) IVPB  1,250 mg Intravenous Q12H     PRN Meds:acetaminophen, benzonatate, cetirizine, diphenoxylate-atropine 2.5-0.025 mg, ondansetron, ondansetron, polyethylene glycol, promethazine (PHENERGAN) IVPB     Review of Systems   Constitutional: Negative.  Negative for fatigue.   Eyes: Negative.    Respiratory: Positive for cough and shortness of breath.    Cardiovascular: Negative.  Negative for chest pain.   Gastrointestinal: Negative for abdominal pain and nausea.   Genitourinary: Negative.  Negative for hematuria.   Musculoskeletal: Negative.    Skin: Negative.    Neurological: Negative.    Psychiatric/Behavioral: Negative.      Objective:     Vital Signs (Most Recent):  Temp: 99.1 °F (37.3 °C) (08/05/17 0714)  Pulse: 94 (08/05/17 0714)  Resp: 20 (08/05/17 0714)  BP: (!) 108/58 (08/05/17 0714)  SpO2: (!) 94 % (08/05/17 0714) Vital Signs (24h Range):  Temp:  [98.6 °F (37 °C)-100.3 °F (37.9 °C)] 99.1 °F (37.3 °C)  Pulse:  [] 94  Resp:  [17-20] 20  SpO2:  [94 %-97 %] 94 %  BP: (100-155)/(55-61) 108/58     Weight: 71.7 kg (158 lb)  Body mass index is 26.29 kg/m².  Body surface area is 1.81 meters squared.      Intake/Output Summary  (Last 24 hours) at 08/05/17 0736  Last data filed at 08/05/17 0500   Gross per 24 hour   Intake             2205 ml   Output             1250 ml   Net              955 ml       Physical Exam   Constitutional: He is oriented to person, place, and time. He appears well-developed and well-nourished.   HENT:   Head: Normocephalic.   Mouth/Throat: No oropharyngeal exudate.   Eyes: Pupils are equal, round, and reactive to light.   Neck: No thyromegaly present.   Cardiovascular: Normal rate, regular rhythm and normal heart sounds.  Exam reveals no gallop.    No murmur heard.  Pulmonary/Chest: No respiratory distress. He has no wheezes. He has no rales.   Crackles on both lungs thrugh both lung fields   Abdominal: Soft. Bowel sounds are normal. He exhibits no distension and no mass. There is no rebound and no guarding.   Musculoskeletal: Normal range of motion. He exhibits no edema.   Lymphadenopathy:     He has no cervical adenopathy.   Neurological: He is alert and oriented to person, place, and time.   Skin: Skin is warm and dry.   Psychiatric: He has a normal mood and affect. His behavior is normal.       Significant Labs:   CBC:   Recent Labs  Lab 08/04/17  0429 08/05/17  0446   WBC 6.78 7.79   HGB 10.5* 10.7*   HCT 32.7* 33.5*    240    and CMP:   Recent Labs  Lab 08/04/17 0429 08/05/17  0446   * 136   K 3.5 4.0    100   CO2 25 27   * 106   BUN 7* 7*   CREATININE 0.9 0.9   CALCIUM 8.6* 9.0   PROT 5.8* 6.0   ALBUMIN 2.3* 2.3*   BILITOT 0.9 0.9   ALKPHOS 61 59   AST 34 39   ALT 38 41   ANIONGAP 7* 9   EGFRNONAA >60 >60       Diagnostic Results:  I have reviewed all pertinent imaging results/findings within the past 24 hours.

## 2017-08-05 NOTE — PROGRESS NOTES
[facility]  Hematology/Oncology  Progress Note    Patient Name: Doron Domínguez  Admission Date: 8/2/2017  Hospital Length of Stay: 3 days  Code Status: DNR     Subjective:     HPI:  77-year-old male history of bronchial alveolar carcinoma patient has completed 4 cycles of carboplatinum and Taxol stable disease at the end of 2 cycles call this morning the temperature 101 with chills told her the emergency room for further evaluation was asked see the patient for further evaluation in terms of response to therapy.  Admitted with PNA currently on zysyn/cipro/vanc.    Interval History:   Sys diarrhea is better. Continues on antibiotics. He is no distress  Oncology Treatment Plan:   OP NSCLC PACLITAXEL + CARBOPLATIN (AUC) (WEEKLY)    Medications:  Continuous Infusions:   sodium chloride 0.9% 50 mL/hr at 08/04/17 2137     Scheduled Meds:   albuterol-ipratropium 2.5mg-0.5mg/3mL  3 mL Nebulization Q6H    aspirin  81 mg Oral Daily    atorvastatin  20 mg Oral QHS    ciprofloxacin (CIPRO)400mg/200ml D5W IVPB  400 mg Intravenous Q12H    enoxaparin  40 mg Subcutaneous Daily    guaifenesin  600 mg Oral BID    metoprolol succinate  50 mg Oral Daily    multivit-iron-FA-calcium-mins  1 tablet Oral Daily    pantoprazole  40 mg Oral Daily    piperacillin-tazobactam (ZOSYN) IVPB  4.5 g Intravenous Q8H    tamsulosin  0.4 mg Oral QHS    vancomycin (VANCOCIN) IVPB  1,250 mg Intravenous Q12H     PRN Meds:acetaminophen, benzonatate, cetirizine, diphenoxylate-atropine 2.5-0.025 mg, ondansetron, ondansetron, polyethylene glycol, promethazine (PHENERGAN) IVPB     Review of Systems   Constitutional: Negative.  Negative for fatigue.   Eyes: Negative.    Respiratory: Positive for cough and shortness of breath.    Cardiovascular: Negative.  Negative for chest pain.   Gastrointestinal: Negative for abdominal pain and nausea.   Genitourinary: Negative.  Negative for hematuria.   Musculoskeletal: Negative.    Skin:  Negative.    Neurological: Negative.    Psychiatric/Behavioral: Negative.      Objective:     Vital Signs (Most Recent):  Temp: 99.1 °F (37.3 °C) (08/05/17 0714)  Pulse: 94 (08/05/17 0714)  Resp: 20 (08/05/17 0714)  BP: (!) 108/58 (08/05/17 0714)  SpO2: (!) 94 % (08/05/17 0714) Vital Signs (24h Range):  Temp:  [98.6 °F (37 °C)-100.3 °F (37.9 °C)] 99.1 °F (37.3 °C)  Pulse:  [] 94  Resp:  [17-20] 20  SpO2:  [94 %-97 %] 94 %  BP: (100-155)/(55-61) 108/58     Weight: 71.7 kg (158 lb)  Body mass index is 26.29 kg/m².  Body surface area is 1.81 meters squared.      Intake/Output Summary (Last 24 hours) at 08/05/17 0736  Last data filed at 08/05/17 0500   Gross per 24 hour   Intake             2205 ml   Output             1250 ml   Net              955 ml       Physical Exam   Constitutional: He is oriented to person, place, and time. He appears well-developed and well-nourished.   HENT:   Head: Normocephalic.   Mouth/Throat: No oropharyngeal exudate.   Eyes: Pupils are equal, round, and reactive to light.   Neck: No thyromegaly present.   Cardiovascular: Normal rate, regular rhythm and normal heart sounds.  Exam reveals no gallop.    No murmur heard.  Pulmonary/Chest: No respiratory distress. He has no wheezes. He has no rales.   Crackles on both lungs thrugh both lung fields   Abdominal: Soft. Bowel sounds are normal. He exhibits no distension and no mass. There is no rebound and no guarding.   Musculoskeletal: Normal range of motion. He exhibits no edema.   Lymphadenopathy:     He has no cervical adenopathy.   Neurological: He is alert and oriented to person, place, and time.   Skin: Skin is warm and dry.   Psychiatric: He has a normal mood and affect. His behavior is normal.       Significant Labs:   CBC:   Recent Labs  Lab 08/04/17  0429 08/05/17  0446   WBC 6.78 7.79   HGB 10.5* 10.7*   HCT 32.7* 33.5*    240    and CMP:   Recent Labs  Lab 08/04/17  0429 08/05/17  0446   * 136   K 3.5 4.0     100   CO2 25 27   * 106   BUN 7* 7*   CREATININE 0.9 0.9   CALCIUM 8.6* 9.0   PROT 5.8* 6.0   ALBUMIN 2.3* 2.3*   BILITOT 0.9 0.9   ALKPHOS 61 59   AST 34 39   ALT 38 41   ANIONGAP 7* 9   EGFRNONAA >60 >60       Diagnostic Results:  I have reviewed all pertinent imaging results/findings within the past 24 hours.    Assessment/Plan:     Diarrhea of presumed infectious origin    Patient developed significant diarrhea overnight which may be related to antibiotics or possibly C. difficile colitis    --We will order stool studies for C. 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    Since broncho-alveloar cancer can present as a diffuse process, the ongoing abnormalities on x rays can represent either pneumonia or extension of cancer.  Currently he is being treated as a pneumonia with at least some initial improvement. Will monitor        * Sepsis related to Pneumonia of both lower lobes due to infectious organism    He is afebrile and stable. X rays showed extensive bilateral infiltrates. Based on the natural history of bronchioalveolar carcinoma, the main differential diagnosis is that of pneumonia vs bronchioalveolar carcinoma involving both lungs. He is being treated as if he had pneumonia and seems to have improved to some edtent              Thank you for your consult. I will follow-up with patient. Please contact us if you have any additional questions.     Chapin Mae MD  Hematology/Oncology  [facility]

## 2017-08-05 NOTE — PLAN OF CARE
Problem: Patient Care Overview  Goal: Plan of Care Review  Outcome: Ongoing (interventions implemented as appropriate)  Remains free from harm, no c/o pain, diarrhea mostly resolved, no acute distress noted. 24 hour chart check complete.

## 2017-08-05 NOTE — PLAN OF CARE
Pt remains free of injuries. No c/o pain. Diarrhea x4. Lomotil ordered PRN. IVF and antibiotics per MD orders. Breath sounds coarse, diminished all lobes. Moderate amount of clear, light green frothy, thck, sputum expectorated. 12 hr chart check completed. Will continue to monitor.

## 2017-08-05 NOTE — CLINICAL REVIEW
Pharmacy Vancomycin Consult    Consult day #4  Vancomycin trough was not drawn today   Rescheduled for AM RANDOM at 0900  Pushed 1,000 mg every 12 hours order to begin 8/6 at 1200  Also on Cipro and Zosyn  Dx: Pneumonia; Chemo patient  Goal trough 15-20  WBC 7.79, Tmax 102.4   Scr 0.9 (CrCl 45.6 ml/min)  Respiratory culture few g+ cocci, rare gram - and + rods, rare gram - diplococci    Pharmacy will continue to closely monitor patient and make adjustments as necessary.   Thank you for allowing us to participate in this patient's care,   Soraya Valdivia 8/5/2017 7:13 PM

## 2017-08-06 PROBLEM — N30.01 ACUTE CYSTITIS WITH HEMATURIA: Status: RESOLVED | Noted: 2017-08-02 | Resolved: 2017-08-06

## 2017-08-06 PROBLEM — N17.9 AKI (ACUTE KIDNEY INJURY): Status: ACTIVE | Noted: 2017-08-06

## 2017-08-06 PROBLEM — R60.0 LOWER LEG EDEMA: Status: RESOLVED | Noted: 2017-08-02 | Resolved: 2017-08-06

## 2017-08-06 PROBLEM — C78.00 SECONDARY LUNG CANCER: Status: RESOLVED | Noted: 2017-04-10 | Resolved: 2017-08-06

## 2017-08-06 PROBLEM — J98.09 BRONCHORRHEA: Status: RESOLVED | Noted: 2017-04-27 | Resolved: 2017-08-06

## 2017-08-06 LAB
ALBUMIN SERPL BCP-MCNC: 2.3 G/DL
ALP SERPL-CCNC: 58 U/L
ALT SERPL W/O P-5'-P-CCNC: 48 U/L
ANION GAP SERPL CALC-SCNC: 9 MMOL/L
AST SERPL-CCNC: 53 U/L
BASOPHILS # BLD AUTO: 0.08 K/UL
BASOPHILS NFR BLD: 0.9 %
BILIRUB SERPL-MCNC: 0.8 MG/DL
BUN SERPL-MCNC: 13 MG/DL
CALCIUM SERPL-MCNC: 9.2 MG/DL
CHLORIDE SERPL-SCNC: 101 MMOL/L
CO2 SERPL-SCNC: 27 MMOL/L
CREAT SERPL-MCNC: 1.9 MG/DL
DIFFERENTIAL METHOD: ABNORMAL
EOSINOPHIL # BLD AUTO: 0.2 K/UL
EOSINOPHIL NFR BLD: 2.2 %
ERYTHROCYTE [DISTWIDTH] IN BLOOD BY AUTOMATED COUNT: 18.8 %
EST. GFR  (AFRICAN AMERICAN): 38 ML/MIN/1.73 M^2
EST. GFR  (NON AFRICAN AMERICAN): 33 ML/MIN/1.73 M^2
GLUCOSE SERPL-MCNC: 122 MG/DL
HCT VFR BLD AUTO: 33.4 %
HGB BLD-MCNC: 10.7 G/DL
LYMPHOCYTES # BLD AUTO: 0.7 K/UL
LYMPHOCYTES NFR BLD: 8 %
MCH RBC QN AUTO: 31 PG
MCHC RBC AUTO-ENTMCNC: 32 G/DL
MCV RBC AUTO: 97 FL
MONOCYTES # BLD AUTO: 2.4 K/UL
MONOCYTES NFR BLD: 25.6 %
NEUTROPHILS # BLD AUTO: 5.9 K/UL
NEUTROPHILS NFR BLD: 63.8 %
PHOSPHATE SERPL-MCNC: 3.2 MG/DL
PLATELET # BLD AUTO: 262 K/UL
PMV BLD AUTO: 9.2 FL
POTASSIUM SERPL-SCNC: 4.2 MMOL/L
PROT SERPL-MCNC: 6 G/DL
RBC # BLD AUTO: 3.45 M/UL
SODIUM SERPL-SCNC: 137 MMOL/L
VANCOMYCIN SERPL-MCNC: 27 UG/ML
WBC # BLD AUTO: 9.3 K/UL

## 2017-08-06 PROCEDURE — 25000003 PHARM REV CODE 250: Performed by: NURSE PRACTITIONER

## 2017-08-06 PROCEDURE — 80202 ASSAY OF VANCOMYCIN: CPT

## 2017-08-06 PROCEDURE — 25000003 PHARM REV CODE 250: Performed by: EMERGENCY MEDICINE

## 2017-08-06 PROCEDURE — 25000242 PHARM REV CODE 250 ALT 637 W/ HCPCS: Performed by: NURSE PRACTITIONER

## 2017-08-06 PROCEDURE — 27000221 HC OXYGEN, UP TO 24 HOURS

## 2017-08-06 PROCEDURE — 99233 SBSQ HOSP IP/OBS HIGH 50: CPT | Mod: ,,, | Performed by: INTERNAL MEDICINE

## 2017-08-06 PROCEDURE — 94640 AIRWAY INHALATION TREATMENT: CPT

## 2017-08-06 PROCEDURE — 63600175 PHARM REV CODE 636 W HCPCS: Performed by: INTERNAL MEDICINE

## 2017-08-06 PROCEDURE — 21400001 HC TELEMETRY ROOM

## 2017-08-06 PROCEDURE — 63600175 PHARM REV CODE 636 W HCPCS: Performed by: EMERGENCY MEDICINE

## 2017-08-06 PROCEDURE — 36415 COLL VENOUS BLD VENIPUNCTURE: CPT

## 2017-08-06 PROCEDURE — S0030 INJECTION, METRONIDAZOLE: HCPCS | Performed by: INTERNAL MEDICINE

## 2017-08-06 PROCEDURE — 96372 THER/PROPH/DIAG INJ SC/IM: CPT

## 2017-08-06 PROCEDURE — 94761 N-INVAS EAR/PLS OXIMETRY MLT: CPT

## 2017-08-06 PROCEDURE — 84100 ASSAY OF PHOSPHORUS: CPT

## 2017-08-06 PROCEDURE — 85025 COMPLETE CBC W/AUTO DIFF WBC: CPT

## 2017-08-06 PROCEDURE — 80053 COMPREHEN METABOLIC PANEL: CPT

## 2017-08-06 PROCEDURE — 25000003 PHARM REV CODE 250: Performed by: INTERNAL MEDICINE

## 2017-08-06 PROCEDURE — 94799 UNLISTED PULMONARY SVC/PX: CPT

## 2017-08-06 RX ORDER — METRONIDAZOLE 500 MG/100ML
500 INJECTION, SOLUTION INTRAVENOUS
Status: DISCONTINUED | OUTPATIENT
Start: 2017-08-06 | End: 2017-08-09 | Stop reason: HOSPADM

## 2017-08-06 RX ORDER — HEPARIN SODIUM 5000 [USP'U]/ML
5000 INJECTION, SOLUTION INTRAVENOUS; SUBCUTANEOUS EVERY 8 HOURS
Status: DISCONTINUED | OUTPATIENT
Start: 2017-08-06 | End: 2017-08-09 | Stop reason: HOSPADM

## 2017-08-06 RX ADMIN — GUAIFENESIN 600 MG: 600 TABLET, EXTENDED RELEASE ORAL at 09:08

## 2017-08-06 RX ADMIN — GUAIFENESIN 600 MG: 600 TABLET, EXTENDED RELEASE ORAL at 08:08

## 2017-08-06 RX ADMIN — METRONIDAZOLE 500 MG: 500 INJECTION, SOLUTION INTRAVENOUS at 06:08

## 2017-08-06 RX ADMIN — HEPARIN SODIUM 5000 UNITS: 5000 INJECTION, SOLUTION INTRAVENOUS; SUBCUTANEOUS at 09:08

## 2017-08-06 RX ADMIN — TAMSULOSIN HYDROCHLORIDE 0.4 MG: 0.4 CAPSULE ORAL at 09:08

## 2017-08-06 RX ADMIN — ATORVASTATIN CALCIUM 20 MG: 10 TABLET, FILM COATED ORAL at 09:08

## 2017-08-06 RX ADMIN — PIPERACILLIN SODIUM AND TAZOBACTAM SODIUM 4.5 G: 4; .5 INJECTION, POWDER, LYOPHILIZED, FOR SOLUTION INTRAVENOUS at 08:08

## 2017-08-06 RX ADMIN — IPRATROPIUM BROMIDE AND ALBUTEROL SULFATE 3 ML: .5; 3 SOLUTION RESPIRATORY (INHALATION) at 01:08

## 2017-08-06 RX ADMIN — METRONIDAZOLE 500 MG: 500 INJECTION, SOLUTION INTRAVENOUS at 11:08

## 2017-08-06 RX ADMIN — DIPHENOXYLATE HYDROCHLORIDE AND ATROPINE SULFATE 1 TABLET: 2.5; .025 TABLET ORAL at 08:08

## 2017-08-06 RX ADMIN — IPRATROPIUM BROMIDE AND ALBUTEROL SULFATE 3 ML: .5; 3 SOLUTION RESPIRATORY (INHALATION) at 06:08

## 2017-08-06 RX ADMIN — PANTOPRAZOLE SODIUM 40 MG: 40 TABLET, DELAYED RELEASE ORAL at 08:08

## 2017-08-06 RX ADMIN — METOPROLOL SUCCINATE 50 MG: 50 TABLET, EXTENDED RELEASE ORAL at 08:08

## 2017-08-06 RX ADMIN — CIPROFLOXACIN 400 MG: 2 INJECTION, SOLUTION INTRAVENOUS at 06:08

## 2017-08-06 RX ADMIN — IPRATROPIUM BROMIDE AND ALBUTEROL SULFATE 3 ML: .5; 3 SOLUTION RESPIRATORY (INHALATION) at 07:08

## 2017-08-06 RX ADMIN — ASPIRIN 81 MG 81 MG: 81 TABLET ORAL at 08:08

## 2017-08-06 RX ADMIN — HEPARIN SODIUM 5000 UNITS: 5000 INJECTION, SOLUTION INTRAVENOUS; SUBCUTANEOUS at 04:08

## 2017-08-06 RX ADMIN — MULTIPLE VITAMINS W/ MINERALS TAB 1 TABLET: TAB at 08:08

## 2017-08-06 RX ADMIN — PIPERACILLIN SODIUM AND TAZOBACTAM SODIUM 4.5 G: 4; .5 INJECTION, POWDER, LYOPHILIZED, FOR SOLUTION INTRAVENOUS at 12:08

## 2017-08-06 NOTE — SUBJECTIVE & OBJECTIVE
Interval History:  Had fever of 102.4 on 8/5/2017  Still feels about the same  Oncology Treatment Plan:   OP NSCLC PACLITAXEL + CARBOPLATIN (AUC) (WEEKLY)    Medications:  Continuous Infusions:   sodium chloride 0.9% 50 mL/hr at 08/04/17 2137     Scheduled Meds:   albuterol-ipratropium 2.5mg-0.5mg/3mL  3 mL Nebulization Q6H    aspirin  81 mg Oral Daily    atorvastatin  20 mg Oral QHS    ciprofloxacin (CIPRO)400mg/200ml D5W IVPB  400 mg Intravenous Q12H    enoxaparin  40 mg Subcutaneous Daily    guaifenesin  600 mg Oral BID    metoprolol succinate  50 mg Oral Daily    multivit-iron-FA-calcium-mins  1 tablet Oral Daily    pantoprazole  40 mg Oral Daily    piperacillin-tazobactam (ZOSYN) IVPB  4.5 g Intravenous Q8H    tamsulosin  0.4 mg Oral QHS    vancomycin (VANCOCIN) IVPB  1,000 mg Intravenous Q12H     PRN Meds:acetaminophen, benzonatate, cetirizine, diphenoxylate-atropine 2.5-0.025 mg, ondansetron, ondansetron, polyethylene glycol, promethazine (PHENERGAN) IVPB     Review of Systems   Constitutional: Negative.  Negative for fatigue.   Eyes: Negative.    Respiratory: Positive for cough and shortness of breath.    Cardiovascular: Negative.  Negative for chest pain.   Gastrointestinal: Negative for abdominal pain and nausea.   Genitourinary: Negative.  Negative for hematuria.   Musculoskeletal: Negative.    Skin: Negative.    Neurological: Negative.    Psychiatric/Behavioral: Negative.      Objective:     Vital Signs (Most Recent):  Temp: 98.3 °F (36.8 °C) (08/06/17 0419)  Pulse: 101 (08/06/17 0419)  Resp: 18 (08/06/17 0419)  BP: (!) 110/57 (08/06/17 0419)  SpO2: 95 % (08/06/17 0419) Vital Signs (24h Range):  Temp:  [98.3 °F (36.8 °C)-102.4 °F (39.1 °C)] 98.3 °F (36.8 °C)  Pulse:  [] 101  Resp:  [18-22] 18  SpO2:  [91 %-99 %] 95 %  BP: (108-123)/(56-67) 110/57     Weight: 71.7 kg (158 lb)  Body mass index is 26.29 kg/m².  Body surface area is 1.81 meters squared.      Intake/Output Summary (Last 24  hours) at 08/06/17 0636  Last data filed at 08/06/17 0500   Gross per 24 hour   Intake             3180 ml   Output             2000 ml   Net             1180 ml       Physical Exam   Constitutional: He is oriented to person, place, and time. He appears well-developed and well-nourished.   HENT:   Head: Normocephalic.   Mouth/Throat: No oropharyngeal exudate.   Eyes: Pupils are equal, round, and reactive to light.   Neck: No thyromegaly present.   Cardiovascular: Normal rate, regular rhythm and normal heart sounds.  Exam reveals no gallop.    No murmur heard.  Pulmonary/Chest: No respiratory distress. He has no wheezes. He has no rales.   Abdominal: Soft. Bowel sounds are normal. He exhibits no distension and no mass. There is no rebound and no guarding.   Musculoskeletal: Normal range of motion. He exhibits no edema.   Lymphadenopathy:     He has no cervical adenopathy.   Neurological: He is alert and oriented to person, place, and time.   Skin: Skin is warm and dry.   Psychiatric: He has a normal mood and affect. His behavior is normal.       Significant Labs:   CBC:   Recent Labs  Lab 08/05/17  0446 08/06/17  0440   WBC 7.79 9.30   HGB 10.7* 10.7*   HCT 33.5* 33.4*    262    and CMP:   Recent Labs  Lab 08/05/17  0446      K 4.0      CO2 27      BUN 7*   CREATININE 0.9   CALCIUM 9.0   PROT 6.0   ALBUMIN 2.3*   BILITOT 0.9   ALKPHOS 59   AST 39   ALT 41   ANIONGAP 9   EGFRNONAA >60       Diagnostic Results:  I have reviewed all pertinent imaging results/findings within the past 24 hours.

## 2017-08-06 NOTE — PLAN OF CARE
Problem: Patient Care Overview  Goal: Plan of Care Review  Outcome: Ongoing (interventions implemented as appropriate)  Pt is tolerating NC well.Pt is receiving nebulized medications as ordered with NARN. IS encouraged.

## 2017-08-06 NOTE — PLAN OF CARE
Pt remains free of injuries. No c/o pain. Diarrhea x2. Moderately controlled c PRN meds. IVF and antibiotics per MD orders. Vanc d/c, Flagyl started. Breath sounds coarse, diminished all lobes. Moderate amount of clear frothy c light green, thick, sputum expectorated. 12 hr chart check completed. Will continue to monitor.

## 2017-08-06 NOTE — CLINICAL REVIEW
Pharmacy Vancomycin Consult    Consult day #5  Vancomycin RANDOM 8/6 at 0440 = 27.0  Will hold today's Vancomycin dose  AM RANDOM ordered 8/7 at 0430  Scr increase to 1.9 (CrCl 27.4 ml/min)  Pushed back placeholder dose of 1,000 mg every 24 hours to begin 8/7 at 0900, pending AM random  Also on Cipro and Zosyn  Dx: Pneumonia; Chemo patient  Goal trough 15-20  WBC continues to increase slightly - now 9.3  Respiratory culture few g+ cocci, rare gram - and + rods, rare gram - diplococci    Pharmacy will continue to closely monitor patient and make adjustments as necessary.   Thank you for allowing us to participate in this patient's care,   Soraya Valdivia 8/6/2017 9:46 AM

## 2017-08-06 NOTE — PROGRESS NOTES
"    PROGRESS NOTE    Admit Date: 8/2/2017    Follow-up For:  Pneumonia of both lower lobes due to infectious organism    Subjective:      Interval History:  Pt had temp spike of 102.4 F yesterday evening but 1 hr later the temp when it was checked again it was 98.8 F . Pt did not have chills  Pt says that his "breathing is better"  He has diarrhea and had 2-3 loose stools this am  D/w with pt's wife in the room    Past Medical History:   Diagnosis Date    Aortic stenosis 12/29/2016    Arthritis     Asthma     Back pain     due to calcium deposits in back    BPH (benign prostatic hyperplasia)     CAD (coronary artery disease)     CAD, multiple vessel 12/29/2016    Cancer     lung    Chronic low back pain     High cholesterol     Hx of CABG 12/29/2016    Hypertension     Lung cancer 01/2017    T3 N0 M0 bronchoalveolar       Objective:      Vital Signs Range (Last 24H):  Vitals:    08/06/17 0151 08/06/17 0419 08/06/17 0648 08/06/17 0826   BP:  (!) 110/57  (!) 103/57   BP Location:  Right arm  Right arm   Patient Position:  Lying  Lying   BP Method:  Automatic  Automatic   Pulse: 106 101 101 99   Resp: (!) 22 18 18 18   Temp:  98.3 °F (36.8 °C)  98.6 °F (37 °C)   TempSrc:  Oral  Oral   SpO2: (!) 91% 95% 95% (!) 94%   Weight:       Height:           I & O (Last 24H):    Intake/Output Summary (Last 24 hours) at 08/06/17 1007  Last data filed at 08/06/17 0500   Gross per 24 hour   Intake             3080 ml   Output             2000 ml   Net             1080 ml       Physical Exam   Constitutional: Pt is not in pain or discomfort. He is able to converse without shortness of breath  Head: Normocephalic and atraumatic. No Icterus  Neck: Neck supple. No tenderness. Shazia cath seen on left upper chest  Cardiovascular: Normal rate and regular rhythm. No murmurs/rub   Pulmonary/Chest: Effort normal, coarse crackles B/l lower limbs. Left>right  Abdomen: Soft. Non-tender and non-distended. Bowel sounds are normal. "   Neurological: Pt is alert and oriented to person, place, and time. power equal B/L  Skin: Warm and dry. No rashes.  Extremities: No edema. No ulcers  Psychaitric:Mood stable, not agitated    Laboratory Data:  Reviewed and noted in plan where applicable- Please see chart for full laboratory data.    Lab Results   Component Value Date    WBC 9.30 08/06/2017    HGB 10.7 (L) 08/06/2017    HCT 33.4 (L) 08/06/2017    MCV 97 08/06/2017     08/06/2017       BMP    Recent Labs  Lab 08/06/17  0440   *      K 4.2      CO2 27   BUN 13   CREATININE 1.9*   CALCIUM 9.2         Radiology:  Reviewed and noted in plan where applicable- Please see chart for full radiology data.    Medications:  Current Facility-Administered Medications   Medication    0.9%  NaCl infusion    acetaminophen tablet 650 mg    albuterol-ipratropium 2.5mg-0.5mg/3mL nebulizer solution 3 mL    aspirin chewable tablet 81 mg    atorvastatin tablet 20 mg    benzonatate capsule 200 mg    cetirizine tablet 10 mg    ciprofloxacin (CIPRO)400mg/200ml D5W IVPB 400 mg    diphenoxylate-atropine 2.5-0.025 mg per tablet 1 tablet    enoxaparin injection 40 mg    guaifenesin 12 hr tablet 600 mg    metoprolol succinate (TOPROL-XL) 24 hr tablet 50 mg    metronidazole IVPB 500 mg    multivit-iron-FA-calcium-mins 9 mg iron-400 mcg tablet Tab 1 tablet    ondansetron injection 4 mg    ondansetron tablet 4 mg    pantoprazole EC tablet 40 mg    polyethylene glycol packet 17 g    promethazine (PHENERGAN) 6.25 mg in dextrose 5 % 50 mL IVPB    tamsulosin 24 hr capsule 0.4 mg       Antibiotics     Start     Stop Route Frequency Ordered    08/06/17 1115  metronidazole IVPB 500 mg      -- IV Every 8 hours (non-standard times) 08/06/17 1000    08/02/17 1457  ciprofloxacin (CIPRO)400mg/200ml D5W IVPB 400 mg      -- IV Every 12 hours (non-standard times) 08/02/17 1457           sodium chloride 0.9% 50 mL/hr at 08/04/17 5917       Microbiology  Results (last 7 days)     Procedure Component Value Units Date/Time    Blood culture x two cultures. Draw prior to antibiotics. [[id number]] Collected:  08/02/17 1400    Order Status:  Completed Specimen:  Blood from Peripheral, Antecubital, Right Updated:  08/05/17 2312     Blood Culture, Routine No Growth to date     Blood Culture, Routine No Growth to date     Blood Culture, Routine No Growth to date     Blood Culture, Routine No Growth to date    Narrative:       Aerobic and anaerobic    Blood culture x two cultures. Draw prior to antibiotics. [[id number]] Collected:  08/02/17 1415    Order Status:  Completed Specimen:  Blood from Peripheral, Antecubital, Right Updated:  08/05/17 2312     Blood Culture, Routine No Growth to date     Blood Culture, Routine No Growth to date     Blood Culture, Routine No Growth to date     Blood Culture, Routine No Growth to date    Narrative:       Aerobic and anaerobic    Culture, Respiratory [[id number]] Collected:  08/02/17 2017    Order Status:  Completed Specimen:  Respiratory from Sputum Updated:  08/05/17 1359     Respiratory Culture Normal respiratory ana     Gram Stain (Respiratory) <10 epithelial cells per low power field.     Gram Stain (Respiratory) Many WBC's     Gram Stain (Respiratory) Few Gram positive cocci     Gram Stain (Respiratory) Rare Gram positive rods     Gram Stain (Respiratory) Rare Gram negative rods     Gram Stain (Respiratory) Rare Gram negative diplococci    Clostridium difficile EIA [[id number]] Collected:  08/04/17 0905    Order Status:  Completed Specimen:  Stool from Stool Updated:  08/04/17 1349     C. diff Antigen Negative     C difficile Toxins A+B, EIA Negative     Comment: Testing not recommended for children <24 months old.       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     Priority: 4     Hypophosphatemia 08/02/2017     Priority: 5     Essential hypertension 08/02/2017     Priority: 6     Benign non-nodular prostatic hyperplasia with lower urinary tract symptoms 02/02/2017     Priority: 7     Hyponatremia 08/02/2017    Transaminitis 08/02/2017    Primary lung cancer with metastasis from lung to other site     Pure hypercholesterolemia 05/02/2017    Bilateral lung cancer 04/13/2017    Overweight (BMI 25.0-29.9) 03/29/2017    Aortic stenosis 12/29/2016    Hx of CABG 12/29/2016       Sepsis related to Pneumonia of both lower lobes due to infectious organism  It is not clear if pt had true temp spike yesterday  Could drug fever vs sec to malignancy  Cultures negative so far; D/C Zosyn and Vanc  Will reculture if pt spikes temp  Will repeat CXR    Diarrhea of presumed infectious origin  C.diff x 2 negative  C/w Lomotil PRN  Stool culture,wbc  Will add Flagyl till stool studies come back    ANGELITO (acute kidney injury)  Urine out put documented 200 ml in 24 hrs  Strict I/O  Check urine sodium, UA  Carboplatin can cause ANGELITO  Will get renal US    Bronchiolo-alveolar adenocarcinoma of left lung  Pt's doreen cath does not appear to be infected  completed 4 cycles of carboplatinum and Taxol   Will f/u Hem/onc recs    Hypophosphatemia  Check phosphate levels today      NQI / Core measures -   1.  CHF ( Echo, ACEI/ ARB, EF %, smoking cessation) -  LV assessment done and pt is not in heart failure  2.  MI ( Asa, Beta blocker, LDL, Statin, smoking cessation)  -  Pt does not have acute MI ( NSTEMI/STEMI) in  this admission  3.  TIA/CVA ( Rehab eval, lipids, ASA/ Plavix, statin) - No TIA/CVA  4.  Pneumonia ( Pulse Ox, ABx, Blood Cxs, smoking cessation, pneumococcal and Flu vaccs) - not admitted for pneumonia  5.  GI Px -Protonix      DVT Px -Lovenox; will change to heparin de to ANGELITO      Advance directives - DNR    Family discussion- D/w pts wife      Disposition/discharge - SNF/Home with home health      Critical care time:    High Risk Conditions:   Patient has a condition that poses threat to life and bodily function:       Critical care time was exclusive of separately billable procedures and treating other patients.     Critical care was time spent personally by me on the following activities: development of treatment plan with patient or surrogate, discussions with consultants, evaluation of patient's response to treatment, examination of patient, obtaining history from patient or surrogate, ordering and performing treatments and interventions, ordering and review of laboratory studies, ordering and review of radiographic studies, pulse oximetry, re-evaluation of patient's condition, review of old charts and interpretation of cardiac output measurements.

## 2017-08-06 NOTE — PROGRESS NOTES
[facility]  Hematology/Oncology  Progress Note    Patient Name: Doron Domínguez  Admission Date: 8/2/2017  Hospital Length of Stay: 4 days  Code Status: DNR     Subjective:     HPI:  77-year-old male history of bronchial alveolar carcinoma patient has completed 4 cycles of carboplatinum and Taxol stable disease at the end of 2 cycles call this morning the temperature 101 with chills told her the emergency room for further evaluation was asked see the patient for further evaluation in terms of response to therapy.  Admitted with PNA currently on zysyn/cipro/vanc.    Interval History:  Had fever of 102.4 on 8/5/2017  Still feels about the same  Oncology Treatment Plan:   OP NSCLC PACLITAXEL + CARBOPLATIN (AUC) (WEEKLY)    Medications:  Continuous Infusions:   sodium chloride 0.9% 50 mL/hr at 08/04/17 2137     Scheduled Meds:   albuterol-ipratropium 2.5mg-0.5mg/3mL  3 mL Nebulization Q6H    aspirin  81 mg Oral Daily    atorvastatin  20 mg Oral QHS    ciprofloxacin (CIPRO)400mg/200ml D5W IVPB  400 mg Intravenous Q12H    enoxaparin  40 mg Subcutaneous Daily    guaifenesin  600 mg Oral BID    metoprolol succinate  50 mg Oral Daily    multivit-iron-FA-calcium-mins  1 tablet Oral Daily    pantoprazole  40 mg Oral Daily    piperacillin-tazobactam (ZOSYN) IVPB  4.5 g Intravenous Q8H    tamsulosin  0.4 mg Oral QHS    vancomycin (VANCOCIN) IVPB  1,000 mg Intravenous Q12H     PRN Meds:acetaminophen, benzonatate, cetirizine, diphenoxylate-atropine 2.5-0.025 mg, ondansetron, ondansetron, polyethylene glycol, promethazine (PHENERGAN) IVPB     Review of Systems   Constitutional: Negative.  Negative for fatigue.   Eyes: Negative.    Respiratory: Positive for cough and shortness of breath.    Cardiovascular: Negative.  Negative for chest pain.   Gastrointestinal: Negative for abdominal pain and nausea.   Genitourinary: Negative.  Negative for hematuria.   Musculoskeletal: Negative.    Skin: Negative.     Neurological: Negative.    Psychiatric/Behavioral: Negative.      Objective:     Vital Signs (Most Recent):  Temp: 98.3 °F (36.8 °C) (08/06/17 0419)  Pulse: 101 (08/06/17 0419)  Resp: 18 (08/06/17 0419)  BP: (!) 110/57 (08/06/17 0419)  SpO2: 95 % (08/06/17 0419) Vital Signs (24h Range):  Temp:  [98.3 °F (36.8 °C)-102.4 °F (39.1 °C)] 98.3 °F (36.8 °C)  Pulse:  [] 101  Resp:  [18-22] 18  SpO2:  [91 %-99 %] 95 %  BP: (108-123)/(56-67) 110/57     Weight: 71.7 kg (158 lb)  Body mass index is 26.29 kg/m².  Body surface area is 1.81 meters squared.      Intake/Output Summary (Last 24 hours) at 08/06/17 0636  Last data filed at 08/06/17 0500   Gross per 24 hour   Intake             3180 ml   Output             2000 ml   Net             1180 ml       Physical Exam   Constitutional: He is oriented to person, place, and time. He appears well-developed and well-nourished.   HENT:   Head: Normocephalic.   Mouth/Throat: No oropharyngeal exudate.   Eyes: Pupils are equal, round, and reactive to light.   Neck: No thyromegaly present.   Cardiovascular: Normal rate, regular rhythm and normal heart sounds.  Exam reveals no gallop.    No murmur heard.  Pulmonary/Chest: No respiratory distress. He has no wheezes. He has no rales.   Abdominal: Soft. Bowel sounds are normal. He exhibits no distension and no mass. There is no rebound and no guarding.   Musculoskeletal: Normal range of motion. He exhibits no edema.   Lymphadenopathy:     He has no cervical adenopathy.   Neurological: He is alert and oriented to person, place, and time.   Skin: Skin is warm and dry.   Psychiatric: He has a normal mood and affect. His behavior is normal.       Significant Labs:   CBC:   Recent Labs  Lab 08/05/17  0446 08/06/17  0440   WBC 7.79 9.30   HGB 10.7* 10.7*   HCT 33.5* 33.4*    262    and CMP:   Recent Labs  Lab 08/05/17  0446      K 4.0      CO2 27      BUN 7*   CREATININE 0.9   CALCIUM 9.0   PROT 6.0   ALBUMIN 2.3*    BILITOT 0.9   ALKPHOS 59   AST 39   ALT 41   ANIONGAP 9   EGFRNONAA >60       Diagnostic Results:  I have reviewed all pertinent imaging results/findings within the past 24 hours.    Assessment/Plan:     Diarrhea of presumed infectious origin    Patient developed significant diarrhea overnight which may be related to antibiotics or possibly C. difficile colitis    --We will order stool studies for C. 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    Since broncho-alveloar cancer can present as a diffuse process, the ongoing abnormalities on x rays can represent either pneumonia or extension of cancer.  Currently he is being treated as a pneumonia with at least some initial improvement. Will monitor        Benign non-nodular prostatic hyperplasia with lower urinary tract symptoms    Has a hx of bronchioalveolar carcinoma and has been receiving chemotherapy.  Imaging studies are amrkedly abnormal with a differential of infection vs tumor progression. Will ask Pulmonary to see. May need a bronchoscopy        * Sepsis related to Pneumonia of both lower lobes due to infectious organism    He had a temperature of 102.4 yesterday. He is slightly SOB and X rays shows  significant amount of infiltrates> Will ask Pulmonary to evaluate            Thank you for your consult. I will follow-up with patient. Please contact us if you have any additional questions.     Chapin Mae MD  Hematology/Oncology  [facility]

## 2017-08-06 NOTE — PLAN OF CARE
Problem: Patient Care Overview  Goal: Plan of Care Review  Outcome: Ongoing (interventions implemented as appropriate)  Remains free from harm, no c/o pain, only one BM this shift, no acute distress noted. 24 hour chart check complete.

## 2017-08-07 PROBLEM — R79.89 ELEVATED SERUM CREATININE: Status: ACTIVE | Noted: 2017-08-07

## 2017-08-07 PROBLEM — C34.92 BILATERAL LUNG CANCER: Status: RESOLVED | Noted: 2017-04-13 | Resolved: 2017-08-07

## 2017-08-07 PROBLEM — R74.01 TRANSAMINITIS: Status: RESOLVED | Noted: 2017-08-02 | Resolved: 2017-08-07

## 2017-08-07 PROBLEM — J96.01 ACUTE RESPIRATORY FAILURE WITH HYPOXIA: Status: ACTIVE | Noted: 2017-08-07

## 2017-08-07 PROBLEM — C34.91 BILATERAL LUNG CANCER: Status: RESOLVED | Noted: 2017-04-13 | Resolved: 2017-08-07

## 2017-08-07 PROBLEM — R79.89 ELEVATED SERUM CREATININE: Status: RESOLVED | Noted: 2017-08-07 | Resolved: 2017-08-07

## 2017-08-07 PROBLEM — E83.39 HYPOPHOSPHATEMIA: Status: RESOLVED | Noted: 2017-08-02 | Resolved: 2017-08-07

## 2017-08-07 LAB
ALBUMIN SERPL BCP-MCNC: 2.2 G/DL
ALP SERPL-CCNC: 62 U/L
ALT SERPL W/O P-5'-P-CCNC: 38 U/L
ANION GAP SERPL CALC-SCNC: 8 MMOL/L
AST SERPL-CCNC: 43 U/L
BACTERIA #/AREA URNS HPF: ABNORMAL /HPF
BACTERIA BLD CULT: NORMAL
BACTERIA BLD CULT: NORMAL
BASOPHILS # BLD AUTO: 0.1 K/UL
BASOPHILS NFR BLD: 1.2 %
BILIRUB SERPL-MCNC: 0.6 MG/DL
BILIRUB UR QL STRIP: NEGATIVE
BUN SERPL-MCNC: 14 MG/DL
CALCIUM SERPL-MCNC: 8.7 MG/DL
CHLORIDE SERPL-SCNC: 104 MMOL/L
CLARITY UR: CLEAR
CO2 SERPL-SCNC: 25 MMOL/L
COLOR UR: YELLOW
CREAT SERPL-MCNC: 2.1 MG/DL
CREAT UR-MCNC: 34.2 MG/DL
DIFFERENTIAL METHOD: ABNORMAL
EOSINOPHIL # BLD AUTO: 0.3 K/UL
EOSINOPHIL NFR BLD: 3.2 %
ERYTHROCYTE [DISTWIDTH] IN BLOOD BY AUTOMATED COUNT: 18.6 %
EST. GFR  (AFRICAN AMERICAN): 34 ML/MIN/1.73 M^2
EST. GFR  (NON AFRICAN AMERICAN): 29 ML/MIN/1.73 M^2
GLUCOSE SERPL-MCNC: 115 MG/DL
GLUCOSE UR QL STRIP: NEGATIVE
HCT VFR BLD AUTO: 31.7 %
HGB BLD-MCNC: 10.4 G/DL
HGB UR QL STRIP: ABNORMAL
KETONES UR QL STRIP: NEGATIVE
LEUKOCYTE ESTERASE UR QL STRIP: ABNORMAL
LYMPHOCYTES # BLD AUTO: 0.7 K/UL
LYMPHOCYTES NFR BLD: 7.8 %
MCH RBC QN AUTO: 31.5 PG
MCHC RBC AUTO-ENTMCNC: 32.8 G/DL
MCV RBC AUTO: 96 FL
MICROSCOPIC COMMENT: ABNORMAL
MONOCYTES # BLD AUTO: 2 K/UL
MONOCYTES NFR BLD: 23.7 %
NEUTROPHILS # BLD AUTO: 5.5 K/UL
NEUTROPHILS NFR BLD: 65 %
NITRITE UR QL STRIP: NEGATIVE
PH UR STRIP: 6 [PH] (ref 5–8)
PLATELET # BLD AUTO: 246 K/UL
PMV BLD AUTO: 8.7 FL
POTASSIUM SERPL-SCNC: 4 MMOL/L
PROT SERPL-MCNC: 6.1 G/DL
PROT UR QL STRIP: NEGATIVE
PROT UR-MCNC: 12 MG/DL
PROT/CREAT RATIO, UR: 0.35
RBC # BLD AUTO: 3.3 M/UL
RBC #/AREA URNS HPF: 1 /HPF (ref 0–4)
SODIUM SERPL-SCNC: 137 MMOL/L
SODIUM UR-SCNC: 47 MMOL/L
SP GR UR STRIP: <=1.005 (ref 1–1.03)
SQUAMOUS #/AREA URNS HPF: 1 /HPF
URN SPEC COLLECT METH UR: ABNORMAL
UROBILINOGEN UR STRIP-ACNC: NEGATIVE EU/DL
WBC # BLD AUTO: 8.49 K/UL
WBC #/AREA URNS HPF: 25 /HPF (ref 0–5)
WBC CLUMPS URNS QL MICRO: ABNORMAL

## 2017-08-07 PROCEDURE — 63600175 PHARM REV CODE 636 W HCPCS: Performed by: NURSE PRACTITIONER

## 2017-08-07 PROCEDURE — 99233 SBSQ HOSP IP/OBS HIGH 50: CPT | Mod: ,,, | Performed by: INTERNAL MEDICINE

## 2017-08-07 PROCEDURE — 94799 UNLISTED PULMONARY SVC/PX: CPT

## 2017-08-07 PROCEDURE — 94761 N-INVAS EAR/PLS OXIMETRY MLT: CPT

## 2017-08-07 PROCEDURE — 80053 COMPREHEN METABOLIC PANEL: CPT

## 2017-08-07 PROCEDURE — 25000242 PHARM REV CODE 250 ALT 637 W/ HCPCS: Performed by: NURSE PRACTITIONER

## 2017-08-07 PROCEDURE — 25000003 PHARM REV CODE 250: Performed by: INTERNAL MEDICINE

## 2017-08-07 PROCEDURE — 81000 URINALYSIS NONAUTO W/SCOPE: CPT

## 2017-08-07 PROCEDURE — 96372 THER/PROPH/DIAG INJ SC/IM: CPT

## 2017-08-07 PROCEDURE — 84300 ASSAY OF URINE SODIUM: CPT

## 2017-08-07 PROCEDURE — S0030 INJECTION, METRONIDAZOLE: HCPCS | Performed by: INTERNAL MEDICINE

## 2017-08-07 PROCEDURE — 63600175 PHARM REV CODE 636 W HCPCS: Performed by: EMERGENCY MEDICINE

## 2017-08-07 PROCEDURE — 99223 1ST HOSP IP/OBS HIGH 75: CPT | Mod: ,,, | Performed by: INTERNAL MEDICINE

## 2017-08-07 PROCEDURE — 27000221 HC OXYGEN, UP TO 24 HOURS

## 2017-08-07 PROCEDURE — 63600175 PHARM REV CODE 636 W HCPCS: Performed by: INTERNAL MEDICINE

## 2017-08-07 PROCEDURE — 94640 AIRWAY INHALATION TREATMENT: CPT

## 2017-08-07 PROCEDURE — 25000003 PHARM REV CODE 250: Performed by: NURSE PRACTITIONER

## 2017-08-07 PROCEDURE — 82570 ASSAY OF URINE CREATININE: CPT

## 2017-08-07 PROCEDURE — 21400001 HC TELEMETRY ROOM

## 2017-08-07 PROCEDURE — 36415 COLL VENOUS BLD VENIPUNCTURE: CPT

## 2017-08-07 PROCEDURE — 85025 COMPLETE CBC W/AUTO DIFF WBC: CPT

## 2017-08-07 RX ORDER — TAMSULOSIN HYDROCHLORIDE 0.4 MG/1
0.8 CAPSULE ORAL NIGHTLY
Status: DISCONTINUED | OUTPATIENT
Start: 2017-08-07 | End: 2017-08-09 | Stop reason: HOSPADM

## 2017-08-07 RX ADMIN — CIPROFLOXACIN 400 MG: 2 INJECTION, SOLUTION INTRAVENOUS at 07:08

## 2017-08-07 RX ADMIN — PANTOPRAZOLE SODIUM 40 MG: 40 TABLET, DELAYED RELEASE ORAL at 09:08

## 2017-08-07 RX ADMIN — SODIUM CHLORIDE: 0.9 INJECTION, SOLUTION INTRAVENOUS at 12:08

## 2017-08-07 RX ADMIN — IPRATROPIUM BROMIDE AND ALBUTEROL SULFATE 3 ML: .5; 3 SOLUTION RESPIRATORY (INHALATION) at 08:08

## 2017-08-07 RX ADMIN — HEPARIN SODIUM 5000 UNITS: 5000 INJECTION, SOLUTION INTRAVENOUS; SUBCUTANEOUS at 05:08

## 2017-08-07 RX ADMIN — METRONIDAZOLE 500 MG: 500 INJECTION, SOLUTION INTRAVENOUS at 05:08

## 2017-08-07 RX ADMIN — METOPROLOL SUCCINATE 50 MG: 50 TABLET, EXTENDED RELEASE ORAL at 09:08

## 2017-08-07 RX ADMIN — CIPROFLOXACIN 400 MG: 2 INJECTION, SOLUTION INTRAVENOUS at 06:08

## 2017-08-07 RX ADMIN — GUAIFENESIN 600 MG: 600 TABLET, EXTENDED RELEASE ORAL at 09:08

## 2017-08-07 RX ADMIN — METRONIDAZOLE 500 MG: 500 INJECTION, SOLUTION INTRAVENOUS at 06:08

## 2017-08-07 RX ADMIN — TAMSULOSIN HYDROCHLORIDE 0.8 MG: 0.4 CAPSULE ORAL at 08:08

## 2017-08-07 RX ADMIN — ATORVASTATIN CALCIUM 20 MG: 10 TABLET, FILM COATED ORAL at 08:08

## 2017-08-07 RX ADMIN — HEPARIN SODIUM 5000 UNITS: 5000 INJECTION, SOLUTION INTRAVENOUS; SUBCUTANEOUS at 11:08

## 2017-08-07 RX ADMIN — ONDANSETRON 4 MG: 2 INJECTION INTRAMUSCULAR; INTRAVENOUS at 08:08

## 2017-08-07 RX ADMIN — IPRATROPIUM BROMIDE AND ALBUTEROL SULFATE 3 ML: .5; 3 SOLUTION RESPIRATORY (INHALATION) at 07:08

## 2017-08-07 RX ADMIN — MULTIPLE VITAMINS W/ MINERALS TAB 1 TABLET: TAB at 09:08

## 2017-08-07 RX ADMIN — IPRATROPIUM BROMIDE AND ALBUTEROL SULFATE 3 ML: .5; 3 SOLUTION RESPIRATORY (INHALATION) at 01:08

## 2017-08-07 RX ADMIN — METRONIDAZOLE 500 MG: 500 INJECTION, SOLUTION INTRAVENOUS at 11:08

## 2017-08-07 RX ADMIN — HEPARIN SODIUM 5000 UNITS: 5000 INJECTION, SOLUTION INTRAVENOUS; SUBCUTANEOUS at 03:08

## 2017-08-07 RX ADMIN — ASPIRIN 81 MG 81 MG: 81 TABLET ORAL at 09:08

## 2017-08-07 RX ADMIN — GUAIFENESIN 600 MG: 600 TABLET, EXTENDED RELEASE ORAL at 08:08

## 2017-08-07 RX ADMIN — DIPHENOXYLATE HYDROCHLORIDE AND ATROPINE SULFATE 1 TABLET: 2.5; .025 TABLET ORAL at 09:08

## 2017-08-07 NOTE — SUBJECTIVE & OBJECTIVE
Interval History:   Says he is doing well. CXR done yesterday showed improvement  Oncology Treatment Plan:   OP NSCLC PACLITAXEL + CARBOPLATIN (AUC) (WEEKLY)    Medications:  Continuous Infusions:   sodium chloride 0.9% 50 mL/hr at 08/04/17 2137     Scheduled Meds:   albuterol-ipratropium 2.5mg-0.5mg/3mL  3 mL Nebulization Q6H    aspirin  81 mg Oral Daily    atorvastatin  20 mg Oral QHS    ciprofloxacin (CIPRO)400mg/200ml D5W IVPB  400 mg Intravenous Q12H    guaifenesin  600 mg Oral BID    heparin (porcine)  5,000 Units Subcutaneous Q8H    metoprolol succinate  50 mg Oral Daily    metronidazole  500 mg Intravenous Q8H    multivit-iron-FA-calcium-mins  1 tablet Oral Daily    pantoprazole  40 mg Oral Daily    tamsulosin  0.4 mg Oral QHS     PRN Meds:acetaminophen, benzonatate, cetirizine, diphenoxylate-atropine 2.5-0.025 mg, ondansetron, ondansetron, polyethylene glycol, promethazine (PHENERGAN) IVPB     Review of Systems   Constitutional: Negative.  Negative for fatigue.   Eyes: Negative.    Cardiovascular: Negative.  Negative for chest pain.   Gastrointestinal: Negative for abdominal pain and nausea.   Genitourinary: Negative.  Negative for hematuria.   Musculoskeletal: Negative.    Skin: Negative.    Neurological: Negative.    Psychiatric/Behavioral: Negative.      Objective:     Vital Signs (Most Recent):  Temp: 98.1 °F (36.7 °C) (08/07/17 0338)  Pulse: 97 (08/07/17 0338)  Resp: 19 (08/07/17 0338)  BP: (!) 111/59 (08/07/17 0338)  SpO2: 96 % (08/07/17 0338) Vital Signs (24h Range):  Temp:  [97.9 °F (36.6 °C)-99.7 °F (37.6 °C)] 98.1 °F (36.7 °C)  Pulse:  [] 97  Resp:  [18-20] 19  SpO2:  [90 %-97 %] 96 %  BP: (103-128)/(57-61) 111/59     Weight: 71.7 kg (158 lb)  Body mass index is 26.29 kg/m².  Body surface area is 1.81 meters squared.      Intake/Output Summary (Last 24 hours) at 08/07/17 0617  Last data filed at 08/07/17 0339   Gross per 24 hour   Intake             2225 ml   Output              1900 ml   Net              325 ml       Physical Exam   Constitutional: He is oriented to person, place, and time. He appears well-developed and well-nourished.   HENT:   Head: Normocephalic.   Mouth/Throat: No oropharyngeal exudate.   Eyes: Pupils are equal, round, and reactive to light.   Neck: No thyromegaly present.   Cardiovascular: Normal rate, regular rhythm and normal heart sounds.  Exam reveals no gallop.    No murmur heard.  Pulmonary/Chest: No respiratory distress.   Has crackles on both bases, left more than right.  scattered  wheezes   Abdominal: Soft. Bowel sounds are normal. He exhibits no distension and no mass. There is no rebound and no guarding.   Musculoskeletal: Normal range of motion. He exhibits no edema.   Lymphadenopathy:     He has no cervical adenopathy.   Neurological: He is alert and oriented to person, place, and time.   Skin: Skin is warm and dry.   Psychiatric: He has a normal mood and affect. His behavior is normal.       Significant Labs:    .  Lab Results   Component Value Date    WBC 8.49 08/07/2017    HGB 10.4 (L) 08/07/2017    HCT 31.7 (L) 08/07/2017    MCV 96 08/07/2017     08/07/2017     Lab Results   Component Value Date    CREATININE 1.9 (H) 08/06/2017     Lab Results   Component Value Date    ALT 48 (H) 08/06/2017    AST 53 (H) 08/06/2017    ALKPHOS 58 08/06/2017    BILITOT 0.8 08/06/2017     Diagnostic Results:    Findings: There's been improvement from 08/02/2017 with partial clearing of basilar infiltrates.  Moderate infiltrative process.  Continued followup suggested.   Impression      Improving chest with partial clearing of the bibasilar infiltrates      Electronically signed by: PIYUSH JANE MD  Date: 08/06/17       I have reviewed all pertinent imaging results/findings within the past 24 hours.

## 2017-08-07 NOTE — CONSULTS
[facility] -   Pulmonology  Consult Note    Patient Name: Doron Domínguez  MRN: [id number]  Admission Date: 8/2/2017  Hospital Length of Stay: 5 days  Code Status: DNR  Attending Physician: Mary Cr MD  Primary Care Provider: SHANEKA Art Jr, MD   Principal Problem: Pneumonia of both lower lobes due to infectious organism    Consults  Subjective:     HPI: 77-year-old male history of bronchial alveolar carcinoma patient has completed 4 cycles of carboplatinum and Taxol stable disease at the end of 2 cycles developed temperature 101 with chills on 8/2 2017 and was admitted to hospital.    Hospital course:   Decrease in fever, improvement in activity level. Found to need oxygen for minimal exertion.   Chest X Ray - demonstrates some clearing on the right ( left side still densely opacified - probably from cancer)    Past Medical History:   Diagnosis Date    Aortic stenosis 12/29/2016    Arthritis     Asthma     Back pain     due to calcium deposits in back    BPH (benign prostatic hyperplasia)     CAD (coronary artery disease)     CAD, multiple vessel 12/29/2016    Cancer     lung    Chronic low back pain     High cholesterol     Hx of CABG 12/29/2016    Hypertension     Lung cancer 01/2017    T3 N0 M0 bronchoalveolar       Past Surgical History:   Procedure Laterality Date    APPENDECTOMY      CARDIAC SURGERY      CORONARY ARTERY BYPASS GRAFT  2012    CABG x 3 at [facility]    LUNG LOBECTOMY Left 01/10/2017    Left lower lobe       Review of patient's allergies indicates:   Allergen Reactions    Bactrim [sulfamethoxazole-trimethoprim] Other (See Comments)     Severe leg cramps, dizziness    Dilaudid [hydromorphone] Other (See Comments)     Confusion, paranoia    Sulfa (sulfonamide antibiotics)      Severe leg cramps, dizziness.       Family History     Problem Relation (Age of Onset)    Diabetes Mother, Sister, Brother        Social History Main Topics    Smoking status: Never  Smoker    Smokeless tobacco: Never Used    Alcohol use No    Drug use: No    Sexual activity: No        Review of Systems   Constitutional: Positive for fatigue.   HENT: Positive for congestion. Negative for postnasal drip and rhinorrhea.    Respiratory: Positive for cough, chest tightness, shortness of breath, wheezing and stridor.    Neurological: Positive for weakness.     Objective:     Vital Signs (Most Recent):  Temp: 98.2 °F (36.8 °C) (08/07/17 1522)  Pulse: 96 (08/07/17 1522)  Resp: 18 (08/07/17 1522)  BP: 125/61 (08/07/17 1522)  SpO2: 95 % (08/07/17 1522) Vital Signs (24h Range):  Temp:  [97.8 °F (36.6 °C)-99.7 °F (37.6 °C)] 98.2 °F (36.8 °C)  Pulse:  [] 96  Resp:  [18-20] 18  SpO2:  [90 %-97 %] 95 %  BP: (108-159)/() 125/61     Weight: 71.7 kg (158 lb)  Body mass index is 26.29 kg/m².      Intake/Output Summary (Last 24 hours) at 08/07/17 1747  Last data filed at 08/07/17 1308   Gross per 24 hour   Intake             2960 ml   Output             1050 ml   Net             1910 ml       Physical Exam   Constitutional: He is oriented to person, place, and time.   Chronically ill appearing     HENT:   Head: Normocephalic and atraumatic.   Mouth/Throat: No oropharyngeal exudate.   Eyes: Conjunctivae are normal. Pupils are equal, round, and reactive to light.   Neck: Neck supple. No JVD present. No tracheal deviation present. No thyromegaly present.   Cardiovascular: Normal rate, regular rhythm and normal heart sounds.    No murmur heard.  Pulmonary/Chest: Tachypnea noted. No respiratory distress. He has decreased breath sounds. He has no wheezes. He has no rhonchi. He has rales in the right lower field and the left lower field. He exhibits no tenderness.   Abdominal: Soft. Bowel sounds are normal.   Musculoskeletal: Normal range of motion. He exhibits no edema or tenderness.   Lymphadenopathy:     He has no cervical adenopathy.   Neurological: He is alert and oriented to person, place, and time.    Skin: Skin is warm and dry.   Nursing note and vitals reviewed.      Vents:  Oxygen Concentration (%): 28 (08/07/17 0107)    Lines/Drains/Airways     Central Venous Catheter Line                 Port A Cath Single Lumen 04/13/17 left subclavian 116 days          Peripheral Intravenous Line                 Peripheral IV - Single Lumen 06/08/17 1325 Left Antecubital 60 days         Peripheral IV - Single Lumen 08/05/17 Left Forearm 2 days                Significant Labs:    CBC/Anemia Profile:    Recent Labs  Lab 08/06/17  0440 08/07/17  0507   WBC 9.30 8.49   HGB 10.7* 10.4*   HCT 33.4* 31.7*    246   MCV 97 96   RDW 18.8* 18.6*        Chemistries:    Recent Labs  Lab 08/06/17  0440 08/07/17  0507    137   K 4.2 4.0    104   CO2 27 25   BUN 13 14   CREATININE 1.9* 2.1*   CALCIUM 9.2 8.7   ALBUMIN 2.3* 2.2*   PROT 6.0 6.1   BILITOT 0.8 0.6   ALKPHOS 58 62   ALT 48* 38   AST 53* 43*   PHOS 3.2  --        ABGs: No results for input(s): PH, PCO2, HCO3, POCSATURATED, BE in the last 48 hours.  Blood Culture: No results for input(s): LABBLOO in the last 48 hours.  BMP:   Recent Labs  Lab 08/07/17  0507   *      K 4.0      CO2 25   BUN 14   CREATININE 2.1*   CALCIUM 8.7     CMP:   Recent Labs  Lab 08/06/17  0440 08/07/17  0507    137   K 4.2 4.0    104   CO2 27 25   * 115*   BUN 13 14   CREATININE 1.9* 2.1*   CALCIUM 9.2 8.7   PROT 6.0 6.1   ALBUMIN 2.3* 2.2*   BILITOT 0.8 0.6   ALKPHOS 58 62   AST 53* 43*   ALT 48* 38   ANIONGAP 9 8   EGFRNONAA 33* 29*     All pertinent labs within the past 24 hours have been reviewed.    Significant Imaging:   CXR: I have reviewed all pertinent results/findings within the past 24 hours and my personal findings are:  clearing of infiltrate on the right    Assessment/Plan:     Active Diagnoses:    Diagnosis Date Noted POA    PRINCIPAL PROBLEM:  Sepsis related to Pneumonia of both lower lobes due to infectious organism [J18.9]  08/02/2017 Yes    ANGELITO (acute kidney injury) [N17.9] 08/06/2017 No    Diarrhea of presumed infectious origin [A09] 08/04/2017 Yes    Essential hypertension [I10] 08/02/2017 Yes    Hyponatremia [E87.1] 08/02/2017 Yes    Primary lung cancer with metastasis from lung to other site [C34.90]  Yes    Benign non-nodular prostatic hyperplasia with lower urinary tract symptoms [N40.1] 02/02/2017 Yes    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] 01/10/2017 Yes      Problems Resolved During this Admission:    Diagnosis Date Noted Date Resolved POA    Elevated serum creatinine [R79.89] 08/07/2017 08/07/2017 Yes    Hypophosphatemia [E83.39] 08/02/2017 08/07/2017 Yes    Transaminitis [R74.0] 08/02/2017 08/07/2017 Yes    Acute cystitis with hematuria [N30.01] 08/02/2017 08/06/2017 Yes    Bilateral Lower leg edema [R60.0] 08/02/2017 08/06/2017 Yes    LUX (dyspnea on exertion) [R06.09] 12/29/2016 08/06/2017 Yes       Problem   Sepsis related to Pneumonia of both lower lobes due to infectious organism   Bronchiolo-Alveolar Adenocarcinoma of Left Lung   Acute Respiratory Failure With Hypoxia     Discharge planning:  Will need antibiotics for 5- 7 days , jet nebs and oxygen at home.  Appears improved - agree with discharge plans      Thank you for your consult. I will follow-up with patient. Please contact us if you have any additional questions.     Dante Massey MD  Pulmonology  [facility] -

## 2017-08-07 NOTE — SUBJECTIVE & OBJECTIVE
Interval History:   Seen during rounds today  Pt was sitting up and eating lunch.  He says pt gets short of breath when he goes from bed to bathroom.  Pt was at his physical best in March this year 2 months after lobectomy.  Since then he had dyspnea on exertion but he was not on oxygen at home.  He has hx of BPH and is on flomax at home.  Pt says his diarrhea is resolving    Review of Systems   Constitutional: Negative for appetite change, chills and fever.   HENT: Negative for nosebleeds and trouble swallowing.    Eyes: Negative for photophobia and visual disturbance.   Cardiovascular: Negative for chest pain and leg swelling.   Gastrointestinal: Negative for abdominal distention, abdominal pain and vomiting.   Genitourinary: Negative for dysuria and hematuria.   Musculoskeletal: Negative for arthralgias and back pain.   Skin: Negative for color change, pallor and rash.   Neurological: Negative.    Psychiatric/Behavioral: Negative for agitation and behavioral problems.     Objective:     Vital Signs (Most Recent):  Temp: 98 °F (36.7 °C) (08/07/17 1144)  Pulse: 87 (08/07/17 1144)  Resp: 18 (08/07/17 1144)  BP: 108/60 (08/07/17 1144)  SpO2: 96 % (08/07/17 1144) Vital Signs (24h Range):  Temp:  [97.8 °F (36.6 °C)-99.7 °F (37.6 °C)] 98 °F (36.7 °C)  Pulse:  [] 87  Resp:  [18-20] 18  SpO2:  [90 %-97 %] 96 %  BP: (108-159)/() 108/60     Weight: 71.7 kg (158 lb)  Body mass index is 26.29 kg/m².    Intake/Output Summary (Last 24 hours) at 08/07/17 1319  Last data filed at 08/07/17 1308   Gross per 24 hour   Intake          3163.33 ml   Output             1700 ml   Net          1463.33 ml      Physical Exam   Constitutional: He is oriented to person, place, and time. No distress.   HENT:   Head: Normocephalic and atraumatic.   Eyes: Conjunctivae and EOM are normal. Pupils are equal, round, and reactive to light.   Neck: Normal range of motion. Neck supple.   Cardiovascular: Normal rate, regular rhythm and  normal heart sounds.  Exam reveals no friction rub.    No murmur heard.  Pulmonary/Chest: He is in respiratory distress. He has rales.   B/L lower lobes coarse crackles,Left lower lobectomy scar   Abdominal: Soft. Bowel sounds are normal. He exhibits no distension. There is no tenderness.   Musculoskeletal: Normal range of motion.   Neurological: He is alert and oriented to person, place, and time.   Skin: No rash noted. He is not diaphoretic.   Psychiatric: He has a normal mood and affect. His behavior is normal.   Vitals reviewed.      Significant Labs:   BMP:   Recent Labs  Lab 08/07/17  0507   *      K 4.0      CO2 25   BUN 14   CREATININE 2.1*   CALCIUM 8.7     CBC:   Recent Labs  Lab 08/06/17  0440 08/07/17  0507   WBC 9.30 8.49   HGB 10.7* 10.4*   HCT 33.4* 31.7*    246     CMP:   Recent Labs  Lab 08/06/17  0440 08/07/17  0507    137   K 4.2 4.0    104   CO2 27 25   * 115*   BUN 13 14   CREATININE 1.9* 2.1*   CALCIUM 9.2 8.7   PROT 6.0 6.1   ALBUMIN 2.3* 2.2*   BILITOT 0.8 0.6   ALKPHOS 58 62   AST 53* 43*   ALT 48* 38   ANIONGAP 9 8   EGFRNONAA 33* 29*       Significant Imaging: I have reviewed and interpreted all pertinent imaging results/findings within the past 24 hours.

## 2017-08-07 NOTE — CARE UPDATE
Consult called for Pulmonology to exchange at this time. Speaker indicated it would be with Bryson.

## 2017-08-07 NOTE — PROGRESS NOTES
Home Oxygen Evaluation    Date Performed: 8/7/2017    1) Patient's Home O2 Sat on room air, while at rest: 88%        If O2 sats on room air at rest are 88% or below, patient qualifies. No additional testing needed. Document N/A in steps 2 and 3. If 89% or above, complete steps 2.      2) Patient's O2 Sat on room air while exercising: n/a           If O2 sats on room air while exercising remain 89% or above patient does not qualify, no further testing needed Document N/A in step 3. If O2 sats on room air while exercising are 88% or below, continue to step 3.      3) Patient's O2 Sat while exercising on O2: n/a at n/a LPM         (Must show improvement from #2 for patients to qualify)    If O2 sats improve on oxygen, patient qualifies for portable oxygen. If not, the patient does not qualify.        PATIENT MEETS REQUIREMENTS FOR HOME O2

## 2017-08-07 NOTE — PROGRESS NOTES
[facility]  Hematology/Oncology  Progress Note    Patient Name: oDron Domínguez  Admission Date: 8/2/2017  Hospital Length of Stay: 5 days  Code Status: DNR     Subjective:     HPI:  77-year-old male history of bronchial alveolar carcinoma patient has completed 4 cycles of carboplatinum and Taxol stable disease at the end of 2 cycles call this morning the temperature 101 with chills told her the emergency room for further evaluation was asked see the patient for further evaluation in terms of response to therapy.  Admitted with PNA currently on zysyn/cipro/vanc.    Interval History:   Says he is doing well. CXR done yesterday showed improvement  Oncology Treatment Plan:   OP NSCLC PACLITAXEL + CARBOPLATIN (AUC) (WEEKLY)    Medications:  Continuous Infusions:   sodium chloride 0.9% 50 mL/hr at 08/04/17 2137     Scheduled Meds:   albuterol-ipratropium 2.5mg-0.5mg/3mL  3 mL Nebulization Q6H    aspirin  81 mg Oral Daily    atorvastatin  20 mg Oral QHS    ciprofloxacin (CIPRO)400mg/200ml D5W IVPB  400 mg Intravenous Q12H    guaifenesin  600 mg Oral BID    heparin (porcine)  5,000 Units Subcutaneous Q8H    metoprolol succinate  50 mg Oral Daily    metronidazole  500 mg Intravenous Q8H    multivit-iron-FA-calcium-mins  1 tablet Oral Daily    pantoprazole  40 mg Oral Daily    tamsulosin  0.4 mg Oral QHS     PRN Meds:acetaminophen, benzonatate, cetirizine, diphenoxylate-atropine 2.5-0.025 mg, ondansetron, ondansetron, polyethylene glycol, promethazine (PHENERGAN) IVPB     Review of Systems   Constitutional: Negative.  Negative for fatigue.   Eyes: Negative.    Cardiovascular: Negative.  Negative for chest pain.   Gastrointestinal: Negative for abdominal pain and nausea.   Genitourinary: Negative.  Negative for hematuria.   Musculoskeletal: Negative.    Skin: Negative.    Neurological: Negative.    Psychiatric/Behavioral: Negative.      Objective:     Vital Signs (Most Recent):  Temp: 98.1 °F (36.7  °C) (08/07/17 0338)  Pulse: 97 (08/07/17 0338)  Resp: 19 (08/07/17 0338)  BP: (!) 111/59 (08/07/17 0338)  SpO2: 96 % (08/07/17 0338) Vital Signs (24h Range):  Temp:  [97.9 °F (36.6 °C)-99.7 °F (37.6 °C)] 98.1 °F (36.7 °C)  Pulse:  [] 97  Resp:  [18-20] 19  SpO2:  [90 %-97 %] 96 %  BP: (103-128)/(57-61) 111/59     Weight: 71.7 kg (158 lb)  Body mass index is 26.29 kg/m².  Body surface area is 1.81 meters squared.      Intake/Output Summary (Last 24 hours) at 08/07/17 0617  Last data filed at 08/07/17 0339   Gross per 24 hour   Intake             2225 ml   Output             1900 ml   Net              325 ml       Physical Exam   Constitutional: He is oriented to person, place, and time. He appears well-developed and well-nourished.   HENT:   Head: Normocephalic.   Mouth/Throat: No oropharyngeal exudate.   Eyes: Pupils are equal, round, and reactive to light.   Neck: No thyromegaly present.   Cardiovascular: Normal rate, regular rhythm and normal heart sounds.  Exam reveals no gallop.    No murmur heard.  Pulmonary/Chest: No respiratory distress.   Has crackles on both bases, left more than right.  scattered  wheezes   Abdominal: Soft. Bowel sounds are normal. He exhibits no distension and no mass. There is no rebound and no guarding.   Musculoskeletal: Normal range of motion. He exhibits no edema.   Lymphadenopathy:     He has no cervical adenopathy.   Neurological: He is alert and oriented to person, place, and time.   Skin: Skin is warm and dry.   Psychiatric: He has a normal mood and affect. His behavior is normal.       Significant Labs:    .  Lab Results   Component Value Date    WBC 8.49 08/07/2017    HGB 10.4 (L) 08/07/2017    HCT 31.7 (L) 08/07/2017    MCV 96 08/07/2017     08/07/2017     Lab Results   Component Value Date    CREATININE 1.9 (H) 08/06/2017     Lab Results   Component Value Date    ALT 48 (H) 08/06/2017    AST 53 (H) 08/06/2017    ALKPHOS 58 08/06/2017    BILITOT 0.8 08/06/2017      Diagnostic Results:    Findings: There's been improvement from 08/02/2017 with partial clearing of basilar infiltrates.  Moderate infiltrative process.  Continued followup suggested.   Impression      Improving chest with partial clearing of the bibasilar infiltrates      Electronically signed by: PIYUSH JANE MD  Date: 08/06/17       I have reviewed all pertinent imaging results/findings within the past 24 hours.    Assessment/Plan:     ANGELITO (acute kidney injury)    Creatinine1.9  Will have this addressed by Hospital medicine        Diarrhea of presumed infectious origin    Patient developed significant diarrhea overnight which may be related to antibiotics or possibly C. difficile colitis    --We will order stool studies for C. 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    Since broncho-alveloar cancer can present as a diffuse process, the ongoing abnormalities on x rays can represent either pneumonia or extension of cancer.  On 8/6/2017 the X-rays showed improvement  Currently he is being treated as a pneumonia with at least some initial improvement. Will monitor        Benign non-nodular prostatic hyperplasia with lower urinary tract symptoms    Has a hx of bronchioalveolar carcinoma and has been receiving chemotherapy.  Imaging studies are amrkedly abnormal with a differential of infection vs tumor progression. Will ask Pulmonary to see. May need a bronchoscopy        Bronchiolo-alveolar adenocarcinoma of left lung    Will follow as outpatient        * Sepsis related to Pneumonia of both lower lobes due to infectious organism    He had a temperature of 102.4 yesterday. He is slightly SOB and X rays shows  significant amount of infiltrates> Will ask Pulmonary to evaluate            Thank  you for your consult. I will follow-up with patient. Please contact us if you have any additional questions.     Chapin Mae MD  Hematology/Oncology  [facility]

## 2017-08-07 NOTE — PLAN OF CARE
Problem: Patient Care Overview  Goal: Plan of Care Review  Outcome: Ongoing (interventions implemented as appropriate)  Pt has not had any diarrhea episodes. Pt lungs are coarse. Pt independently coughs thick sputum. IV fluids and IV ABX per order. No injuiries. Will continue to monitor. 12 hour chart check completed.

## 2017-08-07 NOTE — PROGRESS NOTES
"[facility] Hospital Medicine  Progress Note    Patient Name: Doron Domínguez  MRN: [id number]  Patient Class: IP- Inpatient   Admission Date: 8/2/2017  Length of Stay: 5 days  Attending Physician: Mary Cr MD  Primary Care Provider: SHANEKA Art Jr, MD        Subjective:     Principal Problem:Pneumonia of both lower lobes due to infectious organism    HPI:  Mr. Domínguez 76yo male presented to the ED today for fever (Tmax 101.4) which onset gradually 2 days ago. Symptoms are constant and moderate in severity. Associated sxs include chills, fatigue, sob with activity, productive cough (moderate white thick sputum). No mitigating or exacerbating factors reported. Patient also noted increasing weakness over the past 1.5months with some lower ext. Edema.  Patient denies any CP, N/V/D, abdominal pain, dysuria, weakness/numbness, dizziness, and all other sxs at this time. No further complaints or concerns at this time. Per wife, pt last received chemo tx on 7/20/17 for hx of lung cancer. Dr. Leon is his oncologist.    Hospital Course:  8/2/17: patient admitted for sepsis related to PNE, broad spectrum therapy started. Blood and Sputum Cultures Pending 8/3 Pt reports improvement in symptoms. Pt was afebrile overnight. Hem/onc consulted. Continue current management. 8/4/17 Pt reports that he feels "better and stronger" this morning. The patient does report having diarrhea that began overnight. Stool sent for C-diff. Currently on Vanc/zosyn/cipro for treatment of HCAP. Hem/onc following.   8/7/2017- Diarrhea resolving, BM X 2 today, still short of breath    Interval History:   Seen during rounds today  Pt was sitting up and eating lunch.  He says pt gets short of breath when he goes from bed to bathroom.  Pt was at his physical best in March this year 2 months after lobectomy.  Since then he had dyspnea on exertion but he was not on oxygen at home.  He has hx of BPH and is on flomax at home.  Pt says " his diarrhea is resolving    Review of Systems   Constitutional: Negative for appetite change, chills and fever.   HENT: Negative for nosebleeds and trouble swallowing.    Eyes: Negative for photophobia and visual disturbance.   Cardiovascular: Negative for chest pain and leg swelling.   Gastrointestinal: Negative for abdominal distention, abdominal pain and vomiting.   Genitourinary: Negative for dysuria and hematuria.   Musculoskeletal: Negative for arthralgias and back pain.   Skin: Negative for color change, pallor and rash.   Neurological: Negative.    Psychiatric/Behavioral: Negative for agitation and behavioral problems.     Objective:     Vital Signs (Most Recent):  Temp: 98 °F (36.7 °C) (08/07/17 1144)  Pulse: 87 (08/07/17 1144)  Resp: 18 (08/07/17 1144)  BP: 108/60 (08/07/17 1144)  SpO2: 96 % (08/07/17 1144) Vital Signs (24h Range):  Temp:  [97.8 °F (36.6 °C)-99.7 °F (37.6 °C)] 98 °F (36.7 °C)  Pulse:  [] 87  Resp:  [18-20] 18  SpO2:  [90 %-97 %] 96 %  BP: (108-159)/() 108/60     Weight: 71.7 kg (158 lb)  Body mass index is 26.29 kg/m².    Intake/Output Summary (Last 24 hours) at 08/07/17 1319  Last data filed at 08/07/17 1308   Gross per 24 hour   Intake          3163.33 ml   Output             1700 ml   Net          1463.33 ml      Physical Exam   Constitutional: He is oriented to person, place, and time. No distress.   HENT:   Head: Normocephalic and atraumatic.   Eyes: Conjunctivae and EOM are normal. Pupils are equal, round, and reactive to light.   Neck: Normal range of motion. Neck supple.   Cardiovascular: Normal rate, regular rhythm and normal heart sounds.  Exam reveals no friction rub.    No murmur heard.  Pulmonary/Chest: He is in respiratory distress. He has rales.   B/L lower lobes coarse crackles,Left lower lobectomy scar   Abdominal: Soft. Bowel sounds are normal. He exhibits no distension. There is no tenderness.   Musculoskeletal: Normal range of motion.   Neurological: He is  alert and oriented to person, place, and time.   Skin: No rash noted. He is not diaphoretic.   Psychiatric: He has a normal mood and affect. His behavior is normal.   Vitals reviewed.      Significant Labs:   BMP:   Recent Labs  Lab 08/07/17  0507   *      K 4.0      CO2 25   BUN 14   CREATININE 2.1*   CALCIUM 8.7     CBC:   Recent Labs  Lab 08/06/17  0440 08/07/17  0507   WBC 9.30 8.49   HGB 10.7* 10.4*   HCT 33.4* 31.7*    246     CMP:   Recent Labs  Lab 08/06/17  0440 08/07/17  0507    137   K 4.2 4.0    104   CO2 27 25   * 115*   BUN 13 14   CREATININE 1.9* 2.1*   CALCIUM 9.2 8.7   PROT 6.0 6.1   ALBUMIN 2.3* 2.2*   BILITOT 0.8 0.6   ALKPHOS 58 62   AST 53* 43*   ALT 48* 38   ANIONGAP 9 8   EGFRNONAA 33* 29*       Significant Imaging: I have reviewed and interpreted all pertinent imaging results/findings within the past 24 hours.    Assessment/Plan:      * Sepsis related to Pneumonia of both lower lobes due to infectious organism    No temp spike in last 24 hrs  Pt feels that his breathing is better since admission but gets short of breath when he walks to the bathroom.  Frequency of BM has decreased  He does report frequent urination and poor stream            Diarrhea of presumed infectious origin    Pt says he diarrhea is improving  C/w Cipro and Flagyl for now  Lomotil prn        ANGELITO (acute kidney injury)    Baseline cr <1  Non-oliguric  Possible etiologies- sepsis, post renal, drug induced ( carboplatin)  UA,UPC,Urine sodium,renal US  Check post void urine            Bronchiolo-alveolar adenocarcinoma of left lung    completed 4 cycles of carboplatinum and Taxol   Will f/u Hem/onc recs           Essential hypertension    Stable  C/w Toprol  Will watch for hypotension re: increasing flomax dose          Benign non-nodular prostatic hyperplasia with lower urinary tract symptoms    Increase flomax dose to 0.8 mg          Hyponatremia    -NS IV fluids  -CMP in  AM  -monitor.           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  --Currently on Chemo, with Left Chest Mediport             VTE Risk Mitigation         Ordered     heparin (porcine) injection 5,000 Units  Every 8 hours     Route:  Subcutaneous        08/06/17 1053     Medium Risk of VTE  Once      08/02/17 1638     Place sequential compression device  Until discontinued      08/02/17 1638              Mary Cr MD  Department of Hospital Medicine   [facility]

## 2017-08-08 PROBLEM — N39.0 UTI (URINARY TRACT INFECTION): Status: ACTIVE | Noted: 2017-08-08

## 2017-08-08 PROBLEM — E87.1 HYPONATREMIA: Status: RESOLVED | Noted: 2017-08-02 | Resolved: 2017-08-08

## 2017-08-08 LAB
ALBUMIN SERPL BCP-MCNC: 2.3 G/DL
ALP SERPL-CCNC: 67 U/L
ALT SERPL W/O P-5'-P-CCNC: 34 U/L
ANION GAP SERPL CALC-SCNC: 9 MMOL/L
AST SERPL-CCNC: 39 U/L
BASOPHILS # BLD AUTO: 0.09 K/UL
BASOPHILS NFR BLD: 1 %
BILIRUB SERPL-MCNC: 0.6 MG/DL
BUN SERPL-MCNC: 15 MG/DL
CALCIUM SERPL-MCNC: 9.3 MG/DL
CHLORIDE SERPL-SCNC: 105 MMOL/L
CO2 SERPL-SCNC: 23 MMOL/L
CREAT SERPL-MCNC: 2 MG/DL
DIFFERENTIAL METHOD: ABNORMAL
EOSINOPHIL # BLD AUTO: 0.3 K/UL
EOSINOPHIL NFR BLD: 3.9 %
ERYTHROCYTE [DISTWIDTH] IN BLOOD BY AUTOMATED COUNT: 19 %
EST. GFR  (AFRICAN AMERICAN): 36 ML/MIN/1.73 M^2
EST. GFR  (NON AFRICAN AMERICAN): 31 ML/MIN/1.73 M^2
GLUCOSE SERPL-MCNC: 99 MG/DL
HCT VFR BLD AUTO: 33 %
HGB BLD-MCNC: 10.7 G/DL
LYMPHOCYTES # BLD AUTO: 0.9 K/UL
LYMPHOCYTES NFR BLD: 10 %
MCH RBC QN AUTO: 31.6 PG
MCHC RBC AUTO-ENTMCNC: 32.4 G/DL
MCV RBC AUTO: 97 FL
MONOCYTES # BLD AUTO: 1.9 K/UL
MONOCYTES NFR BLD: 21.9 %
NEUTROPHILS # BLD AUTO: 5.5 K/UL
NEUTROPHILS NFR BLD: 63.2 %
PLATELET # BLD AUTO: 242 K/UL
PMV BLD AUTO: 8.8 FL
POTASSIUM SERPL-SCNC: 3.8 MMOL/L
PROT SERPL-MCNC: 6.1 G/DL
RBC # BLD AUTO: 3.39 M/UL
SODIUM SERPL-SCNC: 137 MMOL/L
WBC # BLD AUTO: 8.66 K/UL

## 2017-08-08 PROCEDURE — 36415 COLL VENOUS BLD VENIPUNCTURE: CPT

## 2017-08-08 PROCEDURE — 63600175 PHARM REV CODE 636 W HCPCS: Performed by: EMERGENCY MEDICINE

## 2017-08-08 PROCEDURE — 27000221 HC OXYGEN, UP TO 24 HOURS

## 2017-08-08 PROCEDURE — 99900035 HC TECH TIME PER 15 MIN (STAT)

## 2017-08-08 PROCEDURE — 25000003 PHARM REV CODE 250: Performed by: INTERNAL MEDICINE

## 2017-08-08 PROCEDURE — 63600175 PHARM REV CODE 636 W HCPCS: Performed by: INTERNAL MEDICINE

## 2017-08-08 PROCEDURE — 25000003 PHARM REV CODE 250: Performed by: NURSE PRACTITIONER

## 2017-08-08 PROCEDURE — 99233 SBSQ HOSP IP/OBS HIGH 50: CPT | Mod: ,,, | Performed by: INTERNAL MEDICINE

## 2017-08-08 PROCEDURE — 25000242 PHARM REV CODE 250 ALT 637 W/ HCPCS: Performed by: NURSE PRACTITIONER

## 2017-08-08 PROCEDURE — S0030 INJECTION, METRONIDAZOLE: HCPCS | Performed by: INTERNAL MEDICINE

## 2017-08-08 PROCEDURE — 94640 AIRWAY INHALATION TREATMENT: CPT

## 2017-08-08 PROCEDURE — 96372 THER/PROPH/DIAG INJ SC/IM: CPT

## 2017-08-08 PROCEDURE — 85025 COMPLETE CBC W/AUTO DIFF WBC: CPT

## 2017-08-08 PROCEDURE — 80053 COMPREHEN METABOLIC PANEL: CPT

## 2017-08-08 PROCEDURE — 21400001 HC TELEMETRY ROOM

## 2017-08-08 RX ORDER — MOXIFLOXACIN HYDROCHLORIDE 400 MG/1
400 TABLET ORAL DAILY
Status: DISCONTINUED | OUTPATIENT
Start: 2017-08-08 | End: 2017-08-08

## 2017-08-08 RX ADMIN — IPRATROPIUM BROMIDE AND ALBUTEROL SULFATE 3 ML: .5; 3 SOLUTION RESPIRATORY (INHALATION) at 08:08

## 2017-08-08 RX ADMIN — IPRATROPIUM BROMIDE AND ALBUTEROL SULFATE 3 ML: .5; 3 SOLUTION RESPIRATORY (INHALATION) at 12:08

## 2017-08-08 RX ADMIN — CEFTRIAXONE 2 G: 2 INJECTION, SOLUTION INTRAVENOUS at 01:08

## 2017-08-08 RX ADMIN — ATORVASTATIN CALCIUM 20 MG: 10 TABLET, FILM COATED ORAL at 08:08

## 2017-08-08 RX ADMIN — GUAIFENESIN 600 MG: 600 TABLET, EXTENDED RELEASE ORAL at 08:08

## 2017-08-08 RX ADMIN — PANTOPRAZOLE SODIUM 40 MG: 40 TABLET, DELAYED RELEASE ORAL at 08:08

## 2017-08-08 RX ADMIN — IPRATROPIUM BROMIDE AND ALBUTEROL SULFATE 3 ML: .5; 3 SOLUTION RESPIRATORY (INHALATION) at 07:08

## 2017-08-08 RX ADMIN — IPRATROPIUM BROMIDE AND ALBUTEROL SULFATE 3 ML: .5; 3 SOLUTION RESPIRATORY (INHALATION) at 01:08

## 2017-08-08 RX ADMIN — ASPIRIN 81 MG 81 MG: 81 TABLET ORAL at 08:08

## 2017-08-08 RX ADMIN — METOPROLOL SUCCINATE 50 MG: 50 TABLET, EXTENDED RELEASE ORAL at 08:08

## 2017-08-08 RX ADMIN — METRONIDAZOLE 500 MG: 500 INJECTION, SOLUTION INTRAVENOUS at 03:08

## 2017-08-08 RX ADMIN — CIPROFLOXACIN 400 MG: 2 INJECTION, SOLUTION INTRAVENOUS at 06:08

## 2017-08-08 RX ADMIN — TAMSULOSIN HYDROCHLORIDE 0.8 MG: 0.4 CAPSULE ORAL at 08:08

## 2017-08-08 RX ADMIN — METRONIDAZOLE 500 MG: 500 INJECTION, SOLUTION INTRAVENOUS at 11:08

## 2017-08-08 RX ADMIN — HEPARIN SODIUM 5000 UNITS: 5000 INJECTION, SOLUTION INTRAVENOUS; SUBCUTANEOUS at 09:08

## 2017-08-08 RX ADMIN — MULTIPLE VITAMINS W/ MINERALS TAB 1 TABLET: TAB at 08:08

## 2017-08-08 RX ADMIN — HEPARIN SODIUM 5000 UNITS: 5000 INJECTION, SOLUTION INTRAVENOUS; SUBCUTANEOUS at 06:08

## 2017-08-08 RX ADMIN — DIPHENOXYLATE HYDROCHLORIDE AND ATROPINE SULFATE 1 TABLET: 2.5; .025 TABLET ORAL at 08:08

## 2017-08-08 RX ADMIN — HEPARIN SODIUM 5000 UNITS: 5000 INJECTION, SOLUTION INTRAVENOUS; SUBCUTANEOUS at 01:08

## 2017-08-08 RX ADMIN — METRONIDAZOLE 500 MG: 500 INJECTION, SOLUTION INTRAVENOUS at 07:08

## 2017-08-08 NOTE — PLAN OF CARE
08/08/17 1006   Medicare Message   Important Message from Medicare regarding Discharge Appeal Rights Given to patient/caregiver;Explained to patient/caregiver;Signed/date by patient/caregiver   Date IMM was signed 08/08/17   Time IMM was signed 1006

## 2017-08-08 NOTE — PLAN OF CARE
Problem: Patient Care Overview  Goal: Plan of Care Review  Outcome: Ongoing (interventions implemented as appropriate)  PT stools are loose not watery. Pt was Bladder scanned at 2100 and showed 405. However pt has continued to void and does not complain of any discomfort. Pt coughs up thick white yellowish sputum. Will continue to monitor. 12 hour chart check completed.

## 2017-08-08 NOTE — PLAN OF CARE
Problem: Patient Care Overview  Goal: Plan of Care Review  Outcome: Ongoing (interventions implemented as appropriate)  Pt is tolerating NC well.Pt is receiving nebulized medications as ordered with NARN. Pt motivated to improve IS levels and requested RT to monitor IS therapy. IS proper procedure, importance of use, and frequency explained to patient.  Performed adequate levels  x10 breaths with good effort.

## 2017-08-08 NOTE — SUBJECTIVE & OBJECTIVE
Interval History:   Feeling better  Sitting in chair; requiring oxygen at rest  No diarrhea in last 24 hrs    Review of Systems   Constitutional: Negative for appetite change, chills and fever.   HENT: Negative for nosebleeds and trouble swallowing.    Eyes: Negative for photophobia and visual disturbance.   Cardiovascular: Negative for chest pain and leg swelling.   Gastrointestinal: Negative for abdominal distention, abdominal pain and vomiting.   Genitourinary: Negative for dysuria and hematuria.   Musculoskeletal: Negative for arthralgias and back pain.   Skin: Negative for color change, pallor and rash.   Neurological: Negative.    Psychiatric/Behavioral: Negative for agitation and behavioral problems.     Objective:     Vital Signs (Most Recent):  Temp: 98.2 °F (36.8 °C) (08/08/17 0355)  Pulse: 110 (08/08/17 0820)  Resp: 18 (08/08/17 0725)  BP: (!) 117/56 (08/08/17 0820)  SpO2: (!) 94 % (08/08/17 0725) Vital Signs (24h Range):  Temp:  [97.8 °F (36.6 °C)-98.2 °F (36.8 °C)] 98.2 °F (36.8 °C)  Pulse:  [] 110  Resp:  [16-20] 18  SpO2:  [92 %-95 %] 94 %  BP: (117-140)/(56-97) 117/56     Weight: 71.7 kg (158 lb)  Body mass index is 26.29 kg/m².    Intake/Output Summary (Last 24 hours) at 08/08/17 1154  Last data filed at 08/08/17 0832   Gross per 24 hour   Intake          1325.83 ml   Output             1850 ml   Net          -524.17 ml      Physical Exam   Constitutional: He is oriented to person, place, and time. No distress.   HENT:   Head: Normocephalic and atraumatic.   On oxygen via NC   Eyes: Conjunctivae and EOM are normal. Pupils are equal, round, and reactive to light.   Neck: Normal range of motion. Neck supple.   Cardiovascular: Normal rate, regular rhythm and normal heart sounds.  Exam reveals no friction rub.    No murmur heard.  Pulmonary/Chest: He has rales.   B/L lower lobes coarse crackles,Left lower lobectomy scar   Abdominal: Soft. Bowel sounds are normal. He exhibits no distension. There  is no tenderness.   Musculoskeletal: Normal range of motion.   Neurological: He is alert and oriented to person, place, and time.   Skin: No rash noted. He is not diaphoretic.   Psychiatric: He has a normal mood and affect. His behavior is normal.   Vitals reviewed.      Significant Labs:   BMP:   Recent Labs  Lab 08/08/17  0509   GLU 99      K 3.8      CO2 23   BUN 15   CREATININE 2.0*   CALCIUM 9.3     CBC:   Recent Labs  Lab 08/07/17  0507 08/08/17  0509   WBC 8.49 8.66   HGB 10.4* 10.7*   HCT 31.7* 33.0*    242     CMP:   Recent Labs  Lab 08/07/17  0507 08/08/17  0509    137   K 4.0 3.8    105   CO2 25 23   * 99   BUN 14 15   CREATININE 2.1* 2.0*   CALCIUM 8.7 9.3   PROT 6.1 6.1   ALBUMIN 2.2* 2.3*   BILITOT 0.6 0.6   ALKPHOS 62 67   AST 43* 39   ALT 38 34   ANIONGAP 8 9   EGFRNONAA 29* 31*       Significant Imaging: I have reviewed and interpreted all pertinent imaging results/findings within the past 24 hours.

## 2017-08-08 NOTE — PROGRESS NOTES
[facility] -   Hematology/Oncology  Progress Note    Patient Name: Doron Domínguez  Admission Date: 8/2/2017  Hospital Length of Stay: 6 days  Code Status: DNR     Subjective:     HPI:  77-year-old male history of bronchial alveolar carcinoma patient has completed 4 cycles of carboplatinum and Taxol stable disease at the end of 2 cycles call this morning the temperature 101 with chills told her the emergency room for further evaluation was asked see the patient for further evaluation in terms of response to therapy.  Admitted with PNA currently on zysyn/cipro/vanc.    Interval History:   Says he feels better.Not coughing as much    Oncology Treatment Plan:   OP NSCLC PACLITAXEL + CARBOPLATIN (AUC) (WEEKLY)    Medications:  Continuous Infusions:   Scheduled Meds:   albuterol-ipratropium 2.5mg-0.5mg/3mL  3 mL Nebulization Q6H    aspirin  81 mg Oral Daily    atorvastatin  20 mg Oral QHS    ciprofloxacin (CIPRO)400mg/200ml D5W IVPB  400 mg Intravenous Q12H    guaifenesin  600 mg Oral BID    heparin (porcine)  5,000 Units Subcutaneous Q8H    metoprolol succinate  50 mg Oral Daily    metronidazole  500 mg Intravenous Q8H    multivit-iron-FA-calcium-mins  1 tablet Oral Daily    pantoprazole  40 mg Oral Daily    tamsulosin  0.8 mg Oral QHS     PRN Meds:acetaminophen, benzonatate, cetirizine, diphenoxylate-atropine 2.5-0.025 mg, ondansetron, ondansetron, polyethylene glycol, promethazine (PHENERGAN) IVPB     Review of Systems   Constitutional: Negative.  Negative for fatigue.   Eyes: Negative.    Respiratory: Positive for cough and shortness of breath.    Cardiovascular: Negative.  Negative for chest pain.   Gastrointestinal: Negative for abdominal pain and nausea.   Genitourinary: Negative.  Negative for hematuria.   Musculoskeletal: Negative.    Skin: Negative.    Neurological: Negative.    Psychiatric/Behavioral: Negative.      Objective:     Vital Signs (Most Recent):  Temp: 98.2 °F (36.8 °C)  (08/08/17 0355)  Pulse: 97 (08/08/17 0355)  Resp: 18 (08/08/17 0355)  BP: 123/60 (08/08/17 0355)  SpO2: (!) 92 % (08/08/17 0355) Vital Signs (24h Range):  Temp:  [97.8 °F (36.6 °C)-98.2 °F (36.8 °C)] 98.2 °F (36.8 °C)  Pulse:  [] 97  Resp:  [16-20] 18  SpO2:  [92 %-96 %] 92 %  BP: (108-159)/() 123/60     Weight: 71.7 kg (158 lb)  Body mass index is 26.29 kg/m².  Body surface area is 1.81 meters squared.      Intake/Output Summary (Last 24 hours) at 08/08/17 0554  Last data filed at 08/08/17 0314   Gross per 24 hour   Intake          1891.66 ml   Output             1650 ml   Net           241.66 ml       Physical Exam   Constitutional: He is oriented to person, place, and time. He appears well-developed and well-nourished.   HENT:   Head: Normocephalic.   Mouth/Throat: No oropharyngeal exudate.   Eyes: Pupils are equal, round, and reactive to light.   Neck: No thyromegaly present.   Cardiovascular: Normal rate, regular rhythm and normal heart sounds.  Exam reveals no gallop.    No murmur heard.  Pulmonary/Chest: No respiratory distress. He has no wheezes. He has no rales.   Crackles on both bases, right more than left   Abdominal: Soft. Bowel sounds are normal. He exhibits no distension and no mass. There is no rebound and no guarding.   Musculoskeletal: Normal range of motion. He exhibits no edema.   Lymphadenopathy:     He has no cervical adenopathy.   Neurological: He is alert and oriented to person, place, and time.   Skin: Skin is warm and dry.   Psychiatric: He has a normal mood and affect. His behavior is normal.       Significant Labs:   CBC:   Recent Labs  Lab 08/07/17  0507 08/08/17  0509   WBC 8.49 8.66   HGB 10.4* 10.7*   HCT 31.7* 33.0*    242    and CMP:   Recent Labs  Lab 08/07/17  0507      K 4.0      CO2 25   *   BUN 14   CREATININE 2.1*   CALCIUM 8.7   PROT 6.1   ALBUMIN 2.2*   BILITOT 0.6   ALKPHOS 62   AST 43*   ALT 38   ANIONGAP 8   EGFRNONAA 29*        Diagnostic Results:  I have reviewed all pertinent imaging results/findings within the past 24 hours.    Assessment/Plan:     ANGELITO (acute kidney injury)    Creatinine has gone up fom normal to 2.1  Hospital medicine Will have this addressed by Hospital medicine        Diarrhea of presumed infectious origin    Patient developed significant diarrhea overnight which may be related to antibiotics or possibly C. difficile colitis    --We will order stool studies for C. 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    Since broncho-alveloar cancer can present as a diffuse process, the ongoing abnormalities on x rays can represent either pneumonia or extension of cancer.  On 8/6/2017 the X-rays showed improvement  Currently he is being treated as a pneumonia with at  improvement. Will monitor  Pulmonary consult requested on 8/87/2017. was asked to continue oral antibiotics upon discharge.        Benign non-nodular prostatic hyperplasia with lower urinary tract symptoms    Has a hx of bronchioalveolar carcinoma and has been receiving chemotherapy.  Imaging studies are amrkedly abnormal with a differential of infection vs tumor progression. Will ask Pulmonary to see. May need a bronchoscopy        Bronchiolo-alveolar adenocarcinoma of left lung    Will follow as outpatient        * Sepsis related to Pneumonia of both lower lobes due to infectious organism    Doing better.Has remained afebrile and x rays show improvement. Once discharged, needs to follow up with DR Leon             Thank you for your consult. I will follow-up with patient. Please contact us if you have any additional questions.     Chapin Mae MD  Hematology/Oncology  [facility]

## 2017-08-08 NOTE — PLAN OF CARE
08/08/17 1535   Discharge Reassessment   Assessment Type Discharge Planning Reassessment   Can the patient answer the patient profile reliably? Yes, cognitively intact   How does the patient rate their overall health at the present time? Fair   Describe the patient's ability to walk at the present time. Minor restrictions or changes   How often would a person be available to care for the patient? Often   Number of comorbid conditions (as recorded on the chart) Three   During the past month, has the patient often been bothered by feeling down, depressed or hopeless? Yes   During the past month, has the patient often been bothered by little interest or pleasure in doing things? No   Discharge plan remains the same: No   Provided patient/caregiver education on the expected discharge date and the discharge plan Yes   Discharge Plan A Home Health;Home with family   Change in patient condition or support system No   Patient choice form signed by patient/caregiver Yes   Explained to the the patient/caregiver why the discharge planned changed: Yes   Involved the patient/caregiver in establishing a new discharge plan: Yes

## 2017-08-08 NOTE — SUBJECTIVE & OBJECTIVE
Interval History:   Says he feels better.Not coughing as much    Oncology Treatment Plan:   OP NSCLC PACLITAXEL + CARBOPLATIN (AUC) (WEEKLY)    Medications:  Continuous Infusions:   Scheduled Meds:   albuterol-ipratropium 2.5mg-0.5mg/3mL  3 mL Nebulization Q6H    aspirin  81 mg Oral Daily    atorvastatin  20 mg Oral QHS    ciprofloxacin (CIPRO)400mg/200ml D5W IVPB  400 mg Intravenous Q12H    guaifenesin  600 mg Oral BID    heparin (porcine)  5,000 Units Subcutaneous Q8H    metoprolol succinate  50 mg Oral Daily    metronidazole  500 mg Intravenous Q8H    multivit-iron-FA-calcium-mins  1 tablet Oral Daily    pantoprazole  40 mg Oral Daily    tamsulosin  0.8 mg Oral QHS     PRN Meds:acetaminophen, benzonatate, cetirizine, diphenoxylate-atropine 2.5-0.025 mg, ondansetron, ondansetron, polyethylene glycol, promethazine (PHENERGAN) IVPB     Review of Systems   Constitutional: Negative.  Negative for fatigue.   Eyes: Negative.    Respiratory: Positive for cough and shortness of breath.    Cardiovascular: Negative.  Negative for chest pain.   Gastrointestinal: Negative for abdominal pain and nausea.   Genitourinary: Negative.  Negative for hematuria.   Musculoskeletal: Negative.    Skin: Negative.    Neurological: Negative.    Psychiatric/Behavioral: Negative.      Objective:     Vital Signs (Most Recent):  Temp: 98.2 °F (36.8 °C) (08/08/17 0355)  Pulse: 97 (08/08/17 0355)  Resp: 18 (08/08/17 0355)  BP: 123/60 (08/08/17 0355)  SpO2: (!) 92 % (08/08/17 0355) Vital Signs (24h Range):  Temp:  [97.8 °F (36.6 °C)-98.2 °F (36.8 °C)] 98.2 °F (36.8 °C)  Pulse:  [] 97  Resp:  [16-20] 18  SpO2:  [92 %-96 %] 92 %  BP: (108-159)/() 123/60     Weight: 71.7 kg (158 lb)  Body mass index is 26.29 kg/m².  Body surface area is 1.81 meters squared.      Intake/Output Summary (Last 24 hours) at 08/08/17 0554  Last data filed at 08/08/17 0314   Gross per 24 hour   Intake          1891.66 ml   Output             1650 ml    Net           241.66 ml       Physical Exam   Constitutional: He is oriented to person, place, and time. He appears well-developed and well-nourished.   HENT:   Head: Normocephalic.   Mouth/Throat: No oropharyngeal exudate.   Eyes: Pupils are equal, round, and reactive to light.   Neck: No thyromegaly present.   Cardiovascular: Normal rate, regular rhythm and normal heart sounds.  Exam reveals no gallop.    No murmur heard.  Pulmonary/Chest: No respiratory distress. He has no wheezes. He has no rales.   Crackles on both bases, right more than left   Abdominal: Soft. Bowel sounds are normal. He exhibits no distension and no mass. There is no rebound and no guarding.   Musculoskeletal: Normal range of motion. He exhibits no edema.   Lymphadenopathy:     He has no cervical adenopathy.   Neurological: He is alert and oriented to person, place, and time.   Skin: Skin is warm and dry.   Psychiatric: He has a normal mood and affect. His behavior is normal.       Significant Labs:   CBC:   Recent Labs  Lab 08/07/17  0507 08/08/17  0509   WBC 8.49 8.66   HGB 10.4* 10.7*   HCT 31.7* 33.0*    242    and CMP:   Recent Labs  Lab 08/07/17  0507      K 4.0      CO2 25   *   BUN 14   CREATININE 2.1*   CALCIUM 8.7   PROT 6.1   ALBUMIN 2.2*   BILITOT 0.6   ALKPHOS 62   AST 43*   ALT 38   ANIONGAP 8   EGFRNONAA 29*       Diagnostic Results:  I have reviewed all pertinent imaging results/findings within the past 24 hours.

## 2017-08-08 NOTE — PROGRESS NOTES
"[facility] Hospital Medicine  Progress Note    Patient Name: Doron Domínguez  MRN: [id number]  Patient Class: IP- Inpatient   Admission Date: 8/2/2017  Length of Stay: 6 days  Attending Physician: Mary Cr MD  Primary Care Provider: SHANEKA Art Jr, MD        Subjective:     Principal Problem:Pneumonia of both lower lobes due to infectious organism    HPI:  Mr. Domínguez 76yo male presented to the ED today for fever (Tmax 101.4) which onset gradually 2 days ago. Symptoms are constant and moderate in severity. Associated sxs include chills, fatigue, sob with activity, productive cough (moderate white thick sputum). No mitigating or exacerbating factors reported. Patient also noted increasing weakness over the past 1.5months with some lower ext. Edema.  Patient denies any CP, N/V/D, abdominal pain, dysuria, weakness/numbness, dizziness, and all other sxs at this time. No further complaints or concerns at this time. Per wife, pt last received chemo tx on 7/20/17 for hx of lung cancer. Dr. Leon is his oncologist.    Hospital Course:  8/2/17: patient admitted for sepsis related to PNE, broad spectrum therapy started. Blood and Sputum Cultures Pending 8/3 Pt reports improvement in symptoms. Pt was afebrile overnight. Hem/onc consulted. Continue current management. 8/4/17 Pt reports that he feels "better and stronger" this morning. The patient does report having diarrhea that began overnight. Stool sent for C-diff. Currently on Vanc/zosyn/cipro for treatment of HCAP. Hem/onc following.   8/7/2017- Diarrhea resolving, BM X 2 today, still short of breath  8/82017- diarrhea resolved, changed cipro to Rocephin; qualified for home oxygen    Interval History:   Feeling better  Sitting in chair; requiring oxygen at rest  No diarrhea in last 24 hrs    Review of Systems   Constitutional: Negative for appetite change, chills and fever.   HENT: Negative for nosebleeds and trouble swallowing.    Eyes: Negative " for photophobia and visual disturbance.   Cardiovascular: Negative for chest pain and leg swelling.   Gastrointestinal: Negative for abdominal distention, abdominal pain and vomiting.   Genitourinary: Negative for dysuria and hematuria.   Musculoskeletal: Negative for arthralgias and back pain.   Skin: Negative for color change, pallor and rash.   Neurological: Negative.    Psychiatric/Behavioral: Negative for agitation and behavioral problems.     Objective:     Vital Signs (Most Recent):  Temp: 98.2 °F (36.8 °C) (08/08/17 0355)  Pulse: 110 (08/08/17 0820)  Resp: 18 (08/08/17 0725)  BP: (!) 117/56 (08/08/17 0820)  SpO2: (!) 94 % (08/08/17 0725) Vital Signs (24h Range):  Temp:  [97.8 °F (36.6 °C)-98.2 °F (36.8 °C)] 98.2 °F (36.8 °C)  Pulse:  [] 110  Resp:  [16-20] 18  SpO2:  [92 %-95 %] 94 %  BP: (117-140)/(56-97) 117/56     Weight: 71.7 kg (158 lb)  Body mass index is 26.29 kg/m².    Intake/Output Summary (Last 24 hours) at 08/08/17 1154  Last data filed at 08/08/17 0832   Gross per 24 hour   Intake          1325.83 ml   Output             1850 ml   Net          -524.17 ml      Physical Exam   Constitutional: He is oriented to person, place, and time. No distress.   HENT:   Head: Normocephalic and atraumatic.   On oxygen via NC   Eyes: Conjunctivae and EOM are normal. Pupils are equal, round, and reactive to light.   Neck: Normal range of motion. Neck supple.   Cardiovascular: Normal rate, regular rhythm and normal heart sounds.  Exam reveals no friction rub.    No murmur heard.  Pulmonary/Chest: He has rales.   B/L lower lobes coarse crackles,Left lower lobectomy scar   Abdominal: Soft. Bowel sounds are normal. He exhibits no distension. There is no tenderness.   Musculoskeletal: Normal range of motion.   Neurological: He is alert and oriented to person, place, and time.   Skin: No rash noted. He is not diaphoretic.   Psychiatric: He has a normal mood and affect. His behavior is normal.   Vitals  reviewed.      Significant Labs:   BMP:   Recent Labs  Lab 08/08/17  0509   GLU 99      K 3.8      CO2 23   BUN 15   CREATININE 2.0*   CALCIUM 9.3     CBC:   Recent Labs  Lab 08/07/17  0507 08/08/17  0509   WBC 8.49 8.66   HGB 10.4* 10.7*   HCT 31.7* 33.0*    242     CMP:   Recent Labs  Lab 08/07/17  0507 08/08/17  0509    137   K 4.0 3.8    105   CO2 25 23   * 99   BUN 14 15   CREATININE 2.1* 2.0*   CALCIUM 8.7 9.3   PROT 6.1 6.1   ALBUMIN 2.2* 2.3*   BILITOT 0.6 0.6   ALKPHOS 62 67   AST 43* 39   ALT 38 34   ANIONGAP 8 9   EGFRNONAA 29* 31*       Significant Imaging: I have reviewed and interpreted all pertinent imaging results/findings within the past 24 hours.    Assessment/Plan:      * Sepsis related to Pneumonia of both lower lobes due to infectious organism    Stable  Cultures negative so far  cipro changed to Rocephin          UTI (urinary tract infection)    Will send for urine culture  Started on Rocephin          ANGELITO (acute kidney injury)    Stable  Baseline cr <1  Non-oliguric, UPC<1,Renal US- normal size kidneys  Possible etiologies- likely combination of sepsis and BPH  post void urine by bladder scan is >400 ml  Bladder scan can be unreliable  Pt is on flomax and is not complaining of BPH symptoms now        Diarrhea of presumed infectious origin    Resolving  C/w Flagyl and Lomotil prn        Bronchiolo-alveolar adenocarcinoma of left lung    completed 4 cycles of carboplatinum and Taxol   Will f/u Hem/onc recs           Essential hypertension    Stable  C/w Toprol  Will watch for hypotension re: increasing flomax dose          Benign non-nodular prostatic hyperplasia with lower urinary tract symptoms    C/w flomax       VTE Risk Mitigation         Ordered     heparin (porcine) injection 5,000 Units  Every 8 hours     Route:  Subcutaneous        08/06/17 1053     Medium Risk of VTE  Once      08/02/17 1638     Place sequential compression device  Until  discontinued      08/02/17 1638          Mary Cr MD  Department of Hospital Medicine   [facility]

## 2017-08-08 NOTE — PLAN OF CARE
Met with patient and his wife to discuss possible discharge plans. Patient wishes to resume with [facility]  and [facility]. Preference letter obtained to reflect [facility]. Referral faxed to [facility] via [facility].Update to Leonarda Arauz RN Case Manager

## 2017-08-08 NOTE — PLAN OF CARE
Problem: Patient Care Overview  Goal: Plan of Care Review  Outcome: Ongoing (interventions implemented as appropriate)  Patient tolerating breathing treatments and oxygen therapy well.

## 2017-08-09 ENCOUNTER — TELEPHONE (OUTPATIENT)
Dept: UROLOGY | Facility: CLINIC | Age: 77
End: 2017-08-09

## 2017-08-09 ENCOUNTER — TELEPHONE (OUTPATIENT)
Dept: HEMATOLOGY/ONCOLOGY | Facility: CLINIC | Age: 77
End: 2017-08-09

## 2017-08-09 ENCOUNTER — TELEPHONE (OUTPATIENT)
Dept: PULMONOLOGY | Facility: CLINIC | Age: 77
End: 2017-08-09

## 2017-08-09 VITALS
SYSTOLIC BLOOD PRESSURE: 115 MMHG | DIASTOLIC BLOOD PRESSURE: 58 MMHG | HEART RATE: 80 BPM | TEMPERATURE: 98 F | HEIGHT: 65 IN | OXYGEN SATURATION: 96 % | BODY MASS INDEX: 26.33 KG/M2 | WEIGHT: 158 LBS | RESPIRATION RATE: 18 BRPM

## 2017-08-09 PROBLEM — N39.0 UTI (URINARY TRACT INFECTION): Chronic | Status: ACTIVE | Noted: 2017-08-08

## 2017-08-09 LAB
ALBUMIN SERPL BCP-MCNC: 2.4 G/DL
ALP SERPL-CCNC: 65 U/L
ALT SERPL W/O P-5'-P-CCNC: 36 U/L
ANION GAP SERPL CALC-SCNC: 10 MMOL/L
AST SERPL-CCNC: 48 U/L
BASOPHILS # BLD AUTO: 0.09 K/UL
BASOPHILS NFR BLD: 0.9 %
BILIRUB SERPL-MCNC: 0.4 MG/DL
BUN SERPL-MCNC: 17 MG/DL
CALCIUM SERPL-MCNC: 9.4 MG/DL
CHLORIDE SERPL-SCNC: 102 MMOL/L
CO2 SERPL-SCNC: 25 MMOL/L
CREAT SERPL-MCNC: 2 MG/DL
DIFFERENTIAL METHOD: ABNORMAL
EOSINOPHIL # BLD AUTO: 0.5 K/UL
EOSINOPHIL NFR BLD: 4.4 %
ERYTHROCYTE [DISTWIDTH] IN BLOOD BY AUTOMATED COUNT: 19 %
EST. GFR  (AFRICAN AMERICAN): 36 ML/MIN/1.73 M^2
EST. GFR  (NON AFRICAN AMERICAN): 31 ML/MIN/1.73 M^2
GLUCOSE SERPL-MCNC: 97 MG/DL
HCT VFR BLD AUTO: 34.9 %
HGB BLD-MCNC: 11.3 G/DL
LYMPHOCYTES # BLD AUTO: 1.3 K/UL
LYMPHOCYTES NFR BLD: 12.1 %
MCH RBC QN AUTO: 31.4 PG
MCHC RBC AUTO-ENTMCNC: 32.4 G/DL
MCV RBC AUTO: 97 FL
MONOCYTES # BLD AUTO: 1.7 K/UL
MONOCYTES NFR BLD: 16.2 %
NEUTROPHILS # BLD AUTO: 6.9 K/UL
NEUTROPHILS NFR BLD: 66.4 %
PLATELET # BLD AUTO: 322 K/UL
PMV BLD AUTO: 9 FL
POTASSIUM SERPL-SCNC: 3.9 MMOL/L
PROT SERPL-MCNC: 6.4 G/DL
RBC # BLD AUTO: 3.6 M/UL
SODIUM SERPL-SCNC: 137 MMOL/L
WBC # BLD AUTO: 10.36 K/UL

## 2017-08-09 PROCEDURE — 36415 COLL VENOUS BLD VENIPUNCTURE: CPT

## 2017-08-09 PROCEDURE — 80053 COMPREHEN METABOLIC PANEL: CPT

## 2017-08-09 PROCEDURE — 99233 SBSQ HOSP IP/OBS HIGH 50: CPT | Mod: ,,, | Performed by: INTERNAL MEDICINE

## 2017-08-09 PROCEDURE — 25000003 PHARM REV CODE 250: Performed by: NURSE PRACTITIONER

## 2017-08-09 PROCEDURE — 25000003 PHARM REV CODE 250: Performed by: INTERNAL MEDICINE

## 2017-08-09 PROCEDURE — 85025 COMPLETE CBC W/AUTO DIFF WBC: CPT

## 2017-08-09 PROCEDURE — 63600175 PHARM REV CODE 636 W HCPCS: Performed by: INTERNAL MEDICINE

## 2017-08-09 PROCEDURE — 96372 THER/PROPH/DIAG INJ SC/IM: CPT

## 2017-08-09 PROCEDURE — 25000242 PHARM REV CODE 250 ALT 637 W/ HCPCS: Performed by: NURSE PRACTITIONER

## 2017-08-09 PROCEDURE — S0030 INJECTION, METRONIDAZOLE: HCPCS | Performed by: INTERNAL MEDICINE

## 2017-08-09 PROCEDURE — 27000221 HC OXYGEN, UP TO 24 HOURS

## 2017-08-09 PROCEDURE — 94640 AIRWAY INHALATION TREATMENT: CPT

## 2017-08-09 RX ORDER — LEVOFLOXACIN 500 MG/1
500 TABLET, FILM COATED ORAL DAILY
Qty: 7 TABLET | Refills: 0 | Status: SHIPPED | OUTPATIENT
Start: 2017-08-09 | End: 2017-08-09

## 2017-08-09 RX ORDER — TAMSULOSIN HYDROCHLORIDE 0.4 MG/1
0.8 CAPSULE ORAL NIGHTLY
Qty: 90 CAPSULE | Refills: 4 | Status: SHIPPED | OUTPATIENT
Start: 2017-08-09 | End: 2017-08-17 | Stop reason: SDUPTHER

## 2017-08-09 RX ORDER — LEVOFLOXACIN 500 MG/1
500 TABLET, FILM COATED ORAL DAILY
Qty: 7 TABLET | Refills: 0 | Status: SHIPPED | OUTPATIENT
Start: 2017-08-09 | End: 2017-08-15 | Stop reason: ALTCHOICE

## 2017-08-09 RX ORDER — METRONIDAZOLE 500 MG/1
500 TABLET ORAL 3 TIMES DAILY
Qty: 15 TABLET | Refills: 0 | Status: SHIPPED | OUTPATIENT
Start: 2017-08-09

## 2017-08-09 RX ORDER — PANTOPRAZOLE SODIUM 40 MG/1
40 TABLET, DELAYED RELEASE ORAL DAILY
Qty: 30 TABLET | Refills: 0 | Status: SHIPPED | OUTPATIENT
Start: 2017-08-09

## 2017-08-09 RX ORDER — DIPHENOXYLATE HYDROCHLORIDE AND ATROPINE SULFATE 2.5; .025 MG/1; MG/1
1 TABLET ORAL 4 TIMES DAILY PRN
Qty: 20 TABLET | Refills: 0 | Status: SHIPPED | OUTPATIENT
Start: 2017-08-09 | End: 2017-08-19

## 2017-08-09 RX ORDER — METRONIDAZOLE 500 MG/1
500 TABLET ORAL 3 TIMES DAILY
Qty: 15 TABLET | Refills: 0 | Status: SHIPPED | OUTPATIENT
Start: 2017-08-09 | End: 2017-08-09

## 2017-08-09 RX ORDER — AMOXICILLIN 250 MG
1 CAPSULE ORAL DAILY PRN
COMMUNITY
Start: 2017-08-09

## 2017-08-09 RX ORDER — PANTOPRAZOLE SODIUM 40 MG/1
40 TABLET, DELAYED RELEASE ORAL DAILY
Qty: 30 TABLET | Refills: 0 | Status: SHIPPED | OUTPATIENT
Start: 2017-08-09 | End: 2017-08-09

## 2017-08-09 RX ORDER — DIPHENOXYLATE HYDROCHLORIDE AND ATROPINE SULFATE 2.5; .025 MG/1; MG/1
1 TABLET ORAL 4 TIMES DAILY PRN
Qty: 20 TABLET | Refills: 0 | Status: SHIPPED | OUTPATIENT
Start: 2017-08-09 | End: 2017-08-09

## 2017-08-09 RX ADMIN — MULTIPLE VITAMINS W/ MINERALS TAB 1 TABLET: TAB at 08:08

## 2017-08-09 RX ADMIN — METRONIDAZOLE 500 MG: 500 INJECTION, SOLUTION INTRAVENOUS at 10:08

## 2017-08-09 RX ADMIN — IPRATROPIUM BROMIDE AND ALBUTEROL SULFATE 3 ML: .5; 3 SOLUTION RESPIRATORY (INHALATION) at 12:08

## 2017-08-09 RX ADMIN — METOPROLOL SUCCINATE 50 MG: 50 TABLET, EXTENDED RELEASE ORAL at 08:08

## 2017-08-09 RX ADMIN — ASPIRIN 81 MG 81 MG: 81 TABLET ORAL at 08:08

## 2017-08-09 RX ADMIN — IPRATROPIUM BROMIDE AND ALBUTEROL SULFATE 3 ML: .5; 3 SOLUTION RESPIRATORY (INHALATION) at 07:08

## 2017-08-09 RX ADMIN — HEPARIN SODIUM 5000 UNITS: 5000 INJECTION, SOLUTION INTRAVENOUS; SUBCUTANEOUS at 05:08

## 2017-08-09 RX ADMIN — PANTOPRAZOLE SODIUM 40 MG: 40 TABLET, DELAYED RELEASE ORAL at 08:08

## 2017-08-09 RX ADMIN — HEPARIN SODIUM 5000 UNITS: 5000 INJECTION, SOLUTION INTRAVENOUS; SUBCUTANEOUS at 02:08

## 2017-08-09 RX ADMIN — METRONIDAZOLE 500 MG: 500 INJECTION, SOLUTION INTRAVENOUS at 03:08

## 2017-08-09 RX ADMIN — CEFTRIAXONE 2 G: 2 INJECTION, SOLUTION INTRAVENOUS at 11:08

## 2017-08-09 RX ADMIN — GUAIFENESIN 600 MG: 600 TABLET, EXTENDED RELEASE ORAL at 08:08

## 2017-08-09 NOTE — PLAN OF CARE
Aug14 Established Patient Visit with Shant Leon MD   Monday Aug 14, 2017 10:40 AM  [facility]   [id number] [id number] [phone]         Aug16 Established Patient Visit with Dante Massey MD   Wednesday Aug 16, 2017 11:00 AM  [facility]   [address] [id number] [phone]      Patient discharging home with [facility] home health and [facility] for home oxygen.     08/09/17 1600   Final Note   Assessment Type Final Discharge Note   Discharge Disposition Home-Health   Hospital Follow Up  Appt(s) scheduled? Yes   Discharge/Hospital Encounter Summary to ([facility]) PCP n/a   Referral to Outpatient Case Management complete? n/a   Referral to / orders for Home Health Complete? Yes   30 day supply of medicines given at discharge, if documented non-compliance / non-adherence? n/a   Right Care Referral Info   Post Acute Recommendation Home-care

## 2017-08-09 NOTE — DISCHARGE INSTRUCTIONS
Dr. Plata's office will notify you of appointment date and time. If you are not contacted in the next 48 hours, contact office to schedule appointment.    Atropine; Diphenoxylate tablets  What is this medicine?  ATROPINE; DIPHENOXYLATE (A troe peen dye fen OX i late) is used to treat diarrhea.  How should I use this medicine?  Take this medicine by mouth with a glass of water. Follow the directions on the prescription label. You can take the tablets with food. Take your doses at regular intervals. Do not take your medicine more often than directed. Once your diarrhea has been brought under control your doctor or health care professional may reduce your doses.  Talk to your pediatrician regarding the use of this medicine in children. Special care may be needed.  Elderly patients may be more sensitive to the effects of this medicine.  What side effects may I notice from receiving this medicine?  Side effects that you should report to your doctor or health care professional as soon as possible:  allergic reactions like skin rash, itching or hives, swelling of the face, lips, or tongue  bloated, swollen feeling  breathing problems  changes in vision  fast, irregular heartbeat  stomach pain  Side effects that usually do not require medical attention (report to your doctor or health care professional if they continue or are bothersome):  headache  loss of appetite  mood changes  nausea, vomiting  numbness or tingling in the hands and feet  What may interact with this medicine?  alcohol  antihistamines for allergy, cough and cold  barbiturate medicines for inducing sleep or treating seizures  certain medicines for depression, anxiety, or psychotic disturbances  certain medicines for sleep  medicines for movement abnormalities as in Parkinson's disease, or for gastrointestinal problems  muscle relaxants  narcotic medicines (opiates) for pain  What if I miss a dose?  If you miss a dose, take it as soon as you can. If it  is almost time for your next dose, take only that dose. Do not take double or extra doses.  Where should I keep my medicine?  Keep out of the reach of children. This medicine can be abused. Keep your medicine in a safe place to protect it from theft. Do not share this medicine with anyone. Selling or giving away this medicine is dangerous and against the law.  This medicine may cause accidental overdose and death if taken by other adults, children, or pets. Mix any unused medicine with a substance like cat litter or coffee grounds. Then throw the medicine away in a sealed container like a sealed bag or a coffee can with a lid. Do not use the medicine after the expiration date.  Store at room temperature between 15 and 30 degrees C (59 and 86 degrees F). Protect from light. Keep container tightly closed.  What should I tell my health care provider before I take this medicine?  They need to know if you have any of these conditions:  bacterial food poisoning  colitis  dehydration  Down's syndrome  jaundice or liver disease  an unusual or allergic reaction to atropine, diphenoxylate, other medicines, foods, dyes, or preservatives  pregnant or trying to get pregnant  breast-feeding  What should I watch for while using this medicine?  If your symptoms do not start to get better after taking this medicine for two days, check with your doctor or health care professional, you may have a problem that needs further evaluation. Check with your doctor or health care professional right away if you develop a fever or bloody diarrhea.  You may get drowsy or dizzy. Do not drive, use machinery, or do anything that needs mental alertness until you know how this medicine affects you. Alcohol can increase possible drowsiness and dizziness. Avoid alcoholic drinks.  Your mouth may get dry. Chewing sugarless gum or sucking hard candy, and drinking plenty of water may help. Contact your doctor if the problem does not go away or is severe.  Drinking plenty of water can also help prevent dehydration that can occur with diarrhea.  Date Last Reviewed:   NOTE:This sheet is a summary. It may not cover all possible information. If you have questions about this medicine, talk to your doctor, pharmacist, or health care provider. Copyright© 2016 [employer]        Metronidazole tablets or capsules  What is this medicine?  METRONIDAZOLE (me troe NI da zole) is an antiinfective. It is used to treat certain kinds of bacterial and protozoal infections. It will not work for colds, flu, or other viral infections.  How should I use this medicine?  Take this medicine by mouth with a full glass of water. Follow the directions on the prescription label. Take your medicine at regular intervals. Do not take your medicine more often than directed. Take all of your medicine as directed even if you think you are better. Do not skip doses or stop your medicine early.  Talk to your pediatrician regarding the use of this medicine in children. Special care may be needed.  What side effects may I notice from receiving this medicine?  Side effects that you should report to your doctor or health care professional as soon as possible:  allergic reactions like skin rash or hives, swelling of the face, lips, or tongue  confusion, clumsiness  difficulty speaking  discolored or sore mouth  dizziness  fever, infection  numbness, tingling, pain or weakness in the hands or feet  trouble passing urine or change in the amount of urine  redness, blistering, peeling or loosening of the skin, including inside the mouth  seizures  unusually weak or tired  vaginal irritation, dryness, or discharge  Side effects that usually do not require medical attention (report to your doctor or health care professional if they continue or are bothersome):  diarrhea  headache  irritability  metallic taste  nausea  stomach pain or cramps  trouble sleeping  What may interact with this medicine?  Do not take  this medicine with any of the following medications:  alcohol or any product that contains alcohol  amprenavir oral solution  cisapride  disulfiram  dofetilide  dronedarone  paclitaxel injection  pimozide  ritonavir oral solution  sertraline oral solution  sulfamethoxazole-trimethoprim injection  thioridazine  ziprasidone  This medicine may also interact with the following medications:  birth control pills  cimetidine  lithium  other medicines that prolong the QT interval (cause an abnormal heart rhythm)  phenobarbital  phenytoin  warfarin  What if I miss a dose?  If you miss a dose, take it as soon as you can. If it is almost time for your next dose, take only that dose. Do not take double or extra doses.  Where should I keep my medicine?  Keep out of the reach of children.  Store at room temperature below 25 degrees C (77 degrees F). Protect from light. Keep container tightly closed. Throw away any unused medicine after the expiration date.  What should I tell my health care provider before I take this medicine?  They need to know if you have any of these conditions:  anemia or other blood disorders  disease of the nervous system  fungal or yeast infection  if you drink alcohol containing drinks  liver disease  seizures  an unusual or allergic reaction to metronidazole, or other medicines, foods, dyes, or preservatives  pregnant or trying to get pregnant  breast-feeding  What should I watch for while using this medicine?  Tell your doctor or health care professional if your symptoms do not improve or if they get worse.  You may get drowsy or dizzy. Do not drive, use machinery, or do anything that needs mental alertness until you know how this medicine affects you. Do not stand or sit up quickly, especially if you are an older patient. This reduces the risk of dizzy or fainting spells.  Avoid alcoholic drinks while you are taking this medicine and for three days afterward. Alcohol may make you feel dizzy, sick, or  flushed.  If you are being treated for a sexually transmitted disease, avoid sexual contact until you have finished your treatment. Your sexual partner may also need treatment.  Date Last Reviewed:   NOTE:This sheet is a summary. It may not cover all possible information. If you have questions about this medicine, talk to your doctor, pharmacist, or health care provider. Copyright© 2016 [employer]        Levofloxacin tablets  What is this medicine?  LEVOFLOXACIN (shanda deepali FLOX a sin) is a quinolone antibiotic. It is used to treat certain kinds of bacterial infections. It will not work for colds, flu, or other viral infections.  How should I use this medicine?  Take this medicine by mouth with a full glass of water. Follow the directions on the prescription label. This medicine can be taken with or without food. Take your medicine at regular intervals. Do not take your medicine more often than directed. Do not skip doses or stop your medicine early even if you feel better. Do not stop taking except on your doctor's advice.  A special MedGuide will be given to you by the pharmacist with each prescription and refill. Be sure to read this information carefully each time.  Talk to your pediatrician regarding the use of this medicine in children. While this drug may be prescribed for children as young as 6 months for selected conditions, precautions do apply.  What side effects may I notice from receiving this medicine?  Side effects that you should report to your doctor or health care professional as soon as possible:  · allergic reactions like skin rash or hives, swelling of the face, lips, or tongue  · anxious  · confusion  · depressed mood  · diarrhea  · fast, irregular heartbeat  · hallucination, loss of contact with reality  · joint, muscle, or tendon pain or swelling  · pain, tingling, numbness in the hands or feet  · suicidal thoughts or other mood changes  · sunburn  · unusually weak or tired  Side effects that  usually do not require medical attention (report to your doctor or health care professional if they continue or are bothersome):  · dry mouth  · headache  · nausea  · trouble sleeping  What may interact with this medicine?  Do not take this medicine with any of the following medications:  · arsenic trioxide  · chloroquine  · droperidol  · medicines for irregular heart rhythm like amiodarone, disopyramide, dofetilide, flecainide, quinidine, procainamide, sotalol  · some medicines for depression or mental problems like phenothiazines, pimozide, and ziprasidone  This medicine may also interact with the following medications:  · amoxapine  · antacids  · birth control pills  · cisapride  · dairy products  · didanosine (ddI) buffered tablets or powder  · haloperidol  · multivitamins  · NSAIDS, medicines for pain and inflammation, like ibuprofen or naproxen  · retinoid products like tretinoin or isotretinoin  · risperidone  · some other antibiotics like clarithromycin or erythromycin  · sucralfate  · theophylline  · warfarin  What if I miss a dose?  If you miss a dose, take it as soon as you remember. If it is almost time for your next dose, take only that dose. Do not take double or extra doses.  Where should I keep my medicine?  Keep out of the reach of children.  Store at room temperature between 15 and 30 degrees C (59 and 86 degrees F). Keep in a tightly closed container. Throw away any unused medicine after the expiration date.  What should I tell my health care provider before I take this medicine?  They need to know if you have any of these conditions:  · bone problems  · cerebral disease  · history of low levels of potassium in the blood  · irregular heartbeat  · joint problems  · kidney disease  · myasthenia gravis  · seizures  · tendon problems  · tingling of the fingers or toes, or other nerve disorder  · an unusual or allergic reaction to levofloxacin, other quinolone antibiotics, foods, dyes, or  preservatives  · pregnant or trying to get pregnant  · breast-feeding  What should I watch for while using this medicine?  Tell your doctor or health care professional if your symptoms do not improve or if they get worse. Drink several glasses of water a day and cut down on drinks that contain caffeine. You must not get dehydrated while taking this medicine.  You may get drowsy or dizzy. Do not drive, use machinery, or do anything that needs mental alertness until you know how this medicine affects you. Do not sit or stand up quickly, especially if you are an older patient. This reduces the risk of dizzy or fainting spells.  This medicine can make you more sensitive to the sun. Keep out of the sun. If you cannot avoid being in the sun, wear protective clothing and use a sunscreen. Do not use sun lamps or tanning beds/booths. Contact your doctor if you get a sunburn.  If you are a diabetic monitor your blood glucose carefully. If you get an unusual reading stop taking this medicine and call your doctor right away.  Do not treat diarrhea with over-the-counter products. Contact your doctor if you have diarrhea that lasts more than 2 days or if the diarrhea is severe and watery.  Avoid antacids, calcium, iron, and zinc products for 2 hours before and 2 hours after taking a dose of this medicine.  Date Last Reviewed:   NOTE:This sheet is a summary. It may not cover all possible information. If you have questions about this medicine, talk to your doctor, pharmacist, or health care provider. Copyright© 2016 [employer]              Pneumonia (Adult)  Pneumonia is an infection deep within the lungs. It is in the small air sacs (alveoli). Pneumonia may be caused by a virus or bacteria. Pneumonia caused by bacteria is usually treated with an antibiotic. Severe cases may need to be treated in the hospital. Milder cases can be treated at home. Symptoms usually start to get better during the first 2 days of treatment.    Home  care  Follow these guidelines when caring for yourself at home:  · Rest at home for the first 2 to 3 days, or until you feel stronger. Dont let yourself get overly tired when you go back to your activities.  · Stay away from cigarette smoke - yours or other peoples.  · You may use acetaminophen or ibuprofen to control fever or pain, unless another medicine was prescribed. If you have chronic liver or kidney disease, talk with your health care provider before using these medicines. Also talk with your provider if youve had a stomach ulcer or GI bleeding. Dont give aspirin to anyone younger than 18 years of age who is ill with a fever. It may cause severe liver damage.  · Your appetite may be poor, so a light diet is fine.  · Drink 6 to 8 glasses of fluids every day to make sure you are getting enough fluids. Beverages can include water, sport drinks, sodas without caffeine, juices, tea, or soup. Fluids will help loosen secretions in the lung. This will make it easier for you to cough up the phlegm (sputum). If you also have heart or kidney disease, check with your health care provider before you drink extra fluids.  · Take antibiotic medicine prescribed until it is all gone, even if you are feeling better after a few days.  Follow-up care  Follow up with your health care provider in the next 2 to 3 days, or as advised. This is to be sure the medicine is helping you get better.  If you are 65 or older, you should get a pneumococcal vaccine and a yearly flu (influenza) shot. You should also get these vaccines if you have chronic lung disease like asthma, emphysema, or COPD. Ask your provider about this.  When to seek medical advice  Call your health care provider right away if any of these occur:  · You dont get better within the first 48 hours of treatment  · Shortness of breath gets worse  · Rapid breathing (more than 25 breaths per minute)  · Coughing up blood  · Chest pain gets worse with breathing  · Fever  of 102°F (38°C) or higher that doesnt get better with fever medicine  · Weakness, dizziness, or fainting that gets worse  · Thirst or dry mouth that gets worse  · Sinus pain, headache, or a stiff neck  · Chest pain not caused by coughing  Date Last Reviewed: 12/23/2014  © 1413-9078 [employer]. [address] [place] [address], [place] [address]. All rights reserved. This information is not intended as a substitute for professional medical care. Always follow your healthcare professional's instructions.

## 2017-08-09 NOTE — DISCHARGE SUMMARY
[facility] Hospital Medicine  Discharge Summary      Patient Name: Doron Domínguez  MRN: [id number]  Admission Date: 8/2/2017  Hospital Length of Stay: 7 days  Discharge Date and Time:  08/09/2017 1:33 PM  Attending Physician: Mary Cr MD   Discharging Provider: Mary Cr MD  Primary Care Provider: SHANEKA Art Jr, MD      HPI:   Mr. Domínguez 76yo male presented to the ED today for fever (Tmax 101.4) which onset gradually 2 days ago. Symptoms are constant and moderate in severity. Associated sxs include chills, fatigue, sob with activity, productive cough (moderate white thick sputum). No mitigating or exacerbating factors reported. Patient also noted increasing weakness over the past 1.5months with some lower ext. Edema.  Patient denies any CP, N/V/D, abdominal pain, dysuria, weakness/numbness, dizziness, and all other sxs at this time. No further complaints or concerns at this time. Per wife, pt last received chemo tx on 7/20/17 for hx of lung cancer. Dr. Leon is his oncologist.         Indwelling Lines/Drains at time of discharge:   Lines/Drains/Airways     Central Venous Catheter Line                 Port A Cath Single Lumen 04/13/17 left subclavian 118 days              Hospital Course:    was admitted to the floor and was managed for pneumonia. He was started on broad spectrum abx. Pt had diarrhea which started prior to coming to the hospital. He was tested for c.diff which was negative. He was started on Lomotil prn. He also had ANGELITO during admission which was likely sec to sepsis,UTI and obstructive uropathy sec to BPH. Pt culture results were negative for growth. Pt was started on Levaquin to cover both pneumonia and UTI to be continued at home. Pt would benefit with urology f/u as out pt due to hx of BPH and obstructive symptoms. Pt was examined and found to be stable for discharge today.       Consults:   Consults         Status Ordering Provider     Inpatient  consult to Pulmonology  Once     Provider:  Dante Massey MD    Completed DAPHNE JASSO     Inpatient consult to Pulmonology  Once     Provider:  (Not yet assigned)    Completed KING WRIGHT     Inpatient consult to Respiratory Care  Once     Provider:  (Not yet assigned)    Acknowledged JHONY LAGOS     Pharmacy to dose Vancomycin consult  Once     Provider:  (Not yet assigned)    Acknowledged RAVINDRA KATZ          Significant Diagnostic Studies: Labs:   BMP:   Recent Labs  Lab 08/08/17  0509 08/09/17  0439   GLU 99 97    137   K 3.8 3.9    102   CO2 23 25   BUN 15 17   CREATININE 2.0* 2.0*   CALCIUM 9.3 9.4   , CMP   Recent Labs  Lab 08/08/17  0509 08/09/17  0439    137   K 3.8 3.9    102   CO2 23 25   GLU 99 97   BUN 15 17   CREATININE 2.0* 2.0*   CALCIUM 9.3 9.4   PROT 6.1 6.4   ALBUMIN 2.3* 2.4*   BILITOT 0.6 0.4   ALKPHOS 67 65   AST 39 48*   ALT 34 36   ANIONGAP 9 10   ESTGFRAFRICA 36* 36*   EGFRNONAA 31* 31*    and CBC   Recent Labs  Lab 08/08/17  0509 08/09/17  0439   WBC 8.66 10.36   HGB 10.7* 11.3*   HCT 33.0* 34.9*    322       Pending Diagnostic Studies:     Procedure Component Value Units Date/Time    Lactic acid, plasma #2 [[id number]] Collected:  08/02/17 1840    Order Status:  Sent Lab Status:  In process Updated:  08/02/17 1841    Specimen:  Blood from Blood     Lactic acid, plasma #3 [[id number]] Collected:  08/02/17 2157    Order Status:  Sent Lab Status:  No result     Specimen:  Blood from Blood     Vancomycin, random [[id number]] Collected:  08/07/17 0507    Order Status:  Sent Lab Status:  In process Updated:  08/07/17 0507    Specimen:  Blood from Blood         Final Active Diagnoses:    Diagnosis Date Noted POA    PRINCIPAL PROBLEM:  Sepsis related to Pneumonia of both lower lobes due to infectious organism [J18.9] 08/02/2017 Yes    UTI (urinary tract infection) [N39.0] 08/08/2017 Yes     Chronic    ANGELITO (acute kidney injury) [N17.9]  08/06/2017 No    Diarrhea of presumed infectious origin [A09] 08/04/2017 Yes    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] 01/10/2017 Yes    Essential hypertension [I10] 08/02/2017 Yes    Benign non-nodular prostatic hyperplasia with lower urinary tract symptoms [N40.1] 02/02/2017 Yes    Acute respiratory failure with hypoxia [J96.01] 08/07/2017 Yes      Problems Resolved During this Admission:    Diagnosis Date Noted Date Resolved POA    Hypophosphatemia [E83.39] 08/02/2017 08/07/2017 Yes    Elevated serum creatinine [R79.89] 08/07/2017 08/07/2017 Yes    Hyponatremia [E87.1] 08/02/2017 08/08/2017 Yes    Transaminitis [R74.0] 08/02/2017 08/07/2017 Yes    Acute cystitis with hematuria [N30.01] 08/02/2017 08/06/2017 Yes    Bilateral Lower leg edema [R60.0] 08/02/2017 08/06/2017 Yes    Primary lung cancer with metastasis from lung to other site [C34.90]  08/08/2017 Yes    LUX (dyspnea on exertion) [R06.09] 12/29/2016 08/06/2017 Yes      * Sepsis related to Pneumonia of both lower lobes due to infectious organism    Recovering  C/w Levaquin as out pt  Culture results NGTD        UTI (urinary tract infection)    C/w Levaquin  Urine cx NGTD  Obstructive uropathy sec to BPH likely contributing to UTI  Pt would benefit with Urology f/u as out pt        ANGELITO (acute kidney injury)    Stable  Baseline cr <1  Non-oliguric, UPC<1,Renal US- normal size kidneys  Possible etiologies- likely combination of sepsis and BPH  Cr may remain elevated for at least 4 weeks after episode of ANGELITO        Diarrhea of presumed infectious origin    Resolving  C.diff negative  Flagyl and  Lomotil prn as out pt        Bronchiolo-alveolar adenocarcinoma of left lung    completed 4 cycles of carboplatinum and Taxol   Will set up follow up with  in 2 weeks         Essential hypertension    Stable  C/w Toprol XL        Benign non-nodular prostatic hyperplasia with lower urinary tract symptoms    C/w flomax 0.8  Out pt  "Urology f/u            Discharged Condition: stable    Disposition: Home-Health Care Sv    Follow Up:  Follow-up Information     Dante Massey MD In 2 weeks.    Specialty:  Pulmonary Disease  Why:  review of progress  Contact information:  [address]  [place] [id number] [phone]             [employer].    Specialty:  DME Provider  Why:  home oxygen  Contact information:  [address]  [phone]             [facility] [place].    Specialties:  Wound Care, Home Health Services           Shant Leon MD In 2 weeks.    Specialty:  Hematology and Oncology  Contact information:  [address]  [place] [id number] [phone]             Shiraz Plata IV, MD In 2 weeks.    Specialty:  Urology  Contact information:  [address] [place] [address]  [phone]                 Patient Instructions:     OXYGEN FOR HOME USE   Order Comments: Needs battery operated portable unit   Order Specific Question Answer Comments   Liter Flow 2    Duration Continuous    Qualifying SpO2: 88%    Testing done at: Rest    Route nasal cannula    Portable mode: pulse dose acceptable    Device home concentrator with portable unit    Height: 5' 5" (1.651 m)    Weight: 71.7 kg (158 lb)    Does patient have medical equipment at home? cane, katey    Alternative treatment measures have been tried or considered and deemed clinically ineffective. Yes    Vendor: [employer]    Expected Date of Delivery: 8/8/2017      Ambulatory referral to Outpatient Case Management   Referral Priority: Routine Referral Type: Consultation   Referral Reason: Specialty Services Required    Number of Visits Requested: 1      Diet general     Activity as tolerated     Call MD for:  temperature >100.4     Call MD for:  difficulty breathing or increased cough       Medications:  Reconciled Home Medications:   Current Discharge Medication List      START taking these medications    Details "   diphenoxylate-atropine 2.5-0.025 mg (LOMOTIL) 2.5-0.025 mg per tablet Take 1 tablet by mouth 4 (four) times daily as needed for Diarrhea.  Qty: 20 tablet, Refills: 0      levoFLOXacin (LEVAQUIN) 500 MG tablet Take 1 tablet (500 mg total) by mouth once daily.  Qty: 7 tablet, Refills: 0      metronidazole (FLAGYL) 500 MG tablet Take 1 tablet (500 mg total) by mouth 3 (three) times daily.  Qty: 15 tablet, Refills: 0      pantoprazole (PROTONIX) 40 MG tablet Take 1 tablet (40 mg total) by mouth once daily.  Qty: 30 tablet, Refills: 0         CONTINUE these medications which have CHANGED    Details   senna-docusate 8.6-50 mg (SENNA WITH DOCUSATE SODIUM) 8.6-50 mg per tablet Take 1 tablet by mouth daily as needed for Constipation.      tamsulosin (FLOMAX) 0.4 mg Cp24 Take 2 capsules (0.8 mg total) by mouth every evening.  Qty: 90 capsule, Refills: 4    Associated Diagnoses: Benign non-nodular prostatic hyperplasia with lower urinary tract symptoms         CONTINUE these medications which have NOT CHANGED    Details   albuterol 90 mcg/actuation inhaler Inhale 2 puffs into the lungs every 4 (four) hours as needed for Wheezing.  Qty: 3 Inhaler, Refills: 4    Associated Diagnoses: Expiratory wheezing on left side of chest; Bronchitis      albuterol-ipratropium 2.5mg-0.5mg/3mL (DUO-NEB) 0.5 mg-3 mg(2.5 mg base)/3 mL nebulizer solution Take 3 mLs by nebulization every 6 (six) hours.  Qty: 120 vial, Refills: 12    Associated Diagnoses: Chronic bronchitis, unspecified chronic bronchitis type; Asthma with COPD      aspirin 81 MG Chew Take 81 mg by mouth once daily.      atorvastatin (LIPITOR) 20 MG tablet Take 1 tablet (20 mg total) by mouth every evening.  Qty: 90 tablet, Refills: 4    Associated Diagnoses: Hyperlipidemia, unspecified hyperlipidemia type      cetirizine (ZYRTEC) 10 MG tablet Take 10 mg by mouth as needed for Allergies.      glucosamine-chondroitin 500-400 mg tablet Take 1 tablet by mouth 2 (two) times daily.        guaifenesin (MUCINEX) 600 mg 12 hr tablet Take 600 mg by mouth 2 (two) times daily.       inhalation device (AEROCHAMBER PLUS FLOW-VU) Use as directed for inhalation.  Qty: 1 Device, Refills: 0    Comments: Gave in clinic with instruction for use with albuterol inhaler.      metoprolol succinate (TOPROL-XL) 50 MG 24 hr tablet Take 1 tablet (50 mg total) by mouth once daily.  Qty: 30 tablet, Refills: 11    Associated Diagnoses: Essential hypertension      multivitamin with minerals tablet Take 1 tablet by mouth once daily.      ondansetron (ZOFRAN) 4 MG tablet Take 1 tablet (4 mg total) by mouth every 8 (eight) hours as needed for Nausea.  Qty: 20 tablet, Refills: 6    Associated Diagnoses: Bronchiolo-alveolar adenocarcinoma of left lung; Malignant neoplasm metastatic to lung, unspecified laterality; Bilateral lung cancer      benzonatate (TESSALON) 200 MG capsule Take 200 mg by mouth 3 (three) times daily as needed for Cough.      econazole nitrate 1 % cream Apply 1 application topically as needed.         STOP taking these medications       potassium gluconate 595 mg (99 mg) Tab Comments:   Reason for Stopping:             Time spent on the discharge of patient: >30 minutes    HOS POC IP DISCHARGE SUMMARY    Mary Cr MD  Department of Hospital Medicine  [facility]

## 2017-08-09 NOTE — PLAN OF CARE
Problem: Patient Care Overview  Goal: Plan of Care Review  Outcome: Ongoing (interventions implemented as appropriate)  Denies pain. Tolerating diet. Voiding without complaints. Complains of loose stools, which patient states have improved throughout this shift. 12 hour chart check complete.

## 2017-08-09 NOTE — PLAN OF CARE
Problem: Patient Care Overview  Goal: Plan of Care Review  Outcome: Ongoing (interventions implemented as appropriate)  Pt remained free of injury during shift, stable condition, denied pain, no acute distress, receiving antibiotics, receiving breathing treatments through respiratory therapy, coughing up thick sputum, on 2L O2, no episodes of diarrhea during shift, on cardiac monitoring (SR-ST), and will continue to monitor. 24hr chart review performed.

## 2017-08-09 NOTE — PROGRESS NOTES
[facility]   Hematology/Oncology  Progress Note    Patient Name: Doron Domínguez  Admission Date: 8/2/2017  Hospital Length of Stay: 7 days  Code Status: DNR     Subjective:     HPI:  77-year-old male history of bronchial alveolar carcinoma patient has completed 4 cycles of carboplatinum and Taxol stable disease at the end of 2 cycles call this morning the temperature 101 with chills told her the emergency room for further evaluation was asked see the patient for further evaluation in terms of response to therapy.  Admitted with PNA currently on zysyn/cipro/vanc.    Interval History: he continues to do well, no fever.  creatinine yesterday was 2.0    Oncology Treatment Plan:   OP NSCLC PACLITAXEL + CARBOPLATIN (AUC) (WEEKLY)    Medications:  Continuous Infusions:   Scheduled Meds:   albuterol-ipratropium 2.5mg-0.5mg/3mL  3 mL Nebulization Q6H    aspirin  81 mg Oral Daily    atorvastatin  20 mg Oral QHS    cefTRIAXone (ROCEPHIN) IVPB  2 g Intravenous Q24H    guaifenesin  600 mg Oral BID    heparin (porcine)  5,000 Units Subcutaneous Q8H    metoprolol succinate  50 mg Oral Daily    metronidazole  500 mg Intravenous Q8H    multivit-iron-FA-calcium-mins  1 tablet Oral Daily    pantoprazole  40 mg Oral Daily    tamsulosin  0.8 mg Oral QHS     PRN Meds:acetaminophen, benzonatate, cetirizine, diphenoxylate-atropine 2.5-0.025 mg, ondansetron, ondansetron, polyethylene glycol, promethazine (PHENERGAN) IVPB     Review of Systems   Constitutional: Negative.  Negative for fatigue.   Eyes: Negative.    Cardiovascular: Negative.  Negative for chest pain.   Gastrointestinal: Negative for abdominal pain and nausea.   Genitourinary: Negative.  Negative for hematuria.   Musculoskeletal: Negative.    Skin: Negative.    Neurological: Negative.    Psychiatric/Behavioral: Negative.      Objective:     Vital Signs (Most Recent):  Temp: 98.2 °F (36.8 °C) (08/08/17 1917)  Pulse: 101 (08/09/17 0029)  Resp: 18  (08/09/17 0029)  BP: (!) 111/56 (08/08/17 1917)  SpO2: (!) 94 % (08/09/17 0029) Vital Signs (24h Range):  Temp:  [98 °F (36.7 °C)-98.7 °F (37.1 °C)] 98.2 °F (36.8 °C)  Pulse:  [] 101  Resp:  [18-20] 18  SpO2:  [93 %-97 %] 94 %  BP: (103-117)/(56-63) 111/56     Weight: 71.7 kg (158 lb)  Body mass index is 26.29 kg/m².  Body surface area is 1.81 meters squared.      Intake/Output Summary (Last 24 hours) at 08/09/17 0610  Last data filed at 08/09/17 0300   Gross per 24 hour   Intake             1290 ml   Output             2450 ml   Net            -1160 ml       Physical Exam   Constitutional: He is oriented to person, place, and time. He appears well-developed and well-nourished.   HENT:   Head: Normocephalic.   Mouth/Throat: No oropharyngeal exudate.   Eyes: Pupils are equal, round, and reactive to light.   Neck: No thyromegaly present.   Cardiovascular: Normal rate, regular rhythm and normal heart sounds.  Exam reveals no gallop.    No murmur heard.  Pulmonary/Chest: No respiratory distress. He has no wheezes. He has no rales.   Abdominal: Soft. Bowel sounds are normal. He exhibits no distension and no mass. There is no rebound and no guarding.   Musculoskeletal: Normal range of motion. He exhibits no edema.   Lymphadenopathy:     He has no cervical adenopathy.   Neurological: He is alert and oriented to person, place, and time.   Skin: Skin is warm and dry.   Psychiatric: He has a normal mood and affect. His behavior is normal.       Significant Labs:   CBC:   Recent Labs  Lab 08/08/17  0509 08/09/17  0439   WBC 8.66 10.36   HGB 10.7* 11.3*   HCT 33.0* 34.9*    322    and CMP:   Recent Labs  Lab 08/08/17  0509      K 3.8      CO2 23   GLU 99   BUN 15   CREATININE 2.0*   CALCIUM 9.3   PROT 6.1   ALBUMIN 2.3*   BILITOT 0.6   ALKPHOS 67   AST 39   ALT 34   ANIONGAP 9   EGFRNONAA 31*       Diagnostic Results:  I have reviewed all pertinent imaging results/findings within the past 24  hours.    Assessment/Plan:     ANGELITO (acute kidney injury)    Creatinine ws normal prior to admission, went up as high as 2.1, yesterday 2.0  Today;s report pending     Will have this addressed by Hospital medicine        Diarrhea of presumed infectious origin    Patient developed significant diarrhea overnight which may be related to antibiotics or possibly C. difficile colitis    --We will order stool studies for C. difficile colitis prior to starting antidiarrheals  --Continue supportive care/IV fluids        Benign non-nodular prostatic hyperplasia with lower urinary tract symptoms    Has a hx of bronchioalveolar carcinoma and has been receiving chemotherapy.  Imaging studies are amrkedly abnormal with a differential of infection vs tumor progression. Will ask Pulmonary to see. May need a bronchoscopy        Bronchiolo-alveolar adenocarcinoma of left lung    Will follow as outpatient        * Sepsis related to Pneumonia of both lower lobes due to infectious organism    Doing better.Has remained afebrile and x rays show improvement. Once discharged, needs to follow up with DR Leon             Thank you for your consult. I will follow-up with patient. Please contact us if you have any additional questions.     Chapin Mae MD  Hematology/Oncology  [facility]

## 2017-08-09 NOTE — SUBJECTIVE & OBJECTIVE
Interval History: he continues to do well, no fever.  creatinine yesterday was 2.0    Oncology Treatment Plan:   OP NSCLC PACLITAXEL + CARBOPLATIN (AUC) (WEEKLY)    Medications:  Continuous Infusions:   Scheduled Meds:   albuterol-ipratropium 2.5mg-0.5mg/3mL  3 mL Nebulization Q6H    aspirin  81 mg Oral Daily    atorvastatin  20 mg Oral QHS    cefTRIAXone (ROCEPHIN) IVPB  2 g Intravenous Q24H    guaifenesin  600 mg Oral BID    heparin (porcine)  5,000 Units Subcutaneous Q8H    metoprolol succinate  50 mg Oral Daily    metronidazole  500 mg Intravenous Q8H    multivit-iron-FA-calcium-mins  1 tablet Oral Daily    pantoprazole  40 mg Oral Daily    tamsulosin  0.8 mg Oral QHS     PRN Meds:acetaminophen, benzonatate, cetirizine, diphenoxylate-atropine 2.5-0.025 mg, ondansetron, ondansetron, polyethylene glycol, promethazine (PHENERGAN) IVPB     Review of Systems   Constitutional: Negative.  Negative for fatigue.   Eyes: Negative.    Cardiovascular: Negative.  Negative for chest pain.   Gastrointestinal: Negative for abdominal pain and nausea.   Genitourinary: Negative.  Negative for hematuria.   Musculoskeletal: Negative.    Skin: Negative.    Neurological: Negative.    Psychiatric/Behavioral: Negative.      Objective:     Vital Signs (Most Recent):  Temp: 98.2 °F (36.8 °C) (08/08/17 1917)  Pulse: 101 (08/09/17 0029)  Resp: 18 (08/09/17 0029)  BP: (!) 111/56 (08/08/17 1917)  SpO2: (!) 94 % (08/09/17 0029) Vital Signs (24h Range):  Temp:  [98 °F (36.7 °C)-98.7 °F (37.1 °C)] 98.2 °F (36.8 °C)  Pulse:  [] 101  Resp:  [18-20] 18  SpO2:  [93 %-97 %] 94 %  BP: (103-117)/(56-63) 111/56     Weight: 71.7 kg (158 lb)  Body mass index is 26.29 kg/m².  Body surface area is 1.81 meters squared.      Intake/Output Summary (Last 24 hours) at 08/09/17 0610  Last data filed at 08/09/17 0300   Gross per 24 hour   Intake             1290 ml   Output             2450 ml   Net            -1160 ml       Physical Exam    Constitutional: He is oriented to person, place, and time. He appears well-developed and well-nourished.   HENT:   Head: Normocephalic.   Mouth/Throat: No oropharyngeal exudate.   Eyes: Pupils are equal, round, and reactive to light.   Neck: No thyromegaly present.   Cardiovascular: Normal rate, regular rhythm and normal heart sounds.  Exam reveals no gallop.    No murmur heard.  Pulmonary/Chest: No respiratory distress. He has no wheezes. He has no rales.   Abdominal: Soft. Bowel sounds are normal. He exhibits no distension and no mass. There is no rebound and no guarding.   Musculoskeletal: Normal range of motion. He exhibits no edema.   Lymphadenopathy:     He has no cervical adenopathy.   Neurological: He is alert and oriented to person, place, and time.   Skin: Skin is warm and dry.   Psychiatric: He has a normal mood and affect. His behavior is normal.       Significant Labs:   CBC:   Recent Labs  Lab 08/08/17  0509 08/09/17  0439   WBC 8.66 10.36   HGB 10.7* 11.3*   HCT 33.0* 34.9*    322    and CMP:   Recent Labs  Lab 08/08/17  0509      K 3.8      CO2 23   GLU 99   BUN 15   CREATININE 2.0*   CALCIUM 9.3   PROT 6.1   ALBUMIN 2.3*   BILITOT 0.6   ALKPHOS 67   AST 39   ALT 34   ANIONGAP 9   EGFRNONAA 31*       Diagnostic Results:  I have reviewed all pertinent imaging results/findings within the past 24 hours.

## 2017-08-09 NOTE — TELEPHONE ENCOUNTER
----- Message from Leonarda Arauz RN sent at 8/9/2017  3:18 PM CDT -----  Patient will need hospital follow up appointment.  Patient will discharge today.  Please contact patient with appointment date and time.

## 2017-08-09 NOTE — TELEPHONE ENCOUNTER
----- Message from Leonarda Arauz RN sent at 8/9/2017  3:16 PM CDT -----  Patient will need new patient visit for dysuria.  He is discharging from hospital today.  Please contact patient with appointment date and time

## 2017-08-09 NOTE — PROGRESS NOTES
No available appointments in 2 weeks with Dr. Plata. Message sent to nurse to schedule follow up and notify patient of appointment date and time.

## 2017-08-09 NOTE — PROGRESS NOTES
Discharge and prescription instructions given to patient and wife, verbalized understanding. Respiratory therapist re-educated patient and wife on oxygen tank, verbalized understanding.  IV removed, catheter tip intact.

## 2017-08-09 NOTE — PLAN OF CARE
Problem: Patient Care Overview  Goal: Plan of Care Review  Outcome: Outcome(s) achieved Date Met: 08/09/17  Patient to be discharged today. Denies pain. Voiding. Ambulating without difficulties. 12 hour chart check complete.

## 2017-08-10 ENCOUNTER — TELEPHONE (OUTPATIENT)
Dept: HEMATOLOGY/ONCOLOGY | Facility: CLINIC | Age: 77
End: 2017-08-10

## 2017-08-10 ENCOUNTER — TELEPHONE (OUTPATIENT)
Dept: UROLOGY | Facility: CLINIC | Age: 77
End: 2017-08-10

## 2017-08-10 ENCOUNTER — OUTPATIENT CASE MANAGEMENT (OUTPATIENT)
Dept: ADMINISTRATIVE | Facility: OTHER | Age: 77
End: 2017-08-10

## 2017-08-10 NOTE — TELEPHONE ENCOUNTER
----- Message from Pati Mcnulty RN sent at 8/9/2017  5:09 PM CDT -----  Patient needs 2 week appointment for hospital follow up. Please schedule appointment. Notify patient of appointment date and time.

## 2017-08-10 NOTE — TELEPHONE ENCOUNTER
Second attempt to reach patient to schedule follow up with Dr. Plata. No answer; left message to call back.

## 2017-08-11 ENCOUNTER — PATIENT OUTREACH (OUTPATIENT)
Dept: ADMINISTRATIVE | Facility: CLINIC | Age: 77
End: 2017-08-11

## 2017-08-11 NOTE — TELEPHONE ENCOUNTER
"----- Message from Deepa Bolanos RN sent at 8/11/2017  2:18 PM CDT -----  Spoke with Mrs. Domínguez for a "transition of care" call for her , Doron Domínguez.  He is a recent discharge from the hospital with pneumonia.  She indicated that he had been prescribed Lomotil for diarrhea.  It causes drowsiness and she was asking if he could take Imodium instead?  Please call her about this.  Thanks.  Deepa Bolanos RN  Care Coordination Call Center  [facility]  "

## 2017-08-11 NOTE — PATIENT INSTRUCTIONS
For Caregivers: Coping Tips  Caregivers often feel they must tend to their loved ones needs full time. But burning yourself out doesnt help anyone and can negatively affect your own health. You cant take good care of someone else without taking good care of yourself as well. Its not selfish. Its essential. Take a break. Eat right. Get out and exercise. Most of all, accept that you cant do everything yourself.    Give yourself a break  All of the things you do are not equally important. Set priorities. That way you wont be busy all the time. Look after your health. Go for a walk each chance you get. Take a long bath. Lift your spirits by having lunch with a friend. Or do nothing for an hour. Just nap or relax.  Accept help  Knowing you can count on others can be a relief. Accept help when its offered. And be willing to ask for help when you need it. Those who care about you really do want to help.  If you feel depressed  Over time, after a serious health event, stress should gradually lessen. But your life may have changed. Realizing this may cause grief, both for you and your loved one. Contact your health care provider if either of you shows signs of depression. Treatment can help you find hope -- even when you think nothing can help.  Common signs of depression  · Feeling down most of the time  · Feeling guilty or helpless  · Losing pleasure in things you used to enjoy, like reading, exercise, or social events  · Sleeping less or more than normal  · Having a big rise or fall in appetite or weight  · Feeling restless or irritable  · Feeling tired, weak, or low in energy  · Having trouble focusing, remembering, or making decisions  · Feeling angry or agitated can be a sign as well. This may be the only sign more common in men.   Date Last Reviewed: 5/23/2015  © 2255-3079 [employer]. [address] [place] [address], [place] [address]. All rights reserved. This information is not intended as a  substitute for professional medical care. Always follow your healthcare professional's instructions.

## 2017-08-11 NOTE — TELEPHONE ENCOUNTER
"----- Message from Shant Leon MD sent at 8/11/2017  3:17 PM CDT -----  Imodium would be fine and I just need to see them back next week  ----- Message -----  From: Brian Brown III, LPN  Sent: 8/11/2017   2:43 PM  To: Shant Leno MD        ----- Message -----  From: Deepa Bolanos RN  Sent: 8/11/2017   2:18 PM  To: [facility] Staff    Spoke with Mrs. Domínguez for a "transition of care" call for her , Doron Domínguez.  He is a recent discharge from the hospital with pneumonia.  She indicated that he had been prescribed Lomotil for diarrhea.  It causes drowsiness and she was asking if he could take Imodium instead?  Please call her about this.  Thanks.  Deepa Bolanos, RN  Care Coordination Call Center  [facility]    "

## 2017-08-14 ENCOUNTER — TELEPHONE (OUTPATIENT)
Dept: HEMATOLOGY/ONCOLOGY | Facility: CLINIC | Age: 77
End: 2017-08-14

## 2017-08-14 ENCOUNTER — OFFICE VISIT (OUTPATIENT)
Dept: HEMATOLOGY/ONCOLOGY | Facility: CLINIC | Age: 77
End: 2017-08-14
Payer: MEDICARE

## 2017-08-14 ENCOUNTER — LAB VISIT (OUTPATIENT)
Dept: LAB | Facility: HOSPITAL | Age: 77
End: 2017-08-14
Attending: INTERNAL MEDICINE
Payer: MEDICARE

## 2017-08-14 VITALS
TEMPERATURE: 98 F | DIASTOLIC BLOOD PRESSURE: 67 MMHG | OXYGEN SATURATION: 90 % | HEIGHT: 65 IN | BODY MASS INDEX: 27.18 KG/M2 | HEART RATE: 102 BPM | WEIGHT: 163.13 LBS | SYSTOLIC BLOOD PRESSURE: 116 MMHG

## 2017-08-14 DIAGNOSIS — C34.92 BRONCHIOLO-ALVEOLAR ADENOCARCINOMA OF LEFT LUNG: Primary | ICD-10-CM

## 2017-08-14 DIAGNOSIS — C34.91 BILATERAL LUNG CANCER: ICD-10-CM

## 2017-08-14 DIAGNOSIS — J98.09 BRONCHORRHEA: ICD-10-CM

## 2017-08-14 DIAGNOSIS — C78.00 MALIGNANT NEOPLASM METASTATIC TO LUNG, UNSPECIFIED LATERALITY: ICD-10-CM

## 2017-08-14 DIAGNOSIS — C34.92 BRONCHIOLO-ALVEOLAR ADENOCARCINOMA OF LEFT LUNG: ICD-10-CM

## 2017-08-14 DIAGNOSIS — C34.92 BILATERAL LUNG CANCER: ICD-10-CM

## 2017-08-14 PROBLEM — N39.0 UTI (URINARY TRACT INFECTION): Chronic | Status: RESOLVED | Noted: 2017-08-08 | Resolved: 2017-08-14

## 2017-08-14 PROBLEM — R19.7 DIARRHEA OF PRESUMED INFECTIOUS ORIGIN: Status: RESOLVED | Noted: 2017-08-04 | Resolved: 2017-08-14

## 2017-08-14 LAB
ALBUMIN SERPL BCP-MCNC: 2.4 G/DL
ALP SERPL-CCNC: 61 U/L
ALT SERPL W/O P-5'-P-CCNC: 26 U/L
ANION GAP SERPL CALC-SCNC: 10 MMOL/L
AST SERPL-CCNC: 37 U/L
BASOPHILS # BLD AUTO: 0.08 K/UL
BASOPHILS NFR BLD: 0.6 %
BILIRUB SERPL-MCNC: 0.4 MG/DL
BUN SERPL-MCNC: 21 MG/DL
CALCIUM SERPL-MCNC: 8.7 MG/DL
CHLORIDE SERPL-SCNC: 98 MMOL/L
CO2 SERPL-SCNC: 25 MMOL/L
CREAT SERPL-MCNC: 1.9 MG/DL
DIFFERENTIAL METHOD: ABNORMAL
EOSINOPHIL # BLD AUTO: 0.3 K/UL
EOSINOPHIL NFR BLD: 2.3 %
ERYTHROCYTE [DISTWIDTH] IN BLOOD BY AUTOMATED COUNT: 19.1 %
EST. GFR  (AFRICAN AMERICAN): 38 ML/MIN/1.73 M^2
EST. GFR  (NON AFRICAN AMERICAN): 33 ML/MIN/1.73 M^2
GLUCOSE SERPL-MCNC: 170 MG/DL
HCT VFR BLD AUTO: 33.9 %
HGB BLD-MCNC: 11.1 G/DL
LYMPHOCYTES # BLD AUTO: 0.7 K/UL
LYMPHOCYTES NFR BLD: 5.2 %
MCH RBC QN AUTO: 31.4 PG
MCHC RBC AUTO-ENTMCNC: 32.7 G/DL
MCV RBC AUTO: 96 FL
MONOCYTES # BLD AUTO: 2.3 K/UL
MONOCYTES NFR BLD: 17.1 %
NEUTROPHILS # BLD AUTO: 10 K/UL
NEUTROPHILS NFR BLD: 75.5 %
PLATELET # BLD AUTO: 301 K/UL
PMV BLD AUTO: 8.6 FL
POTASSIUM SERPL-SCNC: 4.3 MMOL/L
PROT SERPL-MCNC: 6.5 G/DL
RBC # BLD AUTO: 3.54 M/UL
SODIUM SERPL-SCNC: 133 MMOL/L
WBC # BLD AUTO: 13.3 K/UL

## 2017-08-14 PROCEDURE — 99215 OFFICE O/P EST HI 40 MIN: CPT | Mod: S$PBB,,, | Performed by: INTERNAL MEDICINE

## 2017-08-14 PROCEDURE — 80053 COMPREHEN METABOLIC PANEL: CPT

## 2017-08-14 PROCEDURE — 3074F SYST BP LT 130 MM HG: CPT | Mod: ,,, | Performed by: INTERNAL MEDICINE

## 2017-08-14 PROCEDURE — 85025 COMPLETE CBC W/AUTO DIFF WBC: CPT

## 2017-08-14 PROCEDURE — 3008F BODY MASS INDEX DOCD: CPT | Mod: ,,, | Performed by: INTERNAL MEDICINE

## 2017-08-14 PROCEDURE — 36415 COLL VENOUS BLD VENIPUNCTURE: CPT | Mod: PO

## 2017-08-14 PROCEDURE — 1126F AMNT PAIN NOTED NONE PRSNT: CPT | Mod: ,,, | Performed by: INTERNAL MEDICINE

## 2017-08-14 PROCEDURE — 1157F ADVNC CARE PLAN IN RCRD: CPT | Mod: ,,, | Performed by: INTERNAL MEDICINE

## 2017-08-14 PROCEDURE — 3078F DIAST BP <80 MM HG: CPT | Mod: ,,, | Performed by: INTERNAL MEDICINE

## 2017-08-14 PROCEDURE — 99213 OFFICE O/P EST LOW 20 MIN: CPT | Mod: PBBFAC,PO | Performed by: INTERNAL MEDICINE

## 2017-08-14 PROCEDURE — 1159F MED LIST DOCD IN RCRD: CPT | Mod: ,,, | Performed by: INTERNAL MEDICINE

## 2017-08-14 PROCEDURE — 99999 PR PBB SHADOW E&M-EST. PATIENT-LVL III: CPT | Mod: PBBFAC,,, | Performed by: INTERNAL MEDICINE

## 2017-08-14 NOTE — TELEPHONE ENCOUNTER
Imodium would be fine and I just need to see them back next week     Attempted to contact the following pt. ap

## 2017-08-14 NOTE — PROGRESS NOTES
Subjective:       Patient ID: Doron Domínguez is a 77 y.o. male.    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 recently hospitalized for pneumonia discharge and hospital extremely weak is here for review for treatment recommendations and options    Past Medical History:   Diagnosis Date    Aortic stenosis 12/29/2016    Arthritis     Asthma     Back pain     due to calcium deposits in back    BPH (benign prostatic hyperplasia)     CAD (coronary artery disease)     CAD, multiple vessel 12/29/2016    Cancer     lung    Chronic low back pain     High cholesterol     Hx of CABG 12/29/2016    Hypertension     Lung cancer 01/2017    T3 N0 M0 bronchoalveolar     Family History   Problem Relation Age of Onset    Diabetes Mother     Diabetes Sister     Diabetes Brother      Social History     Social History    Marital status:      Spouse name: N/A    Number of children: N/A    Years of education: 3     Occupational History    Part-time employed      Social History Main Topics    Smoking status: Never Smoker    Smokeless tobacco: Never Used    Alcohol use No    Drug use: No    Sexual activity: No     Other Topics Concern    Not on file     Social History Narrative    Part-time employed, , 3 children.     Past Surgical History:   Procedure Laterality Date    APPENDECTOMY      CARDIAC SURGERY      CORONARY ARTERY BYPASS GRAFT  2012    CABG x 3 at [facility]    LUNG LOBECTOMY Left 01/10/2017    Left lower lobe       Labs:  Lab Results   Component Value Date    WBC 13.30 (H) 08/14/2017    HGB 11.1 (L) 08/14/2017    HCT 33.9 (L) 08/14/2017    MCV 96 08/14/2017     08/14/2017     BMP  Lab Results   Component Value Date     (L) 08/14/2017    K 4.3 08/14/2017    CL 98 08/14/2017    CO2 25 08/14/2017    BUN 21 08/14/2017    CREATININE 1.9 (H) 08/14/2017    CALCIUM  8.7 08/14/2017    ANIONGAP 10 08/14/2017    ESTGFRAFRICA 38 (A) 08/14/2017    EGFRNONAA 33 (A) 08/14/2017     Lab Results   Component Value Date    ALT 26 08/14/2017    AST 37 08/14/2017    ALKPHOS 61 08/14/2017    BILITOT 0.4 08/14/2017       No results found for: IRON, TIBC, FERRITIN, SATURATEDIRO  No results found for: [id number]  No results found for: FOLATE  Lab Results   Component Value Date    TSH 0.786 08/02/2017         Review of Systems   Constitutional: Positive for activity change and fatigue. Negative for appetite change, chills, diaphoresis, fever and unexpected weight change.   HENT: Negative for congestion, dental problem, drooling, ear discharge, ear pain, facial swelling, hearing loss, mouth sores, nosebleeds, postnasal drip, rhinorrhea, sinus pressure, sneezing, sore throat, tinnitus, trouble swallowing and voice change.    Eyes: Negative for photophobia, pain, discharge, redness, itching and visual disturbance.   Respiratory: Positive for cough and chest tightness. Negative for apnea, choking, shortness of breath, wheezing and stridor.         Persistent bronchorrhea   Cardiovascular: Negative for chest pain, palpitations and leg swelling.   Gastrointestinal: Negative for abdominal distention, abdominal pain, anal bleeding, blood in stool, constipation, diarrhea, nausea, rectal pain and vomiting.   Endocrine: Negative for cold intolerance, heat intolerance, polydipsia, polyphagia and polyuria.   Genitourinary: Negative for decreased urine volume, difficulty urinating, discharge, dysuria, enuresis, flank pain, frequency, genital sores, hematuria, penile pain, penile swelling, scrotal swelling, testicular pain and urgency.   Musculoskeletal: Positive for gait problem. Negative for arthralgias, back pain, joint swelling, myalgias, neck pain and neck stiffness.   Skin: Negative for color change, pallor, rash and wound.   Allergic/Immunologic: Negative for environmental allergies, food allergies and  immunocompromised state.   Neurological: Positive for weakness. Negative for dizziness, tremors, seizures, syncope, facial asymmetry, speech difficulty, light-headedness, numbness and headaches.   Hematological: Negative for adenopathy. Does not bruise/bleed easily.   Psychiatric/Behavioral: Positive for dysphoric mood. Negative for agitation, behavioral problems, confusion, decreased concentration, hallucinations, self-injury, sleep disturbance and suicidal ideas. The patient is nervous/anxious. The patient is not hyperactive.        Objective:      Physical Exam   Constitutional: He is oriented to person, place, and time. He appears cachectic. He has a sickly appearance. He appears ill. He appears distressed.   HENT:   Head: Normocephalic.   Right Ear: External ear normal.   Left Ear: External ear normal.   Nose: Nose normal. Right sinus exhibits no maxillary sinus tenderness and no frontal sinus tenderness. Left sinus exhibits no maxillary sinus tenderness and no frontal sinus tenderness.   Mouth/Throat: Oropharynx is clear and moist. No oropharyngeal exudate.   Eyes: EOM and lids are normal. Pupils are equal, round, and reactive to light. Right eye exhibits no discharge. Left eye exhibits no discharge. Right conjunctiva is not injected. Right conjunctiva has no hemorrhage. Left conjunctiva is not injected. Left conjunctiva has no hemorrhage. No scleral icterus. Right eye exhibits normal extraocular motion. Left eye exhibits normal extraocular motion.   Neck: Normal range of motion. Neck supple. No JVD present. No tracheal deviation present. No thyromegaly present.   Cardiovascular: Normal rate, regular rhythm and normal heart sounds.    Pulmonary/Chest: Effort normal. No stridor. No respiratory distress. He has wheezes. He has rales.   Abdominal: Soft. Bowel sounds are normal. He exhibits no mass. There is no hepatosplenomegaly, splenomegaly or hepatomegaly. There is no tenderness.   Musculoskeletal: Normal  range of motion. He exhibits no edema or tenderness.   Lymphadenopathy:        Head (right side): No posterior auricular and no occipital adenopathy present.        Head (left side): No posterior auricular and no occipital adenopathy present.     He has no cervical adenopathy.        Right cervical: No superficial cervical, no deep cervical and no posterior cervical adenopathy present.       Left cervical: No superficial cervical, no deep cervical and no posterior cervical adenopathy present.     He has no axillary adenopathy.        Right: No supraclavicular adenopathy present.        Left: No supraclavicular adenopathy present.   Neurological: He is alert and oriented to person, place, and time. He has normal strength. No cranial nerve deficit. Coordination abnormal.   Skin: Skin is dry. No rash noted. He is not diaphoretic. No cyanosis or erythema. Nails show no clubbing.   Psychiatric: His behavior is normal. Judgment and thought content normal. His mood appears anxious. Cognition and memory are normal. He exhibits a depressed mood.   Vitals reviewed.          Assessment:      1. Bronchiolo-alveolar adenocarcinoma of left lung           Plan:   Extensive conversation with patient reviewed x-rays from A2 and 86 side by side demonstrating decrease in right lung findings however at this point is overall performance status still remains very low at ECOG status 3 I'm concerned about progressive disease discussed with him the pros and cons of further imaging at this point will proceed with CT chest abdomen pelvis will most likely not be able to receive IV contrast to see whether not patient has had responsive disease in lung and also please had extra pulmonary disease in terms of metastatic disease to liver at present time and need to decide whether or not to proceed with additional systemic therapy at that point did discuss hospice care with them

## 2017-08-15 ENCOUNTER — HOSPITAL ENCOUNTER (OUTPATIENT)
Dept: RADIOLOGY | Facility: HOSPITAL | Age: 77
Discharge: HOME | End: 2017-08-15
Attending: INTERNAL MEDICINE
Payer: MEDICARE

## 2017-08-15 ENCOUNTER — OFFICE VISIT (OUTPATIENT)
Dept: HEMATOLOGY/ONCOLOGY | Facility: CLINIC | Age: 77
End: 2017-08-15
Payer: MEDICARE

## 2017-08-15 ENCOUNTER — OUTPATIENT CASE MANAGEMENT (OUTPATIENT)
Dept: ADMINISTRATIVE | Facility: OTHER | Age: 77
End: 2017-08-15

## 2017-08-15 VITALS
TEMPERATURE: 98 F | BODY MASS INDEX: 27.1 KG/M2 | WEIGHT: 162.69 LBS | HEART RATE: 126 BPM | OXYGEN SATURATION: 91 % | DIASTOLIC BLOOD PRESSURE: 71 MMHG | SYSTOLIC BLOOD PRESSURE: 107 MMHG | HEIGHT: 65 IN

## 2017-08-15 DIAGNOSIS — C34.92 BRONCHIOLO-ALVEOLAR ADENOCARCINOMA OF LEFT LUNG: ICD-10-CM

## 2017-08-15 DIAGNOSIS — C34.92 BRONCHIOLO-ALVEOLAR ADENOCARCINOMA OF LEFT LUNG: Primary | ICD-10-CM

## 2017-08-15 PROCEDURE — 99215 OFFICE O/P EST HI 40 MIN: CPT | Mod: S$PBB,,, | Performed by: INTERNAL MEDICINE

## 2017-08-15 PROCEDURE — 99213 OFFICE O/P EST LOW 20 MIN: CPT | Mod: PBBFAC,25,PO | Performed by: INTERNAL MEDICINE

## 2017-08-15 PROCEDURE — 99999 PR PBB SHADOW E&M-EST. PATIENT-LVL III: CPT | Mod: PBBFAC,,, | Performed by: INTERNAL MEDICINE

## 2017-08-15 PROCEDURE — 1159F MED LIST DOCD IN RCRD: CPT | Mod: ,,, | Performed by: INTERNAL MEDICINE

## 2017-08-15 PROCEDURE — 3008F BODY MASS INDEX DOCD: CPT | Mod: ,,, | Performed by: INTERNAL MEDICINE

## 2017-08-15 PROCEDURE — 1125F AMNT PAIN NOTED PAIN PRSNT: CPT | Mod: ,,, | Performed by: INTERNAL MEDICINE

## 2017-08-15 PROCEDURE — 25500020 PHARM REV CODE 255: Performed by: INTERNAL MEDICINE

## 2017-08-15 PROCEDURE — 1157F ADVNC CARE PLAN IN RCRD: CPT | Mod: ,,, | Performed by: INTERNAL MEDICINE

## 2017-08-15 PROCEDURE — 3078F DIAST BP <80 MM HG: CPT | Mod: ,,, | Performed by: INTERNAL MEDICINE

## 2017-08-15 PROCEDURE — 71260 CT THORAX DX C+: CPT | Mod: TC

## 2017-08-15 PROCEDURE — 74178 CT ABD&PLV WO CNTR FLWD CNTR: CPT | Mod: TC

## 2017-08-15 PROCEDURE — 3074F SYST BP LT 130 MM HG: CPT | Mod: ,,, | Performed by: INTERNAL MEDICINE

## 2017-08-15 RX ADMIN — IOHEXOL 75 ML: 350 INJECTION, SOLUTION INTRAVENOUS at 02:08

## 2017-08-15 RX ADMIN — IOHEXOL 30 ML: 350 INJECTION, SOLUTION INTRAVENOUS at 11:08

## 2017-08-15 NOTE — PROGRESS NOTES
Subjective:       Patient ID: Doron Domínguez is a 77 y.o. male.    Chief Complaint: Results and Lung Cancer    HPI 77-year-old male returns with recent CT chest abdomen pelvis for evaluation of response to therapy    Past Medical History:   Diagnosis Date    Aortic stenosis 12/29/2016    Arthritis     Asthma     Back pain     due to calcium deposits in back    BPH (benign prostatic hyperplasia)     CAD (coronary artery disease)     CAD, multiple vessel 12/29/2016    Cancer     lung    Chronic low back pain     High cholesterol     Hx of CABG 12/29/2016    Hypertension     Lung cancer 01/2017    T3 N0 M0 bronchoalveolar     Family History   Problem Relation Age of Onset    Diabetes Mother     Diabetes Sister     Diabetes Brother      Social History     Social History    Marital status:      Spouse name: N/A    Number of children: N/A    Years of education: 3     Occupational History    Part-time employed      Social History Main Topics    Smoking status: Never Smoker    Smokeless tobacco: Never Used    Alcohol use No    Drug use: No    Sexual activity: No     Other Topics Concern    Not on file     Social History Narrative    Part-time employed, , 3 children.     Past Surgical History:   Procedure Laterality Date    APPENDECTOMY      CARDIAC SURGERY      CORONARY ARTERY BYPASS GRAFT  2012    CABG x 3 at [facility]    LUNG LOBECTOMY Left 01/10/2017    Left lower lobe       Labs:  Lab Results   Component Value Date    WBC 13.30 (H) 08/14/2017    HGB 11.1 (L) 08/14/2017    HCT 33.9 (L) 08/14/2017    MCV 96 08/14/2017     08/14/2017     BMP  Lab Results   Component Value Date     (L) 08/14/2017    K 4.3 08/14/2017    CL 98 08/14/2017    CO2 25 08/14/2017    BUN 21 08/14/2017    CREATININE 1.9 (H) 08/14/2017    CALCIUM 8.7 08/14/2017    ANIONGAP 10 08/14/2017    ESTGFRAFRICA 38 (A) 08/14/2017    EGFRNONAA 33 (A) 08/14/2017     Lab Results   Component Value Date    ALT 26  08/14/2017    AST 37 08/14/2017    ALKPHOS 61 08/14/2017    BILITOT 0.4 08/14/2017       No results found for: IRON, TIBC, FERRITIN, SATURATEDIRO  No results found for: [id number]  No results found for: FOLATE  Lab Results   Component Value Date    TSH 0.786 08/02/2017         Review of Systems   Constitutional: Positive for activity change, fatigue and unexpected weight change. Negative for appetite change, chills, diaphoresis and fever.   HENT: Negative for congestion, dental problem, drooling, ear discharge, ear pain, facial swelling, hearing loss, mouth sores, nosebleeds, postnasal drip, rhinorrhea, sinus pressure, sneezing, sore throat, tinnitus, trouble swallowing and voice change.    Eyes: Negative for photophobia, pain, discharge, redness, itching and visual disturbance.   Respiratory: Positive for cough and shortness of breath. Negative for apnea, choking, chest tightness, wheezing and stridor.    Cardiovascular: Positive for leg swelling. Negative for chest pain and palpitations.   Gastrointestinal: Negative for abdominal distention, abdominal pain, anal bleeding, blood in stool, constipation, diarrhea, nausea, rectal pain and vomiting.   Endocrine: Negative for cold intolerance, heat intolerance, polydipsia, polyphagia and polyuria.   Genitourinary: Negative for decreased urine volume, difficulty urinating, discharge, dysuria, enuresis, flank pain, frequency, genital sores, hematuria, penile pain, penile swelling, scrotal swelling, testicular pain and urgency.   Musculoskeletal: Positive for gait problem. Negative for arthralgias, back pain, joint swelling, myalgias, neck pain and neck stiffness.   Skin: Negative for color change, pallor, rash and wound.   Allergic/Immunologic: Negative for environmental allergies, food allergies and immunocompromised state.   Neurological: Positive for weakness. Negative for dizziness, tremors, seizures, syncope, facial asymmetry, speech difficulty, light-headedness,  numbness and headaches.   Hematological: Negative for adenopathy. Does not bruise/bleed easily.   Psychiatric/Behavioral: Positive for dysphoric mood. Negative for agitation, behavioral problems, confusion, decreased concentration, hallucinations, self-injury, sleep disturbance and suicidal ideas. The patient is nervous/anxious. The patient is not hyperactive.        Objective:      Physical Exam   Constitutional: He is oriented to person, place, and time. He appears well-developed and well-nourished. He appears cachectic. He has a sickly appearance. He appears ill. He appears distressed.   HENT:   Head: Normocephalic.   Right Ear: External ear normal.   Left Ear: External ear normal.   Nose: Nose normal. Right sinus exhibits no maxillary sinus tenderness and no frontal sinus tenderness. Left sinus exhibits no maxillary sinus tenderness and no frontal sinus tenderness.   Mouth/Throat: Oropharynx is clear and moist. No oropharyngeal exudate.   Eyes: EOM and lids are normal. Pupils are equal, round, and reactive to light. Right eye exhibits no discharge. Left eye exhibits no discharge. Right conjunctiva is not injected. Right conjunctiva has no hemorrhage. Left conjunctiva is not injected. Left conjunctiva has no hemorrhage. No scleral icterus. Right eye exhibits normal extraocular motion. Left eye exhibits normal extraocular motion.   Neck: Normal range of motion. Neck supple. No JVD present. No tracheal deviation present. No thyromegaly present.   Cardiovascular: Normal rate and regular rhythm.    Pulmonary/Chest: Effort normal. No stridor. No respiratory distress.   Abdominal: Soft. He exhibits no mass. There is no hepatosplenomegaly, splenomegaly or hepatomegaly. There is no tenderness.   Musculoskeletal: Normal range of motion. He exhibits no edema or tenderness.   Lymphadenopathy:        Head (right side): No posterior auricular and no occipital adenopathy present.        Head (left side): No posterior auricular  and no occipital adenopathy present.     He has no cervical adenopathy.        Right cervical: No superficial cervical, no deep cervical and no posterior cervical adenopathy present.       Left cervical: No superficial cervical, no deep cervical and no posterior cervical adenopathy present.     He has no axillary adenopathy.        Right: No supraclavicular adenopathy present.        Left: No supraclavicular adenopathy present.   Neurological: He is alert and oriented to person, place, and time. He has normal strength. No cranial nerve deficit. Coordination abnormal.   Skin: Skin is dry. No rash noted. He is not diaphoretic. No cyanosis or erythema. Nails show no clubbing.   Psychiatric: His behavior is normal. Judgment and thought content normal. His mood appears anxious. Cognition and memory are normal. He exhibits a depressed mood.   Vitals reviewed.          Assessment:      1. Bronchiolo-alveolar adenocarcinoma of left lung           Plan:   Review CT chest abdomen pelvis results reviewed at this point findings in lung either progressive disease or resolving pneumonia discussed pros and cons of each.  At this time patient perform status is declining ECOG status 3 limited therapeutic options  PD L1 negative although responses second line therapy to the volar lab and reported likelihood small.  Discussed implications with his physician granddaughter by phone reviewed information with patient wife and daughter photographs of images taken to be sent to the family electronically.  Copies of reports plan follow-up in 2 weeks with labs chest x-ray 40 minutes face-to-face time [id number]

## 2017-08-15 NOTE — PROGRESS NOTES
8/15/17 #1 attempt - Patient referred for high risk. Spoke with patient's spouse Mrs. Castillo. Explained OPCM program. States she is unable to go through screen at this time as they are getting ready for 1 pm doctor's appointment. Requests I mail a letter informing of OPCM program. Provided my name and phone number. Does agree for me to call again either this week or next to see if she and pt can complete questions.

## 2017-08-17 ENCOUNTER — OUTPATIENT CASE MANAGEMENT (OUTPATIENT)
Dept: ADMINISTRATIVE | Facility: OTHER | Age: 77
End: 2017-08-17

## 2017-08-17 DIAGNOSIS — C34.92 BRONCHIOLO-ALVEOLAR ADENOCARCINOMA OF LEFT LUNG: Primary | ICD-10-CM

## 2017-08-17 DIAGNOSIS — N40.1 BENIGN NON-NODULAR PROSTATIC HYPERPLASIA WITH LOWER URINARY TRACT SYMPTOMS: ICD-10-CM

## 2017-08-17 RX ORDER — TAMSULOSIN HYDROCHLORIDE 0.4 MG/1
0.8 CAPSULE ORAL NIGHTLY
Qty: 90 CAPSULE | Refills: 4 | Status: SHIPPED | OUTPATIENT
Start: 2017-08-17 | End: 2017-08-17 | Stop reason: SDUPTHER

## 2017-08-17 RX ORDER — TAMSULOSIN HYDROCHLORIDE 0.4 MG/1
0.4 CAPSULE ORAL NIGHTLY
Qty: 90 CAPSULE | Refills: 4 | Status: SHIPPED | OUTPATIENT
Start: 2017-08-17

## 2017-08-17 NOTE — LETTER
August 17, 2017    Doron Domínguez  [address]  [place] [address]             [facility]  [address] [facility] [place] [address] Dear MrSandra and Mrs. Domínguez:    It was a pleasure speaking with you today. As discussed, I am enclosing information on fall prevention, coping tips for caregiver and oncology resources and more. Please do not get overwhelmed with the amount of information enclosed, as I will call you weekly in the beginning to review and discuss. I have also referred you to  and requested order for light weight travel wheelchair. Furthermore, I wanted to remind you that [facility] care line [phone] or [phone] is a 24 hour 7 days per week free nurse line to assist you determine best care options for you, provides triage, appointment booking, health education and advisory services.     I can be reached Monday through Wednesday at [phone]. The Outpatient Care Management Department can be reached at [phone] from 8:00AM to 4:30 PM on Monday thru Friday.    Sincerely,        Briana Boyle RN [facility]  Outpatient Complex Care Manager

## 2017-08-17 NOTE — TELEPHONE ENCOUNTER
Pt needs a new script for flomax 0.4mg and the pharmacy needs to know if it is for two tablets daily or one tablet daily.

## 2017-08-17 NOTE — PATIENT INSTRUCTIONS
Resources for People with Cancer    You cant fight cancer alone. Reach out. Seek support from family, friends, and others who care about you. Let other people assist you. It can help you feel better both during and after your treatment.  Support groups  When you have cancer, support groups can be a great help. Meeting and talking with others with cancer can help you cope. There are also support groups for families of people with cancer. To find a support group, start by talking to your hospitals patient education department. Ask your healthcare provider. You can also do a search for cancer support groups on the Internet.  For more information  Contact the sources below for more information about cancer and cancer support groups:  · American Cancer Society (ACS)  709.350.4855  www.cancer.org  · [employer]  521.912.2563  www.cancer.gov  Local resources  Ask your healthcare provider about your local ACS or other support groups:  _____________________________________________________________________  _____________________________________________________________________  _____________________________________________________________________  Date Last Reviewed: 1/6/2016  © 8473-0283 [employer]. [address] [place] [address], [place] [address]. All rights reserved. This information is not intended as a substitute for professional medical care. Always follow your healthcare professional's instructions.        Oncology: Controlling Diarrhea  Diarrhea (loose stools) is a common side effect of chemotherapy and radiation therapy. Diarrhea results when treatment affects the normal cells lining the intestine. To help limit this problem, try the tips on this handout.     For breakfast, try eating toasted white bread and a banana.   Tips for controlling diarrhea  · Limit the amount of fiber in your diet. Avoid high-fiber foods such as whole-grain bread and brown rice. Instead, eat white bread and  rice.  · Eat foods rich in potassium such as bananas and oranges. This can help replenish electrolytes lost due to diarrhea.  · Eat small, frequent meals.  · Drink plenty of fluids. Clear liquids and flat light sodas, such as ginger ale, are best. This can help replenish fluids lost due to diarrhea, and prevent dehydration.  · Do not drink coffee, tea, or alcohol.  · Try not to eat foods that are fried, greasy, spicy, or sweet.  · Limit the amount of milk you drink.  Medicines can help  Ongoing diarrhea is not something you have to live with. Talk with your doctor. Medicine to stop diarrhea may be prescribed. Your doctor may also suggest using an ointment to soothe irritation. Keeping the anal area clean helps as well. Do not take any over-the-counter product without asking your doctor first.  When to see your healthcare provider  See your healthcare provider if any of the following occur:  · The diarrhea lasts more than 48 hours.  · There is blood in your stool.  · You have pain in your abdomen.   Date Last Reviewed: 1/3/2016  © 4777-4670 [employer]. [address] [place] [address]. All rights reserved. This information is not intended as a substitute for professional medical care. Always follow your healthcare professional's instructions.        Managing Fatigue     Family members can help with meals and chores around the house.   Fatigue is common. It can be caused by worry, lack of sleep, or poor appetite. Fatigue can also be a sign of anemia, a shortage of red blood cells. You might need medical treatment for anemia. The tips below can help you feel better.  Conserving energy  · Keep track of the times of day when you are most tired and plan around them. For instance, if you are more tired in the afternoon, try to get tasks done in the morning.  · Decide which tasks are most important. Do those first.  · Pass tasks along to others when you need to. Ask for help.  · Accept help when its  offered. Tell people what they can do to help. For instance, you may need someone to fix a meal, fold clothes, or put gas in your car.  · Plan rest times. You may want to take a nap each day. Just sitting quietly for a few minutes can make you feel more rested.  What you can do to feel better  · Relax before you try to sleep. Take a bath or read for a while.  · Form a sleep pattern. Go to bed at the same time each night and get up at the same time each morning.  · Eat well. Choose foods from all of the food groups each day.  · Exercise. Take a brisk walk to help increase your energy.  · Avoid caffeine and alcohol. Drink plenty of water or fruit juices instead.  Treating anemia  If you begin to feel more tired than normal, tell your doctor. Fatigue could be a sign of anemia. This problem is fairly common in cancer patients, especially during chemotherapy and radiation treatments. If your red blood cell count is too low, you may get a blood transfusion. In some cases, you may need medicine to increase the number of red blood cells your body makes.  When to call your healthcare provider  Call your healthcare provider if you have:  · Shortness of breath or chest pain  · A dizzy feeling when you get up from lying or sitting down  · Paler skin than normal  · Extreme tiredness that is not helped by sleep   Date Last Reviewed: 1/3/2016  © 6657-9410 [employer]. [address] [place] [address]. All rights reserved. This information is not intended as a substitute for professional medical care. Always follow your healthcare professional's instructions.        Fall Prevention  Falls often occur due to slipping, tripping or losing your balance. Millions of people fall every year and injure themselves. Here are ways to reduce your risk of falling again.  · Think about your fall, was there anything that caused your fall that can be fixed, removed, or replaced?  · Make your home safe by keeping walkways  clear of objects you may trip over.  · Use non-slip pads under rugs. Do not use area rugs or small throw rugs.  · Use non-slip mats in bathtubs and showers.  · Install handrails and lights on staircases.  · Do not walk in poorly lit areas.  · Do not stand on chairs or wobbly ladders.  · Use caution when reaching overhead or looking upward. This position can cause a loss of balance.  · Be sure your shoes fit properly, have non-slip bottoms and are in good condition.   · Wear shoes both inside and out. Avoid going barefoot or wearing slippers.  · Be cautious when going up and down stairs, curbs, and when walking on uneven sidewalks.  · If your balance is poor, consider using a cane or walker.  · If your fall was related to alcohol use, stop or limit alcohol intake.   · If your fall was related to use of sleeping medicines, talk to your doctor about this. You may need to reduce your dosage at bedtime if you awaken during the night to go to the bathroom.    · To reduce the need for nighttime bathroom trips:  ¨ Avoid drinking fluids for several hours before going to bed  ¨ Empty your bladder before going to bed  ¨ Men can keep a urinal at the bedside  · Stay as active as you can. Balance, flexibility, strength, and endurance all come from exercise. They all play a role in preventing falls. Ask your healthcare provider which types of activity are right for you.  · Get your vision checked on a regular basis.  · If you have pets, know where they are before you stand up or walk so you don't trip over them.  · Use night lights.  Date Last Reviewed: 11/5/2015  © 9168-1774 [employer]. [address] [place] [address], [place] [address]. All rights reserved. This information is not intended as a substitute for professional medical care. Always follow your healthcare professional's instructions.        For Caregivers: Coping Tips  Caregivers often feel they must tend to their loved ones needs full time. But burning yourself  out doesnt help anyone and can negatively affect your own health. You cant take good care of someone else without taking good care of yourself as well. Its not selfish. Its essential. Take a break. Eat right. Get out and exercise. Most of all, accept that you cant do everything yourself.    Give yourself a break  All of the things you do are not equally important. Set priorities. That way you wont be busy all the time. Look after your health. Go for a walk each chance you get. Take a long bath. Lift your spirits by having lunch with a friend. Or do nothing for an hour. Just nap or relax.  Accept help  Knowing you can count on others can be a relief. Accept help when its offered. And be willing to ask for help when you need it. Those who care about you really do want to help.  If you feel depressed  Over time, after a serious health event, stress should gradually lessen. But your life may have changed. Realizing this may cause grief, both for you and your loved one. Contact your health care provider if either of you shows signs of depression. Treatment can help you find hope -- even when you think nothing can help.  Common signs of depression  · Feeling down most of the time  · Feeling guilty or helpless  · Losing pleasure in things you used to enjoy, like reading, exercise, or social events  · Sleeping less or more than normal  · Having a big rise or fall in appetite or weight  · Feeling restless or irritable  · Feeling tired, weak, or low in energy  · Having trouble focusing, remembering, or making decisions  · Feeling angry or agitated can be a sign as well. This may be the only sign more common in men.   Date Last Reviewed: 5/23/2015  © 3387-7583 [employer]. [address] [place] [address], [place] [address]. All rights reserved. This information is not intended as a substitute for professional medical care. Always follow your healthcare professional's instructions.

## 2017-08-17 NOTE — PROGRESS NOTES
8:40 Attempt to complete initial OPCM screen. No answer nor voice message option on patient's mobile #, voice message left on home # requesting call back.   9:10 In-comming call from spouse. Explained OPCM program, requests call back at 11 am as patient is getting up, needs to shower and eat breakfast.   9:54 In-coming call from spouse states ready to talk. Screen and assessment completed telephonically. Patient lives with spouse who is currently helping him with most ADLS. Denies caregiver burnout at this point. They have 2 daughters, one who lives close which has been helping once per week by staying with patient so spouse can go to her own appointments. Other daughter lives in [place] about 500 miles so they do not see often. Daughter is coming in this weekend to help. Patient has been ongoing chemo, but now will be stopping for couple of weeks for him to decide course of action. Hospice was discussed. Spouse had questions which I attempted to answer. Patient scored an 11 in PHQ9 as he is dealing with a lot. Spouse states they have never spoken with  nor psych. [facility] and they have helped in providing used BSC and shower chair for them to have. Patient can benefit from a light weight travel wheelchair to help get patient to and from appts. Patient does have to also carry oxygen making it more difficult for spouse to manage. Patient is active with [facility]. Patient sleeps in recliner as it helps with SOB and coughing up sputum. Spouse states he spits up this slimy, foamy mucus all the time. Does not want hospital bed at this time as she does not have where to place it. Informed that sometimes people use den as option. They live in a 2 story house with one bedroom downstairs. Attempted to complete medication reconciliation but spouse states we have been too long on phone and she needs to go help patient. She does state that Flomax she was recently been told that patient has to take  2 capsules of 0.4 mg which is correct as per med section. States her Rx came from [facility] and it says one tablet and she has not received new Rx and she will run out in next few days. Patient is active with [facility] for nursing and PT. He will be seeing PT this Friday for 1st time. Patient does have hx of fall with injury. Patient does have POA and recently updated and made a comprehensive will spouse states. In-basket message sent to PCP, Dr Andres and Dr Plata requesting order for light weight travel wheelchair faxed to [facility] and Flomax Rx be sent to [facility]. Will also refer to Ladi HUTCHISON to assist.    Plan:  1. Cancer - controlling diarrhea, caregiver burnout, resorces  2. Fall prevention  3. DME - light weight W/C  4. RX: complete med rec and see if RX for Flomax received  5. Referral Ladi Lowe OPCM SW

## 2017-08-22 ENCOUNTER — OUTPATIENT CASE MANAGEMENT (OUTPATIENT)
Dept: ADMINISTRATIVE | Facility: OTHER | Age: 77
End: 2017-08-22

## 2017-08-22 NOTE — PROGRESS NOTES
8/22/17 Follow-up call with patient's spouse Anna. States she is picking up lunch in a restaurant. Cannot speak for extended time. States they have not yet received wheelchair. Order was placed by Dr Leon in [facility] on 8/17/17. Called [facility] x [phone], spoke with Tanja who states order was not faxed to them she is printing it from [facility] today and will have it sent to Medicare DME provider in [place]. Called patient informed of above.       Plan:  1. Cancer - controlling diarrhea, caregiver burnout, resorces  2. Fall prevention  3. DME - light weight W/C - order placed 8/17/17; DME will fax it on 8/22/17 to provider  4. RX: complete med rec and see if RX for Flomax received  5. Referral Ladi Lowe OPCM SW

## 2017-08-23 ENCOUNTER — OUTPATIENT CASE MANAGEMENT (OUTPATIENT)
Dept: ADMINISTRATIVE | Facility: OTHER | Age: 77
End: 2017-08-23

## 2017-08-23 NOTE — PROGRESS NOTES
1st Attempt to complete Social Work Assessment for Outpatient Care Management; left message requesting a return call on home phone. Unable to leave voicemail message on mobile phone.

## 2017-08-25 ENCOUNTER — OUTPATIENT CASE MANAGEMENT (OUTPATIENT)
Dept: ADMINISTRATIVE | Facility: OTHER | Age: 77
End: 2017-08-25

## 2017-08-25 NOTE — LETTER
August 25, 2017    Doron Domínguez  [address]  [place] [address]             Outpatient Case Management  [address]  [facility] [address] Dear Doron Domínguez:    We understand that receiving many services from different doctors and healthcare providers is overwhelming. There are appointments to make, transportation to arrange, dietary instructions to understand, and new medications to obtain.    This is where [facility] can help.     You are eligible to receive Outpatient Case Management services when you have healthcare needs that require the coordination of many providers, treatments, and services. You also qualify if you need assistance with a new treatment plan.     There is no charge for this support. You may have been referred to this program from your doctor(s), hospital staff member(s), or insurance company but you always have a choice to participate or not participate. To participate, you must give us your permission to be enrolled.     When you are enrolled in the [facility] program, the  who is assigned to you is    Briana Boyle RN  [phone]    Depending on your needs and wishes, your  may speak with you by phone, visit you at your place of living (for example your home, skilled nursing facility, or rehabilitation facility), or meet you at your doctors office.     Your  will tell you why you have been selected to participate in the program and will complete an assessment of your needs. Then a personalized plan of care will be developed with you and or your caregiver.             Here are examples of the services your [facility]  provides.     Coordinate communication among multiple providers.   Arrange for transportation, doctors visits, durable medical equipment, home care services, and special clinics.    Provide coaching on how to manage your health condition.     Answer questions about your health condition.   Help you understand your doctors treatment plan.    Provide additional instruction about your health condition, treatments, and medications.    Help you obtain information about your insurance coverage.    Advocate for your individual needs.    Request a Licensed Clinical  (LCSW) to visit you if you need their services. LCSWs help with long term planning (discussing placement options, advanced planning directives), financial planning, and assistance (for example rent, utilities, medication funding).     Your  will coordinate their activities with other outpatient services you are receiving. All services provided by [facility]  are coordinated with and communicated to your primary care physician.    Our goal is to help you manage your health condition(s) safely within your living environment, whether that is your home or a medical facility. We want to help you function at the healthiest and highest level possible.     Sincerely,      Matteo Pimentel MD  Medical Director    Enclosures:    Frequently Asked Questions  Patient Rights and Responsibilities   Reporting a Grievance or Complaint  Consent/Release of Information  Stamped Addressed Envelope                  Frequently Asked Questions about [facility]    What is [facility]?  Outpatient Case management is not Home healthcare services. [facility]  do not provide hands-on care. [facility]  will work with your doctor to arrange for home health services, if needed. Home health services have a limited duration and there are some restrictions as to who can get these services. There is no prescribed limit to the amount of time you receive [facility] services. [facility]  are not agents of your insurance company. However, [facility]  can help you obtain information from your insurance company.     Who are the [facility] ?  [facility]  are Registered Nurses and Social Workers. It is important to remember that you and your  are a team that works together with your primary care physician to create your individualized plan of care. The ultimate goal of your care plan is to help you implement your doctors treatment plan and to help you function at the highest level of health possible.     What are my rights as a patient?  It is important for you to know and understand your rights and responsibilities while receiving services from the [facility] program. We have enclosed a complete description of your rights and responsibilities. You can help to make your care more effective when you understand your right and responsibilities.     What is needed to be enrolled in the program?  You are only enrolled in the [facility] Program when you give us your consent to participate. You will find enclosed a consent form. You are receiving this letter because you or your caregivers have given us a verbal consent to enroll you in [facility] Program. We ask that you sign and return the enclosed written consent in the stamped self-addressed envelope.                           Patient Rights and Responsibilities    We consider you a partner in your care. When you are well informed, participate in treatment decisions and communicate openly with your doctor and other healthcare professionals, you help make your care more effective.     While you are in the Outpatient Case Management Program, your rights include the following:     You have a right to be provided services in a non-discriminatory manner in accordance with the provisions of Title VI of the Civil Rights Act of 1964, Section 504 of the Rehabilitation Act of  1973, the Age Discrimination Act of 1975, the Americans with Disabilities Act as well as any other applicable Federal and State laws and regulations.     You have the right to a reasonable, timely response to your request or need for care, as well as the right to considerate and respectful care including an environment that preserves dignity and contributes to a positive self-image. You are responsible for being considerate and respectful of our staff.     You have a right to information regarding patient rights, advocacy services and complaint mechanisms, and the right to prompt resolution of any complaint. You or a designee has the right to participate in the resolution of ethical issues surrounding your care. You have a right to file a complaint if you feel that your rights have been infringed, without fear or penalty from [facility] or the federal government. You may file a complaint with the Director of Outpatient Case Management by calling [phone]. At any time, you may lodge a grievance with the [facility] and [facility] by calling [phone], or the Joint Commission on Accreditation of Healthcare Organizations at [phone].     You, or someone acting on your behalf, have the right to understandable information on your health status, treatment and progress in order to make decisions. You have the right to know the nature, risks and alternatives to treatment. You have the right to be informed, when appropriate, regarding the outcome of the care that has been provided. You have the right to refuse treatment to the extent permitted by law, and the right to be informed of the alternatives and consequences of refusing treatment.     You, in collaboration with your physician, have the right to make decisions regarding care and the right to participate in the development and implementation of the plan of care and effective pain management. You have the right to know the name and  professional status of those responsible for the delivery of your care and treatment.       You have a right, within legal guidelines, to have a guardian, next-of-kin or legal designee exercises your patient rights when you are unable to do so. You have the right for your wishes regarding end-of-life decisions to be addressed by the healthcare team through advance directives. You have the right to personal privacy and confidentiality and to expect confidentiality of all records and communications pertaining to your care.      You have the right to receive communications about your health information confidentially. You have the right to request restrictions on the uses and disclosures of your health information. You have the right to inspect, copy, request amendments and receive an accounting of to whom we have disclosed your health information.     You have the right to be provided with interpretation services if you do not speak English; to alternative communication techniques if you are hearing or vision impaired; and to have any other resources needed on your behalf to ensure effective communication. These services are provided free of charge.     You have a right to personal safety (free from mental, physical, sexual and verbal abuse, neglect and exploitation). You have the right to access protective and advocacy services.     Advance Directives  A Patient Advocate is available to meet with patients to answer questions regarding advance directives.    Living Will  A document that outlines what medical treatment the patient does or does not want in the event the patient becomes unable to make those decisions at the appropriate time.    Durable Medical Power of   A document by which the patient designates an individual to be responsible for making medical decisions in the event the patient becomes unable to do so.    HIPAA Notice of Privacy Practices  Your medical information is governed by federal  privacy laws. HIPAA protects private medical information and how that information is disclosed. If you have a question regarding the HIPAA Notice of Privacy Practices, or if you believe your privacy rights have been violated, you may call our designated hotline at [phone] [phone].            Quality Improvement  Because we consistently strive to improve the care and service provided to our patients, we welcome your feedback. Your comments are an important part of our quality improvement process, as we like to know what we are doing right and which areas are in need of improvement. Our policy is to listen, be responsive and provide you with an appropriate and timely follow-up to your questions or concerns. Our goal is active patient and family involvement in all aspects of the care process.          Reporting a Grievance or Complaint    During your time with the [facility] team you may have a grievance or complaint with our services. Your Patients Bill of Rights gives patients, families, and caregivers the right to express concerns and grievances and the right to expect a reasonable and timely response.     Your presentation of your concerns is not viewed negatively. It is an opportunity for us to improve the quality of our care and services we provide to you.     You may report your concerns directly to your , or you can phone in a complaint to:     Director of Outpatient Case Management  [phone]    You may also send a complaint letter to:    Director of Outpatient Case Management Services  [address] [place] [id number]    Tell us the details of your complaint and provide us with a contact phone number so we can contact you to obtain additional information. We will return a call to you within two business days of our receipt of your complaint, and to request additional information as needed. If you choose to mail a letter, your complaint may take a few days  longer to reach us.     All grievances will be addressed as quickly as possible. A grievance or complaint that involves situations or practices which place patients in immediate danger will be addressed as an urgent matter. We will work to resolve all other complaints within seven days of receipt. By that time, you will receive a phone call with either the resolution of your complaint, or a plan for corrective action. A formal written response will be sent to you within 30 days of receipt of your grievance.     If a resolution cannot be completed within 30 days, a letter will be sent to you or your family member with an estimated time for the final response.    Additionally, all patients have the right to file complaints with external agencies, without exception. Complaints/grievances can be addressed to the following agencies:            Patient Safety or Quality of Care Concerns  Office of Quality Monitoring   [facility] [place] [address]  [phone] Toll Free    HIPPA Privacy/Security Concerns  Office for Civil Rights Region IV  [place] Department of Health & Human Services  [address] [place] [address], [address]  [place] [address] [phone] Phone  [phone] TDD  [phone] Toll Free    Medicare/Medicaid Billing Concerns  [place] for Medicare & Medicaid Services  Region [address]  [address] [place] [address], [address]  [place] [address] [phone] Phone  [phone] Toll Free    General Concerns  [facility])  [phone] Toll Free Complaint Hotline                                        Consent Form/Release of Information    By signing--     (1) I agree I have read the Outpatient Case Management information provided to me;     (2) I agree to voluntarily participate in the Outpatient Case Management program;     (3) I understand I must consent to participation in the Outpatient Case Management program during my first interview  with my ;    (4) I consent to the discussion and release of my personal health information to my healthcare team (including my personal physician, my medical home care team, any specialty physician(s), and my [facility] team);     (5) I agree my consent is valid for the length of time I am receiving Outpatient Case Management;    (6) I agree to referrals to community resources which my Case Management team recommends for me. I agree to the release of my personal information and personal health information as necessary to referral sources.    ___________________________________________________________________  Patients Printed Name     ___________________________________________________________________  Patients Signature       Date    If patient is in being cared for, please complete this section:     ___________________________________________________________________  Printed Name of Person Caring For Patient   Relationship To Patient    ___________________________________________________________________   Signature of Person Caring For Patient     Date    PLEASE SIGN AND RETURN IN THE ENCLOSED PRE-ADDRESSED ENVELOPE.

## 2017-08-25 NOTE — PROGRESS NOTES
Please note an Outpatient Complex Care Management welcome packet and consent form was created and mailed to the patient on 8/25/17.    Please contact [facility] at [phone] with any questions.    Thank you,    Chula Tran, SSC

## 2017-08-28 ENCOUNTER — OUTPATIENT CASE MANAGEMENT (OUTPATIENT)
Dept: ADMINISTRATIVE | Facility: OTHER | Age: 77
End: 2017-08-28

## 2017-08-28 NOTE — PROGRESS NOTES
"08/28/2017  Attempted 2nd telephone call in an attempt to begin initial SW assessment with patient/wife. Wife answered telephone, saying that they were about "to sit down for lunch".  She stated that it was not a good time for a telephone call. When asked about a good time for a callback, she replied that [phone] in  The morning is a good time, "but I never know. I can't promise."    "

## 2017-08-29 ENCOUNTER — OUTPATIENT CASE MANAGEMENT (OUTPATIENT)
Dept: ADMINISTRATIVE | Facility: OTHER | Age: 77
End: 2017-08-29

## 2017-08-29 NOTE — PROGRESS NOTES
8/29/17 Follow-up call with patient's spouse Anna at first as patient was getting PT and then with patient. States doing much better today. Denies any diarrhea, falls, any issues. States wheelchair was finally delivered yesterday though cushion did not arrive. She informed him of orders that had cushion. DME will investigate. Informed that NAPOLEON HUTCHISON Ena Hooper was attempting to follow-up. Provided her direct number.       Plan:  1. Cancer - controlling diarrhea, caregiver burnout, resorces  2. Fall prevention  3. DME - light weight W/C - order placed 8/17/17; DME will fax it on 8/22/17 to provider  4. RX: complete med rec and see if RX for Flomax received  5. Referral Ladi HUTCHISON
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
## 2017-08-30 ENCOUNTER — OUTPATIENT CASE MANAGEMENT (OUTPATIENT)
Dept: ADMINISTRATIVE | Facility: OTHER | Age: 77
End: 2017-08-30

## 2017-08-30 NOTE — PROGRESS NOTES
RICKIW received a return msg from Oncology SW/Shannen Jung regarding collaboration of care for this patient and his wife.  LCSW updated OPCM RN/Briana and LCSW will f/u with wife next week as planned.    Shannen Jung, DARRIAN Bledsoe [employer], DARRIAN   Cc: Holly Avendaño, KIANNA Bledsoe,     I have put him on my schedule to f/u on 9/5 when he comes back in. I haven't actively followed him since I met with him because he didn't have any needs initially, but I gave him my contact info for future needs. Either I or my colleague will check back in with him next week.     Thanks!   Shannen

## 2017-09-05 ENCOUNTER — HOSPITAL ENCOUNTER (OUTPATIENT)
Dept: RADIOLOGY | Facility: HOSPITAL | Age: 77
Discharge: HOME | End: 2017-09-05
Attending: INTERNAL MEDICINE
Payer: MEDICARE

## 2017-09-05 ENCOUNTER — OFFICE VISIT (OUTPATIENT)
Dept: HEMATOLOGY/ONCOLOGY | Facility: CLINIC | Age: 77
End: 2017-09-05
Payer: MEDICARE

## 2017-09-05 ENCOUNTER — LAB VISIT (OUTPATIENT)
Dept: LAB | Facility: HOSPITAL | Age: 77
End: 2017-09-05
Attending: INTERNAL MEDICINE
Payer: MEDICARE

## 2017-09-05 VITALS
BODY MASS INDEX: 25.3 KG/M2 | TEMPERATURE: 97 F | SYSTOLIC BLOOD PRESSURE: 102 MMHG | DIASTOLIC BLOOD PRESSURE: 65 MMHG | OXYGEN SATURATION: 95 % | HEART RATE: 96 BPM | HEIGHT: 66 IN | WEIGHT: 157.44 LBS

## 2017-09-05 DIAGNOSIS — C34.92 BRONCHIOLO-ALVEOLAR ADENOCARCINOMA OF LEFT LUNG: ICD-10-CM

## 2017-09-05 DIAGNOSIS — C34.92 BRONCHIOLO-ALVEOLAR ADENOCARCINOMA OF LEFT LUNG: Primary | ICD-10-CM

## 2017-09-05 LAB
ALBUMIN SERPL BCP-MCNC: 2.7 G/DL
ALP SERPL-CCNC: 95 U/L
ALT SERPL W/O P-5'-P-CCNC: 30 U/L
ANION GAP SERPL CALC-SCNC: 10 MMOL/L
AST SERPL-CCNC: 39 U/L
BASOPHILS # BLD AUTO: 0.07 K/UL
BASOPHILS NFR BLD: 0.6 %
BILIRUB SERPL-MCNC: 0.4 MG/DL
BUN SERPL-MCNC: 10 MG/DL
CALCIUM SERPL-MCNC: 9.5 MG/DL
CHLORIDE SERPL-SCNC: 100 MMOL/L
CO2 SERPL-SCNC: 28 MMOL/L
CREAT SERPL-MCNC: 1 MG/DL
DIFFERENTIAL METHOD: ABNORMAL
EOSINOPHIL # BLD AUTO: 0.3 K/UL
EOSINOPHIL NFR BLD: 2.3 %
ERYTHROCYTE [DISTWIDTH] IN BLOOD BY AUTOMATED COUNT: 17.2 %
EST. GFR  (AFRICAN AMERICAN): >60 ML/MIN/1.73 M^2
EST. GFR  (NON AFRICAN AMERICAN): >60 ML/MIN/1.73 M^2
GLUCOSE SERPL-MCNC: 104 MG/DL
HCT VFR BLD AUTO: 33.6 %
HGB BLD-MCNC: 11 G/DL
IRON SERPL-MCNC: 24 UG/DL
LDH SERPL L TO P-CCNC: 241 U/L
LYMPHOCYTES # BLD AUTO: 1.2 K/UL
LYMPHOCYTES NFR BLD: 10 %
MCH RBC QN AUTO: 30.7 PG
MCHC RBC AUTO-ENTMCNC: 32.7 G/DL
MCV RBC AUTO: 94 FL
MONOCYTES # BLD AUTO: 1.8 K/UL
MONOCYTES NFR BLD: 15 %
NEUTROPHILS # BLD AUTO: 8.6 K/UL
NEUTROPHILS NFR BLD: 72.1 %
PLATELET # BLD AUTO: 243 K/UL
PMV BLD AUTO: 8.6 FL
POTASSIUM SERPL-SCNC: 4.1 MMOL/L
PROT SERPL-MCNC: 7.1 G/DL
RBC # BLD AUTO: 3.58 M/UL
SATURATED IRON: 11 %
SODIUM SERPL-SCNC: 138 MMOL/L
TOTAL IRON BINDING CAPACITY: 222 UG/DL
TRANSFERRIN SERPL-MCNC: 150 MG/DL
WBC # BLD AUTO: 11.86 K/UL

## 2017-09-05 PROCEDURE — 99213 OFFICE O/P EST LOW 20 MIN: CPT | Mod: PBBFAC,25,PO | Performed by: INTERNAL MEDICINE

## 2017-09-05 PROCEDURE — 3074F SYST BP LT 130 MM HG: CPT | Mod: ,,, | Performed by: INTERNAL MEDICINE

## 2017-09-05 PROCEDURE — 83615 LACTATE (LD) (LDH) ENZYME: CPT

## 2017-09-05 PROCEDURE — 80053 COMPREHEN METABOLIC PANEL: CPT

## 2017-09-05 PROCEDURE — 1126F AMNT PAIN NOTED NONE PRSNT: CPT | Mod: ,,, | Performed by: INTERNAL MEDICINE

## 2017-09-05 PROCEDURE — 82728 ASSAY OF FERRITIN: CPT

## 2017-09-05 PROCEDURE — 36415 COLL VENOUS BLD VENIPUNCTURE: CPT | Mod: PO

## 2017-09-05 PROCEDURE — 3078F DIAST BP <80 MM HG: CPT | Mod: ,,, | Performed by: INTERNAL MEDICINE

## 2017-09-05 PROCEDURE — 83540 ASSAY OF IRON: CPT

## 2017-09-05 PROCEDURE — 1157F ADVNC CARE PLAN IN RCRD: CPT | Mod: ,,, | Performed by: INTERNAL MEDICINE

## 2017-09-05 PROCEDURE — 99215 OFFICE O/P EST HI 40 MIN: CPT | Mod: S$PBB,,, | Performed by: INTERNAL MEDICINE

## 2017-09-05 PROCEDURE — 99999 PR PBB SHADOW E&M-EST. PATIENT-LVL III: CPT | Mod: PBBFAC,,, | Performed by: INTERNAL MEDICINE

## 2017-09-05 PROCEDURE — 71020 XR CHEST PA AND LATERAL: CPT | Mod: TC

## 2017-09-05 PROCEDURE — 71020 XR CHEST PA AND LATERAL: CPT | Mod: 26,,, | Performed by: RADIOLOGY

## 2017-09-05 PROCEDURE — 1159F MED LIST DOCD IN RCRD: CPT | Mod: ,,, | Performed by: INTERNAL MEDICINE

## 2017-09-05 PROCEDURE — 85025 COMPLETE CBC W/AUTO DIFF WBC: CPT

## 2017-09-05 NOTE — PROGRESS NOTES
Subjective:       Patient ID: Doron Domínguez is a 77 y.o. male.    Chief Complaint: Results and Lung Cancer    HPI 77-year-old with a history of bronchial alveolar carcinoma patient here for review chronic perform status ECOG status 3 progressive disease with carboplatinum and Taxol therapy    Past Medical History:   Diagnosis Date    Aortic stenosis 12/29/2016    Arthritis     Asthma     Back pain     due to calcium deposits in back    BPH (benign prostatic hyperplasia)     CAD (coronary artery disease)     CAD, multiple vessel 12/29/2016    Cancer     lung    Chronic low back pain     High cholesterol     Hx of CABG 12/29/2016    Hypertension     Lung cancer 01/2017    T3 N0 M0 bronchoalveolar     Family History   Problem Relation Age of Onset    Diabetes Mother     Diabetes Sister     Diabetes Brother      Social History     Social History    Marital status:      Spouse name: N/A    Number of children: N/A    Years of education: 3     Occupational History    Part-time employed      Social History Main Topics    Smoking status: Never Smoker    Smokeless tobacco: Never Used    Alcohol use No    Drug use: No    Sexual activity: No     Other Topics Concern    Not on file     Social History Narrative    Part-time employed, , 3 children.     Past Surgical History:   Procedure Laterality Date    APPENDECTOMY      CARDIAC SURGERY      CORONARY ARTERY BYPASS GRAFT  2012    CABG x 3 at [facility]    LUNG LOBECTOMY Left 01/10/2017    Left lower lobe       Labs:  Lab Results   Component Value Date    WBC 11.86 09/05/2017    HGB 11.0 (L) 09/05/2017    HCT 33.6 (L) 09/05/2017    MCV 94 09/05/2017     09/05/2017     BMP  Lab Results   Component Value Date     (L) 08/14/2017    K 4.3 08/14/2017    CL 98 08/14/2017    CO2 25 08/14/2017    BUN 21 08/14/2017    CREATININE 1.9 (H) 08/14/2017    CALCIUM 8.7 08/14/2017    ANIONGAP 10 08/14/2017    ESTGFRAFRICA 38 (A) 08/14/2017     EGFRNONAA 33 (A) 08/14/2017     Lab Results   Component Value Date    ALT 26 08/14/2017    AST 37 08/14/2017    ALKPHOS 61 08/14/2017    BILITOT 0.4 08/14/2017       No results found for: IRON, TIBC, FERRITIN, SATURATEDIRO  No results found for: [id number]  No results found for: FOLATE  Lab Results   Component Value Date    TSH 0.786 08/02/2017         Review of Systems   Constitutional: Positive for activity change, appetite change and fatigue. Negative for chills, diaphoresis, fever and unexpected weight change.   HENT: Negative for congestion, dental problem, drooling, ear discharge, ear pain, facial swelling, hearing loss, mouth sores, nosebleeds, postnasal drip, rhinorrhea, sinus pressure, sneezing, sore throat, tinnitus, trouble swallowing and voice change.    Eyes: Negative for photophobia, pain, discharge, redness, itching and visual disturbance.   Respiratory: Positive for cough and shortness of breath. Negative for apnea, choking, chest tightness, wheezing and stridor.    Cardiovascular: Negative for chest pain, palpitations and leg swelling.   Gastrointestinal: Negative for abdominal distention, abdominal pain, anal bleeding, blood in stool, constipation, diarrhea, nausea, rectal pain and vomiting.   Endocrine: Negative for cold intolerance, heat intolerance, polydipsia, polyphagia and polyuria.   Genitourinary: Negative for decreased urine volume, difficulty urinating, discharge, dysuria, enuresis, flank pain, frequency, genital sores, hematuria, penile pain, penile swelling, scrotal swelling, testicular pain and urgency.   Musculoskeletal: Negative for arthralgias, back pain, gait problem, joint swelling, myalgias, neck pain and neck stiffness.   Skin: Negative for color change, pallor, rash and wound.   Allergic/Immunologic: Negative for environmental allergies, food allergies and immunocompromised state.   Neurological: Positive for weakness. Negative for dizziness, tremors, seizures, syncope, facial  asymmetry, speech difficulty, light-headedness, numbness and headaches.   Hematological: Negative for adenopathy. Does not bruise/bleed easily.   Psychiatric/Behavioral: Positive for dysphoric mood. Negative for agitation, behavioral problems, confusion, decreased concentration, hallucinations, self-injury, sleep disturbance and suicidal ideas. The patient is nervous/anxious. The patient is not hyperactive.        Objective:      Physical Exam   Constitutional: He is oriented to person, place, and time. He has a sickly appearance. He appears ill. He appears distressed.   HENT:   Head: Normocephalic.   Right Ear: External ear normal.   Left Ear: External ear normal.   Nose: Nose normal. Right sinus exhibits no maxillary sinus tenderness and no frontal sinus tenderness. Left sinus exhibits no maxillary sinus tenderness and no frontal sinus tenderness.   Mouth/Throat: Oropharynx is clear and moist. No oropharyngeal exudate.   Eyes: EOM and lids are normal. Pupils are equal, round, and reactive to light. Right eye exhibits no discharge. Left eye exhibits no discharge. Right conjunctiva is not injected. Right conjunctiva has no hemorrhage. Left conjunctiva is not injected. Left conjunctiva has no hemorrhage. No scleral icterus. Right eye exhibits normal extraocular motion. Left eye exhibits normal extraocular motion.   Neck: Normal range of motion. Neck supple. No JVD present. No tracheal deviation present. No thyromegaly present.   Cardiovascular: Normal rate and regular rhythm.    Pulmonary/Chest: Effort normal. No stridor. No respiratory distress.   Abdominal: Soft. He exhibits no mass. There is no hepatosplenomegaly, splenomegaly or hepatomegaly. There is no tenderness.   Musculoskeletal: Normal range of motion. He exhibits no edema or tenderness.   Lymphadenopathy:        Head (right side): No posterior auricular and no occipital adenopathy present.        Head (left side): No posterior auricular and no occipital  adenopathy present.     He has no cervical adenopathy.        Right cervical: No superficial cervical, no deep cervical and no posterior cervical adenopathy present.       Left cervical: No superficial cervical, no deep cervical and no posterior cervical adenopathy present.     He has no axillary adenopathy.        Right: No supraclavicular adenopathy present.        Left: No supraclavicular adenopathy present.   Neurological: He is alert and oriented to person, place, and time. He has normal strength. No cranial nerve deficit. Coordination normal.   Skin: Skin is dry. No rash noted. He is not diaphoretic. No cyanosis or erythema. Nails show no clubbing.   Psychiatric: His behavior is normal. Judgment and thought content normal. His mood appears anxious. Cognition and memory are normal. He exhibits a depressed mood.   Vitals reviewed.          Assessment:      1. Bronchiolo-alveolar adenocarcinoma of left lung           Plan:   Declining performance status spoke to patient reviewed x-rays from today at this point my recommendations would be hospice care with talked about the possibility of using immune checkpoint inhibitors likelihood of response is low his PD L1 level is undetectable.  Even though there would be some chance of response with talked about the long nature of this at this time he would like to proceed with hospice care should they rethink disposition I'll be happy to reassess 40 minutes face-to-face time spent face-to-face patient time from [phone]

## 2017-09-06 LAB — FERRITIN SERPL-MCNC: 1163 NG/ML

## 2017-09-11 ENCOUNTER — OUTPATIENT CASE MANAGEMENT (OUTPATIENT)
Dept: ADMINISTRATIVE | Facility: OTHER | Age: 77
End: 2017-09-11

## 2017-09-11 NOTE — PROGRESS NOTES
This LCSW was out of the office 9/8.  LCSW completed a chart review of oncology visit 9/5 and oncologist and pt/family agreed upon Hospice.  LCSW spoke with wife for follow up and she reports that pt was admitted to [facility] at home on Friday, 9/8.  Wife has a meeting with the Hospice SW this week and LCSW discussed Oncology SW's being accessible to pt and family as needed.  Wife feels that she is adjusting appropriately and counts on her gibson.   LCSW provided both of their names:  Shannen Armentaaney and Holly Jarocho and wife has clinic number to reach them if needed.  Wife verbalized being well supported by family and their intent is to keep pt at home.  Wife in agreement for LCSW to close case at this time with goal met.  LCSW updated OPCM RN/Briana Boyle.  LCSW sent inbasket msg to Oncology SW's as well requesting family contact.

## 2017-09-12 ENCOUNTER — TELEPHONE (OUTPATIENT)
Dept: HEMATOLOGY/ONCOLOGY | Facility: CLINIC | Age: 77
End: 2017-09-12

## 2017-09-12 ENCOUNTER — OUTPATIENT CASE MANAGEMENT (OUTPATIENT)
Dept: ADMINISTRATIVE | Facility: OTHER | Age: 77
End: 2017-09-12

## 2017-09-12 NOTE — PROGRESS NOTES
"9/12/17  Chart reviewed patient was admitted to [facility] at home on Friday, 9/8.  Wife has a meeting with the Hospice SW this week and Oncology SW in contact with patient's spouse. Spoke with spouse Anna, states patient is doing "OK', staying mainly sitting up on couch, propped up with pillows. [facility] hospice company has offered to get hospital bed but she feels he does not need it at this time. Has all resources. Case closed.     Plan:  1. Cancer - controlling diarrhea, caregiver burnout, resorces  2. Fall prevention  3. DME - light weight W/C - order placed 8/17/17; DME will fax it on 8/22/17 to provider  4. RX: complete med rec and see if RX for Flomax received  5. Referral Ladi Lowe OPCM SW  "

## 2017-09-12 NOTE — TELEPHONE ENCOUNTER
SW called and f/u with pt's wife who explained they were in the process of switching care to hospice through [facility]. Wife explained that a SW from [facility] has been to their home twice, but they are waiting for a nurse to come out. Wife reported they will continue to see Dr. Leon for f/u as needed. Wife appreciated the call but she indicated no needs at this time. SW provided her with our contact info and encouraged her to call if she needed anything.

## 2017-09-20 ENCOUNTER — OUTPATIENT CASE MANAGEMENT (OUTPATIENT)
Dept: ADMINISTRATIVE | Facility: OTHER | Age: 77
End: 2017-09-20

## 2017-09-20 NOTE — PROGRESS NOTES
Please note this patient was mailed an Outpatient Case Management Patient Satisfaction Discharge Survey on 9/20/2017.    Please contact [facility] at ext. [phone] with any questions.    Thank you,  Joselin Chavis

## 2017-10-13 ENCOUNTER — OFFICE VISIT (OUTPATIENT)
Dept: HEMATOLOGY/ONCOLOGY | Facility: CLINIC | Age: 77
End: 2017-10-13
Payer: MEDICARE

## 2017-10-13 VITALS
OXYGEN SATURATION: 90 % | TEMPERATURE: 97 F | SYSTOLIC BLOOD PRESSURE: 100 MMHG | RESPIRATION RATE: 22 BRPM | HEART RATE: 113 BPM | DIASTOLIC BLOOD PRESSURE: 67 MMHG | HEIGHT: 65 IN | WEIGHT: 157 LBS | BODY MASS INDEX: 26.16 KG/M2

## 2017-10-13 DIAGNOSIS — C78.00 MALIGNANT NEOPLASM METASTATIC TO LUNG, UNSPECIFIED LATERALITY: ICD-10-CM

## 2017-10-13 DIAGNOSIS — C34.92 BRONCHIOLO-ALVEOLAR ADENOCARCINOMA OF LEFT LUNG: Primary | ICD-10-CM

## 2017-10-13 DIAGNOSIS — J98.09 BRONCHORRHEA: ICD-10-CM

## 2017-10-13 PROCEDURE — 99214 OFFICE O/P EST MOD 30 MIN: CPT | Mod: GV,S$PBB,, | Performed by: INTERNAL MEDICINE

## 2017-10-13 PROCEDURE — 99999 PR PBB SHADOW E&M-EST. PATIENT-LVL III: CPT | Mod: PBBFAC,,, | Performed by: INTERNAL MEDICINE

## 2017-10-13 PROCEDURE — 99213 OFFICE O/P EST LOW 20 MIN: CPT | Mod: PBBFAC,PO | Performed by: INTERNAL MEDICINE

## 2017-10-13 RX ORDER — HYOSCYAMINE SULFATE 0.12 MG/ML
LIQUID ORAL
COMMUNITY

## 2017-10-13 NOTE — PROGRESS NOTES
Subjective:       Patient ID: Doron Domínguez is a 77 y.o. male.    Chief Complaint: Lung Cancer and Results    HPI 77-year-old male enrolled in hospice with progressive bronchoalveolar carcinoma and marked bronchorrhea.  Patient returns for follow-up ECOG 3    Past Medical History:   Diagnosis Date    Aortic stenosis 12/29/2016    Arthritis     Asthma     Back pain     due to calcium deposits in back    BPH (benign prostatic hyperplasia)     CAD (coronary artery disease)     CAD, multiple vessel 12/29/2016    Cancer     lung    Chronic low back pain     High cholesterol     Hx of CABG 12/29/2016    Hypertension     Lung cancer 01/2017    T3 N0 M0 bronchoalveolar     Family History   Problem Relation Age of Onset    Diabetes Mother     Diabetes Sister     Diabetes Brother      Social History     Social History    Marital status:      Spouse name: N/A    Number of children: N/A    Years of education: 3     Occupational History    Part-time employed      Social History Main Topics    Smoking status: Never Smoker    Smokeless tobacco: Never Used    Alcohol use No    Drug use: No    Sexual activity: No     Other Topics Concern    Not on file     Social History Narrative    Part-time employed, , 3 children.     Past Surgical History:   Procedure Laterality Date    APPENDECTOMY      CARDIAC SURGERY      CORONARY ARTERY BYPASS GRAFT  2012    CABG x 3 at [facility]    LUNG LOBECTOMY Left 01/10/2017    Left lower lobe       Labs:  Lab Results   Component Value Date    WBC 11.86 09/05/2017    HGB 11.0 (L) 09/05/2017    HCT 33.6 (L) 09/05/2017    MCV 94 09/05/2017     09/05/2017     BMP  Lab Results   Component Value Date     09/05/2017    K 4.1 09/05/2017     09/05/2017    CO2 28 09/05/2017    BUN 10 09/05/2017    CREATININE 1.0 09/05/2017    CALCIUM 9.5 09/05/2017    ANIONGAP 10 09/05/2017    ESTGFRAFRICA >60 09/05/2017    EGFRNONAA >60 09/05/2017     Lab Results    Component Value Date    ALT 30 09/05/2017    AST 39 09/05/2017    ALKPHOS 95 09/05/2017    BILITOT 0.4 09/05/2017       Lab Results   Component Value Date    IRON 24 (L) 09/05/2017    TIBC 222 (L) 09/05/2017    FERRITIN 1,163 (H) 09/05/2017     No results found for: [id number]  No results found for: FOLATE  Lab Results   Component Value Date    TSH 0.786 08/02/2017         Review of Systems   Constitutional: Positive for activity change and fatigue. Negative for appetite change, chills, diaphoresis, fever and unexpected weight change.   HENT: Negative for congestion, dental problem, drooling, ear discharge, ear pain, facial swelling, hearing loss, mouth sores, nosebleeds, postnasal drip, rhinorrhea, sinus pressure, sneezing, sore throat, tinnitus, trouble swallowing and voice change.    Eyes: Negative for photophobia, pain, discharge, redness, itching and visual disturbance.   Respiratory: Negative for apnea, cough, choking, chest tightness, shortness of breath, wheezing and stridor.    Cardiovascular: Negative for chest pain, palpitations and leg swelling.   Gastrointestinal: Negative for abdominal distention, abdominal pain, anal bleeding, blood in stool, constipation, diarrhea, nausea, rectal pain and vomiting.   Endocrine: Negative for cold intolerance, heat intolerance, polydipsia, polyphagia and polyuria.   Genitourinary: Negative for decreased urine volume, difficulty urinating, discharge, dysuria, enuresis, flank pain, frequency, genital sores, hematuria, penile pain, penile swelling, scrotal swelling, testicular pain and urgency.   Musculoskeletal: Positive for gait problem. Negative for arthralgias, back pain, joint swelling, myalgias, neck pain and neck stiffness.   Skin: Negative for color change, pallor, rash and wound.   Allergic/Immunologic: Negative for environmental allergies, food allergies and immunocompromised state.   Neurological: Positive for weakness and numbness. Negative for dizziness,  tremors, seizures, syncope, facial asymmetry, speech difficulty, light-headedness and headaches.   Hematological: Negative for adenopathy. Does not bruise/bleed easily.   Psychiatric/Behavioral: Positive for dysphoric mood. Negative for agitation, behavioral problems, confusion, decreased concentration, hallucinations, self-injury, sleep disturbance and suicidal ideas. The patient is nervous/anxious. The patient is not hyperactive.        Objective:      Physical Exam   Constitutional: He is oriented to person, place, and time. He appears cachectic. He has a sickly appearance. He appears ill. He appears distressed.   HENT:   Head: Normocephalic.   Right Ear: External ear normal.   Left Ear: External ear normal.   Nose: Nose normal. Right sinus exhibits no maxillary sinus tenderness and no frontal sinus tenderness. Left sinus exhibits no maxillary sinus tenderness and no frontal sinus tenderness.   Mouth/Throat: Oropharynx is clear and moist. No oropharyngeal exudate.   Eyes: EOM and lids are normal. Pupils are equal, round, and reactive to light. Right eye exhibits no discharge. Left eye exhibits no discharge. Right conjunctiva is not injected. Right conjunctiva has no hemorrhage. Left conjunctiva is not injected. Left conjunctiva has no hemorrhage. No scleral icterus. Right eye exhibits normal extraocular motion. Left eye exhibits normal extraocular motion.   Neck: Normal range of motion. Neck supple. No JVD present. No tracheal deviation present. No thyromegaly present.   Cardiovascular: Normal rate, regular rhythm and normal heart sounds.    Pulmonary/Chest: No stridor. He is in respiratory distress. He has wheezes. He has rales.   Abdominal: Soft. He exhibits no mass. There is no hepatosplenomegaly, splenomegaly or hepatomegaly. There is no tenderness.   Musculoskeletal: Normal range of motion. He exhibits no edema or tenderness.   Lymphadenopathy:        Head (right side): No posterior auricular and no  occipital adenopathy present.        Head (left side): No posterior auricular and no occipital adenopathy present.     He has no cervical adenopathy.        Right cervical: No superficial cervical, no deep cervical and no posterior cervical adenopathy present.       Left cervical: No superficial cervical, no deep cervical and no posterior cervical adenopathy present.     He has no axillary adenopathy.        Right: No supraclavicular adenopathy present.        Left: No supraclavicular adenopathy present.   Neurological: He is alert and oriented to person, place, and time. He has normal strength. No cranial nerve deficit. Coordination normal.   Skin: Skin is dry. No rash noted. He is not diaphoretic. No cyanosis or erythema. Nails show no clubbing.   Psychiatric: His behavior is normal. Judgment and thought content normal. His mood appears anxious. Cognition and memory are normal. He exhibits a depressed mood.   Vitals reviewed.          Assessment:      1. Bronchiolo-alveolar adenocarcinoma of left lung    2. Malignant neoplasm metastatic to lung, unspecified laterality    3. Bronchorrhea           Plan:   Clearly progressive disease clinically discuss with them pros and cons of repeat imaging studies if were not going to treat would not recommend repeat imaging see back clinically in 6 weeks discussed symptom control and also discussed hospice care with them

## 2018-12-04 NOTE — TELEPHONE ENCOUNTER
"    Reason for Disposition   [1] Fever > 101 F (38.3 C) AND [2] age > 60    Answer Assessment - Initial Assessment Questions  1. TEMPERATURE: "What is the most recent temperature?"  "How was it measured?"       dx'd with lung ca in past 1-2 weeks, on nebs, coughing white foamy stuff last pm, not on chemo yet, T101.5-102.1 or    2. ONSET: "When did the fever start?"       This am   3. SYMPTOMS: "Do you have any other symptoms besides the fever?"  (e.g., sore throat, earache, runny nose, cough, rash, diarrhea, vomiting, abdominal pain, painful urination)     Cough more regular last pm , hasnt eaten today, hasnt done nebs. SOB hasnt changed. No CP  4. CONTACTS: "Does anyone else in the family have an infection?"      Wife with cold sx , ST   5. FEVER TREATMENT: "What have you done so far to treat this fever?" (e.g., medications)      No meds   6. CANCER: "What type of cancer do you have?"      Lung   7. CANCER - TREATMENT: "What cancer treatments have you received?" "When did you last receive them?" (e.g., recent surgery, radiation, or chemotherapy). If triager has access to the patient's medical record, triager should review treatments and administration dates.      Going tues to see dr hi   8. CANCER - NEUTROPENIA RISK: "Have you received chemotherapy recently? If Yes, "When was it and what was your last CBC and ANC (absolute neutrophil count)?" "Were you told that your white cell count was low?" If triager has access to the patient's medical record, triager should review most recent labs. An ANC less than 1,000 - 1,500 means that the neutrophils are low and the immune system is weak.      Not started    Protocols used:  CANCER - FEVER-A-AH  able to stand, no diarrhea, no vomiting. Drank less than 4 oz of water, passed uop three times since woke.c/o HA, tylenol 2-325 mg or 2-500 mg tylenol q4-6 hours.    Spoke with MD on call - pt needs to go to ED today. Spouse notified.   " Nursing Note by Antionette Diaz CMA at 04/11/18 08:16 AM     Author:  Antionette Diaz CMA Service:  (none) Author Type:  Certified Medical Assistant     Filed:  04/11/18 08:17 AM Encounter Date:  4/11/2018 Status:  Signed     :  Antionette Diaz CMA (Certified Medical Assistant)            If provider orders tests or biopsy at today's visit, patient would like to be contacted with results at   [phone] (cell) and it is[DJ1.1T] ok[DJ1.1M] to leave a detailed message on voice mail or with family member.  If medications are ordered at today's visit, the pharmacy name/location patient would like them to be sent to is    [facility]  [address] ([address])[DJ1.1T]      Revision History        User Key Date/Time User Provider Type Action    > DJ1.1 04/11/18 08:17 AM Antionette Diaz CMA Certified Medical Assistant Sign    M - Manual, T - Template

## 2019-08-03 NOTE — PLAN OF CARE
Problem: Chemotherapy Effects (Adult)  Goal: Signs and Symptoms of Listed Potential Problems Will be Absent, Minimized or Managed (Chemotherapy Effects)  Signs and symptoms of listed potential problems will be absent, minimized or managed by discharge/transition of care (reference Chemotherapy Effects (Adult) CPG).    Outcome: Ongoing (interventions implemented as appropriate)  Denies vomiting.      
Problem: Patient Care Overview  Goal: Plan of Care Review  Outcome: Ongoing (interventions implemented as appropriate)  States he has increased fatigue and has just talked to his employer about retiring.      
Immediate family member

## 2024-09-30 NOTE — PROGRESS NOTES
Follow-up with primary care to ensure resolution of the rash.  Please check your sugars and take your insulin as prescribed.   [facility] -   Critical Care Medicine  Progress Note    Patient Name: Doron Domínguez  MRN: [id number]  Admission Date: 1/10/2017  Hospital Length of Stay: 2 days  Code Status: Full Code  Attending Provider: Negrito Hernandez MD  Primary Care Provider: Omar Ramirez MD   Principal Problem: Malignant neoplasm of lower lobe of left lung    Subjective: confused overnight , improved this am     HPI: s/p op left lower lobe lobectomy for lung cancer, alert, orientated     Hospital/ICU Course: confused overnight, blood pressure improved. Tolerating diet    Interval History/Significant Events: Nocturnal confusion thought to be due to dilaudid. Good pain control this am with epidural. Stopped tamulosin due to hypotension.    Review of Systems   Constitutional: Positive for fatigue. Negative for fever and unexpected weight change.   HENT: Negative for congestion, postnasal drip, rhinorrhea, sinus pressure and sneezing.    Respiratory: Positive for cough and shortness of breath.    Cardiovascular: Positive for chest pain. Negative for palpitations and leg swelling.   Gastrointestinal: Negative for abdominal pain and nausea.   Musculoskeletal: Negative for arthralgias and back pain.   Skin: Negative for rash.   Neurological: Positive for weakness and light-headedness. Negative for dizziness and syncope.   Hematological: Negative for adenopathy. Does not bruise/bleed easily.   Psychiatric/Behavioral: Negative for dysphoric mood and sleep disturbance. The patient is not nervous/anxious.      Objective:     Vital Signs (Most Recent):  Temp: 99.9 °F (37.7 °C) (01/12/17 0700)  Pulse: 84 (01/12/17 0900)  Resp: 16 (01/12/17 0900)  BP: (!) 103/59 (01/12/17 0900)  SpO2: 97 % (01/12/17 0900) Vital Signs (24h Range):  Temp:  [98.2 °F (36.8 °C)-100.1 °F (37.8 °C)] 99.9 °F (37.7 °C)  Pulse:  [71-87] 84  Resp:  [10-24] 16  BP: ()/(35-76) 103/59     Weight: 78 kg (171 lb 15.3 oz)  Body mass index is 28.18  kg/(m^2).      Intake/Output Summary (Last 24 hours) at 01/12/17 1020  Last data filed at 01/12/17 0900   Gross per 24 hour   Intake             2654 ml   Output             2795 ml   Net             -141 ml       Physical Exam   Constitutional: He is oriented to person, place, and time. He appears well-developed and well-nourished.   HENT:   Head: Normocephalic and atraumatic.   Eyes: Conjunctivae are normal. Pupils are equal, round, and reactive to light.   Neck: Neck supple. No JVD present. No tracheal deviation present. No thyromegaly present.   Cardiovascular: Normal rate, regular rhythm and normal heart sounds.    Pulmonary/Chest: Effort normal. He has decreased breath sounds in the left lower field. He has rales in the left lower field.       Abdominal: Soft.   Musculoskeletal: Normal range of motion.   Lymphadenopathy:     He has no cervical adenopathy.   Neurological: He is alert and oriented to person, place, and time.   Skin: Skin is warm and dry.   Nursing note and vitals reviewed.      Vents:  Oxygen Concentration (%): 32 (01/12/17 0049)    Lines/Drains/Airways     Drain                 Chest Tube 01/10/17 1320 1 Left Pleural 36 Fr. 1 day         Chest Tube 01/10/17 1321 2 Left Pleural 32 Fr. 1 day         Urethral Catheter 01/10/17 1115 Latex;Straight-tip 16 Fr. 1 day          Epidural Line                 Neuraxial Analgesia/Anesthesia Assessment (using dermatomes) Epidural 01/10/17 0850 2 days          Peripheral Intravenous Line                 Peripheral IV - Single Lumen 01/11/17 2000 Right Upper Arm less than 1 day                Significant Labs:    CBC/Anemia Profile:    Recent Labs  Lab 01/10/17  1435 01/10/17  2104 01/11/17  0326 01/12/17  0430   WBC 14.34*  --  11.60 15.49*   HGB 13.3*  --  11.8* 13.0*   HCT 39.5* 37 35.9* 39.3*     --  202 197   MCV 93  --  94 95   RDW 13.9  --  14.0 13.9        Chemistries:    Recent Labs  Lab 01/10/17  1435 01/11/17  0326 01/12/17  0430     136 134*   K 4.1 4.1 4.0    105 101   CO2 23 24 27   BUN 14 14 9   CREATININE 0.9 1.0 0.9   CALCIUM 8.6* 8.5* 8.8   ALBUMIN  --   --  2.7*   PROT  --   --  6.0   BILITOT  --   --  1.0   ALKPHOS  --   --  64   ALT  --   --  27   AST  --   --  48*       BMP:   Recent Labs  Lab 01/12/17  0430   *   *   K 4.0      CO2 27   BUN 9   CREATININE 0.9   CALCIUM 8.8     CMP:   Recent Labs  Lab 01/10/17  1435 01/11/17  0326 01/12/17  0430    136 134*   K 4.1 4.1 4.0    105 101   CO2 23 24 27   * 155* 138*   BUN 14 14 9   CREATININE 0.9 1.0 0.9   CALCIUM 8.6* 8.5* 8.8   PROT  --   --  6.0   ALBUMIN  --   --  2.7*   BILITOT  --   --  1.0   ALKPHOS  --   --  64   AST  --   --  48*   ALT  --   --  27   ANIONGAP 8 7* 6*   EGFRNONAA >60 >60 >60       Significant Imaging:  CXR: I have reviewed all pertinent results/findings within the past 24 hours and my personal findings are:  stable post op    Assessment/Plan:     Active Diagnoses:    Diagnosis Date Noted POA    PRINCIPAL PROBLEM:  Malignant neoplasm of lower lobe of left lung [C34.32] 12/23/2016 Yes    Primary lung cancer [C34.90] 01/10/2017 Yes      Problems Resolved During this Admission:    Diagnosis Date Noted Date Resolved POA        Critical Care Medicine Daily Checklist:    A: Awake: RASS Goal/Actual Goal:    Actual: Subramanian Agitation Sedation Scale (RASS): Alert and calm   B: Spontaneous Breathing Trial Performed?     C: SAT & SBT Coordinated?  na                      D: Delirium: CAM-ICU     E: Early Mobility Performed? Yes   F: Feeding Goal:    Status:     Current Diet Order   Procedures    Diet clear liquid      AS: Analgesia/Sedation adequate   T: Thromboembolic Prophylaxis Mechanical due to epidural;   H: HOB > 300 Yes   U: Stress Ulcer Prophylaxis (if needed) Y   G: Glucose Control good   B: Bowel Function     I: Indwelling Catheter (Lines & Almaraz) Necessity Post op monitoring   D: De-escalation of  Antimicrobials/Pharmacotherapies na    Plan for the day/ETD trasnfer to telemetry    Code Status:  Family/Goals of Care: Full Code  discussed     Critical Care Time: 40 min  Critical secondary to Patient has a condition that poses threat to life and bodily function: post op thoracotomy  Patient is currently receiving parenteral controlled substances: Dilaudid and epidural      Critical care was time spent personally by me on the following activities: development of treatment plan with patient or surrogate and bedside caregivers, discussions with consultants, evaluation of patient's response to treatment, examination of patient, ordering and performing treatments and interventions, ordering and review of laboratory studies, ordering and review of radiographic studies, pulse oximetry, re-evaluation of patient's condition.  This critical care time did not overlap with that of any other provider or involve time for any procedures.     Dante Massey MD  Critical Care Medicine  [facility] [place]

## 2025-01-03 NOTE — ASSESSMENT & PLAN NOTE
- C diff pending     
- Care discussed with Oncology.  --Currently on Chemo, with Left Chest Mediport     
- improved   - monitor LFT's     
- improved   - monitor LFT's     
- improved   -NS IV fluids  -CMP in AM  -monitor.     
- monitor   - replete as needed    
- monitor   - replete as needed    
-- Continue BB, BP meds with parameters.     
-Admitted   -Blood Cultures and Sputum Cultures Pending  -Broad Spectum antx (vanc, zoysn, cipro)  -Inpatient Resp. Therapy Consult   -CBC in AM    
-Admitted   -Blood Cultures and Sputum Cultures Pending  -Broad Spectum antx (vanc, zoysn, cipro)  -Inpatient Resp. Therapy Consult   -CBC in AM    
-Admitted   -Blood Cultures and Sputum Cultures Pending  -Broad Spectum antx (vanc, zoysn, cipro) for presumed pseudo. PNE.   -Inpatient Resp. Therapy Consult   -CBC in AM    
-Care discussed with Oncology.  --Currently on Chemo, with Left Chest Mediport     
-Care discussed with Oncology.  --Currently on Chemo, with Left Chest Mediport     
-Echo in AM and consider cardiology consult pending results.  -Current BNP less than 100.  -Last echo in 2016, patient with history of Cardiac Bypass and current Chemo patient.       
-Echo showed normal EF  -Current BNP less than 100.  -Last echo in 2016, patient with history of Cardiac Bypass and current Chemo patient.       
-LFTs slightly elevated, Monitor LFTs    
-Monitor  -Echo pending   -CBC, CMP in AM    
-Monitor  -Echo showed normal EF   -CBC, CMP in AM    
-NS IV fluids  -CMP in AM  -monitor.     
-NS IV fluids  -CMP in AM  -monitor.     
-Replete.   -Monitor.     
-Urine Culture pending  -Triple abx coverage  -Monitor.     
Baseline cr <1  Non-oliguric  Possible etiologies- sepsis, post renal, drug induced ( carboplatin)  UA,UPC,Urine sodium,renal US  Check post void urine      
C/w Levaquin  Urine cx NGTD  Obstructive uropathy sec to BPH likely contributing to UTI  Pt would benefit with Urology f/u as out pt    
C/w flomax    
C/w flomax 0.8  Out pt Urology f/u    
Continue broad spectrum abx, cipro/zosyn/vanc  Supportive care  
Creatinine has gone up fom normal to 2.1  Hospital medicine Will have this addressed by Hospital medicine  
Creatinine ws normal prior to admission, went up as high as 2.1, yesterday 2.0  Today;s report pending     Will have this addressed by [facility] medicine  
Creatinine1.9  Will have this addressed by [facility] medicine  
Doing better.Has remained afebrile and x rays show improvement. Once discharged, needs to follow up with DR Leon   
Doing better.Has remained afebrile and x rays show improvement. Once discharged, needs to follow up with DR Leon   
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  Imaging studies are amrkedly abnormal with a differential of infection vs tumor progression. Will ask Pulmonary to see. May need a bronchoscopy  
He had a temperature of 102.4 yesterday. He is slightly SOB and X rays shows  significant amount of infiltrates> Will ask Pulmonary to evaluate  
He is afebrile and stable. X rays showed extensive bilateral infiltrates. Based on the natural history of bronchioalveolar carcinoma, the main differential diagnosis is that of pneumonia vs bronchioalveolar carcinoma involving both lungs. He is being treated as if he had pneumonia and seems to have improved to some edtent    
Increase flomax dose to 0.8 mg    
No temp spike in last 24 hrs  Pt feels that his breathing is better since admission but gets short of breath when he walks to the bathroom.  Frequency of BM has decreased  He does report frequent urination and poor stream      
Patient developed significant diarrhea overnight which may be related to antibiotics or possibly C. difficile colitis    --We will order stool studies for C. difficile colitis prior to starting antidiarrheals  --Continue supportive care/IV fluids  
Pt says he diarrhea is improving  C/w Cipro and Flagyl for now  Lomotil prn  
Recovering  C/w Levaquin as out pt  Culture results NGTD    
Resolving  C.diff negative  Flagyl and  Lomotil prn as out pt    
Resolving  C/w Flagyl and Lomotil prn  
Respiratory status continues to improve however the patient has developed significant diarrhea overnight    Continue broad spectrum abx, cipro/zosyn/vanc    
Stable  Baseline cr <1  Non-oliguric, UPC<1,Renal US- normal size kidneys  Possible etiologies- likely combination of sepsis and BPH  Cr may remain elevated for at least 4 weeks after episode of ANGELITO      
Stable  Baseline cr <1  Non-oliguric, UPC<1,Renal US- normal size kidneys  Possible etiologies- likely combination of sepsis and BPH  post void urine by bladder scan is >400 ml  Bladder scan can be unreliable  Pt is on flomax and is not complaining of BPH symptoms now      
Stable  C/w Toprol  Will watch for hypotension re: increasing flomax dose    
Stable  C/w Toprol XL    
Stable  Cultures negative so far  cipro changed to Rocephin    
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    Since broncho-alveloar cancer can present as a diffuse process, the ongoing abnormalities on x rays can represent either pneumonia or extension of cancer.  Currently he is being treated as a pneumonia with at least some initial improvement. Will monitor  
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    Since broncho-alveloar cancer can present as a diffuse process, the ongoing abnormalities on x rays can represent either pneumonia or extension of cancer.  On 8/6/2017 the X-rays showed improvement  Currently he is being treated as a pneumonia with at  improvement. Will monitor  Pulmonary consult requested on 8/87/2017. was asked to continue oral antibiotics upon discharge.  
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    Since broncho-alveloar cancer can present as a diffuse process, the ongoing abnormalities on x rays can represent either pneumonia or extension of cancer.  On 8/6/2017 the X-rays showed improvement  Currently he is being treated as a pneumonia with at least some initial improvement. Will monitor  
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    Once the patient is medically stable the patient would have a CT scan chest abdomen pelvis to see whether not he is had progressive disease.  The patient's options at this time of relapse would be drugs such as Nivolamab although his PD1 level has been undetectable.  The other options would be further systemic chemotherapy in a non curative setting.   
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    Once the patient is medically stable the patient would have a CT scan chest abdomen pelvis to see whether not he is had progressive disease.  The patient's options at this time of relapse would be drugs such as Nivolamab although his PD1 level has been undetectable.  The other options would be further systemic chemotherapy in a non curative setting.   
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  Once the patient is medically stable the patient would have a CT scan chest abdomen pelvis to see whether not he is had progressive disease.  The patient's options at this time of relapse would be drugs such as Nivolamab although his PD1 level has been undetectable.  The other options would be further systemic chemotherapy in a non curative setting.  In the presence of nursing personnel as spoke to both he and his wife this afternoon they have agreed to a no code order we have talked about this extensively in the office and I concur we will try to treat the treatable but understanding that he has a noncurable malignancy  
Will follow as outpatient  
Will follow as outpatient  
Will send for urine culture  Started on Rocephin    
completed 4 cycles of carboplatinum and Taxol   Will f/u Hem/onc recs     
completed 4 cycles of carboplatinum and Taxol   Will f/u Hem/onc recs     
completed 4 cycles of carboplatinum and Taxol   Will set up follow up with  in 2 weeks   
[FreeTextEntry1] : Patient for telehealth follow-up visit today, last telehealth visit on 11/21/2023, in the meanwhile patient canceled few appointments did not show up.  Patient reports over the past year or so, he has overcome a lot of his issues, he is however upset regarding his domestic/personal situation as apparently he has a lot of conflicts with his wife, as per the patient she may be asking him for a separation, But patient reports that his daughter comes to the house regularly and takes care of the bills.  Patient does however report that his mood is better, he no longer feels depressed or has any suicidal thoughts, he has been taking fluoxetine 20 Mg daily.  Patient reports that he occasionally follows up with his PMD and some specialists, he is very tangential, drifts from 1 subject to another, he keeps arguing about his physicians not being much attention, complains that his PMD has not seen him instead he is only seeing his NP.  Upon further questioning, patient reports that he is very sedentary, does not indulge in any physical activity, barely walks.  He reports he had a fall and was admitted to the hospital, however it is not clear if it is in the recent past or few years earlier.  He renumerates the past-his parents and families achievements, also feels well and proud about what his grandchildren have achieved.  He has no clear neurological complaints

## (undated) DEVICE — GLOVE SURG BIOGEL LATEX SZ 7.5

## (undated) DEVICE — SEE MEDLINE ITEM 146420

## (undated) DEVICE — SUT VICRYL 3-0 27 RB-1

## (undated) DEVICE — GAUZE SPONGE 4X4 12PLY

## (undated) DEVICE — CLIP LIGATING TITANIUM MD/LRG

## (undated) DEVICE — NDL SAFETY 25G X 1.5 ECLIPSE

## (undated) DEVICE — DRAPE MOBILE C-ARM

## (undated) DEVICE — CATH THORACIC 32FR ST

## (undated) DEVICE — SYR 10CC LUER LOCK

## (undated) DEVICE — TAPE MEDIPORE 4IN X 2YDS

## (undated) DEVICE — SEE MEDLINE ITEM 154981

## (undated) DEVICE — ADHESIVE MASTISOL VIAL 48/BX

## (undated) DEVICE — STAPLER SKIN PROXIMATE WIDE

## (undated) DEVICE — SEE MEDLINE ITEM 157117

## (undated) DEVICE — SEE MEDLINE ITEM 157027

## (undated) DEVICE — GAUZE PETROLATUM LF 1X36

## (undated) DEVICE — SEE MEDLINE ITEM 152622

## (undated) DEVICE — KIT FIBRIN SEALANT EVICEL 5 ML

## (undated) DEVICE — ENDOAPTH VAS RELOAD WHITE 2.5

## (undated) DEVICE — DRAIN CHEST DRY SUCTION

## (undated) DEVICE — COVER OVERHEAD SURG LT BLUE

## (undated) DEVICE — SUT 4/0 18IN COATED VICRYL

## (undated) DEVICE — SUT VICRYL PLUS 0 CT1 36IN

## (undated) DEVICE — ECHELON FLEX POWERED VAS STPLR

## (undated) DEVICE — SEE MEDLINE ITEM 152739

## (undated) DEVICE — SEE MEDLINE ITEM 157194

## (undated) DEVICE — CATH 36FR THORACIC RT ANGL

## (undated) DEVICE — SEE MEDLINE ITEM 157206

## (undated) DEVICE — ELECTRODE REM PLYHSV RETURN 9

## (undated) DEVICE — SUT VICRYL 3-0 27 SH

## (undated) DEVICE — SUT SILK 0 SH 30IN BLK BR

## (undated) DEVICE — KIT ANTIFOG

## (undated) DEVICE — CLOSURE SKIN STERI STRIP 1/2X4

## (undated) DEVICE — SYR 3CC LUER LOC

## (undated) DEVICE — CONTAINER SPECIMEN STRL 4OZ

## (undated) DEVICE — DRESSING TRANS 4X4 TEGADERM

## (undated) DEVICE — MANIFOLD 4 PORT

## (undated) DEVICE — SUT VICRYL 4-0 27 PS-1 27IN

## (undated) DEVICE — SPONGE IV DRAIN 4X4 STERILE

## (undated) DEVICE — SHEET THYROID W/ISO-BAC

## (undated) DEVICE — DECANTER VIAL ASEPTIC TRANSFER

## (undated) DEVICE — CLIP LIGACLIP XTRA TITANIUM

## (undated) DEVICE — CUTTER PROXIMATE GREEN 75MM

## (undated) DEVICE — SPONGE LAP 18X18 PREWASHED

## (undated) DEVICE — DRAPE STERI INSTRUMENT 1018

## (undated) DEVICE — STAPLER INT PROX TX 60X4.8MM

## (undated) DEVICE — DRAPE ABDOMINAL TIBURON 14X11